# Patient Record
Sex: FEMALE | Race: BLACK OR AFRICAN AMERICAN | NOT HISPANIC OR LATINO
[De-identification: names, ages, dates, MRNs, and addresses within clinical notes are randomized per-mention and may not be internally consistent; named-entity substitution may affect disease eponyms.]

---

## 2016-08-02 RX ORDER — INSULIN LISPRO 100/ML
15 VIAL (ML) SUBCUTANEOUS
Qty: 0 | Refills: 0 | COMMUNITY
Start: 2016-08-02

## 2017-01-11 ENCOUNTER — APPOINTMENT (OUTPATIENT)
Dept: NEUROLOGY | Facility: CLINIC | Age: 57
End: 2017-01-11

## 2017-01-11 VITALS
OXYGEN SATURATION: 98 % | DIASTOLIC BLOOD PRESSURE: 91 MMHG | HEIGHT: 68 IN | HEART RATE: 86 BPM | SYSTOLIC BLOOD PRESSURE: 166 MMHG | RESPIRATION RATE: 16 BRPM

## 2017-01-11 DIAGNOSIS — I63.9 CEREBRAL INFARCTION, UNSPECIFIED: ICD-10-CM

## 2017-04-03 ENCOUNTER — RX RENEWAL (OUTPATIENT)
Age: 57
End: 2017-04-03

## 2017-04-04 ENCOUNTER — RX RENEWAL (OUTPATIENT)
Age: 57
End: 2017-04-04

## 2017-04-05 ENCOUNTER — RX RENEWAL (OUTPATIENT)
Age: 57
End: 2017-04-05

## 2017-04-06 ENCOUNTER — CHART COPY (OUTPATIENT)
Age: 57
End: 2017-04-06

## 2017-06-12 ENCOUNTER — INPATIENT (INPATIENT)
Facility: HOSPITAL | Age: 57
LOS: 2 days | Discharge: HOME | DRG: 74 | End: 2017-06-15
Attending: PSYCHIATRY & NEUROLOGY | Admitting: PSYCHIATRY & NEUROLOGY
Payer: COMMERCIAL

## 2017-06-12 VITALS
DIASTOLIC BLOOD PRESSURE: 86 MMHG | HEART RATE: 88 BPM | SYSTOLIC BLOOD PRESSURE: 144 MMHG | OXYGEN SATURATION: 99 % | TEMPERATURE: 97 F | RESPIRATION RATE: 18 BRPM

## 2017-06-12 DIAGNOSIS — R26.81 UNSTEADINESS ON FEET: ICD-10-CM

## 2017-06-12 DIAGNOSIS — I63.9 CEREBRAL INFARCTION, UNSPECIFIED: ICD-10-CM

## 2017-06-12 DIAGNOSIS — I15.9 SECONDARY HYPERTENSION, UNSPECIFIED: ICD-10-CM

## 2017-06-12 DIAGNOSIS — E11.9 TYPE 2 DIABETES MELLITUS WITHOUT COMPLICATIONS: ICD-10-CM

## 2017-06-12 LAB
ALBUMIN SERPL ELPH-MCNC: 4.7 G/DL — SIGNIFICANT CHANGE UP (ref 3.3–5)
ALP SERPL-CCNC: 73 U/L — SIGNIFICANT CHANGE UP (ref 40–120)
ALT FLD-CCNC: 11 U/L RC — SIGNIFICANT CHANGE UP (ref 10–45)
ANION GAP SERPL CALC-SCNC: 15 MMOL/L — SIGNIFICANT CHANGE UP (ref 5–17)
APPEARANCE UR: CLEAR — SIGNIFICANT CHANGE UP
AST SERPL-CCNC: 13 U/L — SIGNIFICANT CHANGE UP (ref 10–40)
BACTERIA # UR AUTO: ABNORMAL /HPF
BASOPHILS # BLD AUTO: 0 K/UL — SIGNIFICANT CHANGE UP (ref 0–0.2)
BASOPHILS NFR BLD AUTO: 0.3 % — SIGNIFICANT CHANGE UP (ref 0–2)
BILIRUB SERPL-MCNC: 0.4 MG/DL — SIGNIFICANT CHANGE UP (ref 0.2–1.2)
BILIRUB UR-MCNC: NEGATIVE — SIGNIFICANT CHANGE UP
BUN SERPL-MCNC: 23 MG/DL — SIGNIFICANT CHANGE UP (ref 7–23)
CALCIUM SERPL-MCNC: 10.7 MG/DL — HIGH (ref 8.4–10.5)
CHLORIDE SERPL-SCNC: 101 MMOL/L — SIGNIFICANT CHANGE UP (ref 96–108)
CO2 SERPL-SCNC: 26 MMOL/L — SIGNIFICANT CHANGE UP (ref 22–31)
COLOR SPEC: YELLOW — SIGNIFICANT CHANGE UP
COMMENT - URINE: SIGNIFICANT CHANGE UP
CREAT SERPL-MCNC: 1.23 MG/DL — SIGNIFICANT CHANGE UP (ref 0.5–1.3)
DIFF PNL FLD: NEGATIVE — SIGNIFICANT CHANGE UP
EOSINOPHIL # BLD AUTO: 0.3 K/UL — SIGNIFICANT CHANGE UP (ref 0–0.5)
EOSINOPHIL NFR BLD AUTO: 3.2 % — SIGNIFICANT CHANGE UP (ref 0–6)
EPI CELLS # UR: SIGNIFICANT CHANGE UP /HPF
GAS PNL BLDV: SIGNIFICANT CHANGE UP
GLUCOSE SERPL-MCNC: 131 MG/DL — HIGH (ref 70–99)
GLUCOSE UR QL: NEGATIVE — SIGNIFICANT CHANGE UP
HCT VFR BLD CALC: 34.2 % — LOW (ref 34.5–45)
HGB BLD-MCNC: 11.5 G/DL — SIGNIFICANT CHANGE UP (ref 11.5–15.5)
KETONES UR-MCNC: NEGATIVE — SIGNIFICANT CHANGE UP
LEUKOCYTE ESTERASE UR-ACNC: ABNORMAL
LYMPHOCYTES # BLD AUTO: 2.4 K/UL — SIGNIFICANT CHANGE UP (ref 1–3.3)
LYMPHOCYTES # BLD AUTO: 30 % — SIGNIFICANT CHANGE UP (ref 13–44)
MAGNESIUM SERPL-MCNC: 1.9 MG/DL — SIGNIFICANT CHANGE UP (ref 1.6–2.6)
MCHC RBC-ENTMCNC: 29.2 PG — SIGNIFICANT CHANGE UP (ref 27–34)
MCHC RBC-ENTMCNC: 33.6 GM/DL — SIGNIFICANT CHANGE UP (ref 32–36)
MCV RBC AUTO: 86.9 FL — SIGNIFICANT CHANGE UP (ref 80–100)
MONOCYTES # BLD AUTO: 0.5 K/UL — SIGNIFICANT CHANGE UP (ref 0–0.9)
MONOCYTES NFR BLD AUTO: 5.8 % — SIGNIFICANT CHANGE UP (ref 2–14)
NEUTROPHILS # BLD AUTO: 4.8 K/UL — SIGNIFICANT CHANGE UP (ref 1.8–7.4)
NEUTROPHILS NFR BLD AUTO: 60.6 % — SIGNIFICANT CHANGE UP (ref 43–77)
NITRITE UR-MCNC: NEGATIVE — SIGNIFICANT CHANGE UP
PH UR: 5.5 — SIGNIFICANT CHANGE UP (ref 5–8)
PHOSPHATE SERPL-MCNC: 3.5 MG/DL — SIGNIFICANT CHANGE UP (ref 2.5–4.5)
PLATELET # BLD AUTO: 223 K/UL — SIGNIFICANT CHANGE UP (ref 150–400)
POTASSIUM SERPL-MCNC: 4.1 MMOL/L — SIGNIFICANT CHANGE UP (ref 3.5–5.3)
POTASSIUM SERPL-SCNC: 4.1 MMOL/L — SIGNIFICANT CHANGE UP (ref 3.5–5.3)
PROT SERPL-MCNC: 8.5 G/DL — HIGH (ref 6–8.3)
PROT UR-MCNC: SIGNIFICANT CHANGE UP
RBC # BLD: 3.94 M/UL — SIGNIFICANT CHANGE UP (ref 3.8–5.2)
RBC # FLD: 11.5 % — SIGNIFICANT CHANGE UP (ref 10.3–14.5)
RBC CASTS # UR COMP ASSIST: SIGNIFICANT CHANGE UP /HPF (ref 0–2)
SODIUM SERPL-SCNC: 142 MMOL/L — SIGNIFICANT CHANGE UP (ref 135–145)
SP GR SPEC: 1.02 — SIGNIFICANT CHANGE UP (ref 1.01–1.02)
TSH SERPL-MCNC: 0.92 UIU/ML — SIGNIFICANT CHANGE UP (ref 0.27–4.2)
UROBILINOGEN FLD QL: NEGATIVE — SIGNIFICANT CHANGE UP
WBC # BLD: 8 K/UL — SIGNIFICANT CHANGE UP (ref 3.8–10.5)
WBC # FLD AUTO: 8 K/UL — SIGNIFICANT CHANGE UP (ref 3.8–10.5)
WBC UR QL: SIGNIFICANT CHANGE UP /HPF (ref 0–5)

## 2017-06-12 PROCEDURE — 99285 EMERGENCY DEPT VISIT HI MDM: CPT | Mod: 25

## 2017-06-12 PROCEDURE — 71010: CPT | Mod: 26

## 2017-06-12 PROCEDURE — 93010 ELECTROCARDIOGRAM REPORT: CPT

## 2017-06-12 PROCEDURE — 70450 CT HEAD/BRAIN W/O DYE: CPT | Mod: 26

## 2017-06-12 RX ORDER — FLUOXETINE HCL 10 MG
20 CAPSULE ORAL DAILY
Qty: 0 | Refills: 0 | Status: DISCONTINUED | OUTPATIENT
Start: 2017-06-12 | End: 2017-06-15

## 2017-06-12 RX ORDER — DEXTROSE 50 % IN WATER 50 %
25 SYRINGE (ML) INTRAVENOUS ONCE
Qty: 0 | Refills: 0 | Status: DISCONTINUED | OUTPATIENT
Start: 2017-06-12 | End: 2017-06-15

## 2017-06-12 RX ORDER — ENOXAPARIN SODIUM 100 MG/ML
40 INJECTION SUBCUTANEOUS DAILY
Qty: 0 | Refills: 0 | Status: DISCONTINUED | OUTPATIENT
Start: 2017-06-12 | End: 2017-06-15

## 2017-06-12 RX ORDER — ATORVASTATIN CALCIUM 80 MG/1
80 TABLET, FILM COATED ORAL AT BEDTIME
Qty: 0 | Refills: 0 | Status: DISCONTINUED | OUTPATIENT
Start: 2017-06-12 | End: 2017-06-15

## 2017-06-12 RX ORDER — DEXTROSE 50 % IN WATER 50 %
12.5 SYRINGE (ML) INTRAVENOUS ONCE
Qty: 0 | Refills: 0 | Status: DISCONTINUED | OUTPATIENT
Start: 2017-06-12 | End: 2017-06-15

## 2017-06-12 RX ORDER — SODIUM CHLORIDE 9 MG/ML
1000 INJECTION, SOLUTION INTRAVENOUS
Qty: 0 | Refills: 0 | Status: DISCONTINUED | OUTPATIENT
Start: 2017-06-12 | End: 2017-06-15

## 2017-06-12 RX ORDER — SODIUM CHLORIDE 9 MG/ML
1000 INJECTION INTRAMUSCULAR; INTRAVENOUS; SUBCUTANEOUS ONCE
Qty: 0 | Refills: 0 | Status: COMPLETED | OUTPATIENT
Start: 2017-06-12 | End: 2017-06-12

## 2017-06-12 RX ORDER — DEXTROSE 50 % IN WATER 50 %
1 SYRINGE (ML) INTRAVENOUS ONCE
Qty: 0 | Refills: 0 | Status: DISCONTINUED | OUTPATIENT
Start: 2017-06-12 | End: 2017-06-15

## 2017-06-12 RX ORDER — INSULIN LISPRO 100/ML
VIAL (ML) SUBCUTANEOUS
Qty: 0 | Refills: 0 | Status: DISCONTINUED | OUTPATIENT
Start: 2017-06-12 | End: 2017-06-15

## 2017-06-12 RX ORDER — GLUCAGON INJECTION, SOLUTION 0.5 MG/.1ML
1 INJECTION, SOLUTION SUBCUTANEOUS ONCE
Qty: 0 | Refills: 0 | Status: DISCONTINUED | OUTPATIENT
Start: 2017-06-12 | End: 2017-06-15

## 2017-06-12 RX ORDER — AMLODIPINE BESYLATE 2.5 MG/1
5 TABLET ORAL DAILY
Qty: 0 | Refills: 0 | Status: DISCONTINUED | OUTPATIENT
Start: 2017-06-12 | End: 2017-06-15

## 2017-06-12 RX ORDER — CLOPIDOGREL BISULFATE 75 MG/1
75 TABLET, FILM COATED ORAL DAILY
Qty: 0 | Refills: 0 | Status: DISCONTINUED | OUTPATIENT
Start: 2017-06-12 | End: 2017-06-15

## 2017-06-12 RX ADMIN — SODIUM CHLORIDE 1000 MILLILITER(S): 9 INJECTION INTRAMUSCULAR; INTRAVENOUS; SUBCUTANEOUS at 16:11

## 2017-06-12 NOTE — CONSULT NOTE ADULT - SUBJECTIVE AND OBJECTIVE BOX
Neurology Consult    Patient is a 57y old  Female who presents with a chief complaint of unsteady gait    HPI:  57yoF pmh DM2, HTN, CVA/TIA (s/p loop monitor, on plavix, residual R sided weakness) comes to ED noting 2-3 weeks of gen weakness/fatigue and 1-2 day of unsteady walking. As per patient has minor residual R sided weakness, but over past few days feels off balance when walking. Denies any falls, headache, blurred vision, dysarthria, or facial droop. Patient has been following Dr. Echevarria and Dr. Del Angel as o/p for vascular work up and loop monitoring which has been uneventful. Patient endorses also feeling dehydrated, and less ambulatory recently.    NIHSS MRS 1      PAST MEDICAL & SURGICAL HISTORY:  Hypokalemia  Diabetes Mellitus  Personal History of Hypertension  IBS (Irritable Bowel Syndrome)  Generalized Headaches  Diabetes Mellitus Type II  Hypertension  History of total knee replacement, right  No Past Surgical History      Allergies    sulfa drugs (Angioedema; Swelling)  sulfa drugs (Rash)  Sulfac 10% (Unknown)    Intolerances        MEDICATIONS  (STANDING):    MEDICATIONS  (PRN):      Social History: Denies tob, EtOH use     FAMILY HISTORY:  Family history of acute myocardial infarction (Father)      Review of Systems:  CONSTITUTIONAL:  No weight loss, fever, chills, weakness or fatigue.  HEENT:  Eyes:  No visual loss, blurred vision, double vision or yellow sclerae. Ears, Nose, Throat:  No hearing loss, sneezing, congestion, runny nose or sore throat.  SKIN:  No rash or itching.  CARDIOVASCULAR:  No chest pain, chest pressure or chest discomfort. No palpitations or edema.  RESPIRATORY:  No shortness of breath, cough or sputum.  GASTROINTESTINAL:  No anorexia, nausea, vomiting or diarrhea. No abdominal pain or blood.  GENITOURINARY:  Burning on urination. Pregnancy. Last menstrual period, MM/DD/YYYY.  NEUROLOGICAL:  No headache, dizziness, syncope, paralysis, ataxia, numbness or tingling in the extremities. No change in bowel or bladder control.  MUSCULOSKELETAL:  No muscle, back pain, joint pain or stiffness.  HEMATOLOGIC:  No anemia, bleeding or bruising.  LYMPHATICS:  No enlarged nodes. No history of splenectomy.  PSYCHIATRIC:  No history of depression or anxiety.  ENDOCRINOLOGIC:  No reports of sweating, cold or heat intolerance. No polyuria or polydipsia.      Physical Exam:   Vital Signs Last 24 Hrs  T(C): 36.1, Max: 36.1 ( @ 12:46)  T(F): 97, Max: 97 ( @ 12:46)  HR: 88 (88 - 88)  BP: 144/86 (144/86 - 144/86)  BP(mean): --  RR: 18 (18 - 18)  SpO2: 99% (99% - 99%)    General Exam:   General appearance: No acute distress                 Neurological Exam:  Mental Status: AAOx3, fluent speech, follows simple commands, able to name  Cranial Nerves: EOMI, PERRL, VFF, V1-V3 intact, facial symmetric, no dysarthria, tongue midline  Motor: strength 5/5 throughout. No drift x4  Sensation: Intact to LT throughout  Coordination: FTN intact b/l  Reflexes: 1+ bilateral biceps, brachioradialis, patellar and ankle  Gait: normal and stable.      Labs:                           11.5   8.0   )-----------( 223      ( 2017 16:24 )             34.2     -12    142  |  101  |  23  ----------------------------<  131<H>  4.1   |  26  |  1.23    Ca    10.7<H>      2017 16:24  Phos  3.5     -12  Mg     1.9     12    TPro  8.5<H>  /  Alb  4.7  /  TBili  0.4  /  DBili  x   /  AST  13  /  ALT  11  /  AlkPhos  73  06-12    LIVER FUNCTIONS - ( 2017 16:24 )  Alb: 4.7 g/dL / Pro: 8.5 g/dL / ALK PHOS: 73 U/L / ALT: 11 U/L RC / AST: 13 U/L / GGT: x             Urinalysis Basic - ( 2017 18:22 )    Color: Yellow / Appearance: Clear / S.023 / pH: x  Gluc: x / Ketone: Negative  / Bili: Negative / Urobili: Negative   Blood: x / Protein: Trace / Nitrite: Negative   Leuk Esterase: Small / RBC: 0-2 /HPF / WBC 3-5 /HPF   Sq Epi: x / Non Sq Epi: OCC /HPF / Bacteria: Few /HPF

## 2017-06-12 NOTE — ED PROVIDER NOTE - ATTENDING CONTRIBUTION TO CARE
****ATTENDING**** 56yo f hx CVA on plavix, DM, HTN pw weakness. Patient states she had a viral infection 10 days ago and since than has diffuse join pain, muscle cramps and increasing R sided weakness. States she has had residual weakness since last year now worsened over 10 days. No ha, neck pain, visual changes. No change in bladder or bowel. On exam, Patient is awake,alert,oriented x 3. Patient is well appearing and in no acute distress. Patient's chest is clear to ausculation, +s1s2. Abdomen is soft nd/nt +BS. Extremity with no swelling or calf tenderness. Joints no swelling. RUE 5/5, RLE 5/5, Gait with increasing foot dragging on RLE.   Patient well appearing, feels she may be dehydrated causing worsening weakness. Check Labs, CT head, Treat with IV fluids and re eval.

## 2017-06-12 NOTE — ED ADULT NURSE NOTE - PMH
Diabetes Mellitus Type II    Generalized Headaches    Hypertension    Hypokalemia    IBS (Irritable Bowel Syndrome)

## 2017-06-12 NOTE — H&P ADULT - NSHPLABSRESULTS_GEN_ALL_CORE
No CT evidence for acute infarct or acute hemorrhage. A chronic left   pontine/midbrain infarct is noted. If the patient has new and persistent   symptoms, consider short interval follow-up head CT or brain MRI   follow-up.

## 2017-06-12 NOTE — ED ADULT NURSE NOTE - OBJECTIVE STATEMENT
pt presents with unsteady gait, and "not feeling herself" x 2 weeks progressively becoming worse, pt also c/o joint pain, pt A;Ox3, no acute resp distress noted ,no URI symptoms,  v/s stable, no abd pain, reports watery stools x 2, no blood noted, voids freely, no UTI symptoms noted will continue to monitor

## 2017-06-12 NOTE — H&P ADULT - PROBLEM SELECTOR PLAN 1
Ct H negative  MRI Brain w/o contrast  MRA H/N  TTE  Hba1c/Lipid Profile  PT/OT  Neurochecks q4h  If MRI negative, consider EMG and foot brace

## 2017-06-12 NOTE — H&P ADULT - ATTENDING COMMENTS
I have seen and examined this patient with the stroke neurology team.     History was reviewed with the patient and available family members.   ROS: All negative except documented in the HPI.   Neurological exam was performed and agree with exam as documented above.   Laboratory results and imaging studies were reviewed by me.   I agree with the neurology resident note as documented above.    58 Y/O woman with multiple vascular risk factors including HTN, DM II, age and stroke in 2016 with residual R sided weakness is admitted to [facility] for evaluation and management of R foot weakness. She reports to have noticed R foot weakness – mainly described as inability to lift the foot up off the ground while walking since last week. Her R arm and proximal R leg weakness is reported to be the same as before and she also denies any recent change in the bowel or bladder habits. Her neurological exam today shows mild RUE weakness (5-/5), RLE weakness (HF 5/5, KE 5/5, KF 4+/5, FDF 3/5, Foot inv and daniela 4-/5 and FPF 4/5) and decreased PP over the dorsum of the R foot. MRI brain did not show any acute stroke but showed evidence of old midbrain/pontine stroke with wallerian degeneration changes as well as multiple old micro-hemorrhages in the deep  territories. MRA H/N did not show any intracranial or extracranial large vessel severe stenosis or occlusion. Impression: R foot weakness – likely etiology being L4-L5 radiculopathy once the possibility of MICROS (Metabolic insufficiency causing reactivation of old stroke symptoms) is ruled out. Plan: Continue with Plavix and atorvastatin as per home dose for secondary stroke prevention. Continue with aggressive vascular risk factors modifications. PT/OT/Speech and swallow evaluation. MRI L spine to look for evidence of spinal canal stenosis or neural foraminal narrowing.     Above mentioned plan was discussed with patient in detail. All the questions were answered and concerns were addressed.

## 2017-06-12 NOTE — H&P ADULT - HISTORY OF PRESENT ILLNESS
57yoF pmh DM2, HTN, CVA/TIA (s/p loop monitor, on plavix, residual R sided weakness) comes to ED noting 2-3 weeks of gen weakness/fatigue and 1-2 day of unsteady walking. As per patient has minor residual R sided weakness, but over past few days feels off balance when walking. Patient had fall earlier this week 2/2 weakness. Denies any falls, headache, blurred vision, dysarthria, or facial droop.She does endorse joint pains and fatigue over past week.  Patient has been following Dr. Echevarria and Dr. Del Angel as o/p for vascular work up and loop monitoring which has been uneventful. Patient endorses also feeling dehydrated, and less ambulatory recently.    NIHSS MRS 1 57yoF pmh DM2, HTN, CVA/TIA (s/p loop monitor, on plavix, residual R sided weakness) comes to ED noting 2-3 weeks of gen weakness/fatigue and 1-2 day of unsteady walking. As per patient has minor residual R sided weakness, but over past few days feels off balance when walking. Patient had fall earlier this week 2/2 weakness. Denies any falls, headache, blurred vision, dysarthria, or facial droop.She does endorse joint pains and fatigue over past week.  Patient has been following Dr. Echevarria and Dr. Del Angel as o/p for vascular work up and loop monitoring which has been uneventful. Patient endorses also feeling dehydrated, and less ambulatory recently.    NIHSS3  MRS 1 57yoF pmh DM2, HTN, CVA/TIA (s/p loop monitor, on plavix, residual R sided weakness) comes to ED noting 2-3 weeks of gen weakness/fatigue and 1-2 day of unsteady walking. As per patient has minor residual R sided weakness, but over past few days feels off balance when walking. Patient had fall earlier this week 2/2 weakness. Denies,headache, blurred vision, dysarthria, or facial droop. She does endorse joint pains and fatigue over past week.  Patient has been following Dr. Echevarria and Dr. Del Angel as o/p for vascular work up and loop monitoring which has been uneventful. Patient endorses also feeling dehydrated, and less ambulatory recently.    NIHSS3  MRS 1

## 2017-06-12 NOTE — H&P ADULT - NSHPPHYSICALEXAM_GEN_ALL_CORE
Awake and alert  AOX3, follows commands, normal speech, normal cognition, able to name/repeat/comprehend  EOMi, PERRLA, no nystagmus, no APD,  VVF, V1-V3 normal, no facial assymetry, normal shrug, tongue midline  RUE proximal 4/5, distal 4/5, RLE proximal 4+/5, distal 4/5, + drift, LUE 5/5, LLE 5/5, hyperreflexia 2+ on R, trace on Left side.   Sensation intact to light and deep tough throughout, vibration intact   Gait unsteady 2/2 weakness.

## 2017-06-12 NOTE — H&P ADULT - ASSESSMENT
57yoF pmh DM2, HTN, CVA/TIA (s/p loop monitor, on plavix, residual R sided weakness) comes to ED noting 2-3 weeks of gen weakness/fatigue and 1-2 day of unsteady walking. Patient with h/o CVA L pontine s/p loop, with increase RLE weakness and unsteady gait.  R/o CVA vs. peripheral peroneal nerve palsy.

## 2017-06-12 NOTE — CONSULT NOTE ADULT - ASSESSMENT
57yoF pmh DM2, HTN, CVA/TIA (s/p loop monitor, on plavix, residual R sided weakness) comes to ED noting 2-3 weeks of gen weakness/fatigue and 1-2 day of unsteady walking.

## 2017-06-12 NOTE — ED ADULT NURSE REASSESSMENT NOTE - NS ED NURSE REASSESS COMMENT FT1
patient is resting in the room waiting for dispo. neurology just came to exam the patient. patient states the weakness resolved after the IV fluids. patient is a/ox4. states being back at her baseline. able to move all extremities. denies any fevers, chills, chest pain, sob. patient is slightly htn md is aware.

## 2017-06-12 NOTE — ED PROVIDER NOTE - OBJECTIVE STATEMENT
Detail Level: Zone 57yoF pmh DM2, HTN, CVA/TIA (plaviv, with resiula rue/rle weakenss) comes to ED noting 2-3 weeks of gen weakness/fatigue and 1-2 day of unsteady walking. pt reports no h/a, no loc, no trauma, pt reports decreased appeitite and intermittent watry diarrhea over aopt month. In ED pt reports no f/c, no n/v, no abd pain, no cp/sob/palp/cough, no neck pain, n dysuria. Pt does endorse diffuse body/joint aches. pt reports no known insect/tick bite.

## 2017-06-13 LAB
CULTURE RESULTS: SIGNIFICANT CHANGE UP
SPECIMEN SOURCE: SIGNIFICANT CHANGE UP

## 2017-06-13 PROCEDURE — 72148 MRI LUMBAR SPINE W/O DYE: CPT | Mod: 26

## 2017-06-13 PROCEDURE — 70551 MRI BRAIN STEM W/O DYE: CPT | Mod: 26

## 2017-06-13 PROCEDURE — 99223 1ST HOSP IP/OBS HIGH 75: CPT | Mod: GC

## 2017-06-13 PROCEDURE — 70549 MR ANGIOGRAPH NECK W/O&W/DYE: CPT | Mod: 26

## 2017-06-13 RX ORDER — LISINOPRIL 2.5 MG/1
40 TABLET ORAL DAILY
Qty: 0 | Refills: 0 | Status: DISCONTINUED | OUTPATIENT
Start: 2017-06-13 | End: 2017-06-15

## 2017-06-13 RX ORDER — ASPIRIN/CALCIUM CARB/MAGNESIUM 324 MG
81 TABLET ORAL DAILY
Qty: 0 | Refills: 0 | Status: DISCONTINUED | OUTPATIENT
Start: 2017-06-13 | End: 2017-06-15

## 2017-06-13 RX ADMIN — Medication 81 MILLIGRAM(S): at 14:02

## 2017-06-13 RX ADMIN — Medication 20 MILLIGRAM(S): at 12:23

## 2017-06-13 RX ADMIN — LISINOPRIL 40 MILLIGRAM(S): 2.5 TABLET ORAL at 14:05

## 2017-06-13 RX ADMIN — AMLODIPINE BESYLATE 5 MILLIGRAM(S): 2.5 TABLET ORAL at 06:02

## 2017-06-13 RX ADMIN — ENOXAPARIN SODIUM 40 MILLIGRAM(S): 100 INJECTION SUBCUTANEOUS at 12:23

## 2017-06-13 RX ADMIN — ATORVASTATIN CALCIUM 80 MILLIGRAM(S): 80 TABLET, FILM COATED ORAL at 21:53

## 2017-06-13 RX ADMIN — Medication 1: at 14:02

## 2017-06-13 RX ADMIN — CLOPIDOGREL BISULFATE 75 MILLIGRAM(S): 75 TABLET, FILM COATED ORAL at 12:23

## 2017-06-13 RX ADMIN — Medication 1: at 18:14

## 2017-06-13 NOTE — DISCHARGE NOTE ADULT - CARE PROVIDER_API CALL
Jonah Del Angel (DO), Neurology; Vascular Neurology  [address] [facility] [address] [facility] [address]  [place] [address]  Phone: [phone]  Fax: [phone]

## 2017-06-13 NOTE — DISCHARGE NOTE ADULT - MEDICATION SUMMARY - MEDICATIONS TO TAKE
I will START or STAY ON the medications listed below when I get home from the hospital:    lisinopril 40 mg oral tablet  -- 1 tab(s) by mouth once a day  -- Indication: For Hypertension    FLUoxetine 20 mg oral capsule  -- 1 cap(s) by mouth once a day  -- Indication: For Anxiety    Lantus 100 units/mL subcutaneous solution  -- 15 unit(s) subcutaneous once a day (at bedtime)  -- Indication: For Diabetes    metFORMIN 500 mg oral tablet  -- 1 tab(s) by mouth 2 times a day  -- Indication: For Diabetes    atorvastatin 80 mg oral tablet  -- 1 tab(s) by mouth once a day  -- Indication: For Stroke    clopidogrel 75 mg oral tablet  -- 1 tab(s) by mouth once a day  -- Indication: For Stroke    labetalol 200 mg oral tablet  -- 2 tab(s) by mouth 3 times a day  -- Indication: For Hypertension    amLODIPine 5 mg oral tablet  -- 1 tab(s) by mouth once a day  -- Indication: For Hypertension    baclofen 10 mg oral tablet  -- 1 tab(s) by mouth 3 times a day  -- Indication: For Pain/Spasm    hydrALAZINE 50 mg oral tablet  -- 1 tab(s) by mouth 3 times a day  -- Indication: For Hypertension I will START or STAY ON the medications listed below when I get home from the hospital:    1  -- Physical Therapy  2-3X/week  Dx. Peripheral neuropathy  -- Indication: For neuroapthy    lisinopril 40 mg oral tablet  -- 1 tab(s) by mouth once a day  -- Indication: For Hypertension    FLUoxetine 20 mg oral capsule  -- 1 cap(s) by mouth once a day  -- Indication: For Anxiety    Lantus 100 units/mL subcutaneous solution  -- 15 unit(s) subcutaneous once a day (at bedtime)  -- Indication: For Diabetes    metFORMIN 500 mg oral tablet  -- 1 tab(s) by mouth 2 times a day  -- Indication: For Diabetes    atorvastatin 80 mg oral tablet  -- 1 tab(s) by mouth once a day  -- Indication: For Stroke    clopidogrel 75 mg oral tablet  -- 1 tab(s) by mouth once a day  -- Indication: For Stroke    labetalol 200 mg oral tablet  -- 2 tab(s) by mouth 3 times a day  -- Indication: For Hypertension    amLODIPine 5 mg oral tablet  -- 1 tab(s) by mouth once a day  -- Indication: For Hypertension    baclofen 10 mg oral tablet  -- 1 tab(s) by mouth 3 times a day  -- Indication: For Pain/Spasm    hydrALAZINE 50 mg oral tablet  -- 1 tab(s) by mouth 3 times a day  -- Indication: For Hypertension

## 2017-06-13 NOTE — OCCUPATIONAL THERAPY INITIAL EVALUATION ADULT - LIVES WITH, PROFILE
children/Lives in house with  and 5 children 3 steps to enter with no railing, bed/bath 13 steps/spouse

## 2017-06-13 NOTE — DISCHARGE NOTE ADULT - HOSPITAL COURSE
57yoF pmh DM2, HTN, CVA/TIA (s/p loop monitor, on plavix, residual R sided weakness) comes to ED noting 2-3 weeks of gen weakness/fatigue and 1-2 day of unsteady walking. As per patient has minor residual R sided weakness, but over past few days feels off balance when walking. Patient had fall earlier this week 2/2 weakness. Denies,headache, blurred vision, dysarthria, or facial droop.  Patient was admitted to the neurology unit for observation and work up. Work up included MRI Brain demonstrating no acute stroke, vessel imaging via MRA head/neck demonstrating mild to moderate stenosis.. Patient underwent further evaluation via MRI L/S spine , showing ________. Etiology of patient's symptoms of RLE weakness and unsteady gait were secondary to likely to peripheral neuropathy. Patient underwent blood pressure management, preventative care, and physical therapy recommeding _____________during course of stay. Patient is now stable for follow up as outpatient in 2-4 weeks with Neurology. Patient is to continue medications as directed for stroke prevention. 57yoF pmh DM2, HTN, CVA/TIA (s/p loop monitor, on plavix, residual R sided weakness) comes to ED noting 2-3 weeks of gen weakness/fatigue and 1-2 day of unsteady walking. As per patient has minor residual R sided weakness, but over past few days feels off balance when walking. Patient had fall earlier this week 2/2 weakness. Denies,headache, blurred vision, dysarthria, or facial droop.  Patient was admitted to the neurology unit for observation and work up. Work up included MRI Brain demonstrating no new acute stroke, vessel imaging via MRA head/neck demonstrating mild to moderate stenosis.. Patient underwent further evaluation via MRI L/S spine , showing ________, no acute surgical lesion. Etiology of patient's symptoms of RLE weakness and unsteady gait were secondary to likely to peripheral neuropathy related to DMII, superimposed on baseline right paresis due to stroke.  Pt with diffuse right sided weakness, though now with c/o ankle DF weakness, numbness on dorsum of foot.  Some weakness of eversion, and possibly even glut, raising possibility of common peroneal vs L5 radiculopathy.   Patient underwent blood pressure management, preventative care, and physical therapy recommending _____________during course of stay. Patient is now stable for follow up as outpatient in 2-4 weeks with Neurology. Patient is to continue medications as directed for stroke prevention.  Rec EMG as outpt for clarification of new sensorimotor peripheral findings vs chronic stroke related findings. 57yoF pmh DM2, HTN, CVA/TIA (s/p loop monitor, on plavix, residual R sided weakness) comes to ED noting 2-3 weeks of gen weakness/fatigue and 1-2 day of unsteady walking. As per patient has minor residual R sided weakness, but over past few days feels off balance when walking. Patient had fall earlier this week 2/2 weakness. Denies,headache, blurred vision, dysarthria, or facial droop.  Patient was admitted to the neurology unit for observation and work up. Work up included MRI Brain demonstrating no new acute stroke, vessel imaging via MRA head/neck demonstrating mild to moderate stenosis.. Patient underwent further evaluation via MRI L/S spine , showing Mild degenerative changes without high-grade central canal and neural foraminal comment [place].  Possible T12/L1 right parasagittal disc herniation, no acute surgical lesion. Etiology of patient's symptoms of RLE weakness and unsteady gait were secondary to likely to peripheral neuropathy related to DMII, superimposed on baseline right paresis due to stroke.  Pt with diffuse right sided weakness, though now with c/o ankle DF weakness, numbness on dorsum of foot.  Some weakness of eversion, and possibly even glut, raising possibility of common peroneal vs L5 radiculopathy.   Patient underwent blood pressure management, preventative care, and physical therapy recommending Home with o/p PT and cane for stairs.Patient is now stable for follow up as outpatient in 2-4 weeks with Neurology. Patient is to continue medications as directed for stroke prevention. EMG as outpt for clarification of new sensorimotor peripheral findings vs chronic stroke related findings. 57yoF pmh DM2, HTN, CVA/TIA (s/p loop monitor, on plavix, residual R sided weakness) comes to ED noting 2-3 weeks of gen weakness/fatigue and 1-2 day of unsteady walking. As per patient has minor residual R sided weakness, but over past few days feels off balance when walking. Patient had fall earlier this week 2/2 weakness. Denies,headache, blurred vision, dysarthria, or facial droop.  Patient was admitted to the neurology unit for observation and work up. Work up included MRI Brain demonstrating no new acute stroke, vessel imaging via MRA head/neck demonstrating mild to moderate stenosis.. Patient underwent further evaluation via MRI L/S spine , showing Mild degenerative changes without high-grade central canal and neural foraminal comment [place]. Patient fitted for brace to follow up as o/p for AFO brace.   Possible T12/L1 right parasagittal disc herniation, no acute surgical lesion. Etiology of patient's symptoms of RLE weakness and unsteady gait were secondary to likely to peripheral neuropathy related to DMII, superimposed on baseline right paresis due to stroke.  Pt with diffuse right sided weakness, though now with c/o ankle DF weakness, numbness on dorsum of foot.  Some weakness of eversion, and possibly even glut, raising possibility of common peroneal vs L5 radiculopathy.   Patient underwent blood pressure management, preventative care, and physical therapy recommending Home with o/p PT and cane for stairs.Patient is now stable for follow up as outpatient in 2-4 weeks with Neurology. Patient is to continue medications as directed for stroke prevention. EMG as outpt for clarification of new sensorimotor peripheral findings vs chronic stroke related findings.

## 2017-06-13 NOTE — OCCUPATIONAL THERAPY INITIAL EVALUATION ADULT - ADDITIONAL COMMENTS
MRI Head Redemonstration of  foci of hyperintense T2 foci signal throughout the white matter and lacunar infarcts with multiple abnormal foci of susceptibility on SWI, likely sequela of microvascular disease with   possible prior microhemorrhage or calcification. The size and number has increased slightly compared to the most recent study and with more profound increased when compared to more remote examinations, 8/6/2012. There is no  new restricted diffusion, new extra-axial hemorrhage or midline shift.

## 2017-06-13 NOTE — OCCUPATIONAL THERAPY INITIAL EVALUATION ADULT - ANTICIPATED DISCHARGE DISPOSITION, OT EVAL
Home with assistance/supervision as needed from family, no skilled OT needs, functioning at baseline

## 2017-06-13 NOTE — DISCHARGE NOTE ADULT - PLAN OF CARE
prevention C/w PT/OT as tolerated  F/u o/p Neurology C/w Plavix/Lipitor  F/u o/p Vascular neurology Hba1c <7 C/w home medications  Frequent FS

## 2017-06-13 NOTE — DISCHARGE NOTE ADULT - NS AS DC STROKE ED MATERIALS
Call 911 for Stroke/Prescribed Medications/Need for Followup After Discharge/Stroke Education Booklet/Stroke Warning Signs and Symptoms/Risk Factors for Stroke

## 2017-06-13 NOTE — DISCHARGE NOTE ADULT - PATIENT PORTAL LINK FT
“You can access the [facility] Patient Portal, offered by [facility], by registering with the following website: [email or web address]”

## 2017-06-13 NOTE — DISCHARGE NOTE ADULT - SECONDARY DIAGNOSIS.
Cerebrovascular accident (CVA), unspecified mechanism Controlled type 2 diabetes mellitus without complication, with long-term current use of insulin

## 2017-06-13 NOTE — PROGRESS NOTE ADULT - SUBJECTIVE AND OBJECTIVE BOX
LOOP recorder interrogation   Prior to MRI  NSR in 80's  no new episodes since last remote down load on June 8,2017  one saved episode prior to that noted  may 6 2017 high rate to 162  normal loop recorder function noted

## 2017-06-13 NOTE — DISCHARGE NOTE ADULT - CARE PLAN
Principal Discharge DX:	Unsteady gait  Goal:	prevention  Instructions for follow-up, activity and diet:	C/w PT/OT as tolerated  F/u o/p Neurology  Secondary Diagnosis:	Cerebrovascular accident (CVA), unspecified mechanism  Goal:	prevention  Instructions for follow-up, activity and diet:	C/w Plavix/Lipitor  F/u o/p Vascular neurology  Secondary Diagnosis:	Controlled type 2 diabetes mellitus without complication, with long-term current use of insulin  Goal:	Hba1c <7  Instructions for follow-up, activity and diet:	C/w home medications  Frequent FS

## 2017-06-13 NOTE — DISCHARGE NOTE ADULT - OTHER SIGNIFICANT FINDINGS
Redemonstration of  foci of hyperintense T2 foci signal throughout the   white matter and lacunar infarcts with multiple abnormal foci of   susceptibility on SWI, likely sequela of microvascular disease with   possible prior microhemorrhage or calcification. The size and number has   increased slightly compared to the most recent study and with more   profound increased when compared to more remote examinations, 8/6/2012.   There is no  new restricted diffusion, new extra-axial hemorrhage or   midline shift.    Tortuous extracranial circulation, likely from hypertension without    hemodynamically significant stenosis at the ICA origins. There are   multifocal stenoses of the  anterior, middle, and posterior cerebral   artery branches Redemonstration of  foci of hyperintense T2 foci signal throughout the   white matter and lacunar infarcts with multiple abnormal foci of   susceptibility on SWI, likely sequela of microvascular disease with   possible prior microhemorrhage or calcification. The size and number has   increased slightly compared to the most recent study and with more   profound increased when compared to more remote examinations, 8/6/2012.   There is no  new restricted diffusion, new extra-axial hemorrhage or   midline shift.    Tortuous extracranial circulation, likely from hypertension without      MRI L spine: Mild degenerative changes without high-grade central canal and neural   foraminal comment [place].  Possible T12/L1 right parasagittal disc herniation  hemodynamically significant stenosis at the ICA origins. There are   multifocal stenoses of the  anterior, middle, and posterior cerebral   artery branches

## 2017-06-13 NOTE — CHART NOTE - NSCHARTNOTEFT_GEN_A_CORE
7yoF pmh DM2, HTN, CVA/TIA (s/p loop monitor, on plavix, residual R sided weakness) comes to ED noting 2-3 weeks of gen weakness/fatigue and 1-2 day of unsteady walking. As per patient has minor residual R sided weakness, but over past few days feels off balance when walking. Patient had fall earlier this week 2/2 weakness. Denies,headache, blurred vision, dysarthria, or facial droop. She does endorse joint pains and fatigue over past week.  Patient has been following Dr. Echevarria and Dr. Del Angel as o/p for vascular work up and loop monitoring which has been uneventful. Patient endorses also feeling dehydrated, and less ambulatory recently.    NIHSS3  MRS 1    MRI head negative for evidence of stroke. Pt transferred to the general neurology service.    Plan : MRI lumbar spine for evaluation of foot drop. OT consult for splint of Right foot.

## 2017-06-13 NOTE — OCCUPATIONAL THERAPY INITIAL EVALUATION ADULT - PERTINENT HX OF CURRENT PROBLEM, REHAB EVAL
57yoF comes to ED noting 2-3 weeks of gen weakness/fatigue and 1-2 day of unsteady walking. As per patient has minor residual R sided weakness, but over past few days feels off balance when walking. Patient had fall earlier this week 2/2 weakness.  Patient endorses also feeling dehydrated, and less ambulatory recently.  NIHSS3  MRS 1

## 2017-06-14 DIAGNOSIS — E11.9 TYPE 2 DIABETES MELLITUS WITHOUT COMPLICATIONS: ICD-10-CM

## 2017-06-14 LAB
CHOLEST SERPL-MCNC: 220 MG/DL — HIGH (ref 10–199)
HBA1C BLD-MCNC: 6.9 % — HIGH (ref 4–5.6)
HDLC SERPL-MCNC: 49 MG/DL — SIGNIFICANT CHANGE UP (ref 40–125)
LIPID PNL WITH DIRECT LDL SERPL: 154 MG/DL — HIGH
TOTAL CHOLESTEROL/HDL RATIO MEASUREMENT: 4.5 RATIO — SIGNIFICANT CHANGE UP (ref 3.3–7.1)
TRIGL SERPL-MCNC: 87 MG/DL — SIGNIFICANT CHANGE UP (ref 10–149)

## 2017-06-14 PROCEDURE — 99231 SBSQ HOSP IP/OBS SF/LOW 25: CPT

## 2017-06-14 RX ORDER — BACLOFEN 100 %
10 POWDER (GRAM) MISCELLANEOUS THREE TIMES A DAY
Qty: 0 | Refills: 0 | Status: DISCONTINUED | OUTPATIENT
Start: 2017-06-14 | End: 2017-06-15

## 2017-06-14 RX ORDER — INSULIN GLARGINE 100 [IU]/ML
10 INJECTION, SOLUTION SUBCUTANEOUS AT BEDTIME
Qty: 0 | Refills: 0 | Status: DISCONTINUED | OUTPATIENT
Start: 2017-06-14 | End: 2017-06-15

## 2017-06-14 RX ADMIN — INSULIN GLARGINE 10 UNIT(S): 100 INJECTION, SOLUTION SUBCUTANEOUS at 22:37

## 2017-06-14 RX ADMIN — Medication 10 MILLIGRAM(S): at 22:37

## 2017-06-14 RX ADMIN — LISINOPRIL 40 MILLIGRAM(S): 2.5 TABLET ORAL at 05:20

## 2017-06-14 RX ADMIN — CLOPIDOGREL BISULFATE 75 MILLIGRAM(S): 75 TABLET, FILM COATED ORAL at 11:53

## 2017-06-14 RX ADMIN — Medication: at 09:48

## 2017-06-14 RX ADMIN — Medication 20 MILLIGRAM(S): at 11:53

## 2017-06-14 RX ADMIN — AMLODIPINE BESYLATE 5 MILLIGRAM(S): 2.5 TABLET ORAL at 05:20

## 2017-06-14 RX ADMIN — Medication 10 MILLIGRAM(S): at 15:07

## 2017-06-14 RX ADMIN — Medication 81 MILLIGRAM(S): at 11:51

## 2017-06-14 RX ADMIN — ATORVASTATIN CALCIUM 80 MILLIGRAM(S): 80 TABLET, FILM COATED ORAL at 22:37

## 2017-06-14 RX ADMIN — ENOXAPARIN SODIUM 40 MILLIGRAM(S): 100 INJECTION SUBCUTANEOUS at 22:37

## 2017-06-14 NOTE — PHYSICAL THERAPY INITIAL EVALUATION ADULT - LIVES WITH, PROFILE
children/lives in private house with family, 3 steps to enter without rail, 1 flight inside with 1 rail/spouse

## 2017-06-14 NOTE — PROGRESS NOTE ADULT - PROBLEM SELECTOR PLAN 2
C/w Plavix, lipitor   O/p vascular neurology follow up C/w Plavix, lipitor   O/p vascular neurology follow up  outpt EMG recommended

## 2017-06-14 NOTE — PROGRESS NOTE ADULT - ASSESSMENT
57yoF pmh DM2, HTN, CVA/TIA (s/p loop monitor, on plavix, residual R sided weakness) comes to ED noting 2-3 weeks of gen weakness/fatigue and 1-2 day of unsteady walking. RLE weakness appears peripheral in etiology. Would rule out a root neuropathy vs. peripheral. 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 Etiology includes 2/2 DM vs. vitamin deficiency vs traumatic vs idiopathic. 57yoF pmh DM2, HTN, CVA/TIA (s/p loop monitor, on plavix, residual R sided weakness) comes to ED noting 2-3 weeks of gen weakness/fatigue and 1-2 day of unsteady walking. RLE weakness appears peripheral in etiology. Would rule out a root compression vs. peripheral neuropathy. Distribution pattern of weakness and sensory most consistent with R common peroneal neuropathy vs L5 root involvement. Etiology includes 2/2 DMII, disc disease.  Less likely vitamin deficiency vs traumatic vs idiopathic.

## 2017-06-14 NOTE — PROGRESS NOTE ADULT - PROBLEM SELECTOR PLAN 1
MRI L spine w/o contrast  Consider Foot Brace  PT/OT  Lovenox DVT PPX  b12, folate, tsh, Hba1c MRI L spine w/o contrast negative  AFO device fitted and patient to purchase to outpatient   PT/OT for outpatient PT with cane and walker  Lovenox DVT PPX  b12, folate, tsh, Hba1c ordered  Patient stable for d/c today

## 2017-06-14 NOTE — PHYSICAL THERAPY INITIAL EVALUATION ADULT - PRECAUTIONS/LIMITATIONS, REHAB EVAL
fall precautions fall precautions/cont...  Would rule out a root neuropathy vs. peripheral. 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

## 2017-06-14 NOTE — PHYSICAL THERAPY INITIAL EVALUATION ADULT - GAIT DEVIATIONS NOTED, PT EVAL
RLE crossing midline when using straight cane, increased R hip flex due to decreased R ankle dorsiflexion/decreased ramona/decreased step length

## 2017-06-14 NOTE — PHYSICAL THERAPY INITIAL EVALUATION ADULT - PERTINENT HX OF CURRENT PROBLEM, REHAB EVAL
57yoF pmh DM2, HTN, CVA/TIA (s/p loop monitor, on plavix, residual R sided weakness) comes to ED noting 2-3wks of gen weakness/fatigue & 1-2 day of unsteady walking.  s/p fall earlier this week due to weakness. c/o joint pains & fatigue x1 wk. Patient endorses also feeling dehydrated.  NIHSS3  MRS 1. CTH:  no acute infarct or acute hemorrhage, chronic L pontine/midbrain infarct is noted 57yoF pmh DM2, HTN, CVA/TIA (s/p loop monitor, on plavix, residual R sided weakness) comes to ED noting 2-3wks of gen weakness/fatigue & 1-2 day of unsteady walking.  s/p fall earlier this week due to weakness. c/o joint pains & fatigue x1 wk. Patient endorses also feeling dehydrated.  NIHSS3  MRS 1. CTH:  no acute infarct or acute hemorrhage, chronic L pontine/midbrain infarct is noted. cont below...

## 2017-06-14 NOTE — PHYSICAL THERAPY INITIAL EVALUATION ADULT - GAIT DISTANCE, PT EVAL
45ftx2, pt ambulated 40ftx2 with straight cane and contact guard with increased veer to L & RLE crossing of midline multiple times

## 2017-06-14 NOTE — PROGRESS NOTE ADULT - SUBJECTIVE AND OBJECTIVE BOX
Neurology Progress Note    Patient is a 57y old  Female who presents with a chief complaint of unsteady gait    Interval History  Patient MRI negative for acute stroke, transferred to general neurology for further management. Patient continues to endorse R LE weakness distal >proximal with no significant change overnight. MRI Lumbsacral spine complete overnight pending read        Vital Signs Last 24 Hrs  T(C): 36.7, Max: 37.2 (06-13 @ 16:00)  T(F): 98, Max: 98.9 (06-13 @ 16:00)  HR: 86 (73 - 92)  BP: 149/94 (126/93 - 185/104)  BP(mean): --  RR: 18 (18 - 19)  SpO2: 97% (97% - 100%)  General Exam:   General appearance: No acute distress                 Neurological Exam:  Mental Status: AAOx3, fluent speech, follows simple commands, able to name  Cranial Nerves: EOMI, PERRL, VFF, V1-V3 intact, facial symmetric, no dysarthria, tongue midline  Motor: RUE 4/5, RLE proximal 4+/5, distal dorsi/plantar flexion 3/5, eversion/inversion 3/5, Left side 5/5, reflexes on right 3+ throughout absent archilles, left side trace reflexes throughout.   Sensation: decrease sensation stocking distribution, vibration decrease distal b/l legs.   Coordination: FTN intact b/l  Gait: no ataxia, mild high steppage gait     Labs:                           11.5   8.0   )-----------( 223      ( 12 Jun 2017 16:24 )             34.2     06-12    142  |  101  |  23  ----------------------------<  131<H>  4.1   |  26  |  1.23    Ca    10.7<H>      12 Jun 2017 16:24  Phos  3.5     06-12  Mg     1.9     06-12    TPro  8.5<H>  /  Alb  4.7  /  TBili  0.4  /  DBili  x   /  AST  13  /  ALT  11  /  AlkPhos  73  06-12          MRI negative acute stroke Neurology Progress Note    Patient is a 57y old  Female who presents with a chief complaint of unsteady gait    Interval History  Patient MRI negative for acute stroke, transferred to general neurology for further management. Patient continues to endorse R LE weakness distal >proximal with no significant change overnight. Patient stable for d/c today. Fit for AFO brace to follow as i/p.         Vital Signs Last 24 Hrs  Vital Signs Last 24 Hrs  T(C): 36.9, Max: 36.9 (06-15 @ 09:05)  T(F): 98.5, Max: 98.5 (06-15 @ 09:05)  HR: 94 (18 - 94)  BP: 145/100 (137/79 - 169/112)  BP(mean): --  RR: 18 (17 - 19)  SpO2: 100% (95% - 100%)  General Exam:   General appearance: No acute distress                 Neurological Exam:  Mental Status: AAOx3, fluent speech, follows simple commands, able to name  Cranial Nerves: EOMI, PERRL, VFF, V1-V3 intact, facial symmetric, no dysarthria, tongue midline  Motor: RUE 4/5, RLE proximal 4+/5, distal dorsi/plantar flexion 4/5, eversion 3/5 inversion 4/5, Left side 5/5, reflexes on right 3+ throughout absent archilles, left side trace reflexes throughout.   Sensation: decrease sensation stocking distribution, vibration decrease distal b/l legs.   Coordination: FTN intact b/l  Gait: no ataxia, mild high steppage gait     Labs:                           11.5   8.0   )-----------( 223      ( 12 Jun 2017 16:24 )             34.2     06-12    142  |  101  |  23  ----------------------------<  131<H>  4.1   |  26  |  1.23    Ca    10.7<H>      12 Jun 2017 16:24  Phos  3.5     06-12  Mg     1.9     06-12    TPro  8.5<H>  /  Alb  4.7  /  TBili  0.4  /  DBili  x   /  AST  13  /  ALT  11  /  AlkPhos  73  06-12          MRI negative acute stroke

## 2017-06-15 VITALS
TEMPERATURE: 99 F | OXYGEN SATURATION: 94 % | RESPIRATION RATE: 20 BRPM | SYSTOLIC BLOOD PRESSURE: 173 MMHG | HEART RATE: 105 BPM | DIASTOLIC BLOOD PRESSURE: 96 MMHG

## 2017-06-15 DIAGNOSIS — G62.9 POLYNEUROPATHY, UNSPECIFIED: ICD-10-CM

## 2017-06-15 PROCEDURE — 82330 ASSAY OF CALCIUM: CPT

## 2017-06-15 PROCEDURE — 87086 URINE CULTURE/COLONY COUNT: CPT

## 2017-06-15 PROCEDURE — 84132 ASSAY OF SERUM POTASSIUM: CPT

## 2017-06-15 PROCEDURE — 83036 HEMOGLOBIN GLYCOSYLATED A1C: CPT

## 2017-06-15 PROCEDURE — 97165 OT EVAL LOW COMPLEX 30 MIN: CPT

## 2017-06-15 PROCEDURE — 82947 ASSAY GLUCOSE BLOOD QUANT: CPT

## 2017-06-15 PROCEDURE — 85014 HEMATOCRIT: CPT

## 2017-06-15 PROCEDURE — 99285 EMERGENCY DEPT VISIT HI MDM: CPT | Mod: 25

## 2017-06-15 PROCEDURE — 99238 HOSP IP/OBS DSCHRG MGMT 30/<: CPT

## 2017-06-15 PROCEDURE — 83735 ASSAY OF MAGNESIUM: CPT

## 2017-06-15 PROCEDURE — 82435 ASSAY OF BLOOD CHLORIDE: CPT

## 2017-06-15 PROCEDURE — 80053 COMPREHEN METABOLIC PANEL: CPT

## 2017-06-15 PROCEDURE — 70549 MR ANGIOGRAPH NECK W/O&W/DYE: CPT

## 2017-06-15 PROCEDURE — 84100 ASSAY OF PHOSPHORUS: CPT

## 2017-06-15 PROCEDURE — 84295 ASSAY OF SERUM SODIUM: CPT

## 2017-06-15 PROCEDURE — 72148 MRI LUMBAR SPINE W/O DYE: CPT

## 2017-06-15 PROCEDURE — 71045 X-RAY EXAM CHEST 1 VIEW: CPT

## 2017-06-15 PROCEDURE — 70544 MR ANGIOGRAPHY HEAD W/O DYE: CPT

## 2017-06-15 PROCEDURE — 93005 ELECTROCARDIOGRAM TRACING: CPT

## 2017-06-15 PROCEDURE — 70551 MRI BRAIN STEM W/O DYE: CPT

## 2017-06-15 PROCEDURE — 81001 URINALYSIS AUTO W/SCOPE: CPT

## 2017-06-15 PROCEDURE — 84443 ASSAY THYROID STIM HORMONE: CPT

## 2017-06-15 PROCEDURE — 83605 ASSAY OF LACTIC ACID: CPT

## 2017-06-15 PROCEDURE — 85027 COMPLETE CBC AUTOMATED: CPT

## 2017-06-15 PROCEDURE — 80061 LIPID PANEL: CPT

## 2017-06-15 PROCEDURE — 70450 CT HEAD/BRAIN W/O DYE: CPT

## 2017-06-15 PROCEDURE — 82803 BLOOD GASES ANY COMBINATION: CPT

## 2017-06-15 PROCEDURE — 97161 PT EVAL LOW COMPLEX 20 MIN: CPT

## 2017-06-15 PROCEDURE — 82962 GLUCOSE BLOOD TEST: CPT

## 2017-06-15 PROCEDURE — A9585: CPT

## 2017-06-15 RX ORDER — LISINOPRIL 2.5 MG/1
1 TABLET ORAL
Qty: 0 | Refills: 0 | DISCHARGE
Start: 2017-06-15

## 2017-06-15 RX ORDER — AMLODIPINE BESYLATE 2.5 MG/1
1 TABLET ORAL
Qty: 0 | Refills: 0 | DISCHARGE
Start: 2017-06-15

## 2017-06-15 RX ORDER — BACLOFEN 100 %
1 POWDER (GRAM) MISCELLANEOUS
Qty: 0 | Refills: 0 | COMMUNITY
Start: 2017-06-15

## 2017-06-15 RX ORDER — CLOPIDOGREL BISULFATE 75 MG/1
1 TABLET, FILM COATED ORAL
Qty: 30 | Refills: 2
Start: 2017-06-15 | End: 2017-09-12

## 2017-06-15 RX ORDER — AMLODIPINE BESYLATE 2.5 MG/1
1 TABLET ORAL
Qty: 0 | Refills: 0 | COMMUNITY

## 2017-06-15 RX ORDER — ATORVASTATIN CALCIUM 80 MG/1
1 TABLET, FILM COATED ORAL
Qty: 0 | Refills: 0 | COMMUNITY

## 2017-06-15 RX ORDER — ATORVASTATIN CALCIUM 80 MG/1
1 TABLET, FILM COATED ORAL
Qty: 30 | Refills: 2
Start: 2017-06-15 | End: 2017-09-12

## 2017-06-15 RX ORDER — BACLOFEN 100 %
1 POWDER (GRAM) MISCELLANEOUS
Qty: 21 | Refills: 0 | OUTPATIENT
Start: 2017-06-15 | End: 2017-06-22

## 2017-06-15 RX ADMIN — Medication 20 MILLIGRAM(S): at 11:21

## 2017-06-15 RX ADMIN — CLOPIDOGREL BISULFATE 75 MILLIGRAM(S): 75 TABLET, FILM COATED ORAL at 11:21

## 2017-06-15 RX ADMIN — Medication 81 MILLIGRAM(S): at 11:20

## 2017-06-15 RX ADMIN — LISINOPRIL 40 MILLIGRAM(S): 2.5 TABLET ORAL at 05:15

## 2017-06-15 RX ADMIN — AMLODIPINE BESYLATE 5 MILLIGRAM(S): 2.5 TABLET ORAL at 05:16

## 2017-06-15 RX ADMIN — Medication 10 MILLIGRAM(S): at 05:15

## 2017-06-15 NOTE — PROGRESS NOTE ADULT - PROBLEM SELECTOR PLAN 1
Pt assessed by PT and OT. Will need AFO. Pt seen by orthopedist for orthotics.   D/C with home physical therapy  DVT ppx

## 2017-06-15 NOTE — PROGRESS NOTE ADULT - SUBJECTIVE AND OBJECTIVE BOX
Neurology Follow up note    Patient is a 57y old  Female who presents with a chief complaint of Right sided weakness, mostly of leg, worse vs baseline (13 Jun 2017 17:13)      Subjective: Interval History - No events overnight. Slept well. Feels better than when she first came in. Still having some spasms but the baclofen is helping to decreased the amount. Pain is controlled.     Objective:   Vital Signs Last 24 Hrs  T(C): 36.4, Max: 36.8 (06-14 @ 17:07)  T(F): 97.6, Max: 98.3 (06-14 @ 17:07)  HR: 84 (18 - 89)  BP: 137/79 (137/79 - 169/112)  BP(mean): --  RR: 19 (17 - 19)  SpO2: 97% (95% - 100%)    General Exam:   General appearance: No acute distress                 Cardiovascular: Pedal dorsalis pulses intact bilaterally    Neurological Exam:  Mental Status: Orientated to self, date and place.  Attention intact.  No dysarthria, aphasia or neglect.  Knowledge intact.  Registration intact.  Short and long term memory grossly intact.      Cranial Nerves:  PERRL, EOMI, VFF, no nystagmus or diplopia.  No APD.    CN V1-3 intact to light touch and pinprick.  No facial asymmetry.  Hearing intact to finger rub bilaterally.  Tongue, uvula and palate midline.  Sternocleidomastoid and Trapezius intact bilaterally.    Motor:   Tone: normal.                  Strength: RUE 4/5, RLE proximal 4+/5, distal dorsi/plantar flexion 3/5, eversion/inversion 3/5, Left side 5/5,   Pronator drift: none                 Dysmetria: None to finger-nose-finger or heel-shin-heel  No truncal ataxia.    Tremor: No resting, postural or action tremor.  No myoclonus.  Sensation: decrease sensation stocking distribution, vibration decrease distal b/l legs.  Deep Tendon Reflexes: right 3+ throughout, absent achilles bilat, left side trace reflexes throughout. Toes flexor bilaterally    Gait: High steppage gait        MEDICATIONS  (STANDING):  FLUoxetine 20milliGRAM(s) Oral daily  atorvastatin 80milliGRAM(s) Oral at bedtime  clopidogrel Tablet 75milliGRAM(s) Oral daily  amLODIPine   Tablet 5milliGRAM(s) Oral daily  enoxaparin Injectable 40milliGRAM(s) SubCutaneous daily  insulin lispro (HumaLOG) corrective regimen sliding scale  SubCutaneous three times a day before meals  dextrose 5%. 1000milliLiter(s) IV Continuous <Continuous>  dextrose 50% Injectable 12.5Gram(s) IV Push once  dextrose 50% Injectable 25Gram(s) IV Push once  dextrose 50% Injectable 25Gram(s) IV Push once  aspirin enteric coated 81milliGRAM(s) Oral daily  lisinopril 40milliGRAM(s) Oral daily  insulin glargine Injectable (LANTUS) 10Unit(s) SubCutaneous at bedtime  baclofen 10milliGRAM(s) Oral three times a day    MEDICATIONS  (PRN):  dextrose Gel 1Dose(s) Oral once PRN Blood Glucose LESS THAN 70 milliGRAM(s)/deciliter  glucagon  Injectable 1milliGRAM(s) IntraMuscular once PRN Glucose LESS THAN 70 milligrams/deciliter

## 2017-06-15 NOTE — PROGRESS NOTE ADULT - ASSESSMENT
57yoF pmh DM2, HTN, CVA/TIA (s/p loop monitor, on plavix, residual R sided weakness) comes to ED noting 2-3 weeks of gen weakness/fatigue and 1-2 day of unsteady walking. RLE weakness appears peripheral in etiology. Would rule out a root neuropathy vs. peripheral. 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 Etiology includes 2/2 DM vs. vitamin deficiency vs traumatic vs idiopathic.

## 2017-06-15 NOTE — PROGRESS NOTE ADULT - PROBLEM SELECTOR PLAN 3
Fs before meals and at night  c/w insulin regimen  consistent carb diet.
Frequent FS  C/w home regimen as tolerated  Hba1c 7

## 2017-06-23 ENCOUNTER — EMERGENCY (EMERGENCY)
Facility: HOSPITAL | Age: 57
LOS: 1 days | End: 2017-06-23
Attending: EMERGENCY MEDICINE | Admitting: EMERGENCY MEDICINE
Payer: COMMERCIAL

## 2017-06-23 VITALS
SYSTOLIC BLOOD PRESSURE: 170 MMHG | OXYGEN SATURATION: 100 % | HEART RATE: 78 BPM | DIASTOLIC BLOOD PRESSURE: 97 MMHG | RESPIRATION RATE: 18 BRPM | TEMPERATURE: 98 F

## 2017-06-23 VITALS
HEART RATE: 76 BPM | RESPIRATION RATE: 20 BRPM | TEMPERATURE: 98 F | OXYGEN SATURATION: 99 % | SYSTOLIC BLOOD PRESSURE: 151 MMHG | DIASTOLIC BLOOD PRESSURE: 101 MMHG

## 2017-06-23 LAB
ALBUMIN SERPL ELPH-MCNC: 4.5 G/DL — SIGNIFICANT CHANGE UP (ref 3.3–5)
ALP SERPL-CCNC: 74 U/L — SIGNIFICANT CHANGE UP (ref 40–120)
ALT FLD-CCNC: 12 U/L RC — SIGNIFICANT CHANGE UP (ref 10–45)
ANION GAP SERPL CALC-SCNC: 14 MMOL/L — SIGNIFICANT CHANGE UP (ref 5–17)
AST SERPL-CCNC: 15 U/L — SIGNIFICANT CHANGE UP (ref 10–40)
BASOPHILS # BLD AUTO: 0 K/UL — SIGNIFICANT CHANGE UP (ref 0–0.2)
BASOPHILS NFR BLD AUTO: 0 % — SIGNIFICANT CHANGE UP (ref 0–2)
BILIRUB SERPL-MCNC: 0.3 MG/DL — SIGNIFICANT CHANGE UP (ref 0.2–1.2)
BUN SERPL-MCNC: 24 MG/DL — HIGH (ref 7–23)
CALCIUM SERPL-MCNC: 10.5 MG/DL — SIGNIFICANT CHANGE UP (ref 8.4–10.5)
CHLORIDE SERPL-SCNC: 101 MMOL/L — SIGNIFICANT CHANGE UP (ref 96–108)
CO2 SERPL-SCNC: 28 MMOL/L — SIGNIFICANT CHANGE UP (ref 22–31)
CREAT SERPL-MCNC: 1.28 MG/DL — SIGNIFICANT CHANGE UP (ref 0.5–1.3)
EOSINOPHIL # BLD AUTO: 0.2 K/UL — SIGNIFICANT CHANGE UP (ref 0–0.5)
EOSINOPHIL NFR BLD AUTO: 2.8 % — SIGNIFICANT CHANGE UP (ref 0–6)
GLUCOSE SERPL-MCNC: 128 MG/DL — HIGH (ref 70–99)
HCT VFR BLD CALC: 34.8 % — SIGNIFICANT CHANGE UP (ref 34.5–45)
HGB BLD-MCNC: 11.1 G/DL — LOW (ref 11.5–15.5)
LYMPHOCYTES # BLD AUTO: 1.6 K/UL — SIGNIFICANT CHANGE UP (ref 1–3.3)
LYMPHOCYTES # BLD AUTO: 23.1 % — SIGNIFICANT CHANGE UP (ref 13–44)
MAGNESIUM SERPL-MCNC: 2 MG/DL — SIGNIFICANT CHANGE UP (ref 1.6–2.6)
MCHC RBC-ENTMCNC: 27.9 PG — SIGNIFICANT CHANGE UP (ref 27–34)
MCHC RBC-ENTMCNC: 31.8 GM/DL — LOW (ref 32–36)
MCV RBC AUTO: 87.6 FL — SIGNIFICANT CHANGE UP (ref 80–100)
MONOCYTES # BLD AUTO: 0.4 K/UL — SIGNIFICANT CHANGE UP (ref 0–0.9)
MONOCYTES NFR BLD AUTO: 6.2 % — SIGNIFICANT CHANGE UP (ref 2–14)
NEUTROPHILS # BLD AUTO: 4.7 K/UL — SIGNIFICANT CHANGE UP (ref 1.8–7.4)
NEUTROPHILS NFR BLD AUTO: 67.9 % — SIGNIFICANT CHANGE UP (ref 43–77)
PHOSPHATE SERPL-MCNC: 3.5 MG/DL — SIGNIFICANT CHANGE UP (ref 2.5–4.5)
PLATELET # BLD AUTO: 205 K/UL — SIGNIFICANT CHANGE UP (ref 150–400)
POTASSIUM SERPL-MCNC: 4.3 MMOL/L — SIGNIFICANT CHANGE UP (ref 3.5–5.3)
POTASSIUM SERPL-SCNC: 4.3 MMOL/L — SIGNIFICANT CHANGE UP (ref 3.5–5.3)
PROT SERPL-MCNC: 8.3 G/DL — SIGNIFICANT CHANGE UP (ref 6–8.3)
RBC # BLD: 3.98 M/UL — SIGNIFICANT CHANGE UP (ref 3.8–5.2)
RBC # FLD: 11.5 % — SIGNIFICANT CHANGE UP (ref 10.3–14.5)
SODIUM SERPL-SCNC: 143 MMOL/L — SIGNIFICANT CHANGE UP (ref 135–145)
WBC # BLD: 7 K/UL — SIGNIFICANT CHANGE UP (ref 3.8–10.5)
WBC # FLD AUTO: 7 K/UL — SIGNIFICANT CHANGE UP (ref 3.8–10.5)

## 2017-06-23 PROCEDURE — 93010 ELECTROCARDIOGRAM REPORT: CPT

## 2017-06-23 PROCEDURE — 84100 ASSAY OF PHOSPHORUS: CPT

## 2017-06-23 PROCEDURE — 80053 COMPREHEN METABOLIC PANEL: CPT

## 2017-06-23 PROCEDURE — 83735 ASSAY OF MAGNESIUM: CPT

## 2017-06-23 PROCEDURE — 99283 EMERGENCY DEPT VISIT LOW MDM: CPT | Mod: 25

## 2017-06-23 PROCEDURE — 93005 ELECTROCARDIOGRAM TRACING: CPT

## 2017-06-23 PROCEDURE — 85027 COMPLETE CBC AUTOMATED: CPT

## 2017-06-23 PROCEDURE — 99284 EMERGENCY DEPT VISIT MOD MDM: CPT | Mod: 25

## 2017-06-23 NOTE — ED PROVIDER NOTE - CARE PLAN
Principal Discharge DX:	Syncope Principal Discharge DX:	Syncope  Instructions for follow-up, activity and diet:	Drink plenty of fluids and get plenty of rest. Follow up with your primary doctor tomorrow. Return to the Emergency Dept if you develop any new or worsening symptoms

## 2017-06-23 NOTE — ED PROVIDER NOTE - PHYSICAL EXAMINATION
Attending MD Hoffmann: A & O x 3, NAD, HEENT WNL and no facial asymmetry; lungs CTAB, heart with reg rhythm without murmur; abdomen soft NTND; extremities with no edema; neuro exam non focal with no motor or sensory deficits. peripheral pulses full and equal in bilateral upper and lower extremities

## 2017-06-23 NOTE — ED PROVIDER NOTE - MEDICAL DECISION MAKING DETAILS
Attending MD Hoffmann: 57F presenting with syncopal episode, was out in heat and had a prodrome of dizziness, suspicious for likely vagal event. Patient does have a loop recorder so will consult EP to see if they can download info to r/o arrhythmia during event. Plan for basic labs, EKG, telemetry monitoring

## 2017-06-23 NOTE — ED ADULT NURSE NOTE - OBJECTIVE STATEMENT
56 y/o female brought in by EMS complaining of passing out while at school.  Pt states she was here x 1 week ago for "stroke symptoms". Pt states she was standing in the school yard when she suddenly felt faint and slid against a wall.  Past medical history of  HTN, diabetes, HLD, and a loop placed last year(was not interegated last visit).  Pt's daughter in law states she lost consciousness for about 15 seconds, and did not hit her head. Small abrasion to pt's left knee.  A&O x 4 gross neuro intact.  Able to speak in complete sentences without difficulty.  Lung sounds clear and equal bilateral. S1 S2 heart sounds heard, no edema, pulse motor sensory present x 4 with weakness present on the right side from a previous stroke.  Abdomen soft, nontender, nondistended.  Skin is intact.  Neuro assessment and dysphagia screen complete, placed in chart. Safety and comfort measures maintained. family at the bedside

## 2017-06-23 NOTE — ED PROVIDER NOTE - ATTENDING CONTRIBUTION TO CARE
Attending MD Hoffmann:  I personally have seen and examined this patient.  Resident note reviewed and agree on plan of care and except where noted.  See HPI, PE, and MDM for details.

## 2017-06-23 NOTE — PROGRESS NOTE ADULT - SUBJECTIVE AND OBJECTIVE BOX
EPS: Loop Recorder Interrogation    Loop recorder interrogation revealed no tachycardia, bradycardia, or pauses noted to correlate with her syncopal episode from today. Battery status is good. Two tachycardia events stored on 6/13/17 for 15-28 seconds of Atach. No changes at this time and follow up with EP Clinic in 3 months (Dr Quintana).

## 2017-06-23 NOTE — ED PROVIDER NOTE - OBJECTIVE STATEMENT
57 year old female with PMHx of DM, HTN, CVA in past with mild residual right sided weakness, very recent negative neurologic work up presenting with syncopal episode while outside in the heat and had a prodrome of dizziness. Has had a loop recorder in for the past few months but does not remember the name of her cardiologist. Currently denies any symptoms, no chest pain, no dyspnea, just feels "a little tired." States that she does not want to stay in the hospital.

## 2017-07-05 ENCOUNTER — APPOINTMENT (OUTPATIENT)
Dept: NEUROLOGY | Facility: CLINIC | Age: 57
End: 2017-07-05

## 2017-07-25 ENCOUNTER — APPOINTMENT (OUTPATIENT)
Dept: NEUROLOGY | Facility: CLINIC | Age: 57
End: 2017-07-25

## 2017-07-25 VITALS
HEART RATE: 90 BPM | SYSTOLIC BLOOD PRESSURE: 137 MMHG | BODY MASS INDEX: 32.89 KG/M2 | DIASTOLIC BLOOD PRESSURE: 80 MMHG | TEMPERATURE: 97.8 F | WEIGHT: 217 LBS | HEIGHT: 68 IN | OXYGEN SATURATION: 97 %

## 2017-08-31 ENCOUNTER — APPOINTMENT (OUTPATIENT)
Dept: ELECTROPHYSIOLOGY | Facility: CLINIC | Age: 57
End: 2017-08-31

## 2017-10-16 ENCOUNTER — RX RENEWAL (OUTPATIENT)
Age: 57
End: 2017-10-16

## 2017-10-31 ENCOUNTER — OUTPATIENT (OUTPATIENT)
Dept: OUTPATIENT SERVICES | Facility: HOSPITAL | Age: 57
LOS: 1 days | End: 2017-10-31
Payer: COMMERCIAL

## 2017-10-31 VITALS
HEIGHT: 68 IN | HEART RATE: 86 BPM | DIASTOLIC BLOOD PRESSURE: 91 MMHG | SYSTOLIC BLOOD PRESSURE: 200 MMHG | WEIGHT: 214.95 LBS | RESPIRATION RATE: 18 BRPM | OXYGEN SATURATION: 98 % | TEMPERATURE: 98 F

## 2017-10-31 DIAGNOSIS — I66.9 OCCLUSION AND STENOSIS OF UNSPECIFIED CEREBRAL ARTERY: ICD-10-CM

## 2017-10-31 LAB — GLUCOSE BLDC GLUCOMTR-MCNC: 109 MG/DL — HIGH (ref 70–99)

## 2017-10-31 PROCEDURE — 93010 ELECTROCARDIOGRAM REPORT: CPT

## 2017-10-31 PROCEDURE — 82962 GLUCOSE BLOOD TEST: CPT

## 2017-10-31 PROCEDURE — 93005 ELECTROCARDIOGRAM TRACING: CPT

## 2017-10-31 RX ORDER — SODIUM CHLORIDE 9 MG/ML
3 INJECTION INTRAMUSCULAR; INTRAVENOUS; SUBCUTANEOUS EVERY 8 HOURS
Qty: 0 | Refills: 0 | Status: DISCONTINUED | OUTPATIENT
Start: 2017-10-31 | End: 2017-11-15

## 2017-10-31 RX ORDER — INSULIN GLARGINE 100 [IU]/ML
15 INJECTION, SOLUTION SUBCUTANEOUS
Qty: 0 | Refills: 0 | COMMUNITY

## 2017-10-31 NOTE — H&P CARDIOLOGY - FAMILY HISTORY
Father  Still living? No  Family history of acute myocardial infarction, Age at diagnosis: Age Unknown  Family history of prostate cancer, Age at diagnosis: Age Unknown

## 2017-10-31 NOTE — H&P CARDIOLOGY - PMH
CVA (cerebral vascular accident)    Diabetes Mellitus Type II    Generalized Headaches    Hypertension    Hypokalemia    IBS (Irritable Bowel Syndrome)    TIA (transient ischemic attack)

## 2017-10-31 NOTE — H&P CARDIOLOGY - HISTORY OF PRESENT ILLNESS
58yo female pt with PMHx of DM2, HTN, CVA/TIA, syncopal episodes this past summer, (s/p Loop monitor on 7/2016, on Plavix, residual R sided weakness) presents for Loop recorder extraction. Pt denies SOB, dizziness or light headedness.

## 2017-11-15 ENCOUNTER — APPOINTMENT (OUTPATIENT)
Dept: ELECTROPHYSIOLOGY | Facility: CLINIC | Age: 57
End: 2017-11-15

## 2017-12-21 ENCOUNTER — APPOINTMENT (OUTPATIENT)
Dept: ELECTROPHYSIOLOGY | Facility: CLINIC | Age: 57
End: 2017-12-21

## 2018-02-02 ENCOUNTER — RX RENEWAL (OUTPATIENT)
Age: 58
End: 2018-02-02

## 2018-03-05 ENCOUNTER — RX RENEWAL (OUTPATIENT)
Age: 58
End: 2018-03-05

## 2018-03-05 RX ORDER — METFORMIN ER 500 MG 500 MG/1
500 TABLET ORAL
Qty: 60 | Refills: 1 | Status: ACTIVE | COMMUNITY
Start: 2017-04-04 | End: 1900-01-01

## 2018-03-12 ENCOUNTER — RX RENEWAL (OUTPATIENT)
Age: 58
End: 2018-03-12

## 2018-11-09 NOTE — ED ADULT TRIAGE NOTE - CHIEF COMPLAINT QUOTE
feels weakness and  tired been dehydrated in past had low potassium Skin normal color for race, warm, dry and intact. No evidence of rash.

## 2019-02-07 PROBLEM — G45.9 TRANSIENT CEREBRAL ISCHEMIC ATTACK, UNSPECIFIED: Chronic | Status: ACTIVE | Noted: 2017-10-31

## 2019-02-07 PROBLEM — I63.9 CEREBRAL INFARCTION, UNSPECIFIED: Chronic | Status: ACTIVE | Noted: 2017-10-31

## 2019-03-26 ENCOUNTER — APPOINTMENT (OUTPATIENT)
Dept: NEUROLOGY | Facility: CLINIC | Age: 59
End: 2019-03-26

## 2020-01-16 ENCOUNTER — EMERGENCY (EMERGENCY)
Facility: HOSPITAL | Age: 60
LOS: 1 days | Discharge: HOME | End: 2020-01-16
Attending: EMERGENCY MEDICINE
Payer: COMMERCIAL

## 2020-01-16 VITALS
TEMPERATURE: 97 F | RESPIRATION RATE: 18 BRPM | HEIGHT: 68 IN | HEART RATE: 80 BPM | SYSTOLIC BLOOD PRESSURE: 137 MMHG | DIASTOLIC BLOOD PRESSURE: 87 MMHG | WEIGHT: 216.93 LBS | OXYGEN SATURATION: 99 %

## 2020-01-16 PROCEDURE — 99284 EMERGENCY DEPT VISIT MOD MDM: CPT

## 2020-01-16 RX ORDER — ACETAMINOPHEN 500 MG
975 TABLET ORAL ONCE
Refills: 0 | Status: COMPLETED | OUTPATIENT
Start: 2020-01-16 | End: 2020-01-16

## 2020-01-16 RX ORDER — SODIUM CHLORIDE 9 MG/ML
1000 INJECTION INTRAMUSCULAR; INTRAVENOUS; SUBCUTANEOUS ONCE
Refills: 0 | Status: COMPLETED | OUTPATIENT
Start: 2020-01-16 | End: 2020-01-16

## 2020-01-16 RX ORDER — ASPIRIN/CALCIUM CARB/MAGNESIUM 324 MG
162 TABLET ORAL ONCE
Refills: 0 | Status: COMPLETED | OUTPATIENT
Start: 2020-01-16 | End: 2020-01-16

## 2020-01-16 NOTE — ED ADULT NURSE NOTE - OBJECTIVE STATEMENT
59YOF pmh stroke 2016, HTN, DM2 presents to ED c/o left shoulder and arm pain, palpitation. pt also states she "slept wrong and feels its muscular." Denies fever, chills, HA, blurry vision, abd pain, N/V/D, burning upon urination. Pt placed on portable monitor. Safety and comfort measures provided. Will continue to monitor.  at bedside.

## 2020-01-17 VITALS
HEART RATE: 76 BPM | DIASTOLIC BLOOD PRESSURE: 99 MMHG | SYSTOLIC BLOOD PRESSURE: 141 MMHG | OXYGEN SATURATION: 99 % | TEMPERATURE: 98 F | RESPIRATION RATE: 18 BRPM

## 2020-01-17 LAB
ALBUMIN SERPL ELPH-MCNC: 3.6 G/DL — SIGNIFICANT CHANGE UP (ref 3.3–5)
ALP SERPL-CCNC: 82 U/L — SIGNIFICANT CHANGE UP (ref 40–120)
ALT FLD-CCNC: 10 U/L — SIGNIFICANT CHANGE UP (ref 10–45)
ANION GAP SERPL CALC-SCNC: 14 MMOL/L — SIGNIFICANT CHANGE UP (ref 5–17)
ANION GAP SERPL CALC-SCNC: 18 MMOL/L — HIGH (ref 5–17)
APPEARANCE UR: CLEAR — SIGNIFICANT CHANGE UP
AST SERPL-CCNC: 17 U/L — SIGNIFICANT CHANGE UP (ref 10–40)
BASOPHILS # BLD AUTO: 0.02 K/UL — SIGNIFICANT CHANGE UP (ref 0–0.2)
BASOPHILS NFR BLD AUTO: 0.3 % — SIGNIFICANT CHANGE UP (ref 0–2)
BILIRUB SERPL-MCNC: 0.3 MG/DL — SIGNIFICANT CHANGE UP (ref 0.2–1.2)
BILIRUB UR-MCNC: NEGATIVE — SIGNIFICANT CHANGE UP
BUN SERPL-MCNC: 15 MG/DL — SIGNIFICANT CHANGE UP (ref 7–23)
BUN SERPL-MCNC: 16 MG/DL — SIGNIFICANT CHANGE UP (ref 7–23)
CALCIUM SERPL-MCNC: 9.1 MG/DL — SIGNIFICANT CHANGE UP (ref 8.4–10.5)
CALCIUM SERPL-MCNC: 9.2 MG/DL — SIGNIFICANT CHANGE UP (ref 8.4–10.5)
CHLORIDE SERPL-SCNC: 97 MMOL/L — SIGNIFICANT CHANGE UP (ref 96–108)
CHLORIDE SERPL-SCNC: 98 MMOL/L — SIGNIFICANT CHANGE UP (ref 96–108)
CO2 SERPL-SCNC: 21 MMOL/L — LOW (ref 22–31)
CO2 SERPL-SCNC: 26 MMOL/L — SIGNIFICANT CHANGE UP (ref 22–31)
COLOR SPEC: YELLOW — SIGNIFICANT CHANGE UP
CREAT SERPL-MCNC: 0.9 MG/DL — SIGNIFICANT CHANGE UP (ref 0.5–1.3)
CREAT SERPL-MCNC: 0.97 MG/DL — SIGNIFICANT CHANGE UP (ref 0.5–1.3)
DIFF PNL FLD: NEGATIVE — SIGNIFICANT CHANGE UP
EOSINOPHIL # BLD AUTO: 0.13 K/UL — SIGNIFICANT CHANGE UP (ref 0–0.5)
EOSINOPHIL NFR BLD AUTO: 1.8 % — SIGNIFICANT CHANGE UP (ref 0–6)
GAS PNL BLDV: SIGNIFICANT CHANGE UP
GLUCOSE SERPL-MCNC: 408 MG/DL — HIGH (ref 70–99)
GLUCOSE SERPL-MCNC: 437 MG/DL — HIGH (ref 70–99)
GLUCOSE UR QL: ABNORMAL
HCT VFR BLD CALC: 34.9 % — SIGNIFICANT CHANGE UP (ref 34.5–45)
HGB BLD-MCNC: 11.5 G/DL — SIGNIFICANT CHANGE UP (ref 11.5–15.5)
IMM GRANULOCYTES NFR BLD AUTO: 0.6 % — SIGNIFICANT CHANGE UP (ref 0–1.5)
KETONES UR-MCNC: NEGATIVE — SIGNIFICANT CHANGE UP
LEUKOCYTE ESTERASE UR-ACNC: NEGATIVE — SIGNIFICANT CHANGE UP
LYMPHOCYTES # BLD AUTO: 2.02 K/UL — SIGNIFICANT CHANGE UP (ref 1–3.3)
LYMPHOCYTES # BLD AUTO: 28.3 % — SIGNIFICANT CHANGE UP (ref 13–44)
MAGNESIUM SERPL-MCNC: 1.3 MG/DL — LOW (ref 1.6–2.6)
MCHC RBC-ENTMCNC: 26.6 PG — LOW (ref 27–34)
MCHC RBC-ENTMCNC: 33 GM/DL — SIGNIFICANT CHANGE UP (ref 32–36)
MCV RBC AUTO: 80.6 FL — SIGNIFICANT CHANGE UP (ref 80–100)
MONOCYTES # BLD AUTO: 0.48 K/UL — SIGNIFICANT CHANGE UP (ref 0–0.9)
MONOCYTES NFR BLD AUTO: 6.7 % — SIGNIFICANT CHANGE UP (ref 2–14)
NEUTROPHILS # BLD AUTO: 4.46 K/UL — SIGNIFICANT CHANGE UP (ref 1.8–7.4)
NEUTROPHILS NFR BLD AUTO: 62.3 % — SIGNIFICANT CHANGE UP (ref 43–77)
NITRITE UR-MCNC: NEGATIVE — SIGNIFICANT CHANGE UP
NRBC # BLD: 0 /100 WBCS — SIGNIFICANT CHANGE UP (ref 0–0)
PH UR: 6 — SIGNIFICANT CHANGE UP (ref 5–8)
PHOSPHATE SERPL-MCNC: 3.5 MG/DL — SIGNIFICANT CHANGE UP (ref 2.5–4.5)
PLATELET # BLD AUTO: 220 K/UL — SIGNIFICANT CHANGE UP (ref 150–400)
POTASSIUM SERPL-MCNC: 3.1 MMOL/L — LOW (ref 3.5–5.3)
POTASSIUM SERPL-MCNC: 3.9 MMOL/L — SIGNIFICANT CHANGE UP (ref 3.5–5.3)
POTASSIUM SERPL-SCNC: 3.1 MMOL/L — LOW (ref 3.5–5.3)
POTASSIUM SERPL-SCNC: 3.9 MMOL/L — SIGNIFICANT CHANGE UP (ref 3.5–5.3)
PROT SERPL-MCNC: 7.3 G/DL — SIGNIFICANT CHANGE UP (ref 6–8.3)
PROT UR-MCNC: ABNORMAL
RBC # BLD: 4.33 M/UL — SIGNIFICANT CHANGE UP (ref 3.8–5.2)
RBC # FLD: 13.3 % — SIGNIFICANT CHANGE UP (ref 10.3–14.5)
SODIUM SERPL-SCNC: 136 MMOL/L — SIGNIFICANT CHANGE UP (ref 135–145)
SODIUM SERPL-SCNC: 138 MMOL/L — SIGNIFICANT CHANGE UP (ref 135–145)
SP GR SPEC: 1.03 — HIGH (ref 1.01–1.02)
TROPONIN T, HIGH SENSITIVITY RESULT: 17 NG/L — SIGNIFICANT CHANGE UP (ref 0–51)
TROPONIN T, HIGH SENSITIVITY RESULT: 18 NG/L — SIGNIFICANT CHANGE UP (ref 0–51)
UROBILINOGEN FLD QL: NEGATIVE — SIGNIFICANT CHANGE UP
WBC # BLD: 7.15 K/UL — SIGNIFICANT CHANGE UP (ref 3.8–10.5)
WBC # FLD AUTO: 7.15 K/UL — SIGNIFICANT CHANGE UP (ref 3.8–10.5)

## 2020-01-17 PROCEDURE — 71046 X-RAY EXAM CHEST 2 VIEWS: CPT

## 2020-01-17 PROCEDURE — 85014 HEMATOCRIT: CPT

## 2020-01-17 PROCEDURE — 85027 COMPLETE CBC AUTOMATED: CPT

## 2020-01-17 PROCEDURE — 99285 EMERGENCY DEPT VISIT HI MDM: CPT | Mod: 25

## 2020-01-17 PROCEDURE — 80048 BASIC METABOLIC PNL TOTAL CA: CPT

## 2020-01-17 PROCEDURE — 82947 ASSAY GLUCOSE BLOOD QUANT: CPT

## 2020-01-17 PROCEDURE — 80053 COMPREHEN METABOLIC PANEL: CPT

## 2020-01-17 PROCEDURE — 83605 ASSAY OF LACTIC ACID: CPT

## 2020-01-17 PROCEDURE — 82330 ASSAY OF CALCIUM: CPT

## 2020-01-17 PROCEDURE — 84100 ASSAY OF PHOSPHORUS: CPT

## 2020-01-17 PROCEDURE — 84484 ASSAY OF TROPONIN QUANT: CPT

## 2020-01-17 PROCEDURE — 82962 GLUCOSE BLOOD TEST: CPT

## 2020-01-17 PROCEDURE — 82803 BLOOD GASES ANY COMBINATION: CPT

## 2020-01-17 PROCEDURE — 82435 ASSAY OF BLOOD CHLORIDE: CPT

## 2020-01-17 PROCEDURE — 83735 ASSAY OF MAGNESIUM: CPT

## 2020-01-17 PROCEDURE — 96374 THER/PROPH/DIAG INJ IV PUSH: CPT

## 2020-01-17 PROCEDURE — 71046 X-RAY EXAM CHEST 2 VIEWS: CPT | Mod: 26

## 2020-01-17 PROCEDURE — 84132 ASSAY OF SERUM POTASSIUM: CPT

## 2020-01-17 PROCEDURE — 81001 URINALYSIS AUTO W/SCOPE: CPT

## 2020-01-17 PROCEDURE — 93005 ELECTROCARDIOGRAM TRACING: CPT

## 2020-01-17 PROCEDURE — 84295 ASSAY OF SERUM SODIUM: CPT

## 2020-01-17 RX ORDER — SODIUM CHLORIDE 9 MG/ML
1000 INJECTION INTRAMUSCULAR; INTRAVENOUS; SUBCUTANEOUS ONCE
Refills: 0 | Status: COMPLETED | OUTPATIENT
Start: 2020-01-17 | End: 2020-01-17

## 2020-01-17 RX ORDER — POTASSIUM CITRATE MONOHYDRATE 100 %
1 POWDER (GRAM) MISCELLANEOUS
Qty: 14 | Refills: 0
Start: 2020-01-17 | End: 2020-01-23

## 2020-01-17 RX ORDER — MAGNESIUM SULFATE 500 MG/ML
2 VIAL (ML) INJECTION ONCE
Refills: 0 | Status: COMPLETED | OUTPATIENT
Start: 2020-01-17 | End: 2020-01-17

## 2020-01-17 RX ORDER — POTASSIUM CHLORIDE 20 MEQ
40 PACKET (EA) ORAL ONCE
Refills: 0 | Status: COMPLETED | OUTPATIENT
Start: 2020-01-17 | End: 2020-01-17

## 2020-01-17 RX ORDER — POTASSIUM CHLORIDE 20 MEQ
20 PACKET (EA) ORAL ONCE
Refills: 0 | Status: COMPLETED | OUTPATIENT
Start: 2020-01-17 | End: 2020-01-17

## 2020-01-17 RX ORDER — INSULIN DETEMIR 100/ML (3)
15 INSULIN PEN (ML) SUBCUTANEOUS
Qty: 1 | Refills: 0
Start: 2020-01-17

## 2020-01-17 RX ADMIN — Medication 40 MILLIEQUIVALENT(S): at 00:46

## 2020-01-17 RX ADMIN — Medication 975 MILLIGRAM(S): at 00:03

## 2020-01-17 RX ADMIN — Medication 162 MILLIGRAM(S): at 00:03

## 2020-01-17 RX ADMIN — SODIUM CHLORIDE 2000 MILLILITER(S): 9 INJECTION INTRAMUSCULAR; INTRAVENOUS; SUBCUTANEOUS at 00:03

## 2020-01-17 RX ADMIN — Medication 20 MILLIEQUIVALENT(S): at 04:15

## 2020-01-17 RX ADMIN — Medication 50 GRAM(S): at 00:45

## 2020-01-17 RX ADMIN — SODIUM CHLORIDE 1000 MILLILITER(S): 9 INJECTION INTRAMUSCULAR; INTRAVENOUS; SUBCUTANEOUS at 00:46

## 2020-01-17 NOTE — ED PROVIDER NOTE - NSFOLLOWUPINSTRUCTIONS_ED_ALL_ED_FT
(1) Follow up with your primary care physician as discussed. In addition, we did not find evidence of a life threatening illness on your testing here today, but listed below are the specialists that will be necessary to see as an outpatient to continue the workup.  Please call the numbers listed below or [phone] to set up the necessary appointments  or call [phone] to make an appointment with the clinic.  (2) You were seen for hypokalemia. A copy of your resulted labs, imaging, and findings have been provided to you.  (3) Take potassium as prescribed and follow up with the potassium level with your Primary Care Doctor.  (4) Return immediately to the emergency department for new, persistent, or worsening symptoms or signs. Return immediately to the emergency department if you have chest pain, shortness of breath, loss of consciousness, or any other new concerns.

## 2020-01-17 NOTE — ED PROVIDER NOTE - GASTROINTESTINAL, MLM
Abdomen soft, non-tender, no guarding. 1.  Name of PCP:  2.  PCP Contacted on Admission: [ ] Y    [X] N    3.  PCP contacted at Discharge: [ ] Y    [ ] N    [ ] N/A  4.  Post-Discharge Appointment Date and Location:  5.  Summary of Handoff given to PCP:

## 2020-01-17 NOTE — ED PROVIDER NOTE - PROGRESS NOTE DETAILS
PGY2/MD Jason. Pt feels much better, fluid was given, anion gap now improved t o14. Hypo potassium, 3.1, PO potassium was given. meds are in her pharmacy. Negative delta trop, BS trending down to 300. pt is now safely going home. I have given the pt strict return and follow up precautions. The patient has been provided with a copy of all pertinent results. The patient has been informed of all concerning signs and symptoms to return to Emergency Department, the necessity to follow up with PMD/Clinic/follow up provided within 2-3 days was explained, and the patient reports understanding of above with capacity and insight.

## 2020-01-17 NOTE — ED PROVIDER NOTE - CLINICAL SUMMARY MEDICAL DECISION MAKING FREE TEXT BOX
Chest pressure and intermittent dizziness.  Does not sound like cardiac chest pain, but given patient's history, will get EKG and troponin to screen for ACS.  electrolyte abnormality is more likely given her past experience with these symptoms.  will check glucose, VBG, UA and electrolytes to screen for DKA and other electrolyte disorders.  will treat symptoms and reassess.

## 2020-01-17 NOTE — ED PROVIDER NOTE - PATIENT PORTAL LINK FT
You can access the FollowMyHealth Patient Portal offered by [facility] by registering at the following website: [email or web address]. By joining [email or web address]’s FollowMyHealth portal, you will also be able to view your health information using other applications (apps) compatible with our system.

## 2020-01-17 NOTE — ED PROVIDER NOTE - OBJECTIVE STATEMENT
Attendinyo female presents with feeling dizzy and having a heavy sensation in the chest.  symptoms are not exertional.  feels lightheaded when she stands.  pt feels dehydrated.  glucose has been high.  no diaphoresis.  no vomiting.  no nausea.  states she has felt like this before when her potassium is low.  had some left shoulder discomfort but this is only when she ranges the left arm.  no exertional symptoms.

## 2020-10-02 ENCOUNTER — INPATIENT (INPATIENT)
Facility: HOSPITAL | Age: 60
LOS: 3 days | Discharge: NURSING, REHAB OR LONG-TERM CARE | DRG: 64 | End: 2020-10-06
Attending: PSYCHIATRY & NEUROLOGY | Admitting: PSYCHIATRY & NEUROLOGY
Payer: COMMERCIAL

## 2020-10-02 VITALS
HEIGHT: 68 IN | HEART RATE: 90 BPM | DIASTOLIC BLOOD PRESSURE: 111 MMHG | WEIGHT: 216.93 LBS | OXYGEN SATURATION: 98 % | TEMPERATURE: 99 F | RESPIRATION RATE: 20 BRPM | SYSTOLIC BLOOD PRESSURE: 175 MMHG

## 2020-10-02 LAB
ALBUMIN SERPL ELPH-MCNC: 4.2 G/DL — SIGNIFICANT CHANGE UP (ref 3.3–5)
ALP SERPL-CCNC: 89 U/L — SIGNIFICANT CHANGE UP (ref 40–120)
ALT FLD-CCNC: 13 U/L — SIGNIFICANT CHANGE UP (ref 10–45)
ANION GAP SERPL CALC-SCNC: 13 MMOL/L — SIGNIFICANT CHANGE UP (ref 5–17)
APTT BLD: 28.7 SEC — SIGNIFICANT CHANGE UP (ref 27.5–35.5)
AST SERPL-CCNC: 16 U/L — SIGNIFICANT CHANGE UP (ref 10–40)
BASOPHILS # BLD AUTO: 0.02 K/UL — SIGNIFICANT CHANGE UP (ref 0–0.2)
BASOPHILS NFR BLD AUTO: 0.2 % — SIGNIFICANT CHANGE UP (ref 0–2)
BILIRUB SERPL-MCNC: 0.3 MG/DL — SIGNIFICANT CHANGE UP (ref 0.2–1.2)
BUN SERPL-MCNC: 21 MG/DL — SIGNIFICANT CHANGE UP (ref 7–23)
CALCIUM SERPL-MCNC: 10.1 MG/DL — SIGNIFICANT CHANGE UP (ref 8.4–10.5)
CHLORIDE SERPL-SCNC: 98 MMOL/L — SIGNIFICANT CHANGE UP (ref 96–108)
CO2 SERPL-SCNC: 26 MMOL/L — SIGNIFICANT CHANGE UP (ref 22–31)
CREAT SERPL-MCNC: 0.99 MG/DL — SIGNIFICANT CHANGE UP (ref 0.5–1.3)
EOSINOPHIL # BLD AUTO: 0.22 K/UL — SIGNIFICANT CHANGE UP (ref 0–0.5)
EOSINOPHIL NFR BLD AUTO: 2.5 % — SIGNIFICANT CHANGE UP (ref 0–6)
GLUCOSE SERPL-MCNC: 164 MG/DL — HIGH (ref 70–99)
HCT VFR BLD CALC: 37.2 % — SIGNIFICANT CHANGE UP (ref 34.5–45)
HGB BLD-MCNC: 12.3 G/DL — SIGNIFICANT CHANGE UP (ref 11.5–15.5)
IMM GRANULOCYTES NFR BLD AUTO: 0.3 % — SIGNIFICANT CHANGE UP (ref 0–1.5)
INR BLD: 0.9 RATIO — SIGNIFICANT CHANGE UP (ref 0.88–1.16)
LYMPHOCYTES # BLD AUTO: 3.05 K/UL — SIGNIFICANT CHANGE UP (ref 1–3.3)
LYMPHOCYTES # BLD AUTO: 34.5 % — SIGNIFICANT CHANGE UP (ref 13–44)
MCHC RBC-ENTMCNC: 26.7 PG — LOW (ref 27–34)
MCHC RBC-ENTMCNC: 33.1 GM/DL — SIGNIFICANT CHANGE UP (ref 32–36)
MCV RBC AUTO: 80.9 FL — SIGNIFICANT CHANGE UP (ref 80–100)
MONOCYTES # BLD AUTO: 0.53 K/UL — SIGNIFICANT CHANGE UP (ref 0–0.9)
MONOCYTES NFR BLD AUTO: 6 % — SIGNIFICANT CHANGE UP (ref 2–14)
NEUTROPHILS # BLD AUTO: 4.98 K/UL — SIGNIFICANT CHANGE UP (ref 1.8–7.4)
NEUTROPHILS NFR BLD AUTO: 56.5 % — SIGNIFICANT CHANGE UP (ref 43–77)
NRBC # BLD: 0 /100 WBCS — SIGNIFICANT CHANGE UP (ref 0–0)
PLATELET # BLD AUTO: 264 K/UL — SIGNIFICANT CHANGE UP (ref 150–400)
POTASSIUM SERPL-MCNC: 3.3 MMOL/L — LOW (ref 3.5–5.3)
POTASSIUM SERPL-SCNC: 3.3 MMOL/L — LOW (ref 3.5–5.3)
PROT SERPL-MCNC: 7.6 G/DL — SIGNIFICANT CHANGE UP (ref 6–8.3)
PROTHROM AB SERPL-ACNC: 10.8 SEC — SIGNIFICANT CHANGE UP (ref 10.6–13.6)
RBC # BLD: 4.6 M/UL — SIGNIFICANT CHANGE UP (ref 3.8–5.2)
RBC # FLD: 13.5 % — SIGNIFICANT CHANGE UP (ref 10.3–14.5)
SODIUM SERPL-SCNC: 137 MMOL/L — SIGNIFICANT CHANGE UP (ref 135–145)
TROPONIN T, HIGH SENSITIVITY RESULT: 18 NG/L — SIGNIFICANT CHANGE UP (ref 0–51)
WBC # BLD: 8.83 K/UL — SIGNIFICANT CHANGE UP (ref 3.8–10.5)
WBC # FLD AUTO: 8.83 K/UL — SIGNIFICANT CHANGE UP (ref 3.8–10.5)

## 2020-10-02 PROCEDURE — 99285 EMERGENCY DEPT VISIT HI MDM: CPT

## 2020-10-02 PROCEDURE — 70498 CT ANGIOGRAPHY NECK: CPT | Mod: 26

## 2020-10-02 PROCEDURE — 70496 CT ANGIOGRAPHY HEAD: CPT | Mod: 26

## 2020-10-02 PROCEDURE — 70450 CT HEAD/BRAIN W/O DYE: CPT | Mod: 26,59

## 2020-10-02 PROCEDURE — 93010 ELECTROCARDIOGRAM REPORT: CPT

## 2020-10-02 NOTE — ED ADULT NURSE NOTE - OBJECTIVE STATEMENT
60y female from triage complaining of left sided numbness descirbed by patient as "pins and needles" since 0800, headache since night before gradually worsening, gross neuro intact, a/o x3,  PMH stroke 2016 w/ some right sided weakness, 60y female from triage complaining of left sided numbness described by patient as "pins and needles" since 0800, headache since night before gradually worsening, gross neuro intact, a/o x3,  PMH stroke 2016 w/ some right sided weakness, HTN, no on any blood thinners, allergy to sulfa drugs, denies chest pain, shortness of breath, moves all extremities, abd soft and non tender, denies n/v/d, urinary symptoms, IV lock placed, call bell in reach, bed in lowest position, comfort and safety provided.

## 2020-10-02 NOTE — ED PROVIDER NOTE - ATTENDING CONTRIBUTION TO CARE
61 yo female p/w HA and L sided paresthesias.  ambulating without assistance, no vertigo, no focal weakness, no dysmetria.  stroke alert criteria not met.  onset unclear; some time between 12-24h ago.  otherwise nontoxic appearing, afebrile.  will check labs, CT head, neuro consult and reassess.

## 2020-10-02 NOTE — ED PROVIDER NOTE - NS ED ROS FT
ROS:  GENERAL: No fever, no chills  EYES: no change in vision  HEENT: no trouble swallowing, no trouble speaking  CARDIAC: no chest pain  PULMONARY: no cough, no shortness of breath  GI: no abdominal pain, no nausea, no vomiting, no diarrhea, no constipation  : No dysuria, no frequency, no change in appearance, or odor of urine  SKIN: no rashes  NEURO: +HA, L-sided numbness, unsteadiness.  no new weakness (residual R-sided weakness)   MSK: No joint pain    Riccardo Moreno PGY3 ROS:  GENERAL: No fever, no chills  EYES: no change in vision  HEENT: no trouble swallowing, no trouble speaking  CARDIAC: no chest pain  PULMONARY: no cough, no shortness of breath  GI: no abdominal pain, no nausea, no vomiting, no diarrhea, no constipation  : No dysuria, no frequency, no change in appearance, or odor of urine  SKIN: no rashes  NEURO: +HA, L-sided numbness  no new weakness (residual R-sided weakness)   MSK: No joint pain    Riccardo Mroeno PGY3

## 2020-10-02 NOTE — ED PROVIDER NOTE - CLINICAL SUMMARY MEDICAL DECISION MAKING FREE TEXT BOX
HA and L-sided numbness in pt with hx of CVA with residual R-sided weakness. On exam, decreased L-sided sensation but no other deficits noted. Will assess for ICH & CVA with CT/CTA then likely consult neurology. No code stroke called given lack of focal deficits and length of time from uncertain onset.

## 2020-10-02 NOTE — CONSULT NOTE ADULT - ASSESSMENT
Patient is a 57yoF pmhx DM2, HTN, CVA/TIA (s/p loop monitor, on plavix, residual R sided weakness) who presented to the ED and neurology was consulted for concern of L sided numbness. Onset when she woke this morning at 6am. Associated with headache, phonophobia and unsteady gait. Neuro exam is significant for decreased sensation to LT of L face sparing forehead, LUE and LLE. No motor deficits noted. Sensation intact to pinprick throughout. Patient lightheaded and nauseous on standing. Unsteady gait. NIHSS 1. L sided numbness and tingling 2/2 poss R brain dysfunction vs vitamin deficiency vs DM sequale in the setting of hyperglycemia vs cardiac etiology, orthostatic hypotension.  CT finding as below    CTH : No acute intracranial hemorrhage, mass effect, or CT evidence of an acute transcortical infarct.  Mild to moderate chronic ischemic changes in the frontoparietal white matter and chronic bilateral basal ganglia, bilateral thalamic, and left pontine lacunar infarcts.  CTA NECK:  No significant stenosis of the cervical carotid arteries based on NASCET criteria. Patent cervical vertebral arteries. No evidence of cervical carotid or vertebral artery dissection..  CTA HEAD: No high-grade stenosis or occlusion of the major proximal arterial branches. Short segment mild stenosis at the right P1-P2 junction of the posterior cerebral artery.    Recommendations  - MRI brain without contrast  - Continue home ASA 81mg  - Orthostatics     Case to be discussed with neurology attending Dr. Del Angel Patient is a 57yoF pmhx DM2, HTN, CVA/TIA (s/p loop monitor, on plavix, residual R sided weakness) who presented to the ED and neurology was consulted for concern of L sided numbness. Onset when she woke this morning at 6am. Associated with headache, phonophobia and unsteady gait. Neuro exam is significant for decreased sensation to LT of L face sparing forehead, LUE and LLE. No motor deficits noted. Sensation intact to pinprick throughout. Patient lightheaded and nauseous on standing. Unsteady gait. NIHSS 1. L sided numbness and tingling 2/2 poss R brain dysfunction vs vitamin deficiency vs DM sequale in the setting of hyperglycemia vs cardiac etiology, orthostatic hypotension.  CT finding as below    CTH : No acute intracranial hemorrhage, mass effect, or CT evidence of an acute transcortical infarct.  Mild to moderate chronic ischemic changes in the frontoparietal white matter and chronic bilateral basal ganglia, bilateral thalamic, and left pontine lacunar infarcts.  CTA NECK:  No significant stenosis of the cervical carotid arteries based on NASCET criteria. Patent cervical vertebral arteries. No evidence of cervical carotid or vertebral artery dissection..  CTA HEAD: No high-grade stenosis or occlusion of the major proximal arterial branches. Short segment mild stenosis at the right P1-P2 junction of the posterior cerebral artery.    Recommendations  - MRI brain without contrast  - Continue home ASA 81mg  - Orthostatics  - Permissive htn up to 220/110 for now     Case to be discussed with neurology attending Dr. Del Angel Patient is a 57yoF pmhx DM2, HTN, CVA/TIA (s/p loop monitor, on plavix, residual R sided weakness) who presented to the ED and neurology was consulted for concern of L sided numbness. Onset when she woke this morning at 6am. Associated with headache, phonophobia and unsteady gait. Neuro exam is significant for decreased sensation to LT of L face sparing forehead, LUE and LLE. No motor deficits noted. Sensation intact to pinprick throughout. Comparative hyperreflexia on R, likely due to prior stroke. Patient lightheaded and nauseous on standing. Unsteady gait. NIHSS 1. L sided numbness and tingling 2/2 poss R brain dysfunction vs vitamin deficiency vs DM sequale in the setting of hyperglycemia vs cardiac etiology, orthostatic hypotension.  CT finding as below    CTH : No acute intracranial hemorrhage, mass effect, or CT evidence of an acute transcortical infarct.  Mild to moderate chronic ischemic changes in the frontoparietal white matter and chronic bilateral basal ganglia, bilateral thalamic, and left pontine lacunar infarcts.  CTA NECK:  No significant stenosis of the cervical carotid arteries based on NASCET criteria. Patent cervical vertebral arteries. No evidence of cervical carotid or vertebral artery dissection..  CTA HEAD: No high-grade stenosis or occlusion of the major proximal arterial branches. Short segment mild stenosis at the right P1-P2 junction of the posterior cerebral artery.    Recommendations  - MRI brain without contrast  - Continue home ASA 81mg  - Orthostatics  - Permissive htn up to 220/110 for now     Case to be discussed with neurology attending Dr. Del Angel Patient is a 57yoF pmhx DM2, HTN, CVA/TIA (s/p loop monitor, on plavix, residual R sided weakness) who presented to the ED and neurology was consulted for concern of L sided numbness. Onset when she woke this morning at 6am. Associated with headache, phonophobia and unsteady gait. Neuro exam is significant for decreased sensation to LT of L face sparing forehead, LUE and LLE. No motor deficits noted. Sensation intact to pinprick throughout. Comparative hyperreflexia on R, likely due to prior stroke. Patient lightheaded and nauseous on standing. Unsteady gait. NIHSS 1. L sided numbness and tingling 2/2 poss R brain dysfunction vs vitamin deficiency vs DM sequale in the setting of hyperglycemia vs cardiac etiology, orthostatic hypotension.  CT finding as below    CTH : No acute intracranial hemorrhage, mass effect, or CT evidence of an acute transcortical infarct.  Mild to moderate chronic ischemic changes in the frontoparietal white matter and chronic bilateral basal ganglia, bilateral thalamic, and left pontine lacunar infarcts.  CTA NECK:  No significant stenosis of the cervical carotid arteries based on NASCET criteria. Patent cervical vertebral arteries. No evidence of cervical carotid or vertebral artery dissection..  CTA HEAD: No high-grade stenosis or occlusion of the major proximal arterial branches. Short segment mild stenosis at the right P1-P2 junction of the posterior cerebral artery.    Recommendations  - MRI brain without contrast  - Continue home ASA 81mg  - Orthostatics  - Labs B12, Folate, Thiamine  - Permissive htn up to 220/110 for now     Case to be discussed with neurology attending Dr. Del Angel Patient is a 57yoF pmhx DM2, HTN, CVA/TIA (s/p loop monitor, on plavix, residual R sided weakness) who presented to the ED and neurology was consulted for concern of L sided numbness. Onset when she woke this morning at 6am. Associated with headache, phonophobia and unsteady gait. Neuro exam is significant for decreased sensation to LT of L face sparing forehead, LUE and LLE. No motor deficits noted. Sensation intact to pinprick throughout. Comparative hyperreflexia on R, likely due to prior stroke. Patient lightheaded and nauseous on standing. Unsteady gait. NIHSS 1. L sided numbness and tingling 2/2 poss R brain dysfunction, possible recrudescence given chronic b/l thalamic infarcts on CTH vs vitamin deficiency vs DM sequale in the setting of hyperglycemia vs cardiac etiology, orthostatic hypotension.  CT finding as below    CTH : No acute intracranial hemorrhage, mass effect, or CT evidence of an acute transcortical infarct.  Mild to moderate chronic ischemic changes in the frontoparietal white matter and chronic bilateral basal ganglia, bilateral thalamic, and left pontine lacunar infarcts.  CTA NECK:  No significant stenosis of the cervical carotid arteries based on NASCET criteria. Patent cervical vertebral arteries. No evidence of cervical carotid or vertebral artery dissection..  CTA HEAD: No high-grade stenosis or occlusion of the major proximal arterial branches. Short segment mild stenosis at the right P1-P2 junction of the posterior cerebral artery.    Recommendations  - MRI brain without contrast  - Continue home ASA 81mg  - Orthostatics  - Labs B12, Folate, Thiamine  - Permissive htn up to 220/110 for now     Case to be discussed with neurology attending Dr. Del Angel

## 2020-10-02 NOTE — CONSULT NOTE ADULT - SUBJECTIVE AND OBJECTIVE BOX
HPI:  Patient is a 57yoF pmhx DM2, HTN, CVA/TIA (s/p loop monitor, on plavix, residual R sided weakness) who presented to the ED and neurology was consulted for concern of L sided numbness. She reports sudden onset L sided numbness when she woke up this morning around 6am. She states it involves her  L face, arm, and leg. She reports hx of stroke with residual R sided weakness but states that this feels different. She denies associated weakness but reports associated phonophobia on the L and being able to hear the heartbeat in her L ear. She reports associated nausea and lightheadedness as well. She states that she took her BG at home yesterday and it was 372. Her BG readings at home today were 200-260s. She reports associated bad balance. She reports a b/l occipital headache radiating to the from. Denies blurry/double vision, cp, sob, weakness.     ROS: A 10-system ROS was performed and is negative except for those items noted above and/or in the HPI.    PAST MEDICAL & SURGICAL HISTORY:  TIA (transient ischemic attack)    CVA (cerebral vascular accident)    Hypokalemia    IBS (Irritable Bowel Syndrome)    Generalized Headaches    Diabetes Mellitus Type II    Hypertension    No Past Surgical History      FAMILY HISTORY:  Family history of prostate cancer (Father)    Family history of acute myocardial infarction (Father)        SOCIAL HISTORY: SOCIAL HISTORY:     Marital Status: (  )   (  ) Single  (  )   (  )      Occupation:      Lives: (  ) alone  (  ) with children   (  ) with spouse  (  ) with parents  (  ) other     Illicit Drug Use: (  ) never used  (  ) other _____     Tobacco Use:  (  ) never smoked  (  ) former smoker  (  ) current smoker  (  ) pack year  (  ) last cigarette date     Alcohol Use:      Sexual History:        MEDICATIONS  Home Medications:  amLODIPine 5 mg oral tablet: 1 tab(s) orally once a day (31 Oct 2017 13:27)  Aspirin Enteric Coated 81 mg oral delayed release tablet: 1 tab(s) orally once a day (31 Oct 2017 13:27)  hydrALAZINE 50 mg oral tablet: 1 tab(s) orally 3 times a day (31 Oct 2017 13:27)  labetalol 200 mg oral tablet: 2 tab(s) orally 3 times a day (31 Oct 2017 13:27)  Levemir FlexPen 100 units/mL subcutaneous solution: 15 unit(s) subcutaneous once a day (at bedtime) (31 Oct 2017 13:27)  lisinopril 40 mg oral tablet: 1 tab(s) orally once a day (31 Oct 2017 13:27)  metFORMIN 500 mg oral tablet: 1 tab(s) orally 2 times a day (31 Oct 2017 13:27)      MEDICATIONS  (STANDING):    MEDICATIONS  (PRN):      ALLERGIES/INTOLERANCES:  Allergies  sulfa drugs (Angioedema; Swelling)  sulfa drugs (Rash)  Sulfac 10% (Unknown)    Intolerances      OBJECTIVE:  VITALS   Vital Signs Last 24 Hrs  T(C): 36.6 (02 Oct 2020 21:20), Max: 37.1 (02 Oct 2020 19:27)  T(F): 97.9 (02 Oct 2020 21:20), Max: 98.8 (02 Oct 2020 19:27)  HR: 85 (02 Oct 2020 22:26) (84 - 99)  BP: 151/90 (02 Oct 2020 22:26) (147/101 - 185/119)  BP(mean): --  RR: 16 (02 Oct 2020 22:26) (16 - 20)  SpO2: 100% (02 Oct 2020 22:26) (98% - 100%)    PHYSICAL EXAM:    General:  Constitutional: Obese Female, appears stated age, in no apparent distress including pain  Head: Normocephalic & atraumatic.    Neurological (>12):  MS: Awake, alert, oriented to person, place, situation, time. Normal affect. Follows all commands.    Language: Speech is clear, fluent with good repetition & comprehension (able to name objects: thumb, watch, glasses)    CNs: PERRLA (R = 3mm, L = 3mm). VFF. EOMI no nystagmus, no diplopia. Decreased sensation to LT V1-3 on L. Intact sensation to pinprick. well developed masseter muscles b/l. No facial asymmetry b/l, full eye closure strength b/l. Hearing grossly normal (rubbing fingers) b/l. Symmetric palate elevation in midline. Gag reflex deferred. Head turning & shoulder shrug intact b/l. Tongue midline, normal movements, no atrophy.    Motor: Normal muscle bulk & tone. No noticeable tremor. No pronator drift.              Deltoid	Biceps	Triceps	   R	5	5	5	5	 	  L	5	5	5	5	    	H-Flex	K-Flex	K-Ext	D-Flex	P-Flex  R	5	5	5	5	5		   L	5	5	5	5	5		     Sensation: Decreased sensation to LT of LUE and LLE. Intact sensation to pinprick throughout b/l     Cortical: Extinction on DSS (neglect): none    Reflexes:              Biceps(C5)       BR(C6)     Triceps(C7)               Patellar(L4)    Achilles(S1)    Plantar Resp  R	2	          2	             2		        2		    2		Up  L	1	          1	             1		        1		    1		Down     Coordination:  No dysmetria to FTN/HTS    Gait: Lightheadedness/nausea on standing. Unsteady gait.     LABORATORY:  CBC                       12.3   8.83  )-----------( 264      ( 02 Oct 2020 20:21 )             37.2     Chem 10-02    137  |  98  |  21  ----------------------------<  164<H>  3.3<L>   |  26  |  0.99    Ca    10.1      02 Oct 2020 20:21    TPro  7.6  /  Alb  4.2  /  TBili  0.3  /  DBili  x   /  AST  16  /  ALT  13  /  AlkPhos  89  10-02    LFTs LIVER FUNCTIONS - ( 02 Oct 2020 20:21 )  Alb: 4.2 g/dL / Pro: 7.6 g/dL / ALK PHOS: 89 U/L / ALT: 13 U/L / AST: 16 U/L / GGT: x           Coagulopathy PT/INR - ( 02 Oct 2020 20:21 )   PT: 10.8 sec;   INR: 0.90 ratio         PTT - ( 02 Oct 2020 20:21 )  PTT:28.7 sec

## 2020-10-02 NOTE — ED ADULT NURSE NOTE - NSIMPLEMENTINTERV_GEN_ALL_ED
Implemented All Fall Risk Interventions:  [facility] to call system. Call bell, personal items and telephone within reach. Instruct patient to call for assistance. Room bathroom lighting operational. Non-slip footwear when patient is off stretcher. Physically safe environment: no spills, clutter or unnecessary equipment. Stretcher in lowest position, wheels locked, appropriate side rails in place. Provide visual cue, wrist band, yellow gown, etc. Monitor gait and stability. Monitor for mental status changes and reorient to person, place, and time. Review medications for side effects contributing to fall risk. Reinforce activity limits and safety measures with patient and family.

## 2020-10-02 NOTE — ED PROVIDER NOTE - PHYSICAL EXAMINATION
Gen: AAOx3, non-toxic  Head: NCAT  HEENT: RAYMOND 2MM B/L REACTIVE, EOMI, oral mucosa moist, normal conjunctiva  Lung: CTAB, no respiratory distress, no wheezes/rhonchi/rales B/L, speaking in full sentences  CV: RRR, no murmurs, rubs or gallops  Abd: soft, NTND, no guarding, no CVA tenderness  MSK: no visible deformities  Neuro: +decreased sensation over L face, LUE, and LLE. No focal sensory or motor deficits, normal CN exam, no dysmetria on finger to nose testing    Skin: Warm, well perfused, no rash  Psych: normal affect.     Riccardo Moreno PGY3

## 2020-10-02 NOTE — ED PROVIDER NOTE - OBJECTIVE STATEMENT
60F with PMH of HTN, DM, CVA/TIA with residual right-sided weakness presents with headache and left-sided paresthesias that began some time in the middle of last night (pt unsure). HA described as mild, posterior, gradual onset. Parasthenia affect L face, LUE, & LLE. Also endorses feeling slightly unsteady on her feet today. Denies any other symptoms including weakness, visual changes, trouble speaking/swallowing, chest pain, SOB, abd pain, N/V/D. Not on AC. 60F with PMH of HTN, DM, CVA/TIA with residual right-sided weakness presents with headache and left-sided paresthesias that began some time in the middle of last night (pt unsure). HA described as mild, posterior, gradual onset. Parasthenia affect L face, LUE, & LLE. Denies any other symptoms including weakness, visual changes, trouble speaking/swallowing, chest pain, SOB, abd pain, N/V/D. Not on AC.

## 2020-10-02 NOTE — ED ADULT NURSE REASSESSMENT NOTE - NS ED NURSE REASSESS COMMENT FT1
Report received from Carmelo KRAMER in [facility]. Patient seen resting comfortably in bed, socks provided--comfort provided. Awaiting dispo and neuro recs. Safety and comfort provided at this time.

## 2020-10-03 DIAGNOSIS — I63.9 CEREBRAL INFARCTION, UNSPECIFIED: ICD-10-CM

## 2020-10-03 LAB
A1C WITH ESTIMATED AVERAGE GLUCOSE RESULT: 11.9 % — HIGH (ref 4–5.6)
ALBUMIN SERPL ELPH-MCNC: 3.9 G/DL — SIGNIFICANT CHANGE UP (ref 3.3–5)
ALP SERPL-CCNC: 85 U/L — SIGNIFICANT CHANGE UP (ref 40–120)
ALT FLD-CCNC: 12 U/L — SIGNIFICANT CHANGE UP (ref 10–45)
ANION GAP SERPL CALC-SCNC: 13 MMOL/L — SIGNIFICANT CHANGE UP (ref 5–17)
APTT BLD: 28 SEC — SIGNIFICANT CHANGE UP (ref 27.5–35.5)
AST SERPL-CCNC: 16 U/L — SIGNIFICANT CHANGE UP (ref 10–40)
BILIRUB SERPL-MCNC: 0.4 MG/DL — SIGNIFICANT CHANGE UP (ref 0.2–1.2)
BUN SERPL-MCNC: 20 MG/DL — SIGNIFICANT CHANGE UP (ref 7–23)
CALCIUM SERPL-MCNC: 9.7 MG/DL — SIGNIFICANT CHANGE UP (ref 8.4–10.5)
CHLORIDE SERPL-SCNC: 98 MMOL/L — SIGNIFICANT CHANGE UP (ref 96–108)
CHOLEST SERPL-MCNC: 241 MG/DL — HIGH (ref 10–199)
CO2 SERPL-SCNC: 24 MMOL/L — SIGNIFICANT CHANGE UP (ref 22–31)
CREAT SERPL-MCNC: 0.98 MG/DL — SIGNIFICANT CHANGE UP (ref 0.5–1.3)
CRP SERPL-MCNC: 1.22 MG/DL — HIGH (ref 0–0.4)
ERYTHROCYTE [SEDIMENTATION RATE] IN BLOOD: 82 MM/HR — HIGH (ref 0–20)
ESTIMATED AVERAGE GLUCOSE: 295 MG/DL — HIGH (ref 68–114)
FOLATE SERPL-MCNC: 11.6 NG/ML — SIGNIFICANT CHANGE UP
GLUCOSE BLDC GLUCOMTR-MCNC: 309 MG/DL — HIGH (ref 70–99)
GLUCOSE BLDC GLUCOMTR-MCNC: 324 MG/DL — HIGH (ref 70–99)
GLUCOSE SERPL-MCNC: 240 MG/DL — HIGH (ref 70–99)
HCT VFR BLD CALC: 37.8 % — SIGNIFICANT CHANGE UP (ref 34.5–45)
HCV AB S/CO SERPL IA: 0.08 S/CO — SIGNIFICANT CHANGE UP (ref 0–0.99)
HCV AB SERPL-IMP: SIGNIFICANT CHANGE UP
HDLC SERPL-MCNC: 52 MG/DL — SIGNIFICANT CHANGE UP
HGB BLD-MCNC: 12.1 G/DL — SIGNIFICANT CHANGE UP (ref 11.5–15.5)
LIPID PNL WITH DIRECT LDL SERPL: 168 MG/DL — HIGH
MCHC RBC-ENTMCNC: 26.5 PG — LOW (ref 27–34)
MCHC RBC-ENTMCNC: 32 GM/DL — SIGNIFICANT CHANGE UP (ref 32–36)
MCV RBC AUTO: 82.7 FL — SIGNIFICANT CHANGE UP (ref 80–100)
NRBC # BLD: 0 /100 WBCS — SIGNIFICANT CHANGE UP (ref 0–0)
PLATELET # BLD AUTO: 232 K/UL — SIGNIFICANT CHANGE UP (ref 150–400)
POTASSIUM SERPL-MCNC: 3.4 MMOL/L — LOW (ref 3.5–5.3)
POTASSIUM SERPL-SCNC: 3.4 MMOL/L — LOW (ref 3.5–5.3)
PROT SERPL-MCNC: 7.2 G/DL — SIGNIFICANT CHANGE UP (ref 6–8.3)
RBC # BLD: 4.57 M/UL — SIGNIFICANT CHANGE UP (ref 3.8–5.2)
RBC # FLD: 13.6 % — SIGNIFICANT CHANGE UP (ref 10.3–14.5)
SARS-COV-2 RNA SPEC QL NAA+PROBE: SIGNIFICANT CHANGE UP
SODIUM SERPL-SCNC: 135 MMOL/L — SIGNIFICANT CHANGE UP (ref 135–145)
TOTAL CHOLESTEROL/HDL RATIO MEASUREMENT: 4.6 RATIO — SIGNIFICANT CHANGE UP (ref 3.3–7.1)
TRIGL SERPL-MCNC: 104 MG/DL — SIGNIFICANT CHANGE UP (ref 10–149)
TSH SERPL-MCNC: 0.46 UIU/ML — SIGNIFICANT CHANGE UP (ref 0.27–4.2)
VIT B12 SERPL-MCNC: 686 PG/ML — SIGNIFICANT CHANGE UP (ref 232–1245)
WBC # BLD: 6.5 K/UL — SIGNIFICANT CHANGE UP (ref 3.8–10.5)
WBC # FLD AUTO: 6.5 K/UL — SIGNIFICANT CHANGE UP (ref 3.8–10.5)

## 2020-10-03 PROCEDURE — 70551 MRI BRAIN STEM W/O DYE: CPT | Mod: 26

## 2020-10-03 PROCEDURE — 99233 SBSQ HOSP IP/OBS HIGH 50: CPT

## 2020-10-03 RX ORDER — ENOXAPARIN SODIUM 100 MG/ML
40 INJECTION SUBCUTANEOUS DAILY
Refills: 0 | Status: DISCONTINUED | OUTPATIENT
Start: 2020-10-03 | End: 2020-10-06

## 2020-10-03 RX ORDER — CLOPIDOGREL BISULFATE 75 MG/1
TABLET, FILM COATED ORAL
Refills: 0 | Status: DISCONTINUED | OUTPATIENT
Start: 2020-10-03 | End: 2020-10-03

## 2020-10-03 RX ORDER — POTASSIUM CHLORIDE 20 MEQ
20 PACKET (EA) ORAL ONCE
Refills: 0 | Status: COMPLETED | OUTPATIENT
Start: 2020-10-03 | End: 2020-10-03

## 2020-10-03 RX ORDER — DEXTROSE 50 % IN WATER 50 %
25 SYRINGE (ML) INTRAVENOUS ONCE
Refills: 0 | Status: DISCONTINUED | OUTPATIENT
Start: 2020-10-03 | End: 2020-10-06

## 2020-10-03 RX ORDER — CLOPIDOGREL BISULFATE 75 MG/1
300 TABLET, FILM COATED ORAL ONCE
Refills: 0 | Status: COMPLETED | OUTPATIENT
Start: 2020-10-03 | End: 2020-10-03

## 2020-10-03 RX ORDER — ATORVASTATIN CALCIUM 80 MG/1
80 TABLET, FILM COATED ORAL AT BEDTIME
Refills: 0 | Status: DISCONTINUED | OUTPATIENT
Start: 2020-10-03 | End: 2020-10-06

## 2020-10-03 RX ORDER — SODIUM CHLORIDE 9 MG/ML
1000 INJECTION, SOLUTION INTRAVENOUS
Refills: 0 | Status: DISCONTINUED | OUTPATIENT
Start: 2020-10-03 | End: 2020-10-06

## 2020-10-03 RX ORDER — DEXTROSE 50 % IN WATER 50 %
15 SYRINGE (ML) INTRAVENOUS ONCE
Refills: 0 | Status: DISCONTINUED | OUTPATIENT
Start: 2020-10-03 | End: 2020-10-06

## 2020-10-03 RX ORDER — CLOPIDOGREL BISULFATE 75 MG/1
75 TABLET, FILM COATED ORAL DAILY
Refills: 0 | Status: DISCONTINUED | OUTPATIENT
Start: 2020-10-04 | End: 2020-10-05

## 2020-10-03 RX ORDER — INSULIN GLARGINE 100 [IU]/ML
15 INJECTION, SOLUTION SUBCUTANEOUS AT BEDTIME
Refills: 0 | Status: DISCONTINUED | OUTPATIENT
Start: 2020-10-03 | End: 2020-10-04

## 2020-10-03 RX ORDER — INSULIN LISPRO 100/ML
5 VIAL (ML) SUBCUTANEOUS
Refills: 0 | Status: DISCONTINUED | OUTPATIENT
Start: 2020-10-03 | End: 2020-10-04

## 2020-10-03 RX ORDER — INSULIN LISPRO 100/ML
VIAL (ML) SUBCUTANEOUS
Refills: 0 | Status: DISCONTINUED | OUTPATIENT
Start: 2020-10-03 | End: 2020-10-06

## 2020-10-03 RX ORDER — CLOPIDOGREL BISULFATE 75 MG/1
600 TABLET, FILM COATED ORAL ONCE
Refills: 0 | Status: DISCONTINUED | OUTPATIENT
Start: 2020-10-03 | End: 2020-10-03

## 2020-10-03 RX ORDER — DEXTROSE 50 % IN WATER 50 %
12.5 SYRINGE (ML) INTRAVENOUS ONCE
Refills: 0 | Status: DISCONTINUED | OUTPATIENT
Start: 2020-10-03 | End: 2020-10-06

## 2020-10-03 RX ORDER — INFLUENZA VIRUS VACCINE 15; 15; 15; 15 UG/.5ML; UG/.5ML; UG/.5ML; UG/.5ML
0.5 SUSPENSION INTRAMUSCULAR ONCE
Refills: 0 | Status: COMPLETED | OUTPATIENT
Start: 2020-10-03 | End: 2020-10-06

## 2020-10-03 RX ORDER — SODIUM CHLORIDE 9 MG/ML
1000 INJECTION INTRAMUSCULAR; INTRAVENOUS; SUBCUTANEOUS
Refills: 0 | Status: DISCONTINUED | OUTPATIENT
Start: 2020-10-03 | End: 2020-10-05

## 2020-10-03 RX ORDER — ASPIRIN/CALCIUM CARB/MAGNESIUM 324 MG
81 TABLET ORAL DAILY
Refills: 0 | Status: DISCONTINUED | OUTPATIENT
Start: 2020-10-03 | End: 2020-10-06

## 2020-10-03 RX ORDER — GLUCAGON INJECTION, SOLUTION 0.5 MG/.1ML
1 INJECTION, SOLUTION SUBCUTANEOUS ONCE
Refills: 0 | Status: DISCONTINUED | OUTPATIENT
Start: 2020-10-03 | End: 2020-10-06

## 2020-10-03 RX ORDER — INSULIN LISPRO 100/ML
VIAL (ML) SUBCUTANEOUS AT BEDTIME
Refills: 0 | Status: DISCONTINUED | OUTPATIENT
Start: 2020-10-03 | End: 2020-10-06

## 2020-10-03 RX ADMIN — Medication 4: at 17:52

## 2020-10-03 RX ADMIN — ENOXAPARIN SODIUM 40 MILLIGRAM(S): 100 INJECTION SUBCUTANEOUS at 13:34

## 2020-10-03 RX ADMIN — Medication 81 MILLIGRAM(S): at 13:59

## 2020-10-03 RX ADMIN — Medication 3: at 13:32

## 2020-10-03 RX ADMIN — Medication 20 MILLIEQUIVALENT(S): at 13:00

## 2020-10-03 RX ADMIN — ATORVASTATIN CALCIUM 80 MILLIGRAM(S): 80 TABLET, FILM COATED ORAL at 21:46

## 2020-10-03 RX ADMIN — CLOPIDOGREL BISULFATE 300 MILLIGRAM(S): 75 TABLET, FILM COATED ORAL at 18:46

## 2020-10-03 RX ADMIN — SODIUM CHLORIDE 65 MILLILITER(S): 9 INJECTION INTRAMUSCULAR; INTRAVENOUS; SUBCUTANEOUS at 06:31

## 2020-10-03 RX ADMIN — Medication 2: at 21:46

## 2020-10-03 RX ADMIN — INSULIN GLARGINE 15 UNIT(S): 100 INJECTION, SOLUTION SUBCUTANEOUS at 21:46

## 2020-10-03 NOTE — H&P ADULT - NSHPLABSRESULTS_GEN_ALL_CORE
12.3   8.83  )-----------( 264      ( 02 Oct 2020 20:21 )             37.2     10-02    137  |  98  |  21  ----------------------------<  164<H>  3.3<L>   |  26  |  0.99    Ca    10.1      02 Oct 2020 20:21    TPro  7.6  /  Alb  4.2  /  TBili  0.3  /  DBili  x   /  AST  16  /  ALT  13  /  AlkPhos  89  10-02    CAPILLARY BLOOD GLUCOSE      POCT Blood Glucose.: 150 mg/dL (02 Oct 2020 19:52)  POCT Blood Glucose.: 155 mg/dL (02 Oct 2020 19:33)    PT/INR - ( 02 Oct 2020 20:21 )   PT: 10.8 sec;   INR: 0.90 ratio         PTT - ( 02 Oct 2020 20:21 )  PTT:28.7 sec    I&O's Summary    Vital Signs Last 24 Hrs  T(C): 36.3 (03 Oct 2020 01:49), Max: 37.1 (02 Oct 2020 19:27)  T(F): 97.4 (03 Oct 2020 01:49), Max: 98.8 (02 Oct 2020 19:27)  HR: 76 (03 Oct 2020 01:49) (76 - 99)  BP: 164/85 (03 Oct 2020 01:49) (147/101 - 185/119)  BP(mean): --  RR: 18 (03 Oct 2020 01:49) (16 - 20)  SpO2: 100% (03 Oct 2020 01:49) (98% - 100%)

## 2020-10-03 NOTE — PHYSICAL THERAPY INITIAL EVALUATION ADULT - LEVEL OF INDEPENDENCE: GAIT, REHAB EVAL
moderate assist (50% patients effort)/contact guard minimum assist (75% patients effort)/CGA with RW, Min Ax1 without AD/contact guard

## 2020-10-03 NOTE — PHYSICAL THERAPY INITIAL EVALUATION ADULT - ORIENTATION, REHAB EVAL
Celine Licona needs to be seen for an appointment before further refills are given.
oriented to person, place, time and situation/person/place/time/situation/A&Ox4

## 2020-10-03 NOTE — PHYSICAL THERAPY INITIAL EVALUATION ADULT - GAIT DISTANCE, PT EVAL
10 feet/50 feet/10ft with no AD, 50ft x2 trials with RW 10 feet/10ft with no AD, 40ft x2 trials with RW

## 2020-10-03 NOTE — PHYSICAL THERAPY INITIAL EVALUATION ADULT - PERTINENT HX OF CURRENT PROBLEM, REHAB EVAL
Pt is a 56 y/o F pmhx DM2, HTN, CVA/TIA (s/p loop monitor, on plavix, residual R sided weakness) who presented to the ED and neurology was consulted for concern of L sided numbness. She reports sudden onset L sided numbness when she woke up this morning (10/2) around 6am. She states it involves her  L face, arm, and leg. She reports hx of stroke with residual R sided weakness but states that this feels different.
36.9

## 2020-10-03 NOTE — PHYSICAL THERAPY INITIAL EVALUATION ADULT - PRECAUTIONS/LIMITATIONS, REHAB EVAL
fall precautions/CONTINUED: She denies associated weakness but reports associated phonophobia on the L and being able to hear the heartbeat in her L ear. She reports associated nausea and lightheadedness as well. She states that she took her BG at home yesterday and it was 372. Her BG readings at home today were 200-260s. She reports associated bad balance. She reports a b/l occipital headache radiating to the from. Denies blurry/double vision, cp, sob, weakness.

## 2020-10-03 NOTE — PHYSICAL THERAPY INITIAL EVALUATION ADULT - LIVES WITH, PROFILE
spouse/As per pt, pt lives in a private home with her  and 4 of her children, 3 steps to enter with no HRs, and 14 steps inside up to bedroom/bathroom with BHRs. Pt was indep prior with ADLs, functional mobility and ambulation without AD. (-) drive/children

## 2020-10-03 NOTE — PHYSICAL THERAPY INITIAL EVALUATION ADULT - TRANSFER TRAINING, PT EVAL
Goal: Pt will be able to perform sit<>stand transfers independently with or without least restrictive AD within 3-4 weeks to improve functional mobility.

## 2020-10-03 NOTE — PHYSICAL THERAPY INITIAL EVALUATION ADULT - DIAGNOSIS, PT EVAL
Pt presents with decreased balance, decreased strength, decreased light touch sensation all impacting ability to perform ADLs and functional mobility Pt presents with decreased balance, strength, motor control, decreased light touch sensation all impacting ability to perform ADLs and functional mobility

## 2020-10-03 NOTE — H&P ADULT - HISTORY OF PRESENT ILLNESS
Patient is a 57yoF pmhx DM2, HTN, CVA/TIA (s/p loop monitor, on plavix, residual R sided weakness) who presented to the ED and neurology was consulted for concern of L sided numbness. She reports sudden onset L sided numbness when she woke up this morning around 6am. She states it involves her  L face, arm, and leg. She reports hx of stroke with residual R sided weakness but states that this feels different. She denies associated weakness but reports associated phonophobia on the L and being able to hear the heartbeat in her L ear. She reports associated nausea and lightheadedness as well. She states that she took her BG at home yesterday and it was 372. Her BG readings at home today were 200-260s. She reports associated bad balance. She reports a b/l occipital headache radiating to the from. Denies blurry/double vision, cp, sob, weakness.

## 2020-10-03 NOTE — PHYSICAL THERAPY INITIAL EVALUATION ADULT - BED MOBILITY TRAINING, PT EVAL
Goal: Pt will be able to perform all bed mobility independently without bed rail assist within 3-4 weeks to improve functional mobility.

## 2020-10-03 NOTE — PHYSICAL THERAPY INITIAL EVALUATION ADULT - BALANCE TRAINING, PT EVAL
Goal: Pt will be improve sitting/standing static and dynamic balance by at least half a grade within 3-4 weeks to decrease fall risk.

## 2020-10-03 NOTE — PHYSICAL THERAPY INITIAL EVALUATION ADULT - ACTIVE RANGE OF MOTION EXAMINATION, REHAB EVAL
bilateral upper extremity Active ROM was WFL (within functional limits)/R Ankle df AAROM WFL/Left LE Active ROM was WFL (within functional limits) Left LE Active ROM was WFL (within functional limits)/Except: R Ankle df AAROM WFL/bilateral upper extremity Active ROM was WFL (within functional limits)/Right LE Active ROM was WFL (within functional limits)

## 2020-10-03 NOTE — PHYSICAL THERAPY INITIAL EVALUATION ADULT - PLANNED THERAPY INTERVENTIONS, PT EVAL
balance training/bed mobility training/Stair Goal: Pt will be able to negotiate at least 2 flights of stairs independently with HR assist within 3-4 weeks to improve functional mobility./gait training/transfer training

## 2020-10-03 NOTE — H&P ADULT - NSHPPHYSICALEXAM_GEN_ALL_CORE
PHYSICAL EXAM:    General:  Constitutional: Obese Female, appears stated age, in no apparent distress including pain  Head: Normocephalic & atraumatic.    Neurological (>12):  MS: Awake, alert, oriented to person, place, situation, time. Normal affect. Follows all commands.    Language: Speech is clear, fluent with good repetition & comprehension (able to name objects: thumb, watch, glasses)    CNs: PERRLA (R = 3mm, L = 3mm). VFF. EOMI no nystagmus, no diplopia. Decreased sensation to LT V1-3 on L. Intact sensation to pinprick. well developed masseter muscles b/l. No facial asymmetry b/l, full eye closure strength b/l. Hearing grossly normal (rubbing fingers) b/l. Symmetric palate elevation in midline. Gag reflex deferred. Head turning & shoulder shrug intact b/l. Tongue midline, normal movements, no atrophy.    Motor: Normal muscle bulk & tone. No noticeable tremor. No pronator drift.              Deltoid	Biceps	Triceps	   R	5	5	5	5	 	  L	5	5	5	5	    	H-Flex	K-Flex	K-Ext	D-Flex	P-Flex  R	5	5	5	5	5		   L	5	5	5	5	5		     Sensation: Decreased sensation to LT of LUE and LLE. Intact sensation to pinprick throughout b/l     Cortical: Extinction on DSS (neglect): none    Reflexes:              Biceps(C5)       BR(C6)     Triceps(C7)               Patellar(L4)    Achilles(S1)    Plantar Resp  R	2	          2	             2		        2		    2		Up  L	1	          1	             1		        1		    1		Down     Coordination:  No dysmetria to FTN/HTS    Gait: Lightheadedness/nausea on standing. Unsteady gait.

## 2020-10-03 NOTE — PHYSICAL THERAPY INITIAL EVALUATION ADULT - IMPAIRMENTS CONTRIBUTING TO GAIT DEVIATIONS, PT EVAL
decreased strength/impaired balance/decreased ROM/decreased sensation decreased sensation/decreased strength/impaired balance/impaired motor control/decreased ROM

## 2020-10-03 NOTE — CHART NOTE - NSCHARTNOTEFT_GEN_A_CORE
obtain screening lower extremity venous ultrasound in patients who meet 1 or more of the following criteria as patient is high risk for DVT/PE on admission:   [] History of DVT/PE  []Hypercoagulable states (Factor V Leiden, Cancer, OCP, etc. )  []Prolonged immobility (hemiplegia/hemiparesis/post operative or any other extended immobilization)  [] Transferred from outside facility (Rehab or Long term care)  [] Age </= to 50.

## 2020-10-03 NOTE — PHYSICAL THERAPY INITIAL EVALUATION ADULT - BALANCE DISTURBANCE, IDENTIFIED IMPAIRMENT CONTRIBUTE, REHAB EVAL
decreased ROM/decreased sensation/decreased strength decreased strength/impaired motor control/decreased sensation/decreased ROM

## 2020-10-03 NOTE — PHYSICAL THERAPY INITIAL EVALUATION ADULT - ADDITIONAL COMMENTS
IMAGING: CT BRAIN (10/2/20): No acute intracranial hemorrhage, mass effect, or CT evidence of an acute transcortical infarct. Mild to moderate chronic ischemic changes in the frontoparietal white matter and chronic bilateral basal ganglia, bilateral thalamic, and left pontine lacunar infarcts.  CTA NECK (10/2/20):  No significant stenosis of the cervical carotid arteries based on NASCET criteria. Patent cervical vertebral arteries. No evidence of cervical carotid or vertebral artery dissection.  CTA HEAD (10/2/20): No high-grade stenosis or occlusion of the major proximal arterial branches. Short segment mild stenosis at the right P1-P2 junction of the posterior cerebral artery.

## 2020-10-03 NOTE — PHYSICAL THERAPY INITIAL EVALUATION ADULT - GAIT TRAINING, PT EVAL
Goal: Pt will be able to ambulate at least 250ft independently with or without least restrictive AD to increase endurance.

## 2020-10-03 NOTE — PHYSICAL THERAPY INITIAL EVALUATION ADULT - GAIT DEVIATIONS NOTED, PT EVAL
decreased ramona/footdrop decreased weight-shifting ability/decreased ramona/footdrop/decreased step length/mild foot drop R ankle 2* DF: 2+/5 from prior CVA

## 2020-10-03 NOTE — OCCUPATIONAL THERAPY INITIAL EVALUATION ADULT - PERTINENT HX OF CURRENT PROBLEM, REHAB EVAL
Patient is a 57yoF pmhx DM2, HTN, CVA/TIA (s/p loop monitor, on plavix, residual R sided weakness) who presented to the ED. She reports sudden onset L sided numbness when she woke up this morning around 6am. She states it involves her  L face, arm, and leg. She reports hx of stroke with residual R sided weakness but states that this feels different.

## 2020-10-03 NOTE — OCCUPATIONAL THERAPY INITIAL EVALUATION ADULT - LIVES WITH, PROFILE
spouse/children/Pt lives with spouse and 4 children in private home with 3 [address] and 14 steps to 2nd floor. Pt reports being Ind with all ADLs and functional ambulation PTA. Pt owns a rolling walker however was not using it. Pt also has shower chair which she uses.

## 2020-10-03 NOTE — PHYSICAL THERAPY INITIAL EVALUATION ADULT - GENERAL OBSERVATIONS, REHAB EVAL
Pt received supine in bed in NAD, VSS, +IVL, +cont pulse ox, +BP cuff, +tele and agreeable to tx at this time.

## 2020-10-03 NOTE — CHART NOTE - NSCHARTNOTEFT_GEN_A_CORE
Spoke with Endocrinology over the phone regarding patients elevated blood sugar. Endo recommended to restart home insulin regimen. Patient to remain on low corrective scale and start Humalog 5 mg before meals and Lantus 15 mg at bedtime. Will continue to monitor blood sugar levels. Endocrine at standby if further recommendations required.

## 2020-10-03 NOTE — H&P ADULT - ATTENDING COMMENTS
60-year-old right-handed lady first evaluated at [facility] on 10/3/2020 with left-sided numbness and imbalance.  History and exam as above, with minor changes.  Home medications included aspirin.  ROS otherwise negative.  Exam.  Alert and attentive; mild dysarthria; right anterior tibialis 4/5 (chronic); mild or mild-moderately decreased light touch on left; gait not tested; remainder of neurologic exam was nonfocal.  CT head (10/2/2020) to my eye showed chronic lacunar infarcts: Left superior basis pontis; possibly in the basal ganglia bilaterally (not well seen); and reportedly in the thalamus bilaterally, not obvious to my eye.  CTA neck and head (10/2/2020) to my eye showed mild right PCA stenosis at the P1-P2 junction, and was otherwise unremarkable.  Impression.  In 2017 she had a stroke with residual, mild right hemiparesis, including a mild foot drop.  She has been evaluated by Dr. Heri Vences (vascular neurology).  At that time, stroke work-up was unremarkable including an ILR, and the diagnosis was likely small vessel disease.  On 10/2/2020, she awoke and noted left-sided numbness involving face, arm and leg.  There was associated mild imbalance and occipital headache.  Her fingerstick glucose was 372.  Her presentation is consistent with right brain dysfunction, perhaps in the thalamic region.  Etiology is probably recurrent, minor ischemic stroke of uncertain mechanism, perhaps small vessel disease, or perhaps even branch atheromatous disease given her mild right PCA stenosis.  Her diabetes is apparently under poor control as well.  Plan.  MRI brain; TTE; further management will be guided by results of her MRI; Plavix 300 mg loading dose, then 75 mg daily for 3 weeks; continue aspirin 81 mg daily, indefinitely; an alternative treatment plan would be to change aspirin/Plavix after 3 weeks to Aggrenox; atorvastatin 80 mg daily, target LDL less than 70; endocrinology consultation; PT/OT/speech therapy.

## 2020-10-03 NOTE — PHYSICAL THERAPY INITIAL EVALUATION ADULT - LEVEL OF INDEPENDENCE: SIT/STAND, REHAB EVAL
"Patient identifiers verified and correct for Amanda James.    LOC: The patient is awake, alert and aware of environment with an appropriate affect, the patient is oriented x 3 and speaking appropriately.    APPEARANCE: Patient resting comfortably and in no acute distress, patient is clean and well groomed, patient's clothing is properly fastened.    SKIN: The skin is warm and dry, color consistent with ethnicity, patient has normal skin turgor and moist mucus membranes, skin intact, no breakdown or bruising noted.    MUSCULOSKELETAL: Patient moving all extremities spontaneously, no obvious swelling or deformities noted.  Right hip pain( Pain around the Right SI joint and Right Trochanteric bursa"     RESPIRATORY: Airway is open and patent, respirations are spontaneous, patient has a normal effort and rate, no accessory muscle use noted, bilateral breath sounds clear     CARDIAC: Patient has a normal rate and regular rhythm, no periphreal edema noted, capillary refill < 3 seconds.    ABDOMEN: Soft and non tender to palpation, no distention noted, normoactive bowel sounds present in all four quadrants.    NEUROLOGIC: PERRLA, 3 mm bilaterally, eyes open spontaneously, behavior appropriate to situation, follows commands, facial expression symmetrical, bilateral hand grasp equal and even, purposeful motor response noted  "
contact guard

## 2020-10-03 NOTE — PHYSICAL THERAPY INITIAL EVALUATION ADULT - MANUAL MUSCLE TESTING RESULTS, REHAB EVAL
Pt MMT scores assessed, RUE, R hip flex, R knee ext at least 3+/5, R ankle df 2+/5, L UE and LE at least 4/5 throughout./grossly assessed due to

## 2020-10-03 NOTE — OCCUPATIONAL THERAPY INITIAL EVALUATION ADULT - PRECAUTIONS/LIMITATIONS, REHAB EVAL
she reports associated phonophobia on the L and being able to hear the heartbeat in her L ear, nausea and lightheadedness. She states that she took her BG at home yesterday (10/20 and it was 372. Her BG readings at home today (10/3) were 200-260s. She reports associated bad balance. She reports a b/l occipital headache radiating to the from. fall precautions/she reports associated phonophobia on the L and being able to hear the heartbeat in her L ear, nausea and lightheadedness. She states that she took her BG at home yesterday (10/20 and it was 372. Her BG readings at home today (10/3) were 200-260s. She reports associated bad balance. She reports a b/l occipital headache radiating to the from.

## 2020-10-03 NOTE — OCCUPATIONAL THERAPY INITIAL EVALUATION ADULT - REFERRAL TO ANOTHER SERVICE NEEDED, OT EVAL
CTA NECK: (-) CTA HEAD: No high-grade stenosis or occlusion of the major proximal arterial branches. Short segment mild stenosis at the right P1-P2 junction of the posterior cerebral artery.

## 2020-10-03 NOTE — H&P ADULT - ASSESSMENT
Patient is a 57yoF pmhx DM2, HTN, CVA/TIA (s/p loop monitor, on plavix, residual R sided weakness) who presented to the ED and neurology was consulted for concern of L sided numbness. Onset when she woke this morning at 6am. Associated with headache, phonophobia and unsteady gait. Neuro exam is significant for decreased sensation to LT of L face sparing forehead, LUE and LLE. No motor deficits noted. Sensation intact to pinprick throughout. Comparative hyperreflexia on R, likely due to prior stroke. Patient lightheaded and nauseous on standing. Unsteady gait. NIHSS 1. L sided numbness and tingling 2/2 poss R brain dysfunction, possible recrudescence given chronic b/l thalamic infarcts on CTH vs vitamin deficiency vs DM sequale in the setting of hyperglycemia vs cardiac etiology, orthostatic hypotension.  CT finding as below.     CTH : No acute intracranial hemorrhage, mass effect, or CT evidence of an acute transcortical infarct.  Mild to moderate chronic ischemic changes in the frontoparietal white matter and chronic bilateral basal ganglia, bilateral thalamic, and left pontine lacunar infarcts.  CTA NECK:  No significant stenosis of the cervical carotid arteries based on NASCET criteria. Patent cervical vertebral arteries. No evidence of cervical carotid or vertebral artery dissection..  CTA HEAD: No high-grade stenosis or occlusion of the major proximal arterial branches. Short segment mild stenosis at the right P1-P2 junction of the posterior cerebral artery.    Recommendations  - MRI brain without contrast  - Continue home ASA 81mg  - Orthostatics  - Labs B12, Folate, Thiamine, A1C, Lipid Panel  - Permissive htn up to 220/110 for now  - DVT ppx  - Atorvastatin 80mg, Titrate LDL < 70  - TTE with bubble study and telemetry to look for a cardiac source of embolism.   -  PT/OT evaluation  -  NPO until clears Dysphagia screen, otherwise Swallow evaluation       Case to be discussed with neurology attending Dr. Libman

## 2020-10-04 DIAGNOSIS — E11.65 TYPE 2 DIABETES MELLITUS WITH HYPERGLYCEMIA: ICD-10-CM

## 2020-10-04 DIAGNOSIS — I10 ESSENTIAL (PRIMARY) HYPERTENSION: ICD-10-CM

## 2020-10-04 DIAGNOSIS — E78.5 HYPERLIPIDEMIA, UNSPECIFIED: ICD-10-CM

## 2020-10-04 DIAGNOSIS — I63.9 CEREBRAL INFARCTION, UNSPECIFIED: ICD-10-CM

## 2020-10-04 LAB
APPEARANCE UR: ABNORMAL
BILIRUB UR-MCNC: NEGATIVE — SIGNIFICANT CHANGE UP
COLOR SPEC: YELLOW — SIGNIFICANT CHANGE UP
DIFF PNL FLD: ABNORMAL
GLUCOSE BLDC GLUCOMTR-MCNC: 152 MG/DL — HIGH (ref 70–99)
GLUCOSE BLDC GLUCOMTR-MCNC: 191 MG/DL — HIGH (ref 70–99)
GLUCOSE BLDC GLUCOMTR-MCNC: 195 MG/DL — HIGH (ref 70–99)
GLUCOSE BLDC GLUCOMTR-MCNC: 258 MG/DL — HIGH (ref 70–99)
GLUCOSE UR QL: ABNORMAL
KETONES UR-MCNC: NEGATIVE — SIGNIFICANT CHANGE UP
LEUKOCYTE ESTERASE UR-ACNC: ABNORMAL
NITRITE UR-MCNC: NEGATIVE — SIGNIFICANT CHANGE UP
PH UR: 6 — SIGNIFICANT CHANGE UP (ref 5–8)
PROT UR-MCNC: >600
SARS-COV-2 IGG SERPL QL IA: NEGATIVE — SIGNIFICANT CHANGE UP
SARS-COV-2 IGM SERPL IA-ACNC: <0.1 INDEX — SIGNIFICANT CHANGE UP
SP GR SPEC: 1.03 — HIGH (ref 1.01–1.02)
UROBILINOGEN FLD QL: NEGATIVE — SIGNIFICANT CHANGE UP

## 2020-10-04 PROCEDURE — 93010 ELECTROCARDIOGRAM REPORT: CPT

## 2020-10-04 PROCEDURE — 99233 SBSQ HOSP IP/OBS HIGH 50: CPT

## 2020-10-04 PROCEDURE — 71045 X-RAY EXAM CHEST 1 VIEW: CPT | Mod: 26

## 2020-10-04 PROCEDURE — 99222 1ST HOSP IP/OBS MODERATE 55: CPT

## 2020-10-04 RX ORDER — INSULIN GLARGINE 100 [IU]/ML
18 INJECTION, SOLUTION SUBCUTANEOUS AT BEDTIME
Refills: 0 | Status: DISCONTINUED | OUTPATIENT
Start: 2020-10-04 | End: 2020-10-06

## 2020-10-04 RX ORDER — INSULIN LISPRO 100/ML
6 VIAL (ML) SUBCUTANEOUS
Refills: 0 | Status: DISCONTINUED | OUTPATIENT
Start: 2020-10-04 | End: 2020-10-06

## 2020-10-04 RX ORDER — ACETAMINOPHEN 500 MG
650 TABLET ORAL EVERY 6 HOURS
Refills: 0 | Status: DISCONTINUED | OUTPATIENT
Start: 2020-10-04 | End: 2020-10-06

## 2020-10-04 RX ORDER — LISINOPRIL 2.5 MG/1
40 TABLET ORAL DAILY
Refills: 0 | Status: DISCONTINUED | OUTPATIENT
Start: 2020-10-04 | End: 2020-10-06

## 2020-10-04 RX ORDER — LABETALOL HCL 100 MG
100 TABLET ORAL
Refills: 0 | Status: DISCONTINUED | OUTPATIENT
Start: 2020-10-04 | End: 2020-10-05

## 2020-10-04 RX ORDER — AMLODIPINE BESYLATE 2.5 MG/1
5 TABLET ORAL DAILY
Refills: 0 | Status: DISCONTINUED | OUTPATIENT
Start: 2020-10-04 | End: 2020-10-06

## 2020-10-04 RX ADMIN — ENOXAPARIN SODIUM 40 MILLIGRAM(S): 100 INJECTION SUBCUTANEOUS at 11:59

## 2020-10-04 RX ADMIN — Medication 100 MILLIGRAM(S): at 21:26

## 2020-10-04 RX ADMIN — Medication 81 MILLIGRAM(S): at 11:59

## 2020-10-04 RX ADMIN — CLOPIDOGREL BISULFATE 75 MILLIGRAM(S): 75 TABLET, FILM COATED ORAL at 11:59

## 2020-10-04 RX ADMIN — Medication 6 UNIT(S): at 17:33

## 2020-10-04 RX ADMIN — Medication 5 UNIT(S): at 12:01

## 2020-10-04 RX ADMIN — Medication 5 UNIT(S): at 09:04

## 2020-10-04 RX ADMIN — Medication 3: at 09:03

## 2020-10-04 RX ADMIN — LISINOPRIL 40 MILLIGRAM(S): 2.5 TABLET ORAL at 06:04

## 2020-10-04 RX ADMIN — AMLODIPINE BESYLATE 5 MILLIGRAM(S): 2.5 TABLET ORAL at 06:04

## 2020-10-04 RX ADMIN — INSULIN GLARGINE 18 UNIT(S): 100 INJECTION, SOLUTION SUBCUTANEOUS at 21:26

## 2020-10-04 RX ADMIN — Medication 1: at 12:00

## 2020-10-04 RX ADMIN — Medication 1: at 17:32

## 2020-10-04 RX ADMIN — ATORVASTATIN CALCIUM 80 MILLIGRAM(S): 80 TABLET, FILM COATED ORAL at 21:26

## 2020-10-04 NOTE — PROGRESS NOTE ADULT - SUBJECTIVE AND OBJECTIVE BOX
THE PATIENT WAS SEEN AND EXAMINED BY ME WITH THE HOUSESTAFF AND STROKE TEAM DURING MORNING ROUNDS.   HPI:  Patient is a 57yoF pmhx DM2, HTN, CVA/TIA (s/p loop monitor, on plavix, residual R sided weakness) who presented to the ED and neurology was consulted for concern of L sided numbness. She reported sudden onset L sided numbness when she woke up the morning of admission around 6am. She stateed it involves her  L face, arm, and leg. She reports hx of stroke with residual R sided weakness but states that this feels different. She denied associated weakness but reports associated phonophobia on the L and being able to hear the heartbeat in her L ear. She reported associated nausea and lightheadedness as well. She states that she took her BG at home the day prior to admission and it was 372. Her BG readings at home the day of admission were 200-260s. She reported associated bad balance. She reported a b/l occipital headache radiating to the from. Denied blurry/double vision, cp, sob, weakness. NIHSS 1 on admission.    SUBJECTIVE: No events overnight.  No new neurologic complaints.      amLODIPine   Tablet 5 milliGRAM(s) Oral daily  aspirin  chewable 81 milliGRAM(s) Oral daily  atorvastatin 80 milliGRAM(s) Oral at bedtime  clopidogrel Tablet 75 milliGRAM(s) Oral daily  dextrose 40% Gel 15 Gram(s) Oral once PRN  dextrose 5%. 1000 milliLiter(s) IV Continuous <Continuous>  dextrose 50% Injectable 12.5 Gram(s) IV Push once  dextrose 50% Injectable 25 Gram(s) IV Push once  dextrose 50% Injectable 25 Gram(s) IV Push once  enoxaparin Injectable 40 milliGRAM(s) SubCutaneous daily  glucagon  Injectable 1 milliGRAM(s) IntraMuscular once PRN  influenza   Vaccine 0.5 milliLiter(s) IntraMuscular once  insulin glargine Injectable (LANTUS) 15 Unit(s) SubCutaneous at bedtime  insulin lispro (HumaLOG) corrective regimen sliding scale   SubCutaneous three times a day before meals  insulin lispro (HumaLOG) corrective regimen sliding scale   SubCutaneous at bedtime  insulin lispro Injectable (HumaLOG) 5 Unit(s) SubCutaneous three times a day before meals  lisinopril 40 milliGRAM(s) Oral daily  sodium chloride 0.9%. 1000 milliLiter(s) IV Continuous <Continuous>    PHYSICAL EXAM:   Vital Signs Last 24 Hrs  T(C): 37.2 (03 Oct 2020 20:35), Max: 37.2 (03 Oct 2020 20:35)  T(F): 98.9 (03 Oct 2020 20:35), Max: 98.9 (03 Oct 2020 20:35)  HR: 82 (04 Oct 2020 06:29) (74 - 107)  BP: 209/118 (04 Oct 2020 06:29) (172/107 - 209/118)  BP(mean): 146 (04 Oct 2020 06:29) (122 - 146)  RR: 12 (04 Oct 2020 06:29) (10 - 19)  SpO2: 98% (04 Oct 2020 06:29) (97% - 100%)    General: No acute distress  HEENT: EOM intact, visual fields full  Abdomen: Soft, nontender, nondistended   Extremities: No edema    NEUROLOGICAL EXAM:  Mental status: Awake, alert, oriented x3, no aphasia, no neglect, normal memory   Cranial Nerves: No facial asymmetry, no nystagmus, no dysarthria,  tongue midline  Motor exam: Normal tone, no drift, 5/5 RUE, 5/5 RLE, 5/5 LUE, 5/5 LLE, normal fine finger movements.  Sensation: Intact to light touch   Coordination/ Gait: No dysmetria, OLINDA intact and symmetric bilaterally    LABS:                        12.1   6.50  )-----------( 232      ( 03 Oct 2020 06:47 )             37.8    10-03    135  |  98  |  20  ----------------------------<  240<H>  3.4<L>   |  24  |  0.98    Ca    9.7      03 Oct 2020 06:47    TPro  7.2  /  Alb  3.9  /  TBili  0.4  /  DBili  x   /  AST  16  /  ALT  12  /  AlkPhos  85  10-03  PT/INR - ( 02 Oct 2020 20:21 )   PT: 10.8 sec;   INR: 0.90 ratio         PTT - ( 03 Oct 2020 10:47 )  PTT:28.0 sec      IMAGING: Reviewed by me.      THE PATIENT WAS SEEN AND EXAMINED BY ME WITH THE HOUSESTAFF AND STROKE TEAM DURING MORNING ROUNDS.   HPI:  Patient is a 57yoF pmhx DM2, HTN, CVA/TIA (s/p loop monitor, on plavix, residual R sided weakness) who presented to the ED and neurology was consulted for concern of L sided numbness. She reported sudden onset L sided numbness when she woke up the morning of admission around 6am. She stateed it involves her  L face, arm, and leg. She reports hx of stroke with residual R sided weakness but states that this feels different. She denied associated weakness but reports associated phonophobia on the L and being able to hear the heartbeat in her L ear. She reported associated nausea and lightheadedness as well. She states that she took her BG at home the day prior to admission and it was 372. Her BG readings at home the day of admission were 200-260s. She reported associated bad balance. She reported a b/l occipital headache radiating to the from. Denied blurry/double vision, cp, sob, weakness. NIHSS 1 on admission.    SUBJECTIVE: No events overnight.  No new neurologic complaints.      amLODIPine   Tablet 5 milliGRAM(s) Oral daily  aspirin  chewable 81 milliGRAM(s) Oral daily  atorvastatin 80 milliGRAM(s) Oral at bedtime  clopidogrel Tablet 75 milliGRAM(s) Oral daily  dextrose 40% Gel 15 Gram(s) Oral once PRN  dextrose 5%. 1000 milliLiter(s) IV Continuous <Continuous>  dextrose 50% Injectable 12.5 Gram(s) IV Push once  dextrose 50% Injectable 25 Gram(s) IV Push once  dextrose 50% Injectable 25 Gram(s) IV Push once  enoxaparin Injectable 40 milliGRAM(s) SubCutaneous daily  glucagon  Injectable 1 milliGRAM(s) IntraMuscular once PRN  influenza   Vaccine 0.5 milliLiter(s) IntraMuscular once  insulin glargine Injectable (LANTUS) 15 Unit(s) SubCutaneous at bedtime  insulin lispro (HumaLOG) corrective regimen sliding scale   SubCutaneous three times a day before meals  insulin lispro (HumaLOG) corrective regimen sliding scale   SubCutaneous at bedtime  insulin lispro Injectable (HumaLOG) 5 Unit(s) SubCutaneous three times a day before meals  lisinopril 40 milliGRAM(s) Oral daily  sodium chloride 0.9%. 1000 milliLiter(s) IV Continuous <Continuous>    PHYSICAL EXAM:   Vital Signs Last 24 Hrs  T(C): 37.2 (03 Oct 2020 20:35), Max: 37.2 (03 Oct 2020 20:35)  T(F): 98.9 (03 Oct 2020 20:35), Max: 98.9 (03 Oct 2020 20:35)  HR: 82 (04 Oct 2020 06:29) (74 - 107)  BP: 209/118 (04 Oct 2020 06:29) (172/107 - 209/118)  BP(mean): 146 (04 Oct 2020 06:29) (122 - 146)  RR: 12 (04 Oct 2020 06:29) (10 - 19)  SpO2: 98% (04 Oct 2020 06:29) (97% - 100%)    General: No acute distress  HEENT: EOM intact, visual fields full  Abdomen: Soft, nontender, nondistended   Extremities: No edema    NEUROLOGICAL EXAM:  Mental status: Awake, alert, oriented x3, speech fluent, no neglect, follows commands  Cranial Nerves: Mild R NLF flattening, no nystagmus, no dysarthria,  tongue midline  Motor exam: Normal tone, subtle LLE drift, Right anterior tibialis (4/5) - chronic, rest 5/5  Sensation: Mild decrease in sensation on the L side  Coordination/ Gait: No dysmetria, gait with r foot being elevated to overcome foot drop     LABS:                        12.1   6.50  )-----------( 232      ( 03 Oct 2020 06:47 )             37.8    10-03    135  |  98  |  20  ----------------------------<  240<H>  3.4<L>   |  24  |  0.98    Ca    9.7      03 Oct 2020 06:47    TPro  7.2  /  Alb  3.9  /  TBili  0.4  /  DBili  x   /  AST  16  /  ALT  12  /  AlkPhos  85  10-03  PT/INR - ( 02 Oct 2020 20:21 )   PT: 10.8 sec;   INR: 0.90 ratio         PTT - ( 03 Oct 2020 10:47 )  PTT:28.0 sec      IMAGING: Reviewed by me.     MR Head No Cont (10/3/20):  Small patchy acute/subacute infarcts within the right side of the niko with associated cytotoxic edema and without hemorrhagic transformation. Multiple additional chronic infarcts and chronic white matter microvascular type changes, as discussed. Multiple foci of susceptibility artifact within the thalami, supratentorial brain, and posterior fossa which may reflect areas of chronic microhemorrhage. Chronic hypertensive encephalopathy and amyloid angiopathy can also have a similar appearance.    CT Head (10/2/20):  No acute intracranial hemorrhage, mass effect, or CT evidence of an acute transcortical infarct. Mild to moderate chronic ischemic changes in the frontoparietal white matter and chronic bilateral basal ganglia, bilateral thalamic, and left pontine lacunar infarcts.    CTA NECK (10/2/20):    No significant stenosis of the cervical carotid arteries based on NASCET criteria. Patent cervical vertebral arteries. No evidence of cervical carotid or vertebral artery dissection..    CTA HEAD (10/2/20):   No high-grade stenosis or occlusion of the major proximal arterial branches. Short segment mild stenosis at the right P1-P2 junction of the posterior cerebral artery. THE PATIENT WAS SEEN AND EXAMINED BY ME WITH THE HOUSESTAFF AND STROKE TEAM DURING MORNING ROUNDS.   HPI:  Patient is a 57yoF pmhx DM2, HTN, CVA/TIA (s/p loop monitor, on plavix, residual R sided weakness) who presented to the ED and neurology was consulted for concern of L sided numbness. She reported sudden onset L sided numbness when she woke up the morning of admission around 6am. She stateed it involves her  L face, arm, and leg. She reports hx of stroke with residual R sided weakness but states that this feels different. She denied associated weakness but reports associated phonophobia on the L and being able to hear the heartbeat in her L ear. She reported associated nausea and lightheadedness as well. She states that she took her BG at home the day prior to admission and it was 372. Her BG readings at home the day of admission were 200-260s. She reported associated bad balance. She reported a b/l occipital headache radiating to the from. Denied blurry/double vision, cp, sob, weakness. NIHSS 1 on admission.    SUBJECTIVE: No events overnight.  No new neurologic complaints.      amLODIPine   Tablet 5 milliGRAM(s) Oral daily  aspirin  chewable 81 milliGRAM(s) Oral daily  atorvastatin 80 milliGRAM(s) Oral at bedtime  clopidogrel Tablet 75 milliGRAM(s) Oral daily  dextrose 40% Gel 15 Gram(s) Oral once PRN  dextrose 5%. 1000 milliLiter(s) IV Continuous <Continuous>  dextrose 50% Injectable 12.5 Gram(s) IV Push once  dextrose 50% Injectable 25 Gram(s) IV Push once  dextrose 50% Injectable 25 Gram(s) IV Push once  enoxaparin Injectable 40 milliGRAM(s) SubCutaneous daily  glucagon  Injectable 1 milliGRAM(s) IntraMuscular once PRN  influenza   Vaccine 0.5 milliLiter(s) IntraMuscular once  insulin glargine Injectable (LANTUS) 15 Unit(s) SubCutaneous at bedtime  insulin lispro (HumaLOG) corrective regimen sliding scale   SubCutaneous three times a day before meals  insulin lispro (HumaLOG) corrective regimen sliding scale   SubCutaneous at bedtime  insulin lispro Injectable (HumaLOG) 5 Unit(s) SubCutaneous three times a day before meals  lisinopril 40 milliGRAM(s) Oral daily  sodium chloride 0.9%. 1000 milliLiter(s) IV Continuous <Continuous>    PHYSICAL EXAM:   Vital Signs Last 24 Hrs  T(C): 37.2 (03 Oct 2020 20:35), Max: 37.2 (03 Oct 2020 20:35)  T(F): 98.9 (03 Oct 2020 20:35), Max: 98.9 (03 Oct 2020 20:35)  HR: 82 (04 Oct 2020 06:29) (74 - 107)  BP: 209/118 (04 Oct 2020 06:29) (172/107 - 209/118)  BP(mean): 146 (04 Oct 2020 06:29) (122 - 146)  RR: 12 (04 Oct 2020 06:29) (10 - 19)  SpO2: 98% (04 Oct 2020 06:29) (97% - 100%)    General: No acute distress  HEENT: EOM intact, visual fields full  Abdomen: Soft, nontender, nondistended   Extremities: No edema    NEUROLOGICAL EXAM:  Mental status: Awake, alert, oriented x3, speech fluent, no neglect, follows commands  Cranial Nerves: Mild R NLF flattening, no nystagmus, no dysarthria,  tongue midline  Motor exam: Normal tone, subtle LLE drift, Right anterior tibialis (4/5) - chronic, rest 5/5  Sensation: Mild decrease in temp sensation on the L side  Coordination/ Gait: No dysmetria, mild steppage gait due to right foot drop     LABS:                        12.1   6.50  )-----------( 232      ( 03 Oct 2020 06:47 )             37.8    10-03    135  |  98  |  20  ----------------------------<  240<H>  3.4<L>   |  24  |  0.98    Ca    9.7      03 Oct 2020 06:47    TPro  7.2  /  Alb  3.9  /  TBili  0.4  /  DBili  x   /  AST  16  /  ALT  12  /  AlkPhos  85  10-03  PT/INR - ( 02 Oct 2020 20:21 )   PT: 10.8 sec;   INR: 0.90 ratio         PTT - ( 03 Oct 2020 10:47 )  PTT:28.0 sec      IMAGING: Reviewed by me.     MR Head No Cont (10/3/20):  Small patchy acute/subacute infarcts within the right side of the niko with associated cytotoxic edema and without hemorrhagic transformation. Multiple additional chronic infarcts and chronic white matter microvascular type changes, as discussed. Multiple foci of susceptibility artifact within the thalami, supratentorial brain, and posterior fossa which may reflect areas of chronic microhemorrhage. Chronic hypertensive encephalopathy and amyloid angiopathy can also have a similar appearance.    CT Head (10/2/20):  No acute intracranial hemorrhage, mass effect, or CT evidence of an acute transcortical infarct. Mild to moderate chronic ischemic changes in the frontoparietal white matter and chronic bilateral basal ganglia, bilateral thalamic, and left pontine lacunar infarcts.    CTA NECK (10/2/20):    No significant stenosis of the cervical carotid arteries based on NASCET criteria. Patent cervical vertebral arteries. No evidence of cervical carotid or vertebral artery dissection..    CTA HEAD (10/2/20):   No high-grade stenosis or occlusion of the major proximal arterial branches. Short segment mild stenosis at the right P1-P2 junction of the posterior cerebral artery.

## 2020-10-04 NOTE — PROGRESS NOTE ADULT - ASSESSMENT
Patient is a 57yoF pmhx DM2, HTN, CVA/TIA (s/p loop monitor, on plavix, residual R sided weakness) who presented to the ED and neurology was consulted for concern of L sided numbness. She reported sudden onset L sided numbness when she woke up the morning of admission around 6am. She stateed it involves her  L face, arm, and leg. She reports hx of stroke with residual R sided weakness but states that this feels different. She denied associated weakness but reports associated phonophobia on the L and being able to hear the heartbeat in her L ear. She reported associated nausea and lightheadedness as well. She states that she took her BG at home the day prior to admission and it was 372. Her BG readings at home the day of admission were 200-260s. She reported associated bad balance. She reported a b/l occipital headache radiating to the from. Denied blurry/double vision, cp, sob, weakness. NIHSS 1 on admission.    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     ASSESSMENT:     NEURO: Neurologically stable since admission. Continue close monitoring for neurologic deterioration, permissive HTN to gradual normotension, titrate statin to LDL goal less than 70, MRI Brain w/o results as noted above. Physical therapy/OT recommended AR.    ANTITHROMBOTIC THERAPY: Asprin and Plavix for 3 weeks     PULMONARY: Protecting airway, saturating well     CARDIOVASCULAR: check TTE, cardiac monitoring without any recorded events.                              SBP goal: Permissive HTN to gradual normotension.      GASTROINTESTINAL:  dysphagia screen passed, tolerating diet.       Diet: Regular CC     RENAL: BUN/Cr without acute change, good urine output      Na Goal: Greater than 135     Macario: No    HEMATOLOGY: H/H without acute change, Platelets 232, no signs or symptoms of bleeding.      DVT ppx: Heparin s.c [] LMWH [x]     ID: afebrile, no leukocytosis, no signs or symptoms of infection.     OTHER: All questions and concerns addressed with patient at bedside.     DISPOSITION: Acute rehab once stable and workup is complete.    CORE MEASURES:        Admission NIHSS: 1     TPA: [] YES [x] NO      LDL/HDL: 168/52     Depression Screen: p     Statin Therapy: y     Dysphagia Screen: [x] PASS [] FAIL     Smoking [] YES [x] NO      Afib [] YES [x] NO     Stroke Education [x] YES [] NO    obtain screening lower extremity venous ultrasound in patients who meet 1 or more of the following criteria as patient is high risk for DVT/PE on admission:   [] History of DVT/PE  []Hypercoagulable states (Factor V Leiden, Cancer, OCP, etc. )  []Prolonged immobility (hemiplegia/hemiparesis/post operative or any other extended immobilization)  [] Transferred from outside facility (Rehab or Long term care)  [] Age </= to 50. Patient is a 57yoF pmhx DM2, HTN, CVA/TIA (s/p loop monitor, on plavix, residual R sided weakness) who presented to the ED and neurology was consulted for concern of L sided numbness. She reported sudden onset L sided numbness when she woke up the morning of admission around 6am. She stated it involves her  L face, arm, and leg. She reports hx of stroke with residual R sided weakness but states that this feels different. She denied associated weakness but reports associated phonophobia on the L and being able to hear the heartbeat in her L ear. She reported associated nausea and lightheadedness as well. She states that she took her BG at home the day prior to admission and it was 372. Her BG readings at home the day of admission were 200-260s. She reported associated poor balance. She reported a b/l occipital headache radiating to the front. Denied blurry/double vision, cp, sob, weakness. NIHSS 1 on admission.    Impression: L sensory loss with imbalance due to right pontine infarct, due to small vessel disease.     ASSESSMENT:     NEURO: Neurologically stable since admission. Continue close monitoring for neurologic deterioration, permissive HTN to gradual normotension, titrate statin to LDL goal less than 70, MRI Brain w/o results as noted above. Physical therapy/OT recommended AR.    ANTITHROMBOTIC THERAPY: Asprin and Plavix for 3 weeks, then stop Plavix and continue ASA    PULMONARY: Protecting airway, saturating well     CARDIOVASCULAR: check TTE, cardiac monitoring without any recorded events.                              SBP goal: Permissive HTN to gradual normotension.      GASTROINTESTINAL:  dysphagia screen passed, tolerating diet.       Diet: Regular CC     RENAL: BUN/Cr without acute change, good urine output      Na Goal: Greater than 135     Macario: No    HEMATOLOGY: H/H without acute change, Platelets 232, no signs or symptoms of bleeding.      DVT ppx: Heparin s.c [] LMWH [x]     ID: afebrile, no leukocytosis, no signs or symptoms of infection.     OTHER: All questions and concerns addressed with patient at bedside.     DISPOSITION: Acute rehab once stable and workup is complete.    CORE MEASURES:        Admission NIHSS: 1     TPA: [] YES [x] NO      LDL/HDL: 168/52     Depression Screen: p     Statin Therapy: y     Dysphagia Screen: [x] PASS [] FAIL     Smoking [] YES [x] NO      Afib [] YES [x] NO     Stroke Education [x] YES [] NO    obtain screening lower extremity venous ultrasound in patients who meet 1 or more of the following criteria as patient is high risk for DVT/PE on admission:   [] History of DVT/PE  []Hypercoagulable states (Factor V Leiden, Cancer, OCP, etc. )  []Prolonged immobility (hemiplegia/hemiparesis/post operative or any other extended immobilization)  [] Transferred from outside facility (Rehab or Long term care)  [] Age </= to 50.

## 2020-10-04 NOTE — CONSULT NOTE ADULT - SUBJECTIVE AND OBJECTIVE BOX
HPI:  Patient is a 57yoF pmhx DM2, HTN, CVA/TIA (s/p loop monitor, on plavix, residual R sided weakness) who presented to the ED and neurology was consulted for concern of L sided numbness. She reports sudden onset L sided numbness when she woke up this morning around 6am. She states it involves her  L face, arm, and leg. She reports hx of stroke with residual R sided weakness but states that this feels different. She denies associated weakness but reports associated phonophobia on the L and being able to hear the heartbeat in her L ear. She reports associated nausea and lightheadedness as well. She states that she took her BG at home yesterday and it was 372. Her BG readings at home today were 200-260s. She reports associated bad balance. She reports a b/l occipital headache radiating to the from. Denies blurry/double vision, cp, sob, weakness.  (03 Oct 2020 03:37)      PAST MEDICAL & SURGICAL HISTORY:  TIA (transient ischemic attack)    CVA (cerebral vascular accident)    Hypokalemia    IBS (Irritable Bowel Syndrome)    Generalized Headaches    Diabetes Mellitus Type II    Hypertension    No Past Surgical History        FAMILY HISTORY:  Family history of prostate cancer (Father)    Family history of acute myocardial infarction (Father)        Social History:    Outpatient Medications:  · 	insulin detemir 100 units/mL subcutaneous solution: 15 unit(s) subcutaneous once a day (at bedtime)   · 	potassium citrate 10 mEq oral tablet, extended release: 1 tab(s) orally 2 times a day   · 	atorvastatin 80 mg oral tablet: 1 tab(s) orally once a day  · 	clopidogrel 75 mg oral tablet: 1 tab(s) orally once a day  · 	lisinopril 40 mg oral tablet: 1 tab(s) orally once a day  · 	amLODIPine 5 mg oral tablet: 1 tab(s) orally once a day  · 	labetalol 200 mg oral tablet: 2 tab(s) orally 3 times a day  · 	hydrALAZINE 50 mg oral tablet: 1 tab(s) orally 3 times a day  · 	metFORMIN 500 mg oral tablet: 1 tab(s) orally 2 times a day  · 	Levemir FlexPen 100 units/mL subcutaneous solution: 15 unit(s) subcutaneous once a day (at bedtime)  · 	Aspirin Enteric Coated 81 mg oral delayed release tablet: 1 tab(s) orally once a day    MEDICATIONS  (STANDING):  amLODIPine   Tablet 5 milliGRAM(s) Oral daily  aspirin  chewable 81 milliGRAM(s) Oral daily  atorvastatin 80 milliGRAM(s) Oral at bedtime  clopidogrel Tablet 75 milliGRAM(s) Oral daily  dextrose 5%. 1000 milliLiter(s) (50 mL/Hr) IV Continuous <Continuous>  dextrose 50% Injectable 12.5 Gram(s) IV Push once  dextrose 50% Injectable 25 Gram(s) IV Push once  dextrose 50% Injectable 25 Gram(s) IV Push once  enoxaparin Injectable 40 milliGRAM(s) SubCutaneous daily  influenza   Vaccine 0.5 milliLiter(s) IntraMuscular once  insulin glargine Injectable (LANTUS) 15 Unit(s) SubCutaneous at bedtime  insulin lispro (HumaLOG) corrective regimen sliding scale   SubCutaneous three times a day before meals  insulin lispro (HumaLOG) corrective regimen sliding scale   SubCutaneous at bedtime  insulin lispro Injectable (HumaLOG) 5 Unit(s) SubCutaneous three times a day before meals  lisinopril 40 milliGRAM(s) Oral daily  sodium chloride 0.9%. 1000 milliLiter(s) (65 mL/Hr) IV Continuous <Continuous>    MEDICATIONS  (PRN):  dextrose 40% Gel 15 Gram(s) Oral once PRN Blood Glucose LESS THAN 70 milliGRAM(s)/deciliter  glucagon  Injectable 1 milliGRAM(s) IntraMuscular once PRN Glucose LESS THAN 70 milligrams/deciliter      Allergies    sulfa drugs (Angioedema; Swelling)  sulfa drugs (Rash)  Sulfac 10% (Unknown)    Intolerances      Review of Systems:  Constitutional: No fever  Eyes: No blurry vision  Neuro: No tremors  HEENT: No pain  Cardiovascular: No chest pain, palpitations  Respiratory: No SOB, no cough  GI: No nausea, vomiting, abdominal pain  : No dysuria  Skin: no rash  Psych: no depression  Endocrine: no polyuria, polydipsia  Hem/lymph: no swelling  Osteoporosis: no fractures    ALL OTHER SYSTEMS REVIEWED AND NEGATIVE    UNABLE TO OBTAIN    PHYSICAL EXAM:  VITALS: T(C): 37.2 (10-03-20 @ 20:35)  T(F): 98.9 (10-03-20 @ 20:35), Max: 98.9 (10-03-20 @ 20:35)  HR: 82 (10-04-20 @ 06:29) (74 - 88)  BP: 209/118 (10-04-20 @ 06:29) (187/105 - 209/118)  RR:  (10 - 18)  SpO2:  (97% - 100%)  Wt(kg): --  GENERAL: NAD, well-groomed, well-developed  EYES: No proptosis, no lid lag, anicteric  HEENT:  Atraumatic, Normocephalic, moist mucous membranes  THYROID: Normal size, no palpable nodules  RESPIRATORY: Clear to auscultation bilaterally; No rales, rhonchi, wheezing  CARDIOVASCULAR: Regular rate and rhythm; No murmurs; no peripheral edema  GI: Soft, nontender, non distended, normal bowel sounds  SKIN: Dry, intact, No rashes or lesions  MUSCULOSKELETAL: Full range of motion, normal strength  NEURO: sensation intact, extraocular movements intact, no tremor  PSYCH: Alert and oriented x 3, normal affect, normal mood  CUSHING'S SIGNS: no striae    POCT Blood Glucose.: 195 mg/dL (10-04-20 @ 11:55)  POCT Blood Glucose.: 258 mg/dL (10-04-20 @ 08:32)  POCT Blood Glucose.: 309 mg/dL (10-03-20 @ 21:43)  POCT Blood Glucose.: 324 mg/dL (10-03-20 @ 17:47)  POCT Blood Glucose.: 273 mg/dL (10-03-20 @ 13:05)  POCT Blood Glucose.: 204 mg/dL (10-03-20 @ 09:03)  POCT Blood Glucose.: 150 mg/dL (10-02-20 @ 19:52)  POCT Blood Glucose.: 155 mg/dL (10-02-20 @ 19:33)                            12.1   6.50  )-----------( 232      ( 03 Oct 2020 06:47 )             37.8       10-03    135  |  98  |  20  ----------------------------<  240<H>  3.4<L>   |  24  |  0.98    EGFR if : 73  EGFR if non : 63    Ca    9.7      10-03    TPro  7.2  /  Alb  3.9  /  TBili  0.4  /  DBili  x   /  AST  16  /  ALT  12  /  AlkPhos  85  10-03      Thyroid Function Tests:  10-03 @ 11:16 TSH 0.46 FreeT4 -- T3 -- Anti TPO -- Anti Thyroglobulin Ab -- TSI --          10-03 Chol 241<H> <H> HDL 52 Trig 104      Radiology:                HPI: 57F with history of uncontrolled DM2, HTN, CVA/TIA (s/p loop monitor, on plavix, residual R sided weakness) presented with L sided numbness. She reports sudden onset L sided numbness when she woke up the morning of admission 6am. She states it involves her  L face, arm, and leg. She reports hx of stroke with residual R sided weakness but states that this feels different. She denies associated weakness but reports associated phonophobia on the L and being able to hear the heartbeat in her L ear. She reports associated nausea and lightheadedness as well. She states that she took her BG at home yesterday and it was 372. She reports a b/l occipital headache radiating to the from. Denies blurry/double vision, cp, sob, weakness.   Endocrine consult requested for management of uncontrolled DM2. A1c 11.9%    Endocrine history:  Diagnosed with GDM during pregnancy 1998. Patient then told she had DM2 in 2003 at which time she was started on metformin. Patient then started on insulin 4 years ago.   Currently on Levemir 15units qhs and Metformin 500mg BID.  Patient was following with Endocrinologist at [facility] in [place], however has not been seen in over one year.   Reports AM , before lunch and dinner FS 200s. qhs .  Patient does not report blurred vision or h/o DM retinopathy, last opthalmology visit was 2018.  Patient does report b/l LE neuropathy.  Patient is a vegetarian however reports she drinks soda and juice and eats a lot of junk food.  Reports increased urination and thirst prior to admission.     PAST MEDICAL & SURGICAL HISTORY:  TIA (transient ischemic attack)  CVA (cerebral vascular accident)  Hypokalemia  IBS (Irritable Bowel Syndrome)  Generalized Headaches  Diabetes Mellitus Type II  Hypertension  No Past Surgical History    FAMILY HISTORY:  History of DM in mother and sister    Social History:  Does not report history of alcohol or tobacco use     Outpatient Medications:  · 	insulin detemir 100 units/mL subcutaneous solution: 15 unit(s) subcutaneous once a day (at bedtime)   · 	potassium citrate 10 mEq oral tablet, extended release: 1 tab(s) orally 2 times a day   · 	atorvastatin 80 mg oral tablet: 1 tab(s) orally once a day  · 	clopidogrel 75 mg oral tablet: 1 tab(s) orally once a day  · 	lisinopril 40 mg oral tablet: 1 tab(s) orally once a day  · 	amLODIPine 5 mg oral tablet: 1 tab(s) orally once a day  · 	labetalol 200 mg oral tablet: 2 tab(s) orally 3 times a day  · 	hydrALAZINE 50 mg oral tablet: 1 tab(s) orally 3 times a day  · 	metFORMIN 500 mg oral tablet: 1 tab(s) orally 2 times a day  · 	Levemir FlexPen 100 units/mL subcutaneous solution: 15 unit(s) subcutaneous once a day (at bedtime)  · 	Aspirin Enteric Coated 81 mg oral delayed release tablet: 1 tab(s) orally once a day    MEDICATIONS  (STANDING):  amLODIPine   Tablet 5 milliGRAM(s) Oral daily  aspirin  chewable 81 milliGRAM(s) Oral daily  atorvastatin 80 milliGRAM(s) Oral at bedtime  clopidogrel Tablet 75 milliGRAM(s) Oral daily  dextrose 5%. 1000 milliLiter(s) (50 mL/Hr) IV Continuous <Continuous>  dextrose 50% Injectable 12.5 Gram(s) IV Push once  dextrose 50% Injectable 25 Gram(s) IV Push once  dextrose 50% Injectable 25 Gram(s) IV Push once  enoxaparin Injectable 40 milliGRAM(s) SubCutaneous daily  influenza   Vaccine 0.5 milliLiter(s) IntraMuscular once  insulin glargine Injectable (LANTUS) 15 Unit(s) SubCutaneous at bedtime  insulin lispro (HumaLOG) corrective regimen sliding scale   SubCutaneous three times a day before meals  insulin lispro (HumaLOG) corrective regimen sliding scale   SubCutaneous at bedtime  insulin lispro Injectable (HumaLOG) 5 Unit(s) SubCutaneous three times a day before meals  lisinopril 40 milliGRAM(s) Oral daily  sodium chloride 0.9%. 1000 milliLiter(s) (65 mL/Hr) IV Continuous <Continuous>    MEDICATIONS  (PRN):  dextrose 40% Gel 15 Gram(s) Oral once PRN Blood Glucose LESS THAN 70 milliGRAM(s)/deciliter  glucagon  Injectable 1 milliGRAM(s) IntraMuscular once PRN Glucose LESS THAN 70 milligrams/deciliter    Alergies  sulfa drugs (Angioedema; Swelling)  sulfa drugs (Rash)  Sulfac 10% (Unknown)    Review of Systems:  Constitutional: No fever  Eyes: No blurry vision  Neuro: No tremors  HEENT: No pain  Cardiovascular: No chest pain, palpitations  Respiratory: No SOB, no cough  GI: No nausea, vomiting, abdominal pain  : No dysuria  Endocrine: +polyuria, +polydipsia  Hem/lymph: no swelling  Neuro: left sided numbness     ALL OTHER SYSTEMS REVIEWED AND NEGATIVE      PHYSICAL EXAM:  VITALS: T(C): 37.2 (10-03-20 @ 20:35)  T(F): 98.9 (10-03-20 @ 20:35), Max: 98.9 (10-03-20 @ 20:35)  HR: 82 (10-04-20 @ 06:29) (74 - 88)  BP: 209/118 (10-04-20 @ 06:29) (187/105 - 209/118)  RR:  (10 - 18)  SpO2:  (97% - 100%)  Wt(kg): 98kg   GENERAL: NAD, well-groomed, well-developed  EYES: No proptosis, anicteric  HEENT:  Atraumatic, Normocephalic, moist mucous membranes  THYROID: Normal size, no palpable nodules, nontender   RESPIRATORY: Clear to auscultation bilaterally; No rales, rhonchi, wheezing  CARDIOVASCULAR: Regular rate and rhythm; No murmurs; no peripheral edema  GI: Soft, nontender, non distended, normal bowel sounds  SKIN: Dry, intact, No rashes or lesions  MUSCULOSKELETAL: Full range of motion, normal strength  NEURO: sensation intact, extraocular movements intact, no tremor  PSYCH: Alert and oriented x 3, reactive affect     POCT Blood Glucose.: 195 mg/dL (10-04-20 @ 11:55) H5+2  POCT Blood Glucose.: 258 mg/dL (10-04-20 @ 08:32) H5+3  POCT Blood Glucose.: 309 mg/dL (10-03-20 @ 21:43) L15 H2  POCT Blood Glucose.: 324 mg/dL (10-03-20 @ 17:47) H4  POCT Blood Glucose.: 273 mg/dL (10-03-20 @ 13:05) H3  POCT Blood Glucose.: 204 mg/dL (10-03-20 @ 09:03)  POCT Blood Glucose.: 150 mg/dL (10-02-20 @ 19:52)  POCT Blood Glucose.: 155 mg/dL (10-02-20 @ 19:33)                            12.1   6.50  )-----------( 232      ( 03 Oct 2020 06:47 )             37.8       10-03    135  |  98  |  20  ----------------------------<  240<H>  3.4<L>   |  24  |  0.98    EGFR if : 73  EGFR if non : 63    Ca    9.7      10-03    TPro  7.2  /  Alb  3.9  /  TBili  0.4  /  DBili  x   /  AST  16  /  ALT  12  /  AlkPhos  85  10-03      Thyroid Function Tests:  10-03 @ 11:16 TSH 0.46     10-03 Chol 241<H> <H> HDL 52 Trig 104                     HPI: 57F with history of uncontrolled DM2, HTN, CVA/TIA (s/p loop monitor, on plavix, residual R sided weakness) presented with L sided numbness. She reports sudden onset L sided numbness when she woke up the morning of admission 6am. She states it involves her  L face, arm, and leg. She reports hx of stroke with residual R sided weakness but states that this feels different. She denies associated weakness but reports associated phonophobia on the L and being able to hear the heartbeat in her L ear. She reports associated nausea and lightheadedness as well. She states that she took her BG at home yesterday and it was 372. She reports a b/l occipital headache radiating to the from. Denies blurry/double vision, cp, sob, weakness.   Endocrine consult requested for management of uncontrolled DM2. A1c 11.9%    Endocrine history:  Diagnosed with GDM during pregnancy 1998. Patient then told she had DM2 in 2003 at which time she was started on metformin. Patient then started on insulin 4 years ago.   Currently on Levemir 15units qhs, novolog 5units premeal and Metformin 500mg BID.  Patient was following with Endocrinologist at [facility] in [place], however has not been seen in over one year.   Reports AM , before lunch and dinner FS 200s. qhs .  Patient does not report blurred vision or h/o DM retinopathy, last opthalmology visit was 2018.  Patient does report b/l LE neuropathy.  Patient is a vegetarian however reports she drinks soda and juice and eats a lot of junk food.  Reports increased urination and thirst prior to admission.     PAST MEDICAL & SURGICAL HISTORY:  TIA (transient ischemic attack)  CVA (cerebral vascular accident)  Hypokalemia  IBS (Irritable Bowel Syndrome)  Generalized Headaches  Diabetes Mellitus Type II  Hypertension  No Past Surgical History    FAMILY HISTORY:  History of DM in mother and sister    Social History:  Does not report history of alcohol or tobacco use     Outpatient Medications:  · 	insulin detemir 100 units/mL subcutaneous solution: 15 unit(s) subcutaneous once a day (at bedtime)   · 	potassium citrate 10 mEq oral tablet, extended release: 1 tab(s) orally 2 times a day   · 	atorvastatin 80 mg oral tablet: 1 tab(s) orally once a day  · 	clopidogrel 75 mg oral tablet: 1 tab(s) orally once a day  · 	lisinopril 40 mg oral tablet: 1 tab(s) orally once a day  · 	amLODIPine 5 mg oral tablet: 1 tab(s) orally once a day  · 	labetalol 200 mg oral tablet: 2 tab(s) orally 3 times a day  · 	hydrALAZINE 50 mg oral tablet: 1 tab(s) orally 3 times a day  · 	metFORMIN 500 mg oral tablet: 1 tab(s) orally 2 times a day  · 	Levemir FlexPen 100 units/mL subcutaneous solution: 15 unit(s) subcutaneous once a day (at bedtime)  · 	Aspirin Enteric Coated 81 mg oral delayed release tablet: 1 tab(s) orally once a day    MEDICATIONS  (STANDING):  amLODIPine   Tablet 5 milliGRAM(s) Oral daily  aspirin  chewable 81 milliGRAM(s) Oral daily  atorvastatin 80 milliGRAM(s) Oral at bedtime  clopidogrel Tablet 75 milliGRAM(s) Oral daily  dextrose 5%. 1000 milliLiter(s) (50 mL/Hr) IV Continuous <Continuous>  dextrose 50% Injectable 12.5 Gram(s) IV Push once  dextrose 50% Injectable 25 Gram(s) IV Push once  dextrose 50% Injectable 25 Gram(s) IV Push once  enoxaparin Injectable 40 milliGRAM(s) SubCutaneous daily  influenza   Vaccine 0.5 milliLiter(s) IntraMuscular once  insulin glargine Injectable (LANTUS) 15 Unit(s) SubCutaneous at bedtime  insulin lispro (HumaLOG) corrective regimen sliding scale   SubCutaneous three times a day before meals  insulin lispro (HumaLOG) corrective regimen sliding scale   SubCutaneous at bedtime  insulin lispro Injectable (HumaLOG) 5 Unit(s) SubCutaneous three times a day before meals  lisinopril 40 milliGRAM(s) Oral daily  sodium chloride 0.9%. 1000 milliLiter(s) (65 mL/Hr) IV Continuous <Continuous>    MEDICATIONS  (PRN):  dextrose 40% Gel 15 Gram(s) Oral once PRN Blood Glucose LESS THAN 70 milliGRAM(s)/deciliter  glucagon  Injectable 1 milliGRAM(s) IntraMuscular once PRN Glucose LESS THAN 70 milligrams/deciliter    Alergies  sulfa drugs (Angioedema; Swelling)  sulfa drugs (Rash)  Sulfac 10% (Unknown)    Review of Systems:  Constitutional: No fever  Eyes: No blurry vision  Neuro: No tremors  HEENT: No pain  Cardiovascular: No chest pain, palpitations  Respiratory: No SOB, no cough  GI: No nausea, vomiting, abdominal pain  : No dysuria  Endocrine: +polyuria, +polydipsia  Hem/lymph: no swelling  Neuro: left sided numbness     ALL OTHER SYSTEMS REVIEWED AND NEGATIVE      PHYSICAL EXAM:  VITALS: T(C): 37.2 (10-03-20 @ 20:35)  T(F): 98.9 (10-03-20 @ 20:35), Max: 98.9 (10-03-20 @ 20:35)  HR: 82 (10-04-20 @ 06:29) (74 - 88)  BP: 209/118 (10-04-20 @ 06:29) (187/105 - 209/118)  RR:  (10 - 18)  SpO2:  (97% - 100%)  Wt(kg): 98kg   GENERAL: NAD, well-groomed, well-developed  EYES: No proptosis, anicteric  HEENT:  Atraumatic, Normocephalic, moist mucous membranes  THYROID: Normal size, no palpable nodules, nontender   RESPIRATORY: Clear to auscultation bilaterally; No rales, rhonchi, wheezing  CARDIOVASCULAR: Regular rate and rhythm; No murmurs; no peripheral edema  GI: Soft, nontender, non distended, normal bowel sounds  SKIN: Dry, intact, No rashes or lesions  MUSCULOSKELETAL: Full range of motion, normal strength  NEURO: sensation intact, extraocular movements intact, no tremor  PSYCH: Alert and oriented x 3, reactive affect     POCT Blood Glucose.: 195 mg/dL (10-04-20 @ 11:55) H5+2  POCT Blood Glucose.: 258 mg/dL (10-04-20 @ 08:32) H5+3  POCT Blood Glucose.: 309 mg/dL (10-03-20 @ 21:43) L15 H2  POCT Blood Glucose.: 324 mg/dL (10-03-20 @ 17:47) H4  POCT Blood Glucose.: 273 mg/dL (10-03-20 @ 13:05) H3  POCT Blood Glucose.: 204 mg/dL (10-03-20 @ 09:03)  POCT Blood Glucose.: 150 mg/dL (10-02-20 @ 19:52)  POCT Blood Glucose.: 155 mg/dL (10-02-20 @ 19:33)                            12.1   6.50  )-----------( 232      ( 03 Oct 2020 06:47 )             37.8       10-03    135  |  98  |  20  ----------------------------<  240<H>  3.4<L>   |  24  |  0.98    EGFR if : 73  EGFR if non : 63    Ca    9.7      10-03    TPro  7.2  /  Alb  3.9  /  TBili  0.4  /  DBili  x   /  AST  16  /  ALT  12  /  AlkPhos  85  10-03      Thyroid Function Tests:  10-03 @ 11:16 TSH 0.46     10-03 Chol 241<H> <H> HDL 52 Trig 104                     HPI: 57F with history of uncontrolled DM2, HTN, CVA/TIA (s/p loop monitor, on plavix, residual R sided weakness) presented with L sided numbness. She reports sudden onset L sided numbness when she woke up the morning of admission 6am. She states it involves her  L face, arm, and leg. She reports hx of stroke with residual R sided weakness but states that this feels different. She denies associated weakness but reports associated phonophobia on the L and being able to hear the heartbeat in her L ear. She reports associated nausea and lightheadedness as well. She states that she took her BG at home yesterday and it was 372. She reports a b/l occipital headache radiating to the from. Denies blurry/double vision, cp, sob, weakness.   Endocrine consult requested for management of uncontrolled DM2. A1c 11.9%    Endocrine history:  Diagnosed with GDM during pregnancy 1998. Patient then told she had DM2 in 2003 at which time she was started on metformin. Patient then started on insulin 4 years ago.   Currently on Levemir 15units qhs, novolog 5units premeal and Metformin 500mg BID. Discussed with  and nurse, patient ran out of novolog for some time and has not taken premeal until 2 weeks ago.   Patient was following with Endocrinologist at [facility] in [place], however has not been seen in over one year.   Reports AM , before lunch and dinner FS 200s. qhs .  Patient does not report blurred vision or h/o DM retinopathy, last opthalmology visit was 2018.  Patient does report b/l LE neuropathy.  Patient is a vegetarian however reports she drinks soda and juice and eats a lot of junk food.  Reports increased urination and thirst prior to admission.     PAST MEDICAL & SURGICAL HISTORY:  TIA (transient ischemic attack)  CVA (cerebral vascular accident)  Hypokalemia  IBS (Irritable Bowel Syndrome)  Generalized Headaches  Diabetes Mellitus Type II  Hypertension  No Past Surgical History    FAMILY HISTORY:  History of DM in mother and sister    Social History:  Does not report history of alcohol or tobacco use     Outpatient Medications:  · 	insulin detemir 100 units/mL subcutaneous solution: 15 unit(s) subcutaneous once a day (at bedtime)   · 	potassium citrate 10 mEq oral tablet, extended release: 1 tab(s) orally 2 times a day   · 	atorvastatin 80 mg oral tablet: 1 tab(s) orally once a day  · 	clopidogrel 75 mg oral tablet: 1 tab(s) orally once a day  · 	lisinopril 40 mg oral tablet: 1 tab(s) orally once a day  · 	amLODIPine 5 mg oral tablet: 1 tab(s) orally once a day  · 	labetalol 200 mg oral tablet: 2 tab(s) orally 3 times a day  · 	hydrALAZINE 50 mg oral tablet: 1 tab(s) orally 3 times a day  · 	metFORMIN 500 mg oral tablet: 1 tab(s) orally 2 times a day  · 	Levemir FlexPen 100 units/mL subcutaneous solution: 15 unit(s) subcutaneous once a day (at bedtime)  · 	Aspirin Enteric Coated 81 mg oral delayed release tablet: 1 tab(s) orally once a day    MEDICATIONS  (STANDING):  amLODIPine   Tablet 5 milliGRAM(s) Oral daily  aspirin  chewable 81 milliGRAM(s) Oral daily  atorvastatin 80 milliGRAM(s) Oral at bedtime  clopidogrel Tablet 75 milliGRAM(s) Oral daily  dextrose 5%. 1000 milliLiter(s) (50 mL/Hr) IV Continuous <Continuous>  dextrose 50% Injectable 12.5 Gram(s) IV Push once  dextrose 50% Injectable 25 Gram(s) IV Push once  dextrose 50% Injectable 25 Gram(s) IV Push once  enoxaparin Injectable 40 milliGRAM(s) SubCutaneous daily  influenza   Vaccine 0.5 milliLiter(s) IntraMuscular once  insulin glargine Injectable (LANTUS) 15 Unit(s) SubCutaneous at bedtime  insulin lispro (HumaLOG) corrective regimen sliding scale   SubCutaneous three times a day before meals  insulin lispro (HumaLOG) corrective regimen sliding scale   SubCutaneous at bedtime  insulin lispro Injectable (HumaLOG) 5 Unit(s) SubCutaneous three times a day before meals  lisinopril 40 milliGRAM(s) Oral daily  sodium chloride 0.9%. 1000 milliLiter(s) (65 mL/Hr) IV Continuous <Continuous>    MEDICATIONS  (PRN):  dextrose 40% Gel 15 Gram(s) Oral once PRN Blood Glucose LESS THAN 70 milliGRAM(s)/deciliter  glucagon  Injectable 1 milliGRAM(s) IntraMuscular once PRN Glucose LESS THAN 70 milligrams/deciliter    Alergies  sulfa drugs (Angioedema; Swelling)  sulfa drugs (Rash)  Sulfac 10% (Unknown)    Review of Systems:  Constitutional: No fever  Eyes: No blurry vision  Neuro: No tremors  HEENT: No pain  Cardiovascular: No chest pain, palpitations  Respiratory: No SOB, no cough  GI: No nausea, vomiting, abdominal pain  : No dysuria  Endocrine: +polyuria, +polydipsia  Hem/lymph: no swelling  Neuro: left sided numbness     ALL OTHER SYSTEMS REVIEWED AND NEGATIVE      PHYSICAL EXAM:  VITALS: T(C): 37.2 (10-03-20 @ 20:35)  T(F): 98.9 (10-03-20 @ 20:35), Max: 98.9 (10-03-20 @ 20:35)  HR: 82 (10-04-20 @ 06:29) (74 - 88)  BP: 209/118 (10-04-20 @ 06:29) (187/105 - 209/118)  RR:  (10 - 18)  SpO2:  (97% - 100%)  Wt(kg): 98kg   GENERAL: NAD, well-groomed, well-developed  EYES: No proptosis, anicteric  HEENT:  Atraumatic, Normocephalic, moist mucous membranes  THYROID: Normal size, no palpable nodules, nontender   RESPIRATORY: Clear to auscultation bilaterally; No rales, rhonchi, wheezing  CARDIOVASCULAR: Regular rate and rhythm; No murmurs; no peripheral edema  GI: Soft, nontender, non distended, normal bowel sounds  SKIN: Dry, intact, No rashes or lesions  MUSCULOSKELETAL: Full range of motion, normal strength  NEURO: sensation intact, extraocular movements intact, no tremor  PSYCH: Alert and oriented x 3, reactive affect     POCT Blood Glucose.: 195 mg/dL (10-04-20 @ 11:55) H5+2  POCT Blood Glucose.: 258 mg/dL (10-04-20 @ 08:32) H5+3  POCT Blood Glucose.: 309 mg/dL (10-03-20 @ 21:43) L15 H2  POCT Blood Glucose.: 324 mg/dL (10-03-20 @ 17:47) H4  POCT Blood Glucose.: 273 mg/dL (10-03-20 @ 13:05) H3  POCT Blood Glucose.: 204 mg/dL (10-03-20 @ 09:03)  POCT Blood Glucose.: 150 mg/dL (10-02-20 @ 19:52)  POCT Blood Glucose.: 155 mg/dL (10-02-20 @ 19:33)                            12.1   6.50  )-----------( 232      ( 03 Oct 2020 06:47 )             37.8       10-03    135  |  98  |  20  ----------------------------<  240<H>  3.4<L>   |  24  |  0.98    EGFR if : 73  EGFR if non : 63    Ca    9.7      10-03    TPro  7.2  /  Alb  3.9  /  TBili  0.4  /  DBili  x   /  AST  16  /  ALT  12  /  AlkPhos  85  10-03      Thyroid Function Tests:  10-03 @ 11:16 TSH 0.46     10-03 Chol 241<H> <H> HDL 52 Trig 104

## 2020-10-04 NOTE — CONSULT NOTE ADULT - ASSESSMENT
uncontrolled DM2  A1c 11.9%, goal <7%  FS goal 100-180  Fs premeal and qhs   Recommend increase Lantus to 18 units qhs   Recommend continue Humalog 5units premeal   Recommend low premeal and qhs correctional scale   Nutrition consult   On discharge: basal/bolus v basal and oral medication. patient is interested in Freestyle Arlette  On discharge patient can follow up   [facility]   [address] [facility] [address]  [place] [id number] [phone]      HTN   goal 130/80  will defer to primary team     HLD  goal LDL <70    Patient will need diet modification and daily exercise  Recommend continue Atorvastatin 80mg daily 57F with history of uncontrolled DM2, HTN, CVA/TIA (s/p loop monitor, on plavix, residual R sided weakness) presented with L sided numbness, admitted for evaluation of CVA.  Endocrine consult requested for management of uncontrolled DM2.    uncontrolled DM2  A1c 11.9%, goal <7%  FS goal 100-180  Fs premeal and qhs   Recommend increase Lantus to 18 units qhs   Recommend continue Humalog 6units premeal   Recommend low premeal and qhs correctional scale   Nutrition consult   On discharge: basal/bolus v basal and oral medication. (Would check with pharmacy to determine which basal insulin is covered by insurance: lantus, basaglar or toujeo... )patient is interested in Freestyle Arlette  On discharge patient can follow up  [facility]  [address] [place] [address] [id number] [phone]      HTN   goal 130/80  will defer to primary team     HLD  goal LDL <70    Patient will need diet modification and daily exercise  Recommend continue Atorvastatin 80mg daily      Marcin Mccormick MD  Endocrine Fellow   Pager  57F with history of uncontrolled DM2, HTN, CVA/TIA (s/p loop monitor, on plavix, residual R sided weakness) presented with L sided numbness, admitted for evaluation of CVA.  Endocrine consult requested for management of uncontrolled DM2.    uncontrolled DM2  A1c 11.9%, goal <7%  FS goal 100-180  Fs premeal and qhs   Recommend increase Lantus to 18 units qhs   Recommend continue Humalog 6units premeal   Recommend low premeal and qhs correctional scale   Nutrition consult   On discharge: basal/bolus+ oral medication. (Would check with pharmacy to determine which basal insulin is covered by insurance: lantus, basaglar or toujeo... )patient is interested in Freestyle Arlette  On discharge patient can follow up  [facility]  [address] [place] [address] [address] [facility] [id number] [phone]      HTN   goal 130/80  will defer to primary team     HLD  goal LDL <70    Patient will need diet modification and daily exercise  Recommend continue Atorvastatin 80mg daily      Marcin Mccormick MD  Endocrine Fellow   Pager

## 2020-10-04 NOTE — CONSULT NOTE ADULT - ASSESSMENT
57yoF pmhx DM2, HTN, CVA/TIA (s/p loop monitor, on plavix, residual R sided weakness) who presented to the ED and neurology was consulted for concern of L sided numbness. She reported sudden onset L sided numbness when she woke up the morning of admission around 6am. She stated it involves her  L face, arm, and leg. She reports hx of stroke with residual R sided weakness but states that this feels different. She denied associated weakness but reports associated phonophobia on the L and being able to hear the heartbeat in her L ear. She reported associated nausea and lightheadedness as well. She states that she took her BG at home the day prior to admission and it was 372. Her BG readings at home the day of admission were 200-260s. She reported associated bad balance. She reported a b/l occipital headache radiating to the from. Denied blurry/double vision, cp, sob, weakness. NIHSS 1 on admission.

## 2020-10-04 NOTE — CONSULT NOTE ADULT - SUBJECTIVE AND OBJECTIVE BOX
CHIEF COMPLAINT: CVA    HISTORY OF PRESENT ILLNESS:   57yoF pmhx DM2, HTN, CVA/TIA (s/p loop monitor, on plavix, residual R sided weakness) who presented to the ED and neurology was consulted for concern of L sided numbness. She reported sudden onset L sided numbness when she woke up the morning of admission around 6am. She stated it involves her  L face, arm, and leg. She reports hx of stroke with residual R sided weakness but states that this feels different. She denied associated weakness but reports associated phonophobia on the L and being able to hear the heartbeat in her L ear. She reported associated nausea and lightheadedness as well. She states that she took her BG at home the day prior to admission and it was 372. Her BG readings at home the day of admission were 200-260s. She reported associated bad balance. She reported a b/l occipital headache radiating to the from. Denied blurry/double vision, cp, sob, weakness. NIHSS 1 on admission.    She previously had ILR which was placed in 2016 and explanted in 2018. She sparingly [facility].     PAST MEDICAL & SURGICAL HISTORY:  TIA (transient ischemic attack)    CVA (cerebral vascular accident)    Hypokalemia    IBS (Irritable Bowel Syndrome)    Generalized Headaches    Diabetes Mellitus Type II    Hypertension    No Past Surgical History            MEDICATIONS:  amLODIPine   Tablet 5 milliGRAM(s) Oral daily  aspirin  chewable 81 milliGRAM(s) Oral daily  clopidogrel Tablet 75 milliGRAM(s) Oral daily  enoxaparin Injectable 40 milliGRAM(s) SubCutaneous daily  lisinopril 40 milliGRAM(s) Oral daily        acetaminophen   Tablet .. 650 milliGRAM(s) Oral every 6 hours PRN      atorvastatin 80 milliGRAM(s) Oral at bedtime  dextrose 40% Gel 15 Gram(s) Oral once PRN  dextrose 50% Injectable 12.5 Gram(s) IV Push once  dextrose 50% Injectable 25 Gram(s) IV Push once  dextrose 50% Injectable 25 Gram(s) IV Push once  glucagon  Injectable 1 milliGRAM(s) IntraMuscular once PRN  insulin glargine Injectable (LANTUS) 18 Unit(s) SubCutaneous at bedtime  insulin lispro (HumaLOG) corrective regimen sliding scale   SubCutaneous three times a day before meals  insulin lispro (HumaLOG) corrective regimen sliding scale   SubCutaneous at bedtime  insulin lispro Injectable (HumaLOG) 6 Unit(s) SubCutaneous three times a day before meals    dextrose 5%. 1000 milliLiter(s) IV Continuous <Continuous>  influenza   Vaccine 0.5 milliLiter(s) IntraMuscular once  sodium chloride 0.9%. 1000 milliLiter(s) IV Continuous <Continuous>      FAMILY HISTORY:  Family history of prostate cancer (Father)    Family history of acute myocardial infarction (Father)        SOCIAL HISTORY:    [ ] Non-smoker  [ ] Smoker  [ ] Alcohol    Allergies    sulfa drugs (Angioedema; Swelling)  sulfa drugs (Rash)  Sulfac 10% (Unknown)    Intolerances    	    REVIEW OF SYSTEMS:  CONSTITUTIONAL: No fever, weight loss, + fatigue  EYES: No eye pain, visual disturbances, or discharge  ENMT:  No difficulty hearing, tinnitus, vertigo; No sinus or throat pain  NECK: No pain or stiffness  RESPIRATORY: No cough, wheezing, chills or hemoptysis; No Shortness of Breath  CARDIOVASCULAR: No chest pain, palpitations, passing out, dizziness, or leg swelling  GASTROINTESTINAL: No abdominal or epigastric pain. No nausea, vomiting, or hematemesis; No diarrhea or constipation. No melena or hematochezia.  GENITOURINARY: No dysuria, frequency, hematuria, or incontinence  NEUROLOGICAL: No headaches, memory loss, ++loss of strength, numbness, or tremors  SKIN: No itching, burning, rashes, or lesions   LYMPH Nodes: No enlarged glands  ENDOCRINE: No heat or cold intolerance; No hair loss  MUSCULOSKELETAL: No joint pain or swelling; No muscle, back, or extremity pain  PSYCHIATRIC: No depression, anxiety, mood swings, or difficulty sleeping  HEME/LYMPH: No easy bruising, or bleeding gums  ALLERY AND IMMUNOLOGIC: No hives or eczema	    [ ] All others negative	  [ ] Unable to obtain    PHYSICAL EXAM:  T(C): --  HR: 89 (10-04-20 @ 20:00) (74 - 98)  BP: 187/107 (10-04-20 @ 20:00) (147/123 - 209/118)  RR: 16 (10-04-20 @ 20:00) (10 - 21)  SpO2: 96% (10-04-20 @ 20:00) (96% - 100%)  Wt(kg): --  I&O's Summary      Appearance: NAD  HEENT:   Dry oral mucosa, PERRL, EOMI	  Lymphatic: No lymphadenopathy  Cardiovascular: Normal S1 S2, No JVD, No murmurs, No edema  Respiratory: Lungs clear to auscultation	  Psychiatry: A & O x 3, Mood & affect appropriate  Gastrointestinal:  Soft, Non-tender, + BS	  Skin: No rashes, No ecchymoses, No cyanosis	  Extremities: Normal range of motion, No clubbing, cyanosis or edema  Vascular: Peripheral pulses palpable 2+ bilaterally  NEUROLOGICAL EXAM:  Mental status: Awake, alert, oriented x3, speech fluent, no neglect, follows commands  Cranial Nerves: Mild R NLF flattening, no nystagmus, no dysarthria,  tongue midline  Motor exam: Normal tone, subtle LLE drift, Right anterior tibialis (4/5) - chronic, rest 5/5  Sensation: Mild decrease in temp sensation on the L side  Coordination/ Gait: No dysmetria, mild steppage gait due to right foot drop       TELEMETRY: SR 	    ECG:  	Unavailable   RADIOLOGY: < from: CT Angio Neck w/ IV Cont (10.02.20 @ 21:48) >    EXAM:  CT ANGIO NECK (W)AW IC                          EXAM:  CT ANGIO BRAIN (W)AW IC                            PROCEDURE DATE:  10/02/2020            INTERPRETATION:  CLINICAL INFORMATION:  Stroke Code left-sided numbness      TECHNIQUE: After a bolus of IV contrast is administered, a CT angiogram of the vertebral and carotid arterial vasculature from the aortic arch to the skull vertex is performed. Images are reformatted in sagittal and coronal planes. Maximum intensity projection images are also included. 70 cc of Omnipaque 300 are administered without event. 30 cc are discarded.    The study is correlated with an MRA of the head and neck from 6/13/2017.    FINDINGS:    There is a three-vessel aortic arch. The common carotid arteries are widely patent from the origins to the bifurcations. There is mild atherosclerotic disease at the common carotid artery bifurcations (right greater than left). There is no stenosis of the cervical internal carotid arteries based on NASCET criteria. The cervical vertebral arteries are patent from the origins to the skull base. The vertebral arteries are codominant. There is no evidence of cervical carotid or vertebral artery dissection.    The skull base and intracranial carotid arteries are patent without significant stenosis. There is mild atherosclerotic calcification of the cavernous and supraclinoid segments. The proximal MCAs and ACAs are patent without significant stenosis. The anterior communicating artery is not visualized. The distal MCAs and ACAs are grossly symmetric.    The skull base and intradural vertebral arteries and basilar artery are patent without significant stenosis. The proximal PCAs are patent without high-grade stenosis. There is short segment mild stenosis at the right P1-P2 junction. The posterior communicating arteries are extremely hypoplastic or absent. The superior cerebellar artery origins, small bilateral AICAs, and bilateral PICAs are identified.    There is no evidence of an intracranial arterial aneurysm or arteriovenous malformation on CTA.    The regional soft tissues of the neck are otherwise unremarkable apart from 2.1 cm nodule in the left thyroid gland and heterogeneous nodules in the right thyroid gland measuring up to 3.1 cm. There are multilevel degenerative changes of the spine.        IMPRESSION:    CTA NECK:  No significant stenosis of the cervical carotid arteries based on NASCET criteria. Patent cervical vertebral arteries. No evidence of cervical carotid or vertebral artery dissection..    CTA HEAD: No high-grade stenosis or occlusion of the major proximal arterial branches.    Short segment mild stenosis at the right P1-P2 junction of the posterior cerebral artery.                  GRAHAM BHAKTA D.O. ATTENDING RADIOLOGIST  This document has been electronically signed. Oct  2 2020 10:29PM    < end of copied text >  < from: MR Head No Cont (10.03.20 @ 18:20) >    EXAM:  MR BRAIN                            PROCEDURE DATE:  10/03/2020            INTERPRETATION:  .    CLINICAL INFORMATION: Left-sided numbness and tingling.    TECHNIQUE: Multiplanar multisequential MRI of the brain was acquired without the administration of IV gadolinium.    COMPARISON: Prior CT study of the head and CT angiogram studies of the head and neck from 10/2/2020.    FINDINGS: Small patchy areas of restricted diffusion are seen within the right side of the niko with associated T2/FLAIR prolongation compatible with cytotoxic edema. No abnormal susceptibility artifact or high signal areas on T1-weighted imaging are seen to suggest hemorrhagic transformation.    A chronic infarct is seen within the left cerebral peduncle. Multiple chronic lacunar infarcts are also seen within the bilateral corona radiata, bilateral basal ganglia, and bilateral thalami.    Multiple foci of susceptibility artifact are noted within the posterior fossa, bilateral thalami, left paramedian parietallobe, and left posterior temporal lobe.    Multiple additional patchy confluent nonspecific T2/FLAIR hyperintense signal changes are noted throughout the bihemispheric white matter without associated mass effect or restricted diffusion.    Ventricular size and configuration is unremarkable. No abnormal extra axial fluid collections are noted. Flow-voids are noted throughout the major intracranial vessels, on the T2 weighted images, consistent with their patency. The sellar area appears unremarkable.    Scattered mucosal thickening is seen throughout the paranasal sinuses. The mastoid air cells remain clear. Calvarial signal is unremarkable. The orbits appear within normal limits.    IMPRESSION: Small patchy acute/subacute infarcts within the right side of the niko with associated cytotoxic edema and without hemorrhagic transformation.    Multiple additional chronic infarcts and chronic white matter microvascular type changes, as discussed.    Multiple foci of susceptibility artifact within the thalami, supratentorial brain, and posterior fossa which may reflect areas of chronic microhemorrhage. Chronic hypertensive encephalopathy and amyloid angiopathy can also have a similar appearance.                          BAY KONG M.D., ATTENDING RADIOLOGIST  This document has been electronically signed. Oct  3 2020  7:29PM    < end of copied text >    OTHER: 	  	  LABS:	 	    CARDIAC MARKERS:                                  12.1   6.50  )-----------( 232      ( 03 Oct 2020 06:47 )             37.8     10-03    135  |  98  |  20  ----------------------------<  240<H>  3.4<L>   |  24  |  0.98    Ca    9.7      03 Oct 2020 06:47    TPro  7.2  /  Alb  3.9  /  TBili  0.4  /  DBili  x   /  AST  16  /  ALT  12  /  AlkPhos  85  10-03    proBNP:   Lipid Profile:   HgA1c:   TSH:

## 2020-10-04 NOTE — CONSULT NOTE ADULT - PROBLEM SELECTOR PROBLEM 3
Type 2 diabetes mellitus with hyperglycemia, with long-term current use of insulin
Hyperlipidemia, unspecified hyperlipidemia type

## 2020-10-05 DIAGNOSIS — E78.2 MIXED HYPERLIPIDEMIA: ICD-10-CM

## 2020-10-05 DIAGNOSIS — I10 ESSENTIAL (PRIMARY) HYPERTENSION: ICD-10-CM

## 2020-10-05 LAB
GLUCOSE BLDC GLUCOMTR-MCNC: 110 MG/DL — HIGH (ref 70–99)
GLUCOSE BLDC GLUCOMTR-MCNC: 127 MG/DL — HIGH (ref 70–99)
GLUCOSE BLDC GLUCOMTR-MCNC: 166 MG/DL — HIGH (ref 70–99)
GLUCOSE BLDC GLUCOMTR-MCNC: 177 MG/DL — HIGH (ref 70–99)

## 2020-10-05 PROCEDURE — 76536 US EXAM OF HEAD AND NECK: CPT | Mod: 26

## 2020-10-05 PROCEDURE — 99222 1ST HOSP IP/OBS MODERATE 55: CPT

## 2020-10-05 PROCEDURE — 99233 SBSQ HOSP IP/OBS HIGH 50: CPT

## 2020-10-05 PROCEDURE — 93306 TTE W/DOPPLER COMPLETE: CPT | Mod: 26

## 2020-10-05 RX ORDER — CEFTRIAXONE 500 MG/1
1000 INJECTION, POWDER, FOR SOLUTION INTRAMUSCULAR; INTRAVENOUS EVERY 24 HOURS
Refills: 0 | Status: DISCONTINUED | OUTPATIENT
Start: 2020-10-05 | End: 2020-10-06

## 2020-10-05 RX ORDER — LABETALOL HCL 100 MG
200 TABLET ORAL
Refills: 0 | Status: DISCONTINUED | OUTPATIENT
Start: 2020-10-05 | End: 2020-10-06

## 2020-10-05 RX ORDER — ENOXAPARIN SODIUM 100 MG/ML
18 INJECTION SUBCUTANEOUS
Qty: 1 | Refills: 0
Start: 2020-10-05 | End: 2020-11-03

## 2020-10-05 RX ORDER — DULAGLUTIDE 4.5 MG/.5ML
0.75 INJECTION, SOLUTION SUBCUTANEOUS
Qty: 3.21 | Refills: 0
Start: 2020-10-05 | End: 2020-11-03

## 2020-10-05 RX ADMIN — Medication 6 UNIT(S): at 12:37

## 2020-10-05 RX ADMIN — Medication 100 MILLIGRAM(S): at 05:22

## 2020-10-05 RX ADMIN — LISINOPRIL 40 MILLIGRAM(S): 2.5 TABLET ORAL at 05:22

## 2020-10-05 RX ADMIN — INSULIN GLARGINE 18 UNIT(S): 100 INJECTION, SOLUTION SUBCUTANEOUS at 21:33

## 2020-10-05 RX ADMIN — Medication 81 MILLIGRAM(S): at 12:39

## 2020-10-05 RX ADMIN — AMLODIPINE BESYLATE 5 MILLIGRAM(S): 2.5 TABLET ORAL at 05:22

## 2020-10-05 RX ADMIN — Medication 1: at 08:55

## 2020-10-05 RX ADMIN — CEFTRIAXONE 100 MILLIGRAM(S): 500 INJECTION, POWDER, FOR SOLUTION INTRAMUSCULAR; INTRAVENOUS at 05:33

## 2020-10-05 RX ADMIN — Medication 6 UNIT(S): at 17:44

## 2020-10-05 RX ADMIN — ENOXAPARIN SODIUM 40 MILLIGRAM(S): 100 INJECTION SUBCUTANEOUS at 12:40

## 2020-10-05 RX ADMIN — Medication 6 UNIT(S): at 08:56

## 2020-10-05 RX ADMIN — Medication 200 MILLIGRAM(S): at 17:37

## 2020-10-05 RX ADMIN — ATORVASTATIN CALCIUM 80 MILLIGRAM(S): 80 TABLET, FILM COATED ORAL at 21:33

## 2020-10-05 NOTE — PROGRESS NOTE ADULT - SUBJECTIVE AND OBJECTIVE BOX
Subjective: Patient seen and examined. No new events except as noted.   remains in Stroke unit   Hypertensive     REVIEW OF SYSTEMS:    CONSTITUTIONAL: + weakness, fevers or chills  EYES/ENT: No visual changes;  No vertigo or throat pain   NECK: No pain or stiffness  RESPIRATORY: No cough, wheezing, hemoptysis; No shortness of breath  CARDIOVASCULAR: No chest pain or palpitations  GASTROINTESTINAL: No abdominal or epigastric pain. No nausea, vomiting, or hematemesis; No diarrhea or constipation. No melena or hematochezia.  GENITOURINARY: No dysuria, frequency or hematuria  NEUROLOGICAL: No numbness or weakness  SKIN: No itching, burning, rashes, or lesions   All other review of systems is negative unless indicated above.    MEDICATIONS:  MEDICATIONS  (STANDING):  amLODIPine   Tablet 5 milliGRAM(s) Oral daily  aspirin  chewable 81 milliGRAM(s) Oral daily  atorvastatin 80 milliGRAM(s) Oral at bedtime  cefTRIAXone   IVPB 1000 milliGRAM(s) IV Intermittent every 24 hours  clopidogrel Tablet 75 milliGRAM(s) Oral daily  dextrose 5%. 1000 milliLiter(s) (50 mL/Hr) IV Continuous <Continuous>  dextrose 50% Injectable 12.5 Gram(s) IV Push once  dextrose 50% Injectable 25 Gram(s) IV Push once  dextrose 50% Injectable 25 Gram(s) IV Push once  enoxaparin Injectable 40 milliGRAM(s) SubCutaneous daily  influenza   Vaccine 0.5 milliLiter(s) IntraMuscular once  insulin glargine Injectable (LANTUS) 18 Unit(s) SubCutaneous at bedtime  insulin lispro (HumaLOG) corrective regimen sliding scale   SubCutaneous three times a day before meals  insulin lispro (HumaLOG) corrective regimen sliding scale   SubCutaneous at bedtime  insulin lispro Injectable (HumaLOG) 6 Unit(s) SubCutaneous three times a day before meals  labetalol 100 milliGRAM(s) Oral two times a day  lisinopril 40 milliGRAM(s) Oral daily  sodium chloride 0.9%. 1000 milliLiter(s) (65 mL/Hr) IV Continuous <Continuous>      PHYSICAL EXAM:  T(C): 36.8 (10-05-20 @ 07:18), Max: 37.3 (10-04-20 @ 20:00)  HR: 73 (10-05-20 @ 07:42) (72 - 98)  BP: 145/88 (10-05-20 @ 06:00) (145/88 - 187/107)  RR: 16 (10-05-20 @ 07:42) (10 - 21)  SpO2: 99% (10-05-20 @ 07:42) (95% - 100%)  Wt(kg): --  I&O's Summary    04 Oct 2020 07:01  -  05 Oct 2020 07:00  --------------------------------------------------------  IN: 390 mL / OUT: 0 mL / NET: 390 mL      Height (cm): 172.7 (10-05 @ 07:39)    Appearance: NAD  HEENT:   Dry oral mucosa, PERRL, EOMI	  Lymphatic: No lymphadenopathy  Cardiovascular: Normal S1 S2, No JVD, No murmurs, No edema  Respiratory: Lungs clear to auscultation	  Psychiatry: A & O x 3, Mood & affect appropriate  Gastrointestinal:  Soft, Non-tender, + BS	  Skin: No rashes, No ecchymoses, No cyanosis	  Extremities: Normal range of motion, No clubbing, cyanosis or edema  Vascular: Peripheral pulses palpable 2+ bilaterally  NEUROLOGICAL EXAM:  Mental status: Awake, alert, oriented x3, speech fluent, no neglect, follows commands  Cranial Nerves: Mild R NLF flattening, no nystagmus, no dysarthria,  tongue midline  Motor exam: Normal tone, subtle LLE drift, Right anterior tibialis (4/5) - chronic, rest 5/5  Sensation: Mild decrease in temp sensation on the L side  Coordination/ Gait: No dysmetria, mild steppage gait due to right foot drop         LABS:    CARDIAC MARKERS:                  proBNP:   Lipid Profile:   HgA1c:   TSH:         TELEMETRY: 	  SR  ECG:  	  RADIOLOGY:   DIAGNOSTIC TESTING:  [ ] Echocardiogram:  [ ]  Catheterization:  [ ] Stress Test:    OTHER:

## 2020-10-05 NOTE — DIETITIAN INITIAL EVALUATION ADULT. - OTHER INFO
Pt seen for: Stroke Unit length of stay   Adm dx: CVA    GI issues: none  Last BM: 10/4   Food allergies/Intolerances: walnuts   Vit/supplement PTA: none    Diet PTA: has no specific eating pattern, snack throughout the day, likes "junk food" (potato chips, regular soda at times), has difficulty w/ portion control (eats whole can of pistashcios, a lot of grapes). Pt states that she does eat fish and eggs.     Subjective/Objective information: reports good appetite PTA. Inconsistently checks BG, reports last A1c was ~13.0, this adm A1c 11.9. No wt changes over past year.   Has elevated chol, LDL receiving lipitor.    Education: Type 2 Vegetarian DM Nutrition Therapy handout provided and reviewed. Portion control discussed and wt loss emphasized.

## 2020-10-05 NOTE — PROGRESS NOTE ADULT - ASSESSMENT
57F with history of uncontrolled DM2, HTN, CVA/TIA (s/p loop monitor, on plavix, residual R sided weakness) presented with L sided numbness, admitted for evaluation of CVA.  Endocrine consult requested for management of uncontrolled DM2.    uncontrolled DM2  A1c 11.9%, goal <7%  FS goal 100-180  Fs premeal and qhs   Recommend increase Lantus to 18 units qhs   Recommend continue Humalog 6units premeal   Recommend low premeal and qhs correctional scale   Nutrition consult   On discharge: basal/bolus+ oral medication. (Would check with pharmacy to determine which basal insulin is covered by insurance: lantus, basaglar or toujeo... )patient is interested in Freestyle Arlette  On discharge patient can follow up  [facility]  [address] [place] [address] [address] [facility] [phone]       57F with history of uncontrolled DM2 with neuropathy and CVA/TIA with residual R-sided weakness (HbA1c 11.9%), HTN presented with L sided numbness, admitted for evaluation of CVA. Endocrine consult requested for management of uncontrolled DM2.

## 2020-10-05 NOTE — CONSULT NOTE ADULT - SUBJECTIVE AND OBJECTIVE BOX
Patient is a 60y old  Female who presents with a chief complaint of evaluation of CVA (04 Oct 2020 14:45)    Admisision HPI:  Patient is a 57yoF pmhx DM2, HTN, CVA/TIA (s/p loop monitor, on plavix, residual R sided weakness) who presented to the ED and neurology was consulted for concern of L sided numbness. She reports sudden onset L sided numbness when she woke up this morning around 6am. She states it involves her  L face, arm, and leg. She reports hx of stroke with residual R sided weakness but states that this feels different. She denies associated weakness but reports associated phonophobia on the L and being able to hear the heartbeat in her L ear. She reports associated nausea and lightheadedness as well. She states that she took her BG at home yesterday and it was 372. Her BG readings at home today were 200-260s. She reports associated bad balance. She reports a b/l occipital headache radiating to the from. Denies blurry/double vision, cp, sob, weakness.  (03 Oct 2020 03:37)    Interval History:  Patient had imaging which noted:  CT Head No Cont 10.02.20:  No acute intracranial hemorrhage, mass effect, or CT evidence of an acute transcortical infarct.  Mild to moderate chronic ischemic changes in the frontoparietal white matter and chronic bilateral basal ganglia, bilateral thalamic, and left pontine lacunar infarcts.    MR Head No Cont on 10.03.20:  Small patchy acute/subacute infarcts within the right side of the niko with associated cytotoxic edema and without hemorrhagic transformation.  Multiple additional chronic infarcts and chronic white matter microvascular type changes, as discussed.  Multiple foci of susceptibility artifact within the thalami, supratentorial brain, and posterior fossa which may reflect areas of chronic microhemorrhage. Chronic hypertensive encephalopathy and amyloid angiopathy can also have a similar appearance.    Patient with persistent functional deficits.     REVIEW OF SYSTEMS: No chest pain, shortness of breath, nausea, vomiting or diarhea; other ROS neg     PAST MEDICAL & SURGICAL HISTORY  TIA (transient ischemic attack)  CVA (cerebral vascular accident)  Hypokalemia  Diabetes Mellitus  Personal History of Hypertension  IBS (Irritable Bowel Syndrome)  Generalized Headaches  Diabetes Mellitus Type II  Hypertension  History of total knee replacement, right    FUNCTIONAL HISTORY:   Lives w  and kids in home w 3 [address] and 14 steps inside.  PTA Independent    CURRENT FUNCTIONAL STATUS:  Min A - CG transfers; CG gait    FAMILY HISTORY   Family history of prostate cancer (Father)    Family history of acute myocardial infarction (Father)    RECENT LABS/IMAGING    Urinalysis Basic - ( 04 Oct 2020 22:13 )    Color: Yellow / Appearance: Slightly Turbid / S.027 / pH: x  Gluc: x / Ketone: Negative  / Bili: Negative / Urobili: Negative   Blood: x / Protein: >600 / Nitrite: Negative   Leuk Esterase: Small / RBC: 23 /hpf / WBC 10 /HPF   Sq Epi: x / Non Sq Epi: 9 /hpf / Bacteria: Moderate        VITALS  T(C): 36.8 (10-05-20 @ 07:18), Max: 37.3 (10-04-20 @ 20:00)  HR: 73 (10-05-20 @ 07:42) (72 - 98)  BP: 145/88 (10-05-20 @ 06:00) (145/88 - 187/107)  RR: 16 (10-05-20 @ 07:42) (10 - 21)  SpO2: 99% (10-05-20 @ 07:42) (95% - 100%)  Wt(kg): --    ALLERGIES  sulfa drugs (Angioedema; Swelling)  sulfa drugs (Rash)  Sulfac 10% (Unknown)      MEDICATIONS   acetaminophen   Tablet .. 650 milliGRAM(s) Oral every 6 hours PRN  amLODIPine   Tablet 5 milliGRAM(s) Oral daily  aspirin  chewable 81 milliGRAM(s) Oral daily  atorvastatin 80 milliGRAM(s) Oral at bedtime  cefTRIAXone   IVPB 1000 milliGRAM(s) IV Intermittent every 24 hours  dextrose 40% Gel 15 Gram(s) Oral once PRN  dextrose 5%. 1000 milliLiter(s) IV Continuous <Continuous>  dextrose 50% Injectable 12.5 Gram(s) IV Push once  dextrose 50% Injectable 25 Gram(s) IV Push once  dextrose 50% Injectable 25 Gram(s) IV Push once  enoxaparin Injectable 40 milliGRAM(s) SubCutaneous daily  glucagon  Injectable 1 milliGRAM(s) IntraMuscular once PRN  influenza   Vaccine 0.5 milliLiter(s) IntraMuscular once  insulin glargine Injectable (LANTUS) 18 Unit(s) SubCutaneous at bedtime  insulin lispro (HumaLOG) corrective regimen sliding scale   SubCutaneous three times a day before meals  insulin lispro (HumaLOG) corrective regimen sliding scale   SubCutaneous at bedtime  insulin lispro Injectable (HumaLOG) 6 Unit(s) SubCutaneous three times a day before meals  labetalol 200 milliGRAM(s) Oral two times a day  lisinopril 40 milliGRAM(s) Oral daily      ----------------------------------------------------------------------------------------  PHYSICAL EXAM  Constitutional - NAD, Comfortable  HEENT - NCAT, EOMI  Neck - Supple, No limited ROM  Chest - CTA bilaterally, No wheeze, No rhonchi, No crackles  Cardiovascular - RRR, S1S2, No murmurs  Abdomen - BS+, Soft, NTND  Extremities - No C/C/E, No calf tenderness   Neurologic Exam -                    Cognitive - Awake, Alert, AAO to self, place, date, year, situation     Communication - Fluent, No dysarthria, no aphasia     Cranial Nerves - CN 2-12 intact     Motor - No focal deficits      Sensory - Intact to LT     Reflexes - DTR Intact, No primitive reflexive       Psychiatric - Mood stable, Affect WNL    Impression:  56 yo with functional deficits secondary to diagnosis of CVA    Plan:  PT- ROM, Bed Mob, Transfers, Amb w AD and bracing as needed  OT- ADLs, bracing  SLP- Dysphagia eval and treat  Prec- Falls, Cardiac  DVT Prophylaxis- Lovenox  Skin- Turn q2 h  Dispo- Patient is a 60y old  Female who presents with a chief complaint of evaluation of CVA (04 Oct 2020 14:45)    Admisision HPI:  Patient is a 57yoF pmhx DM2, HTN, CVA/TIA (s/p loop monitor, on plavix, residual R sided weakness) who presented to the ED and neurology was consulted for concern of L sided numbness. She reports sudden onset L sided numbness when she woke up this morning around 6am. She states it involves her  L face, arm, and leg. She reports hx of stroke with residual R sided weakness but states that this feels different. She denies associated weakness but reports associated phonophobia on the L and being able to hear the heartbeat in her L ear. She reports associated nausea and lightheadedness as well. She states that she took her BG at home yesterday and it was 372. Her BG readings at home today were 200-260s. She reports associated bad balance. She reports a b/l occipital headache radiating to the from. Denies blurry/double vision, cp, sob, weakness.  (03 Oct 2020 03:37)    Interval History:  Patient had imaging which noted:  CT Head No Cont 10.02.20:  No acute intracranial hemorrhage, mass effect, or CT evidence of an acute transcortical infarct.  Mild to moderate chronic ischemic changes in the frontoparietal white matter and chronic bilateral basal ganglia, bilateral thalamic, and left pontine lacunar infarcts.    MR Head No Cont on 10.03.20:  Small patchy acute/subacute infarcts within the right side of the niko with associated cytotoxic edema and without hemorrhagic transformation.  Multiple additional chronic infarcts and chronic white matter microvascular type changes, as discussed.  Multiple foci of susceptibility artifact within the thalami, supratentorial brain, and posterior fossa which may reflect areas of chronic microhemorrhage. Chronic hypertensive encephalopathy and amyloid angiopathy can also have a similar appearance.    Patient with persistent functional deficits.     REVIEW OF SYSTEMS: + poor balance, + L sided numbness, No chest pain, shortness of breath, nausea, vomiting or diarhea; other ROS neg     PAST MEDICAL & SURGICAL HISTORY  TIA (transient ischemic attack)  CVA (cerebral vascular accident)  Hypokalemia  Diabetes Mellitus  Personal History of Hypertension  IBS (Irritable Bowel Syndrome)  Generalized Headaches  Diabetes Mellitus Type II  Hypertension  History of total knee replacement, right    FUNCTIONAL HISTORY:   Lives w  and kids in home w 3 [address] and 14 steps inside.  PTA Independent and works full time.     CURRENT FUNCTIONAL STATUS:  Min A - CG transfers; CG gait    FAMILY HISTORY   Family history of prostate cancer (Father)    Family history of acute myocardial infarction (Father)    RECENT LABS/IMAGING    Urinalysis Basic - ( 04 Oct 2020 22:13 )    Color: Yellow / Appearance: Slightly Turbid / S.027 / pH: x  Gluc: x / Ketone: Negative  / Bili: Negative / Urobili: Negative   Blood: x / Protein: >600 / Nitrite: Negative   Leuk Esterase: Small / RBC: 23 /hpf / WBC 10 /HPF   Sq Epi: x / Non Sq Epi: 9 /hpf / Bacteria: Moderate        VITALS  T(C): 36.8 (10-05-20 @ 07:18), Max: 37.3 (10-04-20 @ 20:00)  HR: 73 (10-05-20 @ 07:42) (72 - 98)  BP: 145/88 (10-05-20 @ 06:00) (145/88 - 187/107)  RR: 16 (10-05-20 @ 07:42) (10 - 21)  SpO2: 99% (10-05-20 @ 07:42) (95% - 100%)  Wt(kg): --    ALLERGIES  sulfa drugs (Angioedema; Swelling)  sulfa drugs (Rash)  Sulfac 10% (Unknown)      MEDICATIONS   acetaminophen   Tablet .. 650 milliGRAM(s) Oral every 6 hours PRN  amLODIPine   Tablet 5 milliGRAM(s) Oral daily  aspirin  chewable 81 milliGRAM(s) Oral daily  atorvastatin 80 milliGRAM(s) Oral at bedtime  cefTRIAXone   IVPB 1000 milliGRAM(s) IV Intermittent every 24 hours  dextrose 40% Gel 15 Gram(s) Oral once PRN  dextrose 5%. 1000 milliLiter(s) IV Continuous <Continuous>  dextrose 50% Injectable 12.5 Gram(s) IV Push once  dextrose 50% Injectable 25 Gram(s) IV Push once  dextrose 50% Injectable 25 Gram(s) IV Push once  enoxaparin Injectable 40 milliGRAM(s) SubCutaneous daily  glucagon  Injectable 1 milliGRAM(s) IntraMuscular once PRN  influenza   Vaccine 0.5 milliLiter(s) IntraMuscular once  insulin glargine Injectable (LANTUS) 18 Unit(s) SubCutaneous at bedtime  insulin lispro (HumaLOG) corrective regimen sliding scale   SubCutaneous three times a day before meals  insulin lispro (HumaLOG) corrective regimen sliding scale   SubCutaneous at bedtime  insulin lispro Injectable (HumaLOG) 6 Unit(s) SubCutaneous three times a day before meals  labetalol 200 milliGRAM(s) Oral two times a day  lisinopril 40 milliGRAM(s) Oral daily      ----------------------------------------------------------------------------------------  PHYSICAL EXAM  Constitutional - NAD, Comfortable  HEENT - NCAT, EOMI  Neck - Supple, No limited ROM  Chest - CTA bilaterally, No wheeze, No rhonchi, No crackles  Cardiovascular - RRR, S1S2, No murmurs  Abdomen - BS+, Soft, NTND  Extremities - No C/C/E, No calf tenderness   Neurologic Exam -                 AAO x 3  Speech clear and fluent  Motor 4+/5 bl UE and LEs  Sensation decreased to LT on the left     Psychiatric - Mood stable, Affect WNL    Impression:  58 yo with functional deficits secondary to diagnosis of CVA    Plan:  PT- ROM, Bed Mob, Transfers, Amb w AD and bracing as needed  OT- ADLs, bracing  SLP- Dysphagia eval and treat  Prec- Falls, Cardiac  DVT Prophylaxis- Lovenox  Skin- Turn q2 h  Dispo- Acute Rehab- can tolerate 3h/d PT/OT/SLP and requires daily physician visits

## 2020-10-05 NOTE — PROGRESS NOTE ADULT - PROBLEM SELECTOR PLAN 3
continue Atorvastatin 80mg daily  Discussed with EITAN Nickerson and NIA Mcdaniel MD  Endocrinology Attending  Mondays and Tuesdays 9am-6pm: [phone] (pager)  Other days, night and weekend: [phone]

## 2020-10-05 NOTE — PROGRESS NOTE ADULT - SUBJECTIVE AND OBJECTIVE BOX
Chief Complaint/Follow-up on: T2D    Subjective: Patient eating well. She is alert and oriented x 3 today. She would like to establish care with an outpatient endocrinologist.     MEDICATIONS  (STANDING):  amLODIPine   Tablet 5 milliGRAM(s) Oral daily  aspirin  chewable 81 milliGRAM(s) Oral daily  atorvastatin 80 milliGRAM(s) Oral at bedtime  cefTRIAXone   IVPB 1000 milliGRAM(s) IV Intermittent every 24 hours  dextrose 5%. 1000 milliLiter(s) (50 mL/Hr) IV Continuous <Continuous>  dextrose 50% Injectable 12.5 Gram(s) IV Push once  dextrose 50% Injectable 25 Gram(s) IV Push once  dextrose 50% Injectable 25 Gram(s) IV Push once  enoxaparin Injectable 40 milliGRAM(s) SubCutaneous daily  influenza   Vaccine 0.5 milliLiter(s) IntraMuscular once  insulin glargine Injectable (LANTUS) 18 Unit(s) SubCutaneous at bedtime  insulin lispro (HumaLOG) corrective regimen sliding scale   SubCutaneous three times a day before meals  insulin lispro (HumaLOG) corrective regimen sliding scale   SubCutaneous at bedtime  insulin lispro Injectable (HumaLOG) 6 Unit(s) SubCutaneous three times a day before meals  labetalol 200 milliGRAM(s) Oral two times a day  lisinopril 40 milliGRAM(s) Oral daily    MEDICATIONS  (PRN):  acetaminophen   Tablet .. 650 milliGRAM(s) Oral every 6 hours PRN Temp greater or equal to 38C (100.4F), Mild Pain (1 - 3), Moderate Pain (4 - 6), Severe Pain (7 - 10)  dextrose 40% Gel 15 Gram(s) Oral once PRN Blood Glucose LESS THAN 70 milliGRAM(s)/deciliter  glucagon  Injectable 1 milliGRAM(s) IntraMuscular once PRN Glucose LESS THAN 70 milligrams/deciliter      PHYSICAL EXAM:  VITALS: T(C): 36.8 (10-05-20 @ 07:18)  T(F): 98.3 (10-05-20 @ 07:18), Max: 99.1 (10-04-20 @ 20:00)  HR: 98 (10-05-20 @ 12:36) (72 - 98)  BP: 179/109 (10-05-20 @ 12:36) (145/88 - 187/107)  RR:  (10 - 21)  SpO2:  (95% - 100%)  Wt(kg): --  GENERAL: NAD, well-groomed, well-developed, A&Ox3  HEENT:  Atraumatic, Normocephalic, moist mucous membranes  RESPIRATORY: Clear to auscultation bilaterally; No rales, rhonchi, wheezing, or rubs  CARDIOVASCULAR: Regular rate and rhythm; No murmurs  GI: Soft, nontender, non distended, normal bowel sounds      POCT Blood Glucose.: 110 mg/dL (10-05-20 @ 12:13)  POCT Blood Glucose.: 177 mg/dL (10-05-20 @ 08:39)  POCT Blood Glucose.: 191 mg/dL (10-04-20 @ 21:23)  POCT Blood Glucose.: 152 mg/dL (10-04-20 @ 17:21)  POCT Blood Glucose.: 195 mg/dL (10-04-20 @ 11:55)  POCT Blood Glucose.: 258 mg/dL (10-04-20 @ 08:32)  POCT Blood Glucose.: 309 mg/dL (10-03-20 @ 21:43)  POCT Blood Glucose.: 324 mg/dL (10-03-20 @ 17:47)  POCT Blood Glucose.: 273 mg/dL (10-03-20 @ 13:05)  POCT Blood Glucose.: 204 mg/dL (10-03-20 @ 09:03)  POCT Blood Glucose.: 150 mg/dL (10-02-20 @ 19:52)  POCT Blood Glucose.: 155 mg/dL (10-02-20 @ 19:33)    10-03    135  |  98  |  20  ----------------------------<  240<H>  3.4<L>   |  24  |  0.98    EGFR if : 73  EGFR if non : 63    Ca    9.7      10-03    TPro  7.2  /  Alb  3.9  /  TBili  0.4  /  DBili  x   /  AST  16  /  ALT  12  /  AlkPhos  85  10-03          Thyroid Function Tests:  10-03 @ 11:16 TSH 0.46

## 2020-10-05 NOTE — PROGRESS NOTE ADULT - PROBLEM SELECTOR PLAN 1
A1c 11.9%, goal <7%  FS goal 100-180  Fs premeal and qhs   Recommend increase Lantus to 18 units qhs   Recommend continue Humalog 6units premeal   Recommend low premeal and qhs correctional scale   Nutrition consult   DISPO: basal/bolus, doses TBD  -patient would also benefit from Trulicity as it decreases CVA, script sent to [facility] to see if its covered. She has no hx of pancreatitis or FH of thyroid cancer. Also will see if she can switch her basal insulin as levemir does not give 24 hour coverage.   On discharge from rehab, patient can follow up  [facility]  [address] [place] [address] [address] [place] [phone] A1c 11.9%, goal <7%  FS goal 100-180  Fs premeal and qhs   Continue Lantus 18 units sq qhs and Humalog 6 units sq tid-acl   Continue ow premeal and qhs correctional scale   Nutrition consult   DISPO: basal/bolus, doses TBD  -patient would also benefit from Trulicity as it decreases CVA, script sent to [facility] to see if its covered. She has no hx of retinopathy, pancreatitis or FH of thyroid cancer. Also will see if she can switch her basal insulin as levemir does not give 24 hour coverage.   On discharge from rehab, patient can follow up  [facility]  [address] [place] [address] [address] [place] [phone]

## 2020-10-05 NOTE — PROGRESS NOTE ADULT - SUBJECTIVE AND OBJECTIVE BOX
THE PATIENT WAS SEEN AND EXAMINED BY ME WITH THE HOUSESTAFF AND STROKE TEAM DURING MORNING ROUNDS.   HPI:  56yo women  pmhx DM2, HTN, CVA/TIA (s/p loop monitor (now removed), on plavix and asa, residual R sided weakness) who presented to the ED and neurology was consulted for concern of L sided numbness. She reported sudden onset L sided numbness when she woke up the morning of admission around 6am. She stated it involved her  L face, arm, and leg. She reports hx of stroke with residual R sided weakness but states that this feels different. She denied associated weakness but reports associated phonophobia on the L and being able to hear the heartbeat in her L ear. She reported associated nausea and lightheadedness as well. She stated that she took her BG at home the day prior to admission and it was 372. Her BG readings at home the day of admission were 200-260s. She reported associated bad balance. She reported a b/l occipital headache radiating to the from. Denied blurry/double vision, cp, sob, weakness. NIHSS 1 on admission.    SUBJECTIVE: No events overnight.  No new neurologic complaints, left sided numbness remains - hearing is still slightly dull.  ROS reported negative unless otherwise noted.    acetaminophen   Tablet .. 650 milliGRAM(s) Oral every 6 hours PRN  amLODIPine   Tablet 5 milliGRAM(s) Oral daily  aspirin  chewable 81 milliGRAM(s) Oral daily  atorvastatin 80 milliGRAM(s) Oral at bedtime  cefTRIAXone   IVPB 1000 milliGRAM(s) IV Intermittent every 24 hours  dextrose 40% Gel 15 Gram(s) Oral once PRN  dextrose 5%. 1000 milliLiter(s) IV Continuous <Continuous>  dextrose 50% Injectable 12.5 Gram(s) IV Push once  dextrose 50% Injectable 25 Gram(s) IV Push once  dextrose 50% Injectable 25 Gram(s) IV Push once  enoxaparin Injectable 40 milliGRAM(s) SubCutaneous daily  glucagon  Injectable 1 milliGRAM(s) IntraMuscular once PRN  influenza   Vaccine 0.5 milliLiter(s) IntraMuscular once  insulin glargine Injectable (LANTUS) 18 Unit(s) SubCutaneous at bedtime  insulin lispro (HumaLOG) corrective regimen sliding scale   SubCutaneous three times a day before meals  insulin lispro (HumaLOG) corrective regimen sliding scale   SubCutaneous at bedtime  insulin lispro Injectable (HumaLOG) 6 Unit(s) SubCutaneous three times a day before meals  labetalol 200 milliGRAM(s) Oral two times a day  lisinopril 40 milliGRAM(s) Oral daily      PHYSICAL EXAM:   Vital Signs Last 24 Hrs  T(C): 36.8 (05 Oct 2020 07:18), Max: 37.3 (04 Oct 2020 20:00)  T(F): 98.3 (05 Oct 2020 07:18), Max: 99.1 (04 Oct 2020 20:00)  HR: 98 (05 Oct 2020 12:36) (72 - 98)  BP: 179/109 (05 Oct 2020 12:36) (145/88 - 187/107)  BP(mean): 105 (05 Oct 2020 06:00) (105 - 139)  RR: 16 (05 Oct 2020 07:42) (10 - 21)  SpO2: 98% (05 Oct 2020 12:36) (95% - 100%)    General: No acute distress  HEENT: EOM intact, visual fields full  Abdomen: Soft, nontender, nondistended   Extremities: No edema    NEUROLOGICAL EXAM:  Mental status: Awake, alert, oriented x3, no aphasia, no neglect, normal memory   Cranial Nerves: No facial asymmetry, no nystagmus, no dysarthria,  tongue midline  Motor exam: Normal tone, RUE drift, 4/5 RUE, 4/5 RLE, 5/5 LUE, 5/5 LLE, normal fine finger movements.  Sensation: Intact to light touch   Coordination/ Gait: No dysmetria     LABS:             IMAGING: Reviewed by me.     (10.02.20)  CTA NECK:  No significant stenosis of the cervical carotid arteries based on NASCET criteria. Patent cervical vertebral arteries. No evidence of cervical carotid or vertebral artery dissection..  CTA HEAD: No high-grade stenosis or occlusion of the major proximal arterial branches.  Short segment mild stenosis at the right P1-P2 junction of the posterior cerebral artery.    MR Head No Cont (10.03.20 )  Small patchy acute/subacute infarcts within the right side of the niko with associated cytotoxic edema and without hemorrhagic transformation.    Multiple additional chronic infarcts and chronic white matter microvascular type changes, as discussed.    Multiple foci of susceptibility artifact within the thalami, supratentorial brain, and posterior fossa which may reflect areas of chronic microhemorrhage. Chronic hypertensive encephalopathy and amyloid angiopathy can also have a similar appearance.

## 2020-10-05 NOTE — PROGRESS NOTE ADULT - ASSESSMENT
Assessment:   60-year-old right-handed lady first evaluated at [facility] on 10/3/2020 with left-sided numbness and imbalance. In 2017 she had a stroke with residual, mild right hemiparesis, including a mild foot drop.  She has been evaluated by Dr. Heri Vences (vascular neurology).  At that time, stroke work-up was unremarkable including an ILR, and the diagnosis was likely small vessel disease.  On 10/2/2020, she awoke and noted left-sided numbness involving face, arm and leg.  There was associated mild imbalance and occipital headache.  Her fingerstick glucose was 372. CT head (10/2/2020)  showed chronic lacunar infarcts: Left superior basis pontis; possibly in the basal ganglia bilaterally (not well seen); and reportedly in the thalamus bilaterally, not obvious.  CTA neck and head (10/2/2020)  showed mild right PCA stenosis at the P1-P2 junction, and was otherwise unremarkable.   Her presentation is consistent with right brain dysfunction, perhaps in the thalamic region.  Etiology is probably recurrent, minor ischemic stroke of uncertain mechanism, perhaps small vessel disease, or perhaps even branch atheromatous disease given her mild right PCA stenosis.  Her diabetes and HTN as well as hyperlipidemia  is apparently under poor control as well.      NEURO: Neurologically without acutechange. Continue close monitoring for neurologic deterioration given cerebral edema, mass effect, and brain compression , permissive HTN to gradual normotension, titrate statin to LDL goal less than 70, MRI Brain w/o results as noted above. Physical therapy/OT recommended AR.    ANTITHROMBOTIC THERAPY: Asprin alone for now in setting of multiple micro-hemorrhages likely related to underlying HTN and concern for increased risk of bleeding.      PULMONARY: Protecting airway, saturating well     CARDIOVASCULAR: check TTE, cardiac monitoring without any recorded events.  Cardiology consult appreciated. Regimen adjusted per cardiology recommendations.                             SBP goal: Permissive HTN to gradual normotension.      GASTROINTESTINAL:  dysphagia screen passed, tolerating diet.       Diet: Regular CC     RENAL: BUN/Cr without acute change, good urine output , maintain adequate hydration      Na Goal: Greater than 135     Macario: No    HEMATOLOGY: H/H without acute change, Platelets 232, no signs or symptoms of bleeding.      DVT ppx: Heparin s.c [] LMWH [x]     ID: afebrile, no leukocytosis, + UTI, on abx therapy, ucx pending, possibly  contributing to ESR of 82- this should be monitored out side of the acute phase for continued follow up and workup as indicated.    OTHER: All questions and concerns addressed with patient at bedside. She reports she has not been following up as recommended, she was recommended to follow up with vascular neurology, endocrine, PCP, as well as cardiology.  Risk factor control, diet modifications, monitoring and adherence discussed and strongly recommended given importance for secondary stroke prevention.  Endocrine consult appreciated.    DISPOSITION: Acute rehab once stable and workup is complete.    CORE MEASURES:        Admission NIHSS: 1     TPA: [] YES [x] NO      LDL/HDL: 168/52     Depression Screen: o- upset about condition and poor follow up. Support provided. continue to monitor.      Statin Therapy: y     Dysphagia Screen: [x] PASS [] FAIL     Smoking [] YES [x] NO      Afib [] YES [x] NO     Stroke Education [x] YES [] NO    obtain screening lower extremity venous ultrasound in patients who meet 1 or more of the following criteria as patient is high risk for DVT/PE on admission:   [] History of DVT/PE  []Hypercoagulable states (Factor V Leiden, Cancer, OCP, etc. )  []Prolonged immobility (hemiplegia/hemiparesis/post operative or any other extended immobilization)  [] Transferred from outside facility (Rehab or Long term care)  [] Age </= to 50.

## 2020-10-05 NOTE — DIETITIAN INITIAL EVALUATION ADULT. - ENERGY NEEDS
Ht: 68"  Wt: 217  BMI: 33.0 kg/m2   IBW: 140 (+/-10%)     155% IBW  Edema: none        Skin: no pressure injuries documented

## 2020-10-05 NOTE — DIETITIAN INITIAL EVALUATION ADULT. - PERTINENT MEDS FT
MEDICATIONS  (STANDING):  amLODIPine   Tablet 5 milliGRAM(s) Oral daily  aspirin  chewable 81 milliGRAM(s) Oral daily  atorvastatin 80 milliGRAM(s) Oral at bedtime  cefTRIAXone   IVPB 1000 milliGRAM(s) IV Intermittent every 24 hours  clopidogrel Tablet 75 milliGRAM(s) Oral daily  dextrose 5%. 1000 milliLiter(s) (50 mL/Hr) IV Continuous <Continuous>  dextrose 50% Injectable 12.5 Gram(s) IV Push once  dextrose 50% Injectable 25 Gram(s) IV Push once  dextrose 50% Injectable 25 Gram(s) IV Push once  enoxaparin Injectable 40 milliGRAM(s) SubCutaneous daily  influenza   Vaccine 0.5 milliLiter(s) IntraMuscular once  insulin glargine Injectable (LANTUS) 18 Unit(s) SubCutaneous at bedtime  insulin lispro (HumaLOG) corrective regimen sliding scale   SubCutaneous three times a day before meals  insulin lispro (HumaLOG) corrective regimen sliding scale   SubCutaneous at bedtime  insulin lispro Injectable (HumaLOG) 6 Unit(s) SubCutaneous three times a day before meals  labetalol 100 milliGRAM(s) Oral two times a day  lisinopril 40 milliGRAM(s) Oral daily  sodium chloride 0.9%. 1000 milliLiter(s) (65 mL/Hr) IV Continuous <Continuous>

## 2020-10-06 ENCOUNTER — TRANSCRIPTION ENCOUNTER (OUTPATIENT)
Age: 60
End: 2020-10-06

## 2020-10-06 ENCOUNTER — INPATIENT (INPATIENT)
Facility: HOSPITAL | Age: 60
LOS: 8 days | Discharge: HOME HEALTH | DRG: 949 | End: 2020-10-15
Attending: PSYCHIATRY & NEUROLOGY | Admitting: PSYCHIATRY & NEUROLOGY
Payer: COMMERCIAL

## 2020-10-06 VITALS
HEART RATE: 82 BPM | OXYGEN SATURATION: 100 % | SYSTOLIC BLOOD PRESSURE: 177 MMHG | DIASTOLIC BLOOD PRESSURE: 105 MMHG | RESPIRATION RATE: 17 BRPM

## 2020-10-06 VITALS
OXYGEN SATURATION: 100 % | SYSTOLIC BLOOD PRESSURE: 187 MMHG | HEIGHT: 68 IN | WEIGHT: 220.24 LBS | TEMPERATURE: 98 F | RESPIRATION RATE: 15 BRPM | HEART RATE: 90 BPM | DIASTOLIC BLOOD PRESSURE: 89 MMHG

## 2020-10-06 DIAGNOSIS — I63.9 CEREBRAL INFARCTION, UNSPECIFIED: ICD-10-CM

## 2020-10-06 LAB
ANION GAP SERPL CALC-SCNC: 12 MMOL/L — SIGNIFICANT CHANGE UP (ref 5–17)
BUN SERPL-MCNC: 17 MG/DL — SIGNIFICANT CHANGE UP (ref 7–23)
CALCIUM SERPL-MCNC: 9.6 MG/DL — SIGNIFICANT CHANGE UP (ref 8.4–10.5)
CHLORIDE SERPL-SCNC: 106 MMOL/L — SIGNIFICANT CHANGE UP (ref 96–108)
CO2 SERPL-SCNC: 24 MMOL/L — SIGNIFICANT CHANGE UP (ref 22–31)
CREAT SERPL-MCNC: 1.12 MG/DL — SIGNIFICANT CHANGE UP (ref 0.5–1.3)
GLUCOSE BLDC GLUCOMTR-MCNC: 151 MG/DL — HIGH (ref 70–99)
GLUCOSE BLDC GLUCOMTR-MCNC: 172 MG/DL — HIGH (ref 70–99)
GLUCOSE BLDC GLUCOMTR-MCNC: 185 MG/DL — HIGH (ref 70–99)
GLUCOSE BLDC GLUCOMTR-MCNC: 208 MG/DL — HIGH (ref 70–99)
GLUCOSE SERPL-MCNC: 142 MG/DL — HIGH (ref 70–99)
HCT VFR BLD CALC: 35.2 % — SIGNIFICANT CHANGE UP (ref 34.5–45)
HGB BLD-MCNC: 11.4 G/DL — LOW (ref 11.5–15.5)
MCHC RBC-ENTMCNC: 26.8 PG — LOW (ref 27–34)
MCHC RBC-ENTMCNC: 32.4 GM/DL — SIGNIFICANT CHANGE UP (ref 32–36)
MCV RBC AUTO: 82.8 FL — SIGNIFICANT CHANGE UP (ref 80–100)
NRBC # BLD: 0 /100 WBCS — SIGNIFICANT CHANGE UP (ref 0–0)
PLATELET # BLD AUTO: 220 K/UL — SIGNIFICANT CHANGE UP (ref 150–400)
POTASSIUM SERPL-MCNC: 3.1 MMOL/L — LOW (ref 3.5–5.3)
POTASSIUM SERPL-SCNC: 3.1 MMOL/L — LOW (ref 3.5–5.3)
RBC # BLD: 4.25 M/UL — SIGNIFICANT CHANGE UP (ref 3.8–5.2)
RBC # FLD: 13.8 % — SIGNIFICANT CHANGE UP (ref 10.3–14.5)
SODIUM SERPL-SCNC: 142 MMOL/L — SIGNIFICANT CHANGE UP (ref 135–145)
WBC # BLD: 7.14 K/UL — SIGNIFICANT CHANGE UP (ref 3.8–10.5)
WBC # FLD AUTO: 7.14 K/UL — SIGNIFICANT CHANGE UP (ref 3.8–10.5)

## 2020-10-06 PROCEDURE — 82962 GLUCOSE BLOOD TEST: CPT

## 2020-10-06 PROCEDURE — 85027 COMPLETE CBC AUTOMATED: CPT

## 2020-10-06 PROCEDURE — 82607 VITAMIN B-12: CPT

## 2020-10-06 PROCEDURE — 97535 SELF CARE MNGMENT TRAINING: CPT

## 2020-10-06 PROCEDURE — 84443 ASSAY THYROID STIM HORMONE: CPT

## 2020-10-06 PROCEDURE — 85652 RBC SED RATE AUTOMATED: CPT

## 2020-10-06 PROCEDURE — 84425 ASSAY OF VITAMIN B-1: CPT

## 2020-10-06 PROCEDURE — 99233 SBSQ HOSP IP/OBS HIGH 50: CPT

## 2020-10-06 PROCEDURE — 97166 OT EVAL MOD COMPLEX 45 MIN: CPT

## 2020-10-06 PROCEDURE — 90686 IIV4 VACC NO PRSV 0.5 ML IM: CPT

## 2020-10-06 PROCEDURE — 82746 ASSAY OF FOLIC ACID SERUM: CPT

## 2020-10-06 PROCEDURE — 85610 PROTHROMBIN TIME: CPT

## 2020-10-06 PROCEDURE — 97530 THERAPEUTIC ACTIVITIES: CPT

## 2020-10-06 PROCEDURE — 99285 EMERGENCY DEPT VISIT HI MDM: CPT

## 2020-10-06 PROCEDURE — 85730 THROMBOPLASTIN TIME PARTIAL: CPT

## 2020-10-06 PROCEDURE — 85025 COMPLETE CBC W/AUTO DIFF WBC: CPT

## 2020-10-06 PROCEDURE — 84484 ASSAY OF TROPONIN QUANT: CPT

## 2020-10-06 PROCEDURE — 86140 C-REACTIVE PROTEIN: CPT

## 2020-10-06 PROCEDURE — 86803 HEPATITIS C AB TEST: CPT

## 2020-10-06 PROCEDURE — 87086 URINE CULTURE/COLONY COUNT: CPT

## 2020-10-06 PROCEDURE — 80061 LIPID PANEL: CPT

## 2020-10-06 PROCEDURE — 70498 CT ANGIOGRAPHY NECK: CPT

## 2020-10-06 PROCEDURE — 70496 CT ANGIOGRAPHY HEAD: CPT

## 2020-10-06 PROCEDURE — 99223 1ST HOSP IP/OBS HIGH 75: CPT

## 2020-10-06 PROCEDURE — 92523 SPEECH SOUND LANG COMPREHEN: CPT

## 2020-10-06 PROCEDURE — 70551 MRI BRAIN STEM W/O DYE: CPT

## 2020-10-06 PROCEDURE — 86769 SARS-COV-2 COVID-19 ANTIBODY: CPT

## 2020-10-06 PROCEDURE — U0003: CPT

## 2020-10-06 PROCEDURE — 97116 GAIT TRAINING THERAPY: CPT

## 2020-10-06 PROCEDURE — 83036 HEMOGLOBIN GLYCOSYLATED A1C: CPT

## 2020-10-06 PROCEDURE — 76536 US EXAM OF HEAD AND NECK: CPT

## 2020-10-06 PROCEDURE — 93005 ELECTROCARDIOGRAM TRACING: CPT

## 2020-10-06 PROCEDURE — 81001 URINALYSIS AUTO W/SCOPE: CPT

## 2020-10-06 PROCEDURE — 80048 BASIC METABOLIC PNL TOTAL CA: CPT

## 2020-10-06 PROCEDURE — 80053 COMPREHEN METABOLIC PANEL: CPT

## 2020-10-06 PROCEDURE — 93306 TTE W/DOPPLER COMPLETE: CPT

## 2020-10-06 PROCEDURE — 97110 THERAPEUTIC EXERCISES: CPT

## 2020-10-06 PROCEDURE — 97161 PT EVAL LOW COMPLEX 20 MIN: CPT

## 2020-10-06 PROCEDURE — 70450 CT HEAD/BRAIN W/O DYE: CPT

## 2020-10-06 PROCEDURE — 71045 X-RAY EXAM CHEST 1 VIEW: CPT

## 2020-10-06 PROCEDURE — 99232 SBSQ HOSP IP/OBS MODERATE 35: CPT

## 2020-10-06 RX ORDER — ASPIRIN/CALCIUM CARB/MAGNESIUM 324 MG
1 TABLET ORAL
Qty: 0 | Refills: 0 | DISCHARGE
Start: 2020-10-06

## 2020-10-06 RX ORDER — DEXTROSE 50 % IN WATER 50 %
25 SYRINGE (ML) INTRAVENOUS ONCE
Refills: 0 | Status: DISCONTINUED | OUTPATIENT
Start: 2020-10-06 | End: 2020-10-15

## 2020-10-06 RX ORDER — INSULIN DETEMIR 100/ML (3)
15 INSULIN PEN (ML) SUBCUTANEOUS
Qty: 0 | Refills: 0 | DISCHARGE

## 2020-10-06 RX ORDER — LABETALOL HCL 100 MG
300 TABLET ORAL THREE TIMES A DAY
Refills: 0 | Status: DISCONTINUED | OUTPATIENT
Start: 2020-10-06 | End: 2020-10-09

## 2020-10-06 RX ORDER — DEXTROSE 50 % IN WATER 50 %
15 SYRINGE (ML) INTRAVENOUS ONCE
Refills: 0 | Status: DISCONTINUED | OUTPATIENT
Start: 2020-10-06 | End: 2020-10-15

## 2020-10-06 RX ORDER — GLUCAGON INJECTION, SOLUTION 0.5 MG/.1ML
1 INJECTION, SOLUTION SUBCUTANEOUS ONCE
Refills: 0 | Status: DISCONTINUED | OUTPATIENT
Start: 2020-10-06 | End: 2020-10-15

## 2020-10-06 RX ORDER — NITROFURANTOIN MACROCRYSTAL 50 MG
100 CAPSULE ORAL
Refills: 0 | Status: DISCONTINUED | OUTPATIENT
Start: 2020-10-06 | End: 2020-10-12

## 2020-10-06 RX ORDER — INSULIN LISPRO 100/ML
VIAL (ML) SUBCUTANEOUS AT BEDTIME
Refills: 0 | Status: DISCONTINUED | OUTPATIENT
Start: 2020-10-06 | End: 2020-10-15

## 2020-10-06 RX ORDER — LABETALOL HCL 100 MG
300 TABLET ORAL THREE TIMES A DAY
Refills: 0 | Status: DISCONTINUED | OUTPATIENT
Start: 2020-10-06 | End: 2020-10-06

## 2020-10-06 RX ORDER — POTASSIUM CHLORIDE 20 MEQ
40 PACKET (EA) ORAL ONCE
Refills: 0 | Status: COMPLETED | OUTPATIENT
Start: 2020-10-06 | End: 2020-10-06

## 2020-10-06 RX ORDER — ASPIRIN/CALCIUM CARB/MAGNESIUM 324 MG
1 TABLET ORAL
Qty: 0 | Refills: 0 | DISCHARGE

## 2020-10-06 RX ORDER — NITROFURANTOIN MACROCRYSTAL 50 MG
1 CAPSULE ORAL
Qty: 0 | Refills: 0 | DISCHARGE
Start: 2020-10-06

## 2020-10-06 RX ORDER — ENOXAPARIN SODIUM 100 MG/ML
40 INJECTION SUBCUTANEOUS DAILY
Refills: 0 | Status: DISCONTINUED | OUTPATIENT
Start: 2020-10-06 | End: 2020-10-15

## 2020-10-06 RX ORDER — LABETALOL HCL 100 MG
1 TABLET ORAL
Qty: 0 | Refills: 0 | DISCHARGE
Start: 2020-10-06

## 2020-10-06 RX ORDER — SODIUM CHLORIDE 9 MG/ML
1000 INJECTION, SOLUTION INTRAVENOUS
Refills: 0 | Status: DISCONTINUED | OUTPATIENT
Start: 2020-10-06 | End: 2020-10-15

## 2020-10-06 RX ORDER — AMLODIPINE BESYLATE 2.5 MG/1
1 TABLET ORAL
Qty: 0 | Refills: 0 | DISCHARGE
Start: 2020-10-06

## 2020-10-06 RX ORDER — ATORVASTATIN CALCIUM 80 MG/1
1 TABLET, FILM COATED ORAL
Qty: 0 | Refills: 0 | DISCHARGE
Start: 2020-10-06

## 2020-10-06 RX ORDER — LISINOPRIL 2.5 MG/1
40 TABLET ORAL DAILY
Refills: 0 | Status: DISCONTINUED | OUTPATIENT
Start: 2020-10-06 | End: 2020-10-15

## 2020-10-06 RX ORDER — LISINOPRIL 2.5 MG/1
1 TABLET ORAL
Qty: 0 | Refills: 0 | DISCHARGE
Start: 2020-10-06

## 2020-10-06 RX ORDER — INSULIN LISPRO 100/ML
6 VIAL (ML) SUBCUTANEOUS
Qty: 1 | Refills: 0
Start: 2020-10-06

## 2020-10-06 RX ORDER — DEXTROSE 50 % IN WATER 50 %
12.5 SYRINGE (ML) INTRAVENOUS ONCE
Refills: 0 | Status: DISCONTINUED | OUTPATIENT
Start: 2020-10-06 | End: 2020-10-15

## 2020-10-06 RX ORDER — METFORMIN HYDROCHLORIDE 850 MG/1
500 TABLET ORAL
Refills: 0 | Status: DISCONTINUED | OUTPATIENT
Start: 2020-10-06 | End: 2020-10-15

## 2020-10-06 RX ORDER — ATORVASTATIN CALCIUM 80 MG/1
80 TABLET, FILM COATED ORAL AT BEDTIME
Refills: 0 | Status: DISCONTINUED | OUTPATIENT
Start: 2020-10-06 | End: 2020-10-15

## 2020-10-06 RX ORDER — ASPIRIN/CALCIUM CARB/MAGNESIUM 324 MG
81 TABLET ORAL DAILY
Refills: 0 | Status: DISCONTINUED | OUTPATIENT
Start: 2020-10-06 | End: 2020-10-15

## 2020-10-06 RX ORDER — INSULIN LISPRO 100/ML
VIAL (ML) SUBCUTANEOUS
Refills: 0 | Status: DISCONTINUED | OUTPATIENT
Start: 2020-10-06 | End: 2020-10-15

## 2020-10-06 RX ORDER — NITROFURANTOIN MACROCRYSTAL 50 MG
100 CAPSULE ORAL
Refills: 0 | Status: DISCONTINUED | OUTPATIENT
Start: 2020-10-06 | End: 2020-10-06

## 2020-10-06 RX ORDER — AMLODIPINE BESYLATE 2.5 MG/1
5 TABLET ORAL DAILY
Refills: 0 | Status: DISCONTINUED | OUTPATIENT
Start: 2020-10-06 | End: 2020-10-08

## 2020-10-06 RX ORDER — INSULIN GLARGINE 100 [IU]/ML
18 INJECTION, SOLUTION SUBCUTANEOUS AT BEDTIME
Refills: 0 | Status: DISCONTINUED | OUTPATIENT
Start: 2020-10-06 | End: 2020-10-15

## 2020-10-06 RX ADMIN — ATORVASTATIN CALCIUM 80 MILLIGRAM(S): 80 TABLET, FILM COATED ORAL at 21:10

## 2020-10-06 RX ADMIN — Medication 300 MILLIGRAM(S): at 21:10

## 2020-10-06 RX ADMIN — METFORMIN HYDROCHLORIDE 500 MILLIGRAM(S): 850 TABLET ORAL at 17:27

## 2020-10-06 RX ADMIN — Medication 300 MILLIGRAM(S): at 13:34

## 2020-10-06 RX ADMIN — Medication 1: at 17:28

## 2020-10-06 RX ADMIN — INFLUENZA VIRUS VACCINE 0.5 MILLILITER(S): 15; 15; 15; 15 SUSPENSION INTRAMUSCULAR at 15:27

## 2020-10-06 RX ADMIN — ENOXAPARIN SODIUM 40 MILLIGRAM(S): 100 INJECTION SUBCUTANEOUS at 11:38

## 2020-10-06 RX ADMIN — Medication 100 MILLIGRAM(S): at 17:27

## 2020-10-06 RX ADMIN — LISINOPRIL 40 MILLIGRAM(S): 2.5 TABLET ORAL at 05:11

## 2020-10-06 RX ADMIN — CEFTRIAXONE 100 MILLIGRAM(S): 500 INJECTION, POWDER, FOR SOLUTION INTRAMUSCULAR; INTRAVENOUS at 05:11

## 2020-10-06 RX ADMIN — Medication 1: at 09:24

## 2020-10-06 RX ADMIN — Medication 6 UNIT(S): at 09:23

## 2020-10-06 RX ADMIN — Medication 81 MILLIGRAM(S): at 11:38

## 2020-10-06 RX ADMIN — INSULIN GLARGINE 18 UNIT(S): 100 INJECTION, SOLUTION SUBCUTANEOUS at 21:10

## 2020-10-06 RX ADMIN — Medication 6 UNIT(S): at 13:28

## 2020-10-06 RX ADMIN — Medication 200 MILLIGRAM(S): at 05:11

## 2020-10-06 RX ADMIN — Medication 40 MILLIEQUIVALENT(S): at 09:28

## 2020-10-06 RX ADMIN — AMLODIPINE BESYLATE 5 MILLIGRAM(S): 2.5 TABLET ORAL at 05:11

## 2020-10-06 RX ADMIN — Medication 2: at 13:27

## 2020-10-06 NOTE — SPEECH LANGUAGE PATHOLOGY EVALUATION - SLP PERTINENT HISTORY OF CURRENT PROBLEM
H&P: Patient is a 57yoF pmhx DM2, HTN, CVA/TIA (s/p loop monitor, on plavix, residual R sided weakness) who presented to the ED and neurology was consulted for concern of L sided numbness. She reports sudden onset L sided numbness when she woke up this morning around 6am. She states it involves her  L face, arm, and leg. She reports hx of stroke with residual R sided weakness but states that this feels different.

## 2020-10-06 NOTE — PROGRESS NOTE ADULT - SUBJECTIVE AND OBJECTIVE BOX
Patient is a 60y old  Female who presents with a chief complaint of evaluation of CVA (04 Oct 2020 14:45)    Patient seen and examined- tolerating therapies.  Still feels off balance.   No CP, no SOB, no N/V, other ROS neg    MEDICATIONS  (STANDING):  amLODIPine   Tablet 5 milliGRAM(s) Oral daily  aspirin  chewable 81 milliGRAM(s) Oral daily  atorvastatin 80 milliGRAM(s) Oral at bedtime  cefTRIAXone   IVPB 1000 milliGRAM(s) IV Intermittent every 24 hours  dextrose 5%. 1000 milliLiter(s) (50 mL/Hr) IV Continuous <Continuous>  dextrose 50% Injectable 12.5 Gram(s) IV Push once  dextrose 50% Injectable 25 Gram(s) IV Push once  dextrose 50% Injectable 25 Gram(s) IV Push once  enoxaparin Injectable 40 milliGRAM(s) SubCutaneous daily  influenza   Vaccine 0.5 milliLiter(s) IntraMuscular once  insulin glargine Injectable (LANTUS) 18 Unit(s) SubCutaneous at bedtime  insulin lispro (HumaLOG) corrective regimen sliding scale   SubCutaneous three times a day before meals  insulin lispro (HumaLOG) corrective regimen sliding scale   SubCutaneous at bedtime  insulin lispro Injectable (HumaLOG) 6 Unit(s) SubCutaneous three times a day before meals  labetalol 200 milliGRAM(s) Oral two times a day  lisinopril 40 milliGRAM(s) Oral daily    MEDICATIONS  (PRN):  acetaminophen   Tablet .. 650 milliGRAM(s) Oral every 6 hours PRN Temp greater or equal to 38C (100.4F), Mild Pain (1 - 3), Moderate Pain (4 - 6), Severe Pain (7 - 10)  dextrose 40% Gel 15 Gram(s) Oral once PRN Blood Glucose LESS THAN 70 milliGRAM(s)/deciliter  glucagon  Injectable 1 milliGRAM(s) IntraMuscular once PRN Glucose LESS THAN 70 milligrams/deciliter    Vital Signs Last 24 Hrs  T(C): 37.1 (06 Oct 2020 07:50), Max: 37.1 (06 Oct 2020 07:50)  T(F): 98.7 (06 Oct 2020 07:50), Max: 98.7 (06 Oct 2020 07:50)  HR: 84 (06 Oct 2020 06:00) (71 - 98)  BP: 160/97 (06 Oct 2020 06:00) (152/103 - 179/109)  BP(mean): 114 (06 Oct 2020 06:00) (104 - 157)  RR: 12 (06 Oct 2020 06:00) (12 - 20)  SpO2: 100% (06 Oct 2020 06:00) (97% - 100%)    PHYSICAL EXAM  Constitutional - NAD, Comfortable  HEENT - NCAT, EOMI  Neck - Supple, No limited ROM  Chest - CTA bilaterally, No wheeze, No rhonchi, No crackles  Cardiovascular - RRR, S1S2, No murmurs  Abdomen - BS+, Soft, NTND  Extremities - No C/C/E, No calf tenderness   Neurologic Exam -                 AAO x 3  Speech clear and fluent  Motor 4+/5 bl UE and LEs  Sensation decreased to LT on the left     Psychiatric - Mood stable, Affect WNL                          11.4   7.14  )-----------( 220      ( 06 Oct 2020 05:22 )             35.2     10-06    142  |  106  |  17  ----------------------------<  142<H>  3.1<L>   |  24  |  1.12    Ca    9.6      06 Oct 2020 05:23    Impression:  56 yo with functional deficits secondary to diagnosis of CVA    Plan:  PT- ROM, Bed Mob, Transfers, Amb w AD and bracing as needed  OT- ADLs, bracing  SLP- Dysphagia eval and treat  Prec- Falls, Cardiac  DVT Prophylaxis- Lovenox  Monitor K+  Skin- Turn q2 h  Dispo- Acute Rehab- can tolerate 3h/d PT/OT/SLP and requires daily physician visits

## 2020-10-06 NOTE — PROGRESS NOTE ADULT - SUBJECTIVE AND OBJECTIVE BOX
Subjective: Patient seen and examined. No new events except as noted.     REVIEW OF SYSTEMS:    CONSTITUTIONAL: No weakness, fevers or chills  EYES/ENT: No visual changes;  No vertigo or throat pain   NECK: No pain or stiffness  RESPIRATORY: No cough, wheezing, hemoptysis; No shortness of breath  CARDIOVASCULAR: No chest pain or palpitations  GASTROINTESTINAL: No abdominal or epigastric pain. No nausea, vomiting, or hematemesis; No diarrhea or constipation. No melena or hematochezia.  GENITOURINARY: No dysuria, frequency or hematuria  NEUROLOGICAL: No numbness or weakness  SKIN: No itching, burning, rashes, or lesions   All other review of systems is negative unless indicated above.    MEDICATIONS:  MEDICATIONS  (STANDING):  amLODIPine   Tablet 5 milliGRAM(s) Oral daily  aspirin  chewable 81 milliGRAM(s) Oral daily  atorvastatin 80 milliGRAM(s) Oral at bedtime  cefTRIAXone   IVPB 1000 milliGRAM(s) IV Intermittent every 24 hours  dextrose 5%. 1000 milliLiter(s) (50 mL/Hr) IV Continuous <Continuous>  dextrose 50% Injectable 12.5 Gram(s) IV Push once  dextrose 50% Injectable 25 Gram(s) IV Push once  dextrose 50% Injectable 25 Gram(s) IV Push once  enoxaparin Injectable 40 milliGRAM(s) SubCutaneous daily  influenza   Vaccine 0.5 milliLiter(s) IntraMuscular once  insulin glargine Injectable (LANTUS) 18 Unit(s) SubCutaneous at bedtime  insulin lispro (HumaLOG) corrective regimen sliding scale   SubCutaneous three times a day before meals  insulin lispro (HumaLOG) corrective regimen sliding scale   SubCutaneous at bedtime  insulin lispro Injectable (HumaLOG) 6 Unit(s) SubCutaneous three times a day before meals  labetalol 200 milliGRAM(s) Oral two times a day  lisinopril 40 milliGRAM(s) Oral daily      PHYSICAL EXAM:  T(C): 37.1 (10-06-20 @ 07:50), Max: 37.1 (10-06-20 @ 07:50)  HR: 84 (10-06-20 @ 06:00) (71 - 98)  BP: 160/97 (10-06-20 @ 06:00) (152/103 - 179/109)  RR: 12 (10-06-20 @ 06:00) (12 - 20)  SpO2: 100% (10-06-20 @ 06:00) (97% - 100%)  Wt(kg): --  I&O's Summary    05 Oct 2020 07:01  -  06 Oct 2020 07:00  --------------------------------------------------------  IN: 240 mL / OUT: 0 mL / NET: 240 mL          Appearance: Normal	  HEENT:   Normal oral mucosa, PERRL, EOMI	  Lymphatic: No lymphadenopathy , no edema  Cardiovascular: Normal S1 S2, No JVD, No murmurs , Peripheral pulses palpable 2+ bilaterally  Respiratory: Lungs clear to auscultation, normal effort 	  Gastrointestinal:  Soft, Non-tender, + BS	  Skin: No rashes, No ecchymoses, No cyanosis, warm to touch  Musculoskeletal: Normal range of motion, normal strength  Psychiatry:  Mood & affect appropriate  Ext: No edema      LABS:    CARDIAC MARKERS:                                11.4   7.14  )-----------( 220      ( 06 Oct 2020 05:22 )             35.2     10-06    142  |  106  |  17  ----------------------------<  142<H>  3.1<L>   |  24  |  1.12    Ca    9.6      06 Oct 2020 05:23      proBNP:   Lipid Profile:   HgA1c:   TSH:         TELEMETRY: 	  SR  ECG:  	  RADIOLOGY: < from: US Thyroid (10.05.20 @ 15:01) >    EXAM:  US THYROID                            PROCEDURE DATE:  10/05/2020            INTERPRETATION:  CLINICAL INFORMATION: Left thyroid nodule seen CT from 10/2/2020.    COMPARISON: CTA of the neck from 10/2/2020.    TECHNIQUE: Sonography of the thyroid.    FINDINGS:  Right Lobe: 6.4 x 2.0 x 2.2 cm. 3.0 x 2.0 x 2.5 cm lower pole solid heterogeneous nodule. 0.7 x 0.5 x 0.8 cm mid pole homogeneous solid nodule. Several other smaller nodules including 8 mm x 7 mm x 5 mm nodule mildly increased in echogenicity and an incomplete surrounding halo.    Left Lobe: 4.9 x 1.6 x 2.8 cm. 1.7 x 1.7 x 1.5 cm lower pole solid homogeneous nodule of relative decreased echogenicity. 2.4 x 1.6 x 2.1 cm mid to lower pole solid heterogeneous nodule with tiny/small cystic components.  1.1 x 0.6 x 1.0 cm upper pole solid heterogeneous nodule.    Isthmus: 3 mm. 1.3 x 0.5 x 0.5 cm solid heterogeneous nodule within the right isthmus.    Cervical Lymph Nodes: Contain fatty ashley, benign-appearing.    Measurements are below:    Right sided lymph nodes: L2-1 0.7 x 1.0 x 1.8 cm  Left-sided lymph: L1-1 0.2 x 0.6 x 1.5 cm, 1.0 x 0.4 x 0.6 cm; L3-1 0.1 x 0.5 x 0.6 cm; L4-1 0.2 x 0.4 x 0.8 cm.    IMPRESSION: Enlarged thyroid gland containing multiple solid nodules consistent with a multinodular goiter.     Bilateral solid thyroid nodules measuring up to  3.0 x 2.0 x 2.5 cm (right lobe lower pole)TI-RAD 3  biopsy  2.4 x 1.6 x 2.1 cm (left lobe mid to lower pole) TI-RAD 3  can follow  1.7 x 1.7 x 1.5 cm (left lobe lower pole) TI-RAD 4   biopsy    Bilateral benign cervical lymph nodes as described above.    TI-RAD 3: Mildly suspicious (FNA if > 2.5 cm, Follow if > 1.5 cm)  TI-RAD 4: Moderately suspicious (FNA if > 1.5 cm, Follow if > 1 cm)  __________________________________  ACR Thyroid Imaging, Reporting and Data System (TI-RADS): White Paper of the ACR TI-RADS Committee. J Am Eileen Radiol 2017;14:587-595.    TI-RAD 1: Benign (No FNA)  TI-RAD 2: Not suspicious (No FNA)  TI-RAD 3: Mildly suspicious (FNA if > 2.5 cm, Follow if >1.5 cm)  TI-RAD 4: Moderately suspicious (FNA if > 1.5 cm, Follow if > 1 cm)  TI-RAD 5: Highly suspicious (FNA if > 1 cm, Follow if > 0.5 cm)                  ASHANTI BLACKWELL M.D., RADIOLOGY RESIDENT  This document has been electronically signed.  ELISA RICH M.D., ATTENDING RADIOLOGIST  This document has been electronically signed. Oct  5 2020  5:28PM    < end of copied text >    < end of copied text >    DIAGNOSTIC TESTING:  [ ] Echocardiogram:  < from: Transthoracic Echocardiogram (10.05.20 @ 10:08) >    Patient name: OMAR CASTANO  YOB: 1960   Age: 60 (F)   MR#: [id number]  Study Date: 10/5/2020  Location: O/PSonographer: Mei Mead RDCS  [facility] Sonographer: Maribel Black RDCS  Study quality: Technically fair  Referring Physician: Richard B Libman, MD  Blood Pressure: 145/88 mmHg  Height: 173 cm  Weight: 98 kg  BSA: 2.1 m2  ------------------------------------------------------------------------  PROCEDURE: Transthoracic echocardiogram with 2-D, M-Mode  and complete spectraland color flow Doppler.  INDICATION: Other cerebrovascular disease (I67.89)  ------------------------------------------------------------------------  Dimensions:    Normal Values:  LA:     3.7    2.0 - 4.0 cm  Ao:     3.6    2.0 - 3.8 cm  SEPTUM: 1.7    0.6 - 1.2 cm  PWT:    1.1    0.6 - 1.1 cm  LVIDd:  4.8    3.0 - 5.6 cm  LVIDs:  3.1    1.8 - 4.0 cm  Derived variables:  LVMI: 130 g/m2  RWT: 0.45  Fractional short: 35 %  EF (Visual Estimate): 70-75 %  Doppler Peak Velocity (m/sec): AoV=1.2  ------------------------------------------------------------------------  Observations:  Mitral Valve: Mild mitral annular calcification, otherwise  normal mitral valve. Minimal mitral regurgitation.  Aortic Valve/Aorta: Calcified trileaflet aortic valvewith  normal opening. Peak transaortic valve gradient equals 6 mm  Hg, mean transaortic valve gradient equals 3 mm Hg, aortic  valve velocity time integral equals 26 cm. Minimal aortic  regurgitation. Peak left ventricular outflow tract gradient  equals 3 mm Hg, LVOT velocity time integral equals 18 cm.  Aortic Root: 3.6 cm.  Left Atrium: Normal left atrium.  LA volume index = 14  cc/m2.  Left Ventricle: Normal left ventricular systolic function.  No segmental wall motion abnormalities. Concentric left  ventricular hypertrophy. Normal diastolic function.  Right Heart: Normal right atrium. Normal right ventricular  size and function. Normal tricuspid valve. Minimal  tricuspid regurgitation. Pulmonic valve not well  visualized, probably normal.No pulmonic regurgitation.  Pericardium/Pleura: Normal pericardium with no pericardial  effusion.  Hemodynamic: Estimated right atrial pressure equals 3 mmHg.  Inadequate tricuspid regurgitation Doppler envelope  precludes estimation of RVSP.  ------------------------------------------------------------------------  Conclusions:  1. Mild mitral annular calcification, otherwise normal  mitral valve. Minimal mitral regurgitation.  2. Calcified trileaflet aortic valve with normal opening.  Minimal aortic regurgitation.  3. Concentric left ventricular hypertrophy.  4. Normal left ventricular systolic function. No segmental  wall motion abnormalities.  5. Normal right ventricular size and function.  *** Compared with echocardiogram of 7/28/2016, results are  similar on today's study.  ------------------------------------------------------------------------  Confirmed on  10/5/2020 - 12:39:53 by Radha Tsai M.D.  ------------------------------------------------------------------------    [ ]  Catheterization:  [ ] Stress Test:    OTHER:

## 2020-10-06 NOTE — H&P ADULT - NSHPSOCIALHISTORY_GEN_ALL_CORE
FUNCTIONAL HISTORY:   Lives w  and kids in home w 3 [address] and 14 steps inside.  PTA Independent and works full time.     CURRENT FUNCTIONAL STATUS:  Min A - CG transfers; CG gait

## 2020-10-06 NOTE — H&P ADULT - ASSESSMENT
OMAR CASTANO is a 61yo Female with right pontine CVA, now with decreased functional mobility, gait instability and ADL impairments.    COMORBIDITES/ACTIVE MEDICAL ISSUES     Gait Instability, ADL impairments and Functional impairments: start Comprehensive Rehab Program of PT/OT     #CVA  -Aspirin, Lipitor  -start PT/OT/SLP  -fall and aspiration precautions    #HTN  -Norvasc and Lisinopril  -labetalol    #DM2  -lantus/Humalog  -FS ACHS  -consistent carbs diet  -nutrition eval    #UTI  -check voiding, PVR x1  -Abx as per hospitalist plan    Pain Mgmt - Tylenol PRN  GI/Bowel Mgmt - Colace, Senna,  Miralax PRN  /Bladder Mgmt - Voiding independently, PVR      Precautions / PROPHYLAXIS:   - Falls, Cardiac  - Ortho: Weight bearing status: WBAT   - Lungs: Aspiration, Incentive Spirometer   - Pressure injury/Skin: Turn Q2hrs while in bed, OOB to Chair, PT/OT    - DVT: Lovenox,    MEDICAL PROGNOSIS: GOOD            REHAB POTENTIAL: GOOD             ESTIMATED DISPOSITION: HOME WITH HOME CARE            ELOS: 10-14 Days   EXPECTED THERAPY:     P.T. 1hr/day       O.T. 1hr/day      S.L.P. 1hr/day     P&O Unnecessary     EXP FREQUENCY: 5 days per 7 day period     PRESCREEN COMPARISION:   I have reviewed the prescreen information and I have found no relevant changes between the preadmission screening and my post admission evaluation     RATIONALE FOR INPATIENT ADMISSION - Patient demonstrates the following: (check all that apply)  [X] Medically appropriate for rehabilitation admission  [X] Has attainable rehab goals with an appropriate initial discharge plan  [X] Has rehabilitation potential (expected to make a significant improvement within a reasonable period of time)   [X] Requires close medical management by a rehab physician, rehab nursing care, Hospitalist and comprehensive interdisciplinary team (including PT, OT, & or SLP, Prosthetics and Orthotics)     OMAR CASTANO is a 59yo Female with right pontine CVA, now with decreased functional mobility, gait instability and ADL impairments.    COMORBIDITES/ACTIVE MEDICAL ISSUES     Gait Instability, ADL impairments and Functional impairments: start Comprehensive Rehab Program of PT/OT     #CVA  -Aspirin, Lipitor  -start PT/OT/SLP  -fall and aspiration precautions    #HTN  -Norvasc and Lisinopril  -labetalol    #DM2  -lantus/Humalog  -FS ACHS  -consistent carbs diet  -nutrition eval    #UTI  -check voiding, PVR x1  -Abx as per hospitalist plan    #Costochondritis   -Left chest wall pain with palpation   -Started on 10/5 after she had TTE when pt endorsed to technician pushing too hard   -Tylenol PRN     Pain Mgmt - Tylenol PRN  GI/Bowel Mgmt - Colace, Senna,  Miralax PRN  /Bladder Mgmt - Voiding independently, PVR      Precautions / PROPHYLAXIS:   - Falls, Cardiac  - Ortho: Weight bearing status: WBAT   - Lungs: Aspiration, Incentive Spirometer   - Pressure injury/Skin: Turn Q2hrs while in bed, OOB to Chair, PT/OT    - DVT: Lovenox,    MEDICAL PROGNOSIS: GOOD            REHAB POTENTIAL: GOOD             ESTIMATED DISPOSITION: HOME WITH HOME CARE            ELOS: 10-14 Days   EXPECTED THERAPY:     P.T. 1hr/day       O.T. 1hr/day      S.L.P. 1hr/day     P&O Unnecessary     EXP FREQUENCY: 5 days per 7 day period     PRESCREEN COMPARISION:   I have reviewed the prescreen information and I have found no relevant changes between the preadmission screening and my post admission evaluation     RATIONALE FOR INPATIENT ADMISSION - Patient demonstrates the following: (check all that apply)  [X] Medically appropriate for rehabilitation admission  [X] Has attainable rehab goals with an appropriate initial discharge plan  [X] Has rehabilitation potential (expected to make a significant improvement within a reasonable period of time)   [X] Requires close medical management by a rehab physician, rehab nursing care, Hospitalist and comprehensive interdisciplinary team (including PT, OT, & or SLP, Prosthetics and Orthotics)

## 2020-10-06 NOTE — H&P ADULT - NSHPPHYSICALEXAM_GEN_ALL_CORE
Gen - NAD, Comfortable  HEENT - NCAT, EOMI, MMM  Neck - Supple, No limited ROM  Pulm - CTAB, No wheeze, No rhonchi, No crackles  Chest: Pain elicited when left sided chest was palpated  Cardiovascular - RRR, S1S2, No murmurs  Abdomen - Soft, NT/ND, +BS  Extremities - No C/C/E, No calf tenderness  Neuro-     Cognitive - AAOx3     Communication - Fluent, No dysarthria     Attention: Intact      Judgement: Good evidence of judgement     Memory: Recall 3 objects immediate and 3 min later         Cranial Nerves - Decreased sensation on the left side of the face      Motor -                     LEFT    UE - ShAB 5/5, EF 5/5, EE 5/5, WE 5/5,  5/5                    RIGHT UE - ShAB 4/5, EF 5/5, EE 5/5, WE 5/5,  5/5                    LEFT    LE - HF 5/5, KE 5/5, DF 5/5, PF 5/5                    RIGHT LE - HF 4/5, KE 5/5, DF 2/5, PF 3+/5        Sensory - Decreased to sensation on the LUE and LLE      Reflexes - DTR Intact, No primitive reflexive     Coordination - FTN intact     Tone - normal  Psychiatric - Mood stable, Affect WNL  Skin:  all skin intact    Wounds: None Present Gen - NAD, Comfortable  HEENT - NCAT, EOMI, MMM  Neck - Supple, No limited ROM  Pulm - CTAB, No wheeze, No rhonchi, No crackles  Chest: Pain elicited when left sided chest was palpated  Cardiovascular - RRR, S1S2, No murmurs  Abdomen - Soft, NT/ND, +BS  Extremities - No C/C/E, No calf tenderness  Neuro-     Cognitive - AAOx3     Communication - Fluent, No dysarthria     Attention: Intact      Judgement: Good evidence of judgement     Memory: Recall 3 objects immediate and 3 min later         Cranial Nerves - Decreased sensation on the left side of the face      Motor -                     LEFT    UE - ShAB 5/5, EF 5/5, EE 5/5, WE 5/5,  5/5                    RIGHT UE - ShAB 4/5, EF 5/5, EE 5/5, WE 5/5,  5/5                    LEFT    LE - HF 5/5, KE 5/5, DF 5/5, PF 5/5                    RIGHT LE - HF 4/5, KE 5/5, DF 2/5, PF 3+/5        Sensory - Decreased to LT sensation on the LUE and LLE      Reflexes - DTR Intact, No primitive reflexive     Coordination - FTN intact     Tone - normal  Psychiatric - Mood stable, Affect WNL  Skin:  all skin intact    Wounds: None Present

## 2020-10-06 NOTE — PROGRESS NOTE ADULT - ATTENDING COMMENTS
agree with above; ROS otherwise negative
Advanced care planning was discussed with patient and family.  Advanced care planning forms were reviewed and discussed as appropriate.  Differential diagnosis and plan of care discussed with patient after the evaluation.   Pain assessed and judicious use of narcotics when appropriate was discussed.  Importance of Fall prevention discussed.  Counseling on Smoking and Alcohol cessation was offered when appropriate.  Counseling on Diet, exercise, and medication compliance was done.
Advanced care planning was discussed with patient and family.  Advanced care planning forms were reviewed and discussed as appropriate.  Differential diagnosis and plan of care discussed with patient after the evaluation.   Pain assessed and judicious use of narcotics when appropriate was discussed.  Importance of Fall prevention discussed.  Counseling on Smoking and Alcohol cessation was offered when appropriate.  Counseling on Diet, exercise, and medication compliance was done.

## 2020-10-06 NOTE — DISCHARGE NOTE PROVIDER - CARE PROVIDER_API CALL
Hilton Minor  CARDIOLOGY  [address] [place] [address], [address]  [place] [address]  Phone: [phone]  Fax: [phone]  Follow Up Time: 2 weeks    Donald Morgan  NEUROLOGY  [address] [facility] [address]  [place] [address]  Phone: [phone]  Fax: [phone]  Follow Up Time: 1 week

## 2020-10-06 NOTE — H&P ADULT - NSHPREVIEWOFSYSTEMS_GEN_ALL_CORE
CONSTITUTIONAL: No fever, weight loss, or fatigue  EYES: No eye pain, visual disturbances, or discharge  ENMT:  No difficulty hearing, tinnitus, vertigo; No sinus or throat pain  NECK: No pain or stiffness  BREASTS: No pain, masses, or nipple discharge  RESPIRATORY: No cough, wheezing, chills or hemoptysis; No shortness of breath  CARDIOVASCULAR: No chest pain, palpitations, dizziness, or leg swelling  GASTROINTESTINAL: No abdominal or epigastric pain. No nausea, vomiting, or hematemesis; No diarrhea or constipation. No melena or hematochezia.  GENITOURINARY: No dysuria, frequency, hematuria, or incontinence  NEUROLOGICAL: No headaches, memory loss, loss of strength, numbness, or tremors  SKIN: No itching, burning, rashes, or lesions   LYMPH NODES: No enlarged glands  ENDOCRINE: No heat or cold intolerance; No hair loss  MUSCULOSKELETAL: No joint pain or swelling; No muscle, back, or extremity pain  PSYCHIATRIC: No depression, anxiety, mood swings, or difficulty sleeping  HEME/LYMPH: No easy bruising, or bleeding gums  ALLERY AND IMMUNOLOGIC: No hives or eczema CONSTITUTIONAL: No fever, weight loss, or +fatigue   EYES: No eye pain, visual disturbances, or discharge  ENMT:  No difficulty hearing, tinnitus, vertigo; No sinus or throat pain  NECK: No pain or stiffness  BREASTS: No pain, masses, or nipple discharge  RESPIRATORY: No cough, wheezing, chills or hemoptysis; No shortness of breath  CARDIOVASCULAR: No chest pain, palpitations, dizziness, or leg swelling  GASTROINTESTINAL: No abdominal or epigastric pain. No nausea, vomiting, or hematemesis; No diarrhea or constipation. No melena or hematochezia.  GENITOURINARY: No dysuria, frequency, hematuria, or incontinence  NEUROLOGICAL: No headaches, memory loss, +residual weakness on the right from prior CVA, + numbness on the left side   SKIN: No itching, burning, rashes, or lesions   LYMPH NODES: No enlarged glands  ENDOCRINE: No heat or cold intolerance; No hair loss  MUSCULOSKELETAL: +Muscular pain on the chestm No joint pain or swelling; No  back, or extremity pain  PSYCHIATRIC: No depression, anxiety, mood swings, or difficulty sleeping  HEME/LYMPH: No easy bruising, or bleeding gums  ALLERY AND IMMUNOLOGIC: No hives or eczema CONSTITUTIONAL: No fever, weight loss, or +fatigue   EYES: No eye pain, visual disturbances, or discharge  ENMT:  No difficulty hearing, tinnitus, vertigo; No sinus or throat pain  NECK: No pain or stiffness  BREASTS: No pain, masses, or nipple discharge  RESPIRATORY: No cough, wheezing, chills or hemoptysis; No shortness of breath  CARDIOVASCULAR: No chest pain, palpitations, dizziness, or leg swelling  GASTROINTESTINAL: No abdominal or epigastric pain. No nausea, vomiting, or hematemesis; No diarrhea or constipation. No melena or hematochezia.  GENITOURINARY: No dysuria, frequency, hematuria, or incontinence  NEUROLOGICAL: No headaches, memory loss, +residual weakness on the right from prior CVA, +chronic right foot drop, + numbness on the left side   SKIN: No itching, burning, rashes, or lesions   LYMPH NODES: No enlarged glands  ENDOCRINE: No heat or cold intolerance; No hair loss  MUSCULOSKELETAL: +Muscular pain on the chest wall, No joint pain or swelling; No  back, or extremity pain  PSYCHIATRIC: No depression, anxiety, mood swings, or difficulty sleeping  HEME/LYMPH: No easy bruising, or bleeding gums  ALLERY AND IMMUNOLOGIC: No hives or eczema

## 2020-10-06 NOTE — SPEECH LANGUAGE PATHOLOGY EVALUATION - SLP CONVERSATIONAL SPEECH
occasional breakdown in word retrieval however, patient pausing and allowing increased time to respond to questions. patient independently using this as compensatory strategy 75% with minimal need for verbal cues.

## 2020-10-06 NOTE — PROGRESS NOTE ADULT - ASSESSMENT
57F w/h/o uncontrolled DM2 (A1C 11.9%). DM c/b  neuropathy and CVA/TIA with residual R-sided weakness, Also h/o HTN/IBS. Here with  L sided numbness, admitted for evaluation of CVA. Endocrine consult requested for management of uncontrolled DM2. Pt reports adherent to Levemir 15 units q hs and Metformin 500mg bid but not taking Novolog> states giving her a reaction (rash in back). Tolerating POs with glycemic control mostly at goal while on present insulin regimen. No hypoglycemia. On PO antibiotic for UTI. Will c/w present insulin regimen since BG at goal 100 to 180s most of the time.    Met with patient and reviewed the following:    -A1c LEVEL: Present and goal  -Blood glucose goals: 100s to 150s as out pt  -Glucose monitoring frequency: ac and hs  -Healthy eating and portion control. Pt states she has not been following any diet and bakes bread and home which she eats a lot. Denies drinking juice or using regular sugar. Eats fish and seafood and the rest of her meals are carbs. Pt is aware of the need to make nutritional adjustments   -Insulin(s) action, time of administration and side effects. Doesn't want to use Novolog due to reaction. Pt willing to start GLP1a to improve DM control.  -Importance of follow up care. Follows at [facility] with endo  Pt able to verbalize understanding and is also concern about this event since she already had a stroke.    Spent over 35 minutes providing face to face education which was more than 50% of encounter that also included assessing pt/labs/meds and discussing plan of care with primary team.

## 2020-10-06 NOTE — H&P ADULT - HISTORY OF PRESENT ILLNESS
yo Female c Pmhx DM2, HTN, CVA 2017 (s/p loop monitor, on Plavix, residual Right sided weakness) who presented to [facility] ED 10/2 with Left sided numbness with poor balance, also associated nausea, lightheadedness. CTH 10/2 showed no new CVA. MR head 10/3 showed right pontine CVA with associated cytotoxic edema and without hemorrhagic transformation. Multiple additional chronic infarcts and chronic white matter microvascular type changes. CTA H/N 10/2  showed mild right PCA stenosis otherwise unremarkable. Asprin alone for now in setting of multiple micro-hemorrhages likely related to underlying HTN.    Hospitalization complicated by + UTI, on abx therapy. Evaluated by PMR and AR recommended. Cleared for d/c on 10/6.    61yo Female c Pmhx DM2, HTN, CVA 2017 (s/p loop monitor, on Plavix, residual Right sided weakness) who presented to [facility] ED 10/2 with Left sided numbness with poor balance, also associated nausea, lightheadedness. CTH 10/2 showed no new CVA. MR head 10/3 showed right pontine CVA with associated cytotoxic edema and without hemorrhagic transformation. Multiple additional chronic infarcts and chronic white matter microvascular type changes. CTA H/N 10/2  showed mild right PCA stenosis otherwise unremarkable. Asprin alone for now in setting of multiple micro-hemorrhages likely related to underlying HTN.    Hospitalization complicated by + UTI, on abx therapy. Evaluated by PMR and AR recommended. Cleared for d/c on 10/6.

## 2020-10-06 NOTE — H&P ADULT - NSICDXPASTMEDICALHX_GEN_ALL_CORE_FT
PAST MEDICAL HISTORY:  CVA (cerebral vascular accident)     Diabetes Mellitus Type II     Generalized Headaches     Hypertension     Hypokalemia     IBS (Irritable Bowel Syndrome)     TIA (transient ischemic attack)

## 2020-10-06 NOTE — DISCHARGE NOTE PROVIDER - NSDCCPCAREPLAN_GEN_ALL_CORE_FT
PRINCIPAL DISCHARGE DIAGNOSIS  Diagnosis: Stroke  Assessment and Plan of Treatment: Please follow up with neurologist in 1 week. The office will call you to schedule an appointment, if you do not hear from them please call [phone]. Continue taking medications as prescribed. If you were prescribed a statin for your cholesterol please make sure you have your liver enzymes checked with your primary care physician. Monitor your blood pressure. Reduce fat, cholesterol and salt in your diet. Increase intake of fruits and vegetables. Limit alcohol to minimum and do not smoke. You may be at risk for falling, make changes to your home to help you walk easier. Keep up to date on vaccinations.  If you experience any symptoms of facial drooping, slurred speech, arm or leg weakness, severe headache, vision changes or any worsening symptoms, notify provider immediately and return to ER.

## 2020-10-06 NOTE — SPEECH LANGUAGE PATHOLOGY EVALUATION - SLP DIAGNOSIS
Pt is a 59 y/o F with L sensory loss with imbalance due to right pontine infarct, due to small vessel disease. Pt now presents with.... H&P: Patient is a 57yoF pmhx DM2, HTN, CVA/TIA (s/p loop monitor, on plavix, residual R sided weakness) who presented to the ED and neurology was consulted for concern of L sided numbness. H&P: Patient is a 57yoF pmhx DM2, HTN, CVA/TIA (s/p loop monitor, on plavix, residual R sided weakness) who presented to the ED for concern of L sided numbness. Pt found to have Right pontine infarct, due to small vessel disease. Pt presents with mild word retrieval deficits at the conversational level. Pt aware of errors and attempting to use compensatory strategies to improve functional communication. During extended utterances, pt noted to have fatigue of the oral musculature resulting in imprecise articulation and reduced coordination of respiration and phonation. Patient is a 57yoF pmhx DM2, HTN, CVA/TIA (s/p loop monitor, on plavix, residual R sided weakness) who presented to the ED for concern of L sided numbness. Pt found to have Right pontine infarct, due to small vessel disease. Pt presents with mild word retrieval deficits at the conversational level. Pt aware of errors and attempting to use compensatory strategies to improve functional communication. During extended utterances, pt noted to have fatigue of the oral musculature resulting in imprecise articulation and reduced coordination of respiration and phonation.

## 2020-10-06 NOTE — DISCHARGE NOTE PROVIDER - HOSPITAL COURSE
Patient is a 57yoF pmhx DM2, HTN, CVA/TIA (s/p loop monitor, on plavix, residual R sided weakness) who presented to the ED and neurology was consulted for concern of L sided numbness. She reports sudden onset L sided numbness when she woke up this morning around 6am. She states it involves her  L face, arm, and leg. She reports hx of stroke with residual R sided weakness but states that this feels different. She denies associated weakness but reports associated phonophobia on the L and being able to hear the heartbeat in her L ear. She reports associated nausea and lightheadedness as well. She states that she took her BG at home and it was 372. Her BG readings at home today were 200-260s. She reports associated bad balance. She reports a b/l occipital headache radiating to the from. Denies blurry/double vision, cp, sob, weakness.       Hospital Course:     Imaging:     MR Head No Cont (10/3/20):  Small patchy acute/subacute infarcts within the right side of the niko with associated cytotoxic edema and without hemorrhagic transformation. Multiple additional chronic infarcts and chronic white matter microvascular type changes, as discussed. Multiple foci of susceptibility artifact within the thalami, supratentorial brain, and posterior fossa which may reflect areas of chronic microhemorrhage. Chronic hypertensive encephalopathy and amyloid angiopathy can also have a similar appearance.    CT Head (10/2/20):  No acute intracranial hemorrhage, mass effect, or CT evidence of an acute transcortical infarct. Mild to moderate chronic ischemic changes in the frontoparietal white matter and chronic bilateral basal ganglia, bilateral thalamic, and left pontine lacunar infarcts.    CTA NECK (10/2/20):    No significant stenosis of the cervical carotid arteries based on NASCET criteria. Patent cervical vertebral arteries. No evidence of cervical carotid or vertebral artery dissection..    CTA HEAD (10/2/20):   No high-grade stenosis or occlusion of the major proximal arterial branches. Short segment mild stenosis at the right P1-P2 junction of the posterior cerebral artery.    US Thyroid: Enlarged thyroid gland containing multiple solid nodules consistent with a multinodular goiter.    Consults:     [] Endocrinology for elevated blood sugar, hx DM (uncontrolled): recommendations include: increase Lantus from 15 IU to 18 IU qhs, Humalog 6 IU premeal, and keep on the low correctional scale and f/u outpatient. Also per recommendatins, patient would benefit from Trulicity as it decreased CVA, endocrinology sent script to [facility] for authorization. On discharge from rehab, patient can follow up  [facility]  [address] [facility] [address] [place] [id number] [phone].    [] Cardiology consulted for uncontrolled blood pressure: recommendations include: add labetolol 100 mg BID and continue with rest of home medications. Low salt diet, TTE. Monitor on Tele. F/u outpatient with Dr. Minor.     Labs:     [] Hypokalemic: patient supplemented with potassium and electrolytes trended on BMP.   [] UA showed UTI - started on ceftriaxone for 5-7 days   [] LDL: 168, A1c: 11.9    Plan:     [] DC to [facility]   [] F/u with outpatient specialities with continuation of care   [] Continue on medications as prescribed    Patient is a 57yoF pmhx DM2, HTN, CVA/TIA (s/p loop monitor, on plavix, residual R sided weakness) who presented to the ED and neurology was consulted for concern of L sided numbness. She reports sudden onset L sided numbness when she woke up this morning around 6am. She states it involves her  L face, arm, and leg. She reports hx of stroke with residual R sided weakness but states that this feels different. She denies associated weakness but reports associated phonophobia on the L and being able to hear the heartbeat in her L ear. She reports associated nausea and lightheadedness as well. She states that she took her BG at home and it was 372. Her BG readings at home today were 200-260s. She reports associated bad balance. She reports a b/l occipital headache radiating to the from. Denies blurry/double vision, cp, sob, weakness.       Hospital Course:     Imaging:     MR Head No Cont (10/3/20):  Small patchy acute/subacute infarcts within the right side of the niko with associated cytotoxic edema and without hemorrhagic transformation. Multiple additional chronic infarcts and chronic white matter microvascular type changes, as discussed. Multiple foci of susceptibility artifact within the thalami, supratentorial brain, and posterior fossa which may reflect areas of chronic microhemorrhage. Chronic hypertensive encephalopathy and amyloid angiopathy can also have a similar appearance.    CT Head (10/2/20):  No acute intracranial hemorrhage, mass effect, or CT evidence of an acute transcortical infarct. Mild to moderate chronic ischemic changes in the frontoparietal white matter and chronic bilateral basal ganglia, bilateral thalamic, and left pontine lacunar infarcts.    CTA NECK (10/2/20):    No significant stenosis of the cervical carotid arteries based on NASCET criteria. Patent cervical vertebral arteries. No evidence of cervical carotid or vertebral artery dissection..    CTA HEAD (10/2/20):   No high-grade stenosis or occlusion of the major proximal arterial branches. Short segment mild stenosis at the right P1-P2 junction of the posterior cerebral artery.    US Thyroid: Enlarged thyroid gland containing multiple solid nodules consistent with a multinodular goiter.    Consults:     [] Endocrinology for elevated blood sugar, hx DM (uncontrolled): recommendations include: increase Lantus from 15 IU to 18 IU qhs, Humalog 6 IU premeal, and keep on the low correctional scale and f/u outpatient. Also per recommendatins, patient would benefit from Trulicity as it decreased CVA, endocrinology sent script to [facility] for authorization. On discharge from rehab, patient can follow up  [facility]  [address] [facility] [address] [place] [id number] [phone].  [] Patient has an enlarged thyroid gland with multiple nodules. Spoke with patient and informed her of the US Thyroid findings. Patient already established with Endocrinology for diabetes and will f/u on the thyroid nodules.     [] Cardiology consulted for uncontrolled blood pressure: recommendations include: add labetolol 100 mg BID and continue with rest of home medications. Low salt diet, TTE. Monitor on Tele. F/u outpatient with Dr. Minor.     Labs:     [] Hypokalemic: patient supplemented with potassium and electrolytes trended on BMP.   [] UA showed UTI - started on ceftriaxone for 5-7 days   [] LDL: 168, A1c: 11.9    Plan:     [] DC to [facility]   [] F/u with outpatient specialities with continuation of care   [] Continue on medications as prescribed   [] Educated on stroke, diabetes control and blood pressure control   Patient is a 57yoF pmhx DM2, HTN, CVA/TIA (s/p loop monitor, on plavix, residual R sided weakness) who presented to the ED and neurology was consulted for concern of L sided numbness. She reports sudden onset L sided numbness when she woke up this morning around 6am. She states it involves her  L face, arm, and leg. She reports hx of stroke with residual R sided weakness but states that this feels different. She denies associated weakness but reports associated phonophobia on the L and being able to hear the heartbeat in her L ear. She reports associated nausea and lightheadedness as well. She states that she took her BG at home and it was 372. Her BG readings at home today were 200-260s. She reports associated bad balance. She reports a b/l occipital headache radiating to the from. Denies blurry/double vision, cp, sob, weakness.       Hospital Course:     Imaging:     MR Head No Cont (10/3/20):  Small patchy acute/subacute infarcts within the right side of the niko with associated cytotoxic edema and without hemorrhagic transformation. Multiple additional chronic infarcts and chronic white matter microvascular type changes, as discussed. Multiple foci of susceptibility artifact within the thalami, supratentorial brain, and posterior fossa which may reflect areas of chronic microhemorrhage. Chronic hypertensive encephalopathy and amyloid angiopathy can also have a similar appearance.    CT Head (10/2/20):  No acute intracranial hemorrhage, mass effect, or CT evidence of an acute transcortical infarct. Mild to moderate chronic ischemic changes in the frontoparietal white matter and chronic bilateral basal ganglia, bilateral thalamic, and left pontine lacunar infarcts.    CTA NECK (10/2/20):    No significant stenosis of the cervical carotid arteries based on NASCET criteria. Patent cervical vertebral arteries. No evidence of cervical carotid or vertebral artery dissection..    CTA HEAD (10/2/20):   No high-grade stenosis or occlusion of the major proximal arterial branches. Short segment mild stenosis at the right P1-P2 junction of the posterior cerebral artery.    US Thyroid: Enlarged thyroid gland containing multiple solid nodules consistent with a multinodular goiter.    Consults:     [] Endocrinology for elevated blood sugar, hx DM (uncontrolled): recommendations include: increase Lantus from 15 IU to 18 IU qhs, Humalog 6 IU premeal, and keep on the low correctional scale and f/u outpatient. Also per recommendatins, patient would benefit from Trulicity as it decreased CVA, endocrinology sent script to [facility] for authorization. On discharge from rehab, patient can follow up  [facility]  [address] [facility] [address] [place] [id number] [phone].  [] Patient has an enlarged thyroid gland with multiple nodules. Spoke with patient and informed her of the US Thyroid findings. Patient already established with Endocrinology for diabetes and will f/u on the thyroid nodules.     [] Cardiology consulted for uncontrolled blood pressure: recommendations include: add labetolol 100 mg BID and continue with rest of home medications. Low salt diet, TTE. Monitor on Tele. F/u outpatient with Dr. Minor.     [] Speech and Swallow evaluation consulted for patients c/o slurred speech and she was recommended for speech therapy at rehab.     Labs:     [] Hypokalemic: patient supplemented with potassium and electrolytes trended on BMP.   [] UA showed UTI - started on ceftriaxone for 5-7 days   [] LDL: 168, A1c: 11.9    Plan:     [] DC to [facility]   [] F/u with outpatient specialities with continuation of care   [] Continue on medications as prescribed   [] Speech therapy recommended and occupational therapy while at rehab   [] Educated on stroke, diabetes control and blood pressure control

## 2020-10-06 NOTE — H&P ADULT - ATTENDING COMMENTS
Patient examined, medical records reviewed   Admit to [facility]  Resume all medications  Admit labs  Fall precautions  Medicine evaluation   Goal LDL <70  Goal Hb A1c < 7

## 2020-10-06 NOTE — DISCHARGE NOTE PROVIDER - NSDCMRMEDTOKEN_GEN_ALL_CORE_FT
amLODIPine 5 mg oral tablet: 1 tab(s) orally once a day  Aspirin Enteric Coated 81 mg oral delayed release tablet: 1 tab(s) orally once a day  atorvastatin 80 mg oral tablet: 1 tab(s) orally once a day (at bedtime)  hydrALAZINE 50 mg oral tablet: 1 tab(s) orally 3 times a day  insulin detemir 100 units/mL subcutaneous solution: 15 unit(s) subcutaneous once a day (at bedtime)   labetalol 200 mg oral tablet: 2 tab(s) orally 3 times a day  Lantus Solostar Pen 100 units/mL subcutaneous solution: 18 unit(s) subcutaneous once a day (at bedtime)   Levemir FlexPen 100 units/mL subcutaneous solution: 15 unit(s) subcutaneous once a day (at bedtime)  lisinopril 40 mg oral tablet: 1 tab(s) orally once a day  metFORMIN 500 mg oral tablet: 1 tab(s) orally 2 times a day  Trulicity Pen 0.75 mg/0.5 mL subcutaneous solution: 0.75 milligram(s) subcutaneously every 7 days    amLODIPine 5 mg oral tablet: 1 tab(s) orally once a day  Aspirin Enteric Coated 81 mg oral delayed release tablet: 1 tab(s) orally once a day  Aspirin Low Dose 81 mg oral tablet, chewable: 1 tab(s) orally once a day  atorvastatin 80 mg oral tablet: 1 tab(s) orally once a day (at bedtime)  hydrALAZINE 50 mg oral tablet: 1 tab(s) orally 3 times a day  insulin detemir 100 units/mL subcutaneous solution: 15 unit(s) subcutaneous once a day (at bedtime)   labetalol 200 mg oral tablet: 2 tab(s) orally 3 times a day  Lantus Solostar Pen 100 units/mL subcutaneous solution: 18 unit(s) subcutaneous once a day (at bedtime)   lisinopril 40 mg oral tablet: 1 tab(s) orally once a day  metFORMIN 500 mg oral tablet: 1 tab(s) orally 2 times a day  Norvasc 5 mg oral tablet: 1 tab(s) orally once a day  Trulicity Pen 0.75 mg/0.5 mL subcutaneous solution: 0.75 milligram(s) subcutaneously every 7 days    Aspirin Low Dose 81 mg oral tablet, chewable: 1 tab(s) orally once a day  atorvastatin 80 mg oral tablet: 1 tab(s) orally once a day (at bedtime)  HumaLOG 100 units/mL injectable solution: 6 unit(s) injectable 3 times a day before meals  hydrALAZINE 50 mg oral tablet: 1 tab(s) orally 3 times a day  labetalol 300 mg oral tablet: 1 tab(s) orally 3 times a day  Lantus Solostar Pen 100 units/mL subcutaneous solution: 18 unit(s) subcutaneous once a day (at bedtime)   lisinopril 40 mg oral tablet: 1 tab(s) orally once a day  metFORMIN 500 mg oral tablet: 1 tab(s) orally 2 times a day  nitrofurantoin macrocrystals-monohydrate 100 mg oral capsule: 1 cap(s) orally 2 times a day  Norvasc 5 mg oral tablet: 1 tab(s) orally once a day  Trulicity Pen 0.75 mg/0.5 mL subcutaneous solution: 0.75 milligram(s) subcutaneously every 7 days

## 2020-10-06 NOTE — DISCHARGE NOTE PROVIDER - CARE PROVIDERS DIRECT ADDRESSES
,DirectAddress_Unknown,[email or web address]

## 2020-10-06 NOTE — PROGRESS NOTE ADULT - SUBJECTIVE AND OBJECTIVE BOX
THE PATIENT WAS SEEN AND EXAMINED BY ME WITH THE HOUSESTAFF AND STROKE TEAM DURING MORNING ROUNDS.   HPI:  58yo women  pmhx DM2, HTN, CVA/TIA (s/p loop monitor (now removed), on plavix and asa, residual R sided weakness) who presented to the ED and neurology was consulted for concern of L sided numbness. She reported sudden onset L sided numbness when she woke up the morning of admission around 6am. She stated it involved her  L face, arm, and leg. She reports hx of stroke with residual R sided weakness but states that this feels different. She denied associated weakness but reports associated phonophobia on the L and being able to hear the heartbeat in her L ear. She reported associated nausea and lightheadedness as well. She stated that she took her BG at home the day prior to admission and it was 372. Her BG readings at home the day of admission were 200-260s. She reported associated bad balance. She reported a b/l occipital headache radiating to the from. Denied blurry/double vision, cp, sob, weakness. NIHSS 1 on admission.    SUBJECTIVE: No events overnight.  No new neurologic complaints.  ROS reported negative unless otherwise noted.    acetaminophen   Tablet .. 650 milliGRAM(s) Oral every 6 hours PRN  amLODIPine   Tablet 5 milliGRAM(s) Oral daily  aspirin  chewable 81 milliGRAM(s) Oral daily  atorvastatin 80 milliGRAM(s) Oral at bedtime  cefTRIAXone   IVPB 1000 milliGRAM(s) IV Intermittent every 24 hours  dextrose 40% Gel 15 Gram(s) Oral once PRN  dextrose 5%. 1000 milliLiter(s) IV Continuous <Continuous>  dextrose 50% Injectable 12.5 Gram(s) IV Push once  dextrose 50% Injectable 25 Gram(s) IV Push once  dextrose 50% Injectable 25 Gram(s) IV Push once  enoxaparin Injectable 40 milliGRAM(s) SubCutaneous daily  glucagon  Injectable 1 milliGRAM(s) IntraMuscular once PRN  influenza   Vaccine 0.5 milliLiter(s) IntraMuscular once  insulin glargine Injectable (LANTUS) 18 Unit(s) SubCutaneous at bedtime  insulin lispro (HumaLOG) corrective regimen sliding scale   SubCutaneous three times a day before meals  insulin lispro (HumaLOG) corrective regimen sliding scale   SubCutaneous at bedtime  insulin lispro Injectable (HumaLOG) 6 Unit(s) SubCutaneous three times a day before meals  labetalol 200 milliGRAM(s) Oral two times a day  lisinopril 40 milliGRAM(s) Oral daily  potassium chloride    Tablet ER 40 milliEquivalent(s) Oral once      PHYSICAL EXAM:   Vital Signs Last 24 Hrs  T(C): 37.1 (06 Oct 2020 07:50), Max: 37.1 (06 Oct 2020 07:50)  T(F): 98.7 (06 Oct 2020 07:50), Max: 98.7 (06 Oct 2020 07:50)  HR: 84 (06 Oct 2020 06:00) (71 - 98)  BP: 160/97 (06 Oct 2020 06:00) (152/103 - 179/109)  BP(mean): 114 (06 Oct 2020 06:00) (104 - 157)  RR: 12 (06 Oct 2020 06:00) (12 - 20)  SpO2: 100% (06 Oct 2020 06:00) (97% - 100%)    General: No acute distress  HEENT: EOM intact, visual fields full  Abdomen: Soft, nontender, nondistended   Extremities: No edema    NEUROLOGICAL EXAM:  Mental status: Awake, alert, oriented x3, no aphasia, no neglect, normal memory   Cranial Nerves: No facial asymmetry, no nystagmus, no dysarthria,  tongue midline  Motor exam: Normal tone, RUE drift, 4/5 RUE, 4/5 RLE, 5/5 LUE, 5/5 LLE, normal fine finger movements.  Sensation: Intact to light touch   Coordination/ Gait: No dysmetria   LABS:                        11.4   7.14  )-----------( 220      ( 06 Oct 2020 05:22 )             35.2    10-06    142  |  106  |  17  ----------------------------<  142<H>  3.1<L>   |  24  |  1.12    Ca    9.6      06 Oct 2020 05:23          IMAGING: Reviewed by me.     (10.02.20)  CTA NECK:  No significant stenosis of the cervical carotid arteries based on NASCET criteria. Patent cervical vertebral arteries. No evidence of cervical carotid or vertebral artery dissection..  CTA HEAD: No high-grade stenosis or occlusion of the major proximal arterial branches.  Short segment mild stenosis at the right P1-P2 junction of the posterior cerebral artery.    MR Head No Cont (10.03.20 )  Small patchy acute/subacute infarcts within the right side of the niko with associated cytotoxic edema and without hemorrhagic transformation.    Multiple additional chronic infarcts and chronic white matter microvascular type changes, as discussed.    Multiple foci of susceptibility artifact within the thalami, supratentorial brain, and posterior fossa which may reflect areas of chronic microhemorrhage. Chronic hypertensive encephalopathy and amyloid angiopathy can also have a similar appearance.   THE PATIENT WAS SEEN AND EXAMINED BY ME WITH THE HOUSESTAFF AND STROKE TEAM DURING MORNING ROUNDS.   HPI:  56yo women  pmhx DM2, HTN, CVA/TIA (s/p loop monitor (now removed), on plavix and asa, residual R sided weakness) who presented to the ED and neurology was consulted for concern of L sided numbness. She reported sudden onset L sided numbness when she woke up the morning of admission around 6am. She stated it involved her  L face, arm, and leg. She reports hx of stroke with residual R sided weakness but states that this feels different. She denied associated weakness but reports associated phonophobia on the L and being able to hear the heartbeat in her L ear. She reported associated nausea and lightheadedness as well. She stated that she took her BG at home the day prior to admission and it was 372. Her BG readings at home the day of admission were 200-260s. She reported associated bad balance. She reported a b/l occipital headache radiating to the from. Denied blurry/double vision, cp, sob, weakness. NIHSS 1 on admission.    SUBJECTIVE: No events overnight.  No new neurologic complaints.  ROS reported negative unless otherwise noted.    acetaminophen   Tablet .. 650 milliGRAM(s) Oral every 6 hours PRN  amLODIPine   Tablet 5 milliGRAM(s) Oral daily  aspirin  chewable 81 milliGRAM(s) Oral daily  atorvastatin 80 milliGRAM(s) Oral at bedtime  cefTRIAXone   IVPB 1000 milliGRAM(s) IV Intermittent every 24 hours  dextrose 40% Gel 15 Gram(s) Oral once PRN  dextrose 5%. 1000 milliLiter(s) IV Continuous <Continuous>  dextrose 50% Injectable 12.5 Gram(s) IV Push once  dextrose 50% Injectable 25 Gram(s) IV Push once  dextrose 50% Injectable 25 Gram(s) IV Push once  enoxaparin Injectable 40 milliGRAM(s) SubCutaneous daily  glucagon  Injectable 1 milliGRAM(s) IntraMuscular once PRN  influenza   Vaccine 0.5 milliLiter(s) IntraMuscular once  insulin glargine Injectable (LANTUS) 18 Unit(s) SubCutaneous at bedtime  insulin lispro (HumaLOG) corrective regimen sliding scale   SubCutaneous three times a day before meals  insulin lispro (HumaLOG) corrective regimen sliding scale   SubCutaneous at bedtime  insulin lispro Injectable (HumaLOG) 6 Unit(s) SubCutaneous three times a day before meals  labetalol 200 milliGRAM(s) Oral two times a day  lisinopril 40 milliGRAM(s) Oral daily  potassium chloride    Tablet ER 40 milliEquivalent(s) Oral once      PHYSICAL EXAM:   Vital Signs Last 24 Hrs  T(C): 37.1 (06 Oct 2020 07:50), Max: 37.1 (06 Oct 2020 07:50)  T(F): 98.7 (06 Oct 2020 07:50), Max: 98.7 (06 Oct 2020 07:50)  HR: 84 (06 Oct 2020 06:00) (71 - 98)  BP: 160/97 (06 Oct 2020 06:00) (152/103 - 179/109)  BP(mean): 114 (06 Oct 2020 06:00) (104 - 157)  RR: 12 (06 Oct 2020 06:00) (12 - 20)  SpO2: 100% (06 Oct 2020 06:00) (97% - 100%)    General: No acute distress  HEENT: EOM intact, visual fields full  Abdomen: Soft, nontender, nondistended   Extremities: No edema    NEUROLOGICAL EXAM:  Mental status: Awake, alert, oriented x3, no aphasia, no neglect, normal memory   Cranial Nerves: No facial asymmetry, no nystagmus, no dysarthria,  tongue midline  Motor exam: Normal tone, RUE drift, 4/5 RUE, 4/5 RLE, 5/5 LUE, 5/5 LLE, normal fine finger movements.  Sensation: Intact to light touch   Coordination/ Gait: No dysmetria ,  decreased FFM on right   LABS:                        11.4   7.14  )-----------( 220      ( 06 Oct 2020 05:22 )             35.2    10-06    142  |  106  |  17  ----------------------------<  142<H>  3.1<L>   |  24  |  1.12    Ca    9.6      06 Oct 2020 05:23          IMAGING: Reviewed by me.     (10.02.20)  CTA NECK:  No significant stenosis of the cervical carotid arteries based on NASCET criteria. Patent cervical vertebral arteries. No evidence of cervical carotid or vertebral artery dissection..  CTA HEAD: No high-grade stenosis or occlusion of the major proximal arterial branches.  Short segment mild stenosis at the right P1-P2 junction of the posterior cerebral artery.    MR Head No Cont (10.03.20 )  Small patchy acute/subacute infarcts within the right side of the niko with associated cytotoxic edema and without hemorrhagic transformation.    Multiple additional chronic infarcts and chronic white matter microvascular type changes, as discussed.    Multiple foci of susceptibility artifact within the thalami, supratentorial brain, and posterior fossa which may reflect areas of chronic microhemorrhage. Chronic hypertensive encephalopathy and amyloid angiopathy can also have a similar appearance.

## 2020-10-06 NOTE — SPEECH LANGUAGE PATHOLOGY EVALUATION - ARTICULATION
mild imprecise articulation noted. + fatigue of OM resulting in reduced coordination of respiration and phonation and slightly imprecise articulation in conversation./imprecise

## 2020-10-06 NOTE — PROGRESS NOTE ADULT - PROBLEM SELECTOR PLAN 1
-Test BG premeal and qhs   -Continue Lantus 18 units sq qhs and Humalog 6 units sq tid- ac for now  -Continue low dose Humalog premeal and qhs correctional scales  DISPO: TBD closer to discharge based on BG and insulin needs. Could benefit from Lantus instead of Levemir plus any GLP1a her insurance covers.   -On discharge from rehab, patient can follow up  [facility]  [address] [place] [phone]  -Plan discussed with pt/team.  Contact info: [phone] (24/7). pager [phone]

## 2020-10-06 NOTE — H&P ADULT - NSICDXFAMILYHX_GEN_ALL_CORE_FT
FAMILY HISTORY:  Father  Still living? No  Family history of acute myocardial infarction, Age at diagnosis: Age Unknown  Family history of prostate cancer, Age at diagnosis: Age Unknown

## 2020-10-06 NOTE — PATIENT PROFILE ADULT - ABILITY TO HEAR (WITH HEARING AID OR HEARING APPLIANCE IF NORMALLY USED):
----- Message from Shannen Becerra sent at 5/18/2018  8:36 AM CDT -----  Contact: pt  The pt states she is returning a missed call, can be reached at [phone]///thxMW   Adequate: hears normal conversation without difficulty

## 2020-10-06 NOTE — SPEECH LANGUAGE PATHOLOGY EVALUATION - COMMENTS
H&P cont: " She denies associated weakness but reports associated phonophobia on the L and being able to hear the heartbeat in her L ear. She reports associated nausea and lightheadedness as well. She states that she took her BG at home yesterday and it was 372. Her BG readings at home today were 200-260s. She reports associated bad balance. She reports a b/l occipital headache radiating to the from. Denies blurry/double vision, cp, sob, weakness."       IMAGING:  MR Head No Cont (10/3/20):  Small patchy acute/subacute infarcts within the right side of the niko with associated cytotoxic edema and without hemorrhagic transformation. Multiple additional chronic infarcts and chronic white matter microvascular type changes, as discussed. Multiple foci of susceptibility artifact within the thalami, supratentorial brain, and posterior fossa which may reflect areas of chronic microhemorrhage. Chronic hypertensive encephalopathy and amyloid angiopathy can also have a similar appearance.    CT Head (10/2/20):  No acute intracranial hemorrhage, mass effect, or CT evidence of an acute transcortical infarct. Mild to moderate chronic ischemic changes in the frontoparietal white matter and chronic bilateral basal ganglia, bilateral thalamic, and left pontine lacunar infarcts.    CTA NECK (10/2/20):    No significant stenosis of the cervical carotid arteries based on NASCET criteria. Patent cervical vertebral arteries. No evidence of cervical carotid or vertebral artery dissection..    CTA HEAD (10/2/20):   No high-grade stenosis or occlusion of the major proximal arterial branches. Short segment mild stenosis at the right P1-P2 junction of the posterior cerebral artery. H&P cont: " She denies associated weakness but reports associated phonophobia on the L and being able to hear the heartbeat in her L ear. She reports associated nausea and lightheadedness as well. She states that she took her BG at home yesterday and it was 372. Her BG readings at home today were 200-260s. She reports associated bad balance. She reports a b/l occipital headache radiating to the from. Denies blurry/double vision, cp, sob, weakness."     Pt was seen by Neurology on 10/4/2020, and pt was dx with L sensory loss with imbalance due to right pontine infarct, due to small vessel disease. Pt is actively followed by Endocrinology and Cardiology for management of uncontrolled DM2, HTN, and HLD.     IMAGING:  MR Head No Cont (10/3/20):  Small patchy acute/subacute infarcts within the right side of the niko with associated cytotoxic edema and without hemorrhagic transformation. Multiple additional chronic infarcts and chronic white matter microvascular type changes, as discussed. Multiple foci of susceptibility artifact within the thalami, supratentorial brain, and posterior fossa which may reflect areas of chronic microhemorrhage. Chronic hypertensive encephalopathy and amyloid angiopathy can also have a similar appearance.    CT Head (10/2/20):  No acute intracranial hemorrhage, mass effect, or CT evidence of an acute transcortical infarct. Mild to moderate chronic ischemic changes in the frontoparietal white matter and chronic bilateral basal ganglia, bilateral thalamic, and left pontine lacunar infarcts.    CTA NECK (10/2/20):    No significant stenosis of the cervical carotid arteries based on NASCET criteria. Patent cervical vertebral arteries. No evidence of cervical carotid or vertebral artery dissection..    CTA HEAD (10/2/20):   No high-grade stenosis or occlusion of the major proximal arterial branches. Short segment mild stenosis at the right P1-P2 junction of the posterior cerebral artery. D/W NIA Figueroa and Neuro [facility]. OT services recommended due to Right hand weakness noted with writing task. +Fatigue during tasks negatively impacting patient's ability to continue with writing task. subjectively judged as wfl.

## 2020-10-06 NOTE — DISCHARGE NOTE PROVIDER - PROVIDER TOKENS
PROVIDER:[TOKEN:[[id number]],FOLLOWUP:[2 weeks]],PROVIDER:[TOKEN:[[id number]],FOLLOWUP:[1 week]]

## 2020-10-06 NOTE — H&P ADULT - NSHPLABSRESULTS_GEN_ALL_CORE
EXAM:  MR BRAIN                            PROCEDURE DATE:  10/03/2020            INTERPRETATION:  .    CLINICAL INFORMATION: Left-sided numbness and tingling.    TECHNIQUE: Multiplanar multisequential MRI of the brain was acquired without the administration of IV gadolinium.    COMPARISON: Prior CT study of the head and CT angiogram studies of the head and neck from 10/2/2020.    FINDINGS: Small patchy areas of restricted diffusion are seen within the right side of the niko with associated T2/FLAIR prolongation compatible with cytotoxic edema. No abnormal susceptibility artifact or high signal areas on T1-weighted imaging are seen to suggest hemorrhagic transformation.    A chronic infarct is seen within the left cerebral peduncle. Multiple chronic lacunar infarcts are also seen within the bilateral corona radiata, bilateral basal ganglia, and bilateral thalami.    Multiple foci of susceptibility artifact are noted within the posterior fossa, bilateral thalami, left paramedian parietallobe, and left posterior temporal lobe.    Multiple additional patchy confluent nonspecific T2/FLAIR hyperintense signal changes are noted throughout the bihemispheric white matter without associated mass effect or restricted diffusion.    Ventricular size and configuration is unremarkable. No abnormal extra axial fluid collections are noted. Flow-voids are noted throughout the major intracranial vessels, on the T2 weighted images, consistent with their patency. The sellar area appears unremarkable.    Scattered mucosal thickening is seen throughout the paranasal sinuses. The mastoid air cells remain clear. Calvarial signal is unremarkable. The orbits appear within normal limits.    IMPRESSION: Small patchy acute/subacute infarcts within the right side of the niko with associated cytotoxic edema and without hemorrhagic transformation.    Multiple additional chronic infarcts and chronic white matter microvascular type changes, as discussed.    Multiple foci of susceptibility artifact within the thalami, supratentorial brain, and posterior fossa which may reflect areas of chronic microhemorrhage. Chronic hypertensive encephalopathy and amyloid angiopathy can also have a similar appearance.            BAY KONG M.D., ATTENDING RADIOLOGIST  This document has been electronically signed. Oct  3 2020  7:29PM EXAM:  MR BRAIN                            PROCEDURE DATE:  10/03/2020            INTERPRETATION:  .    CLINICAL INFORMATION: Left-sided numbness and tingling.    TECHNIQUE: Multiplanar multisequential MRI of the brain was acquired without the administration of IV gadolinium.    COMPARISON: Prior CT study of the head and CT angiogram studies of the head and neck from 10/2/2020.    FINDINGS: Small patchy areas of restricted diffusion are seen within the right side of the niko with associated T2/FLAIR prolongation compatible with cytotoxic edema. No abnormal susceptibility artifact or high signal areas on T1-weighted imaging are seen to suggest hemorrhagic transformation.    A chronic infarct is seen within the left cerebral peduncle. Multiple chronic lacunar infarcts are also seen within the bilateral corona radiata, bilateral basal ganglia, and bilateral thalami.    Multiple foci of susceptibility artifact are noted within the posterior fossa, bilateral thalami, left paramedian parietallobe, and left posterior temporal lobe.    Multiple additional patchy confluent nonspecific T2/FLAIR hyperintense signal changes are noted throughout the bihemispheric white matter without associated mass effect or restricted diffusion.    Ventricular size and configuration is unremarkable. No abnormal extra axial fluid collections are noted. Flow-voids are noted throughout the major intracranial vessels, on the T2 weighted images, consistent with their patency. The sellar area appears unremarkable.    Scattered mucosal thickening is seen throughout the paranasal sinuses. The mastoid air cells remain clear. Calvarial signal is unremarkable. The orbits appear within normal limits.    IMPRESSION: Small patchy acute/subacute infarcts within the right side of the niko with associated cytotoxic edema and without hemorrhagic transformation.    Multiple additional chronic infarcts and chronic white matter microvascular type changes, as discussed.    Multiple foci of susceptibility artifact within the thalami, supratentorial brain, and posterior fossa which may reflect areas of chronic microhemorrhage. Chronic hypertensive encephalopathy and amyloid angiopathy can also have a similar appearance.                        BAY KONG M.D., ATTENDING RADIOLOGIST  This document has been electronically signed. Oct  3 2020  7:29PM

## 2020-10-06 NOTE — H&P ADULT - REASON FOR ADMISSION
1.1 left body, right pontine CVA. 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

## 2020-10-06 NOTE — PROGRESS NOTE ADULT - ASSESSMENT
Assessment:   60-year-old right-handed lady first evaluated at [facility] on 10/3/2020 with left-sided numbness and imbalance. In 2017 she had a stroke with residual, mild right hemiparesis, including a mild foot drop.  She has been evaluated by Dr. Heri Vences (vascular neurology).  At that time, stroke work-up was unremarkable including an ILR, and the diagnosis was likely small vessel disease.  On 10/2/2020, she awoke and noted left-sided numbness involving face, arm and leg.  There was associated mild imbalance and occipital headache.  Her fingerstick glucose was 372. CT head (10/2/2020)  showed chronic lacunar infarcts: Left superior basis pontis; possibly in the basal ganglia bilaterally (not well seen); and reportedly in the thalamus bilaterally, not obvious.  CTA neck and head (10/2/2020)  showed mild right PCA stenosis at the P1-P2 junction, and was otherwise unremarkable.   Her presentation is consistent with right brain dysfunction, pontine infarct.  Etiology is probably recurrent, minor ischemic stroke due to  small vessel disease, or perhaps even branch atheromatous disease given her mild right PCA stenosis.  Her diabetes and HTN as well as hyperlipidemia  is apparently under poor control as well.      NEURO: Neurologically without acute change. Continue close monitoring for neurologic deterioration given cerebral edema, mass effect, and brain compression , permissive HTN to gradual normotension, titrate statin to LDL goal less than 70, MRI Brain w/o results as noted above. Physical therapy/OT recommended AR.    ANTITHROMBOTIC THERAPY: Asprin alone for now in setting of multiple micro-hemorrhages likely related to underlying HTN and concern for increased risk of bleeding.      PULMONARY: Protecting airway, saturating well     CARDIOVASCULAR:  TTE: ef 70-75%, minimal MR< minimal AR, minimal TR , cardiac monitoring without any recorded events.  Cardiology consult appreciated. Regimen adjusted per cardiology recommendations.                             SBP goal: Permissive HTN to gradual normotension.      GASTROINTESTINAL:  dysphagia screen passed, tolerating diet.       Diet: Regular CC     RENAL: BUN/Cr without acute change, good urine output , maintain adequate hydration , KCl supplement for hypokalemia      Na Goal: Greater than 135     Macario: No    HEMATOLOGY: H/H without acute change, Platelets 220, no signs or symptoms of bleeding. She should have all age and risk appropriate malignancy screenings.      DVT ppx: Heparin s.c [] LMWH [x]     ID: afebrile, no leukocytosis, + UTI, on abx therapy, ucx pending, possibly  contributing to ESR of 82- this should be monitored out side of the acute phase for continued follow up and workup as indicated.    OTHER: All questions and concerns addressed with patient at bedside. She reports she has not been following up as recommended, she was recommended to follow up with vascular neurology, endocrine, PCP, as well as cardiology.  Risk factor control, diet modifications, monitoring and adherence discussed and strongly recommended given importance for secondary stroke prevention.  Endocrine consult appreciated. She should follow up thyroid lesions as well.     DISPOSITION: Acute rehab      CORE MEASURES:        Admission NIHSS: 1     TPA: [] YES [x] NO      LDL/HDL: 168/52     Depression Screen: o- upset about condition and poor follow up. Support provided. continue to monitor.      Statin Therapy: y     Dysphagia Screen: [x] PASS [] FAIL     Smoking [] YES [x] NO      Afib [] YES [x] NO     Stroke Education [x] YES [] NO    obtain screening lower extremity venous ultrasound in patients who meet 1 or more of the following criteria as patient is high risk for DVT/PE on admission:   [] History of DVT/PE  []Hypercoagulable states (Factor V Leiden, Cancer, OCP, etc. )  []Prolonged immobility (hemiplegia/hemiparesis/post operative or any other extended immobilization)  [] Transferred from outside facility (Rehab or Long term care)  [] Age </= to 50.

## 2020-10-06 NOTE — PROGRESS NOTE ADULT - SUBJECTIVE AND OBJECTIVE BOX
DIABETES FOLLOW UP NOTE: Saw pt earlier today  INTERVAL HX: 57F w/h/o uncontrolled DM2 (A1C 11.9%). DM c/b  neuropathy and CVA/TIA with residual R-sided weakness, Also h/o HTN/IBS. Here with  L sided numbness, admitted for evaluation of CVA. Endocrine consult requested for management of uncontrolled DM2. Pt reports adherent to Levemir 15 units q hs and Metformin 500mg bid but not taking Novolog> states giving her a reaction (rash in back). States tolerating POs and feeling much better. Denies any further numbness and has good L sided strength by report. No HA. Glycemic control mostly  at goal while on present insulin regimen. No hypoglycemia. On PO antibiotic for UTI    Review of Systems:  General: As above  Cardiovascular: No chest pain, palpitations  Respiratory: No SOB, no cough  GI: No nausea, vomiting, abdominal pain  Endocrine: no polyuria, polydipsia or S&Sx of hypoglycemia    Allergies    sulfa drugs (Angioedema; Swelling)  sulfa drugs (Rash)  Sulfac 10% (Unknown)  walnut (Urticaria)    Intolerances    lactose (Diarrhea)    MEDICATIONS:  atorvastatin 80 milliGRAM(s) Oral at bedtime  insulin glargine Injectable (LANTUS) 18 Unit(s) SubCutaneous at bedtime  insulin lispro (HumaLOG) corrective regimen sliding scale   SubCutaneous three times a day before meals  insulin lispro (HumaLOG) corrective regimen sliding scale   SubCutaneous at bedtime  insulin lispro Injectable (HumaLOG) 6 Unit(s) SubCutaneous three times a day before meals    PHYSICAL EXAM:  VITALS: T(C): 37.1 (10-06-20 @ 07:50)  T(F): 98.7 (10-06-20 @ 07:50), Max: 98.7 (10-06-20 @ 07:50)  HR: 82 (10-06-20 @ 12:00) (71 - 85)  BP: 177/105 (10-06-20 @ 12:00) (152/103 - 177/105)  RR:  (12 - 20)  SpO2:  (97% - 100%)  Wt(kg): --  GENERAL: Female sitting in chair in NAD  Abdomen: Soft, nontender, non distended, central adiposity  Extremities: Warm, no edema in all 4 exts  NEURO: A&O X3    LABS:  POCT Blood Glucose.: 208 mg/dL (10-06-20 @ 12:32)  POCT Blood Glucose.: 151 mg/dL (10-06-20 @ 08:26)  POCT Blood Glucose.: 166 mg/dL (10-05-20 @ 21:24)  POCT Blood Glucose.: 127 mg/dL (10-05-20 @ 17:13)  POCT Blood Glucose.: 110 mg/dL (10-05-20 @ 12:13)  POCT Blood Glucose.: 177 mg/dL (10-05-20 @ 08:39)  POCT Blood Glucose.: 191 mg/dL (10-04-20 @ 21:23)  POCT Blood Glucose.: 152 mg/dL (10-04-20 @ 17:21)  POCT Blood Glucose.: 195 mg/dL (10-04-20 @ 11:55)  POCT Blood Glucose.: 258 mg/dL (10-04-20 @ 08:32)  POCT Blood Glucose.: 309 mg/dL (10-03-20 @ 21:43)  POCT Blood Glucose.: 324 mg/dL (10-03-20 @ 17:47)                            11.4   7.14  )-----------( 220      ( 06 Oct 2020 05:22 )             35.2       10-06    142  |  106  |  17  ----------------------------<  142<H>  3.1<L>   |  24  |  1.12    EGFR if : 62      Ca    9.6      10-06        Thyroid Function Tests:  10-03 @ 11:16 TSH 0.46 FreeT4 -- T3 -- Anti TPO -- Anti Thyroglobulin Ab -- TSI --      A1C with Estimated Average Glucose Result: 11.9 % (10-03-20 @ 11:04)      Estimated Average Glucose: 295 mg/dL (10-03-20 @ 11:04)        10-03 Chol 241<H> <H> HDL 52 Trig 104

## 2020-10-06 NOTE — DISCHARGE NOTE PROVIDER - NSFOLLOWUPCLINICS_GEN_ALL_ED_FT
[facility]  Endocrinology  [address] [place] [address]  Phone: [phone]  Fax:   Follow Up Time:

## 2020-10-07 LAB
ALBUMIN SERPL ELPH-MCNC: 3 G/DL — LOW (ref 3.3–5)
ALP SERPL-CCNC: 75 U/L — SIGNIFICANT CHANGE UP (ref 40–120)
ALT FLD-CCNC: 16 U/L — SIGNIFICANT CHANGE UP (ref 10–45)
ANION GAP SERPL CALC-SCNC: 10 MMOL/L — SIGNIFICANT CHANGE UP (ref 5–17)
AST SERPL-CCNC: 14 U/L — SIGNIFICANT CHANGE UP (ref 10–40)
BASOPHILS # BLD AUTO: 0.02 K/UL — SIGNIFICANT CHANGE UP (ref 0–0.2)
BASOPHILS NFR BLD AUTO: 0.4 % — SIGNIFICANT CHANGE UP (ref 0–2)
BILIRUB SERPL-MCNC: 0.5 MG/DL — SIGNIFICANT CHANGE UP (ref 0.2–1.2)
BUN SERPL-MCNC: 18 MG/DL — SIGNIFICANT CHANGE UP (ref 7–23)
CALCIUM SERPL-MCNC: 9.2 MG/DL — SIGNIFICANT CHANGE UP (ref 8.4–10.5)
CHLORIDE SERPL-SCNC: 106 MMOL/L — SIGNIFICANT CHANGE UP (ref 96–108)
CO2 SERPL-SCNC: 27 MMOL/L — SIGNIFICANT CHANGE UP (ref 22–31)
CREAT SERPL-MCNC: 1.12 MG/DL — SIGNIFICANT CHANGE UP (ref 0.5–1.3)
CULTURE RESULTS: SIGNIFICANT CHANGE UP
EOSINOPHIL # BLD AUTO: 0.2 K/UL — SIGNIFICANT CHANGE UP (ref 0–0.5)
EOSINOPHIL NFR BLD AUTO: 3.9 % — SIGNIFICANT CHANGE UP (ref 0–6)
GLUCOSE BLDC GLUCOMTR-MCNC: 130 MG/DL — HIGH (ref 70–99)
GLUCOSE BLDC GLUCOMTR-MCNC: 190 MG/DL — HIGH (ref 70–99)
GLUCOSE BLDC GLUCOMTR-MCNC: 191 MG/DL — HIGH (ref 70–99)
GLUCOSE BLDC GLUCOMTR-MCNC: 212 MG/DL — HIGH (ref 70–99)
GLUCOSE SERPL-MCNC: 186 MG/DL — HIGH (ref 70–99)
HCT VFR BLD CALC: 35.3 % — SIGNIFICANT CHANGE UP (ref 34.5–45)
HGB BLD-MCNC: 11.2 G/DL — LOW (ref 11.5–15.5)
IMM GRANULOCYTES NFR BLD AUTO: 0.2 % — SIGNIFICANT CHANGE UP (ref 0–1.5)
LYMPHOCYTES # BLD AUTO: 1.77 K/UL — SIGNIFICANT CHANGE UP (ref 1–3.3)
LYMPHOCYTES # BLD AUTO: 34.8 % — SIGNIFICANT CHANGE UP (ref 13–44)
MCHC RBC-ENTMCNC: 26.4 PG — LOW (ref 27–34)
MCHC RBC-ENTMCNC: 31.7 GM/DL — LOW (ref 32–36)
MCV RBC AUTO: 83.1 FL — SIGNIFICANT CHANGE UP (ref 80–100)
MONOCYTES # BLD AUTO: 0.45 K/UL — SIGNIFICANT CHANGE UP (ref 0–0.9)
MONOCYTES NFR BLD AUTO: 8.9 % — SIGNIFICANT CHANGE UP (ref 2–14)
NEUTROPHILS # BLD AUTO: 2.63 K/UL — SIGNIFICANT CHANGE UP (ref 1.8–7.4)
NEUTROPHILS NFR BLD AUTO: 51.8 % — SIGNIFICANT CHANGE UP (ref 43–77)
NRBC # BLD: 0 /100 WBCS — SIGNIFICANT CHANGE UP (ref 0–0)
PLATELET # BLD AUTO: 216 K/UL — SIGNIFICANT CHANGE UP (ref 150–400)
POTASSIUM SERPL-MCNC: 3.5 MMOL/L — SIGNIFICANT CHANGE UP (ref 3.5–5.3)
POTASSIUM SERPL-SCNC: 3.5 MMOL/L — SIGNIFICANT CHANGE UP (ref 3.5–5.3)
PROT SERPL-MCNC: 7.1 G/DL — SIGNIFICANT CHANGE UP (ref 6–8.3)
RBC # BLD: 4.25 M/UL — SIGNIFICANT CHANGE UP (ref 3.8–5.2)
RBC # FLD: 13.9 % — SIGNIFICANT CHANGE UP (ref 10.3–14.5)
SODIUM SERPL-SCNC: 143 MMOL/L — SIGNIFICANT CHANGE UP (ref 135–145)
SPECIMEN SOURCE: SIGNIFICANT CHANGE UP
WBC # BLD: 5.08 K/UL — SIGNIFICANT CHANGE UP (ref 3.8–10.5)
WBC # FLD AUTO: 5.08 K/UL — SIGNIFICANT CHANGE UP (ref 3.8–10.5)

## 2020-10-07 PROCEDURE — 99223 1ST HOSP IP/OBS HIGH 75: CPT

## 2020-10-07 PROCEDURE — 96116 NUBHVL XM PHYS/QHP 1ST HR: CPT

## 2020-10-07 PROCEDURE — 99232 SBSQ HOSP IP/OBS MODERATE 35: CPT

## 2020-10-07 RX ORDER — AMLODIPINE BESYLATE 2.5 MG/1
2.5 TABLET ORAL ONCE
Refills: 0 | Status: COMPLETED | OUTPATIENT
Start: 2020-10-07 | End: 2020-10-07

## 2020-10-07 RX ORDER — AMLODIPINE BESYLATE 2.5 MG/1
7.5 TABLET ORAL DAILY
Refills: 0 | Status: DISCONTINUED | OUTPATIENT
Start: 2020-10-08 | End: 2020-10-08

## 2020-10-07 RX ADMIN — Medication 300 MILLIGRAM(S): at 05:17

## 2020-10-07 RX ADMIN — Medication 300 MILLIGRAM(S): at 21:24

## 2020-10-07 RX ADMIN — METFORMIN HYDROCHLORIDE 500 MILLIGRAM(S): 850 TABLET ORAL at 17:00

## 2020-10-07 RX ADMIN — AMLODIPINE BESYLATE 5 MILLIGRAM(S): 2.5 TABLET ORAL at 05:17

## 2020-10-07 RX ADMIN — Medication 100 MILLIGRAM(S): at 17:00

## 2020-10-07 RX ADMIN — ENOXAPARIN SODIUM 40 MILLIGRAM(S): 100 INJECTION SUBCUTANEOUS at 11:31

## 2020-10-07 RX ADMIN — Medication 2: at 07:51

## 2020-10-07 RX ADMIN — Medication 300 MILLIGRAM(S): at 14:20

## 2020-10-07 RX ADMIN — Medication 81 MILLIGRAM(S): at 11:31

## 2020-10-07 RX ADMIN — Medication 0: at 16:05

## 2020-10-07 RX ADMIN — Medication 100 MILLIGRAM(S): at 05:17

## 2020-10-07 RX ADMIN — LISINOPRIL 40 MILLIGRAM(S): 2.5 TABLET ORAL at 05:17

## 2020-10-07 RX ADMIN — METFORMIN HYDROCHLORIDE 500 MILLIGRAM(S): 850 TABLET ORAL at 07:51

## 2020-10-07 RX ADMIN — INSULIN GLARGINE 18 UNIT(S): 100 INJECTION, SOLUTION SUBCUTANEOUS at 21:24

## 2020-10-07 RX ADMIN — Medication 1: at 11:56

## 2020-10-07 RX ADMIN — AMLODIPINE BESYLATE 2.5 MILLIGRAM(S): 2.5 TABLET ORAL at 11:31

## 2020-10-07 RX ADMIN — ATORVASTATIN CALCIUM 80 MILLIGRAM(S): 80 TABLET, FILM COATED ORAL at 21:24

## 2020-10-07 NOTE — CHART NOTE - NSCHARTNOTEFT_GEN_A_CORE
REHABILITATION DIAGNOSIS/IMPAIRMENT GROUP CODE:  1.3 bilateral weakness, new right niko cva/old left BG cva    COMORBIDITIES/COMPLICATING CONDITIONS IMPACTING REHABILITATION:  HEALTH ISSUES - PROBLEM Dx: uncontrolled HTN, DM2, IBS, depression        PAST MEDICAL & SURGICAL HISTORY:  TIA (transient ischemic attack)    CVA (cerebral vascular accident)    Hypokalemia    IBS (Irritable Bowel Syndrome)    Generalized Headaches    Diabetes Mellitus Type II    Hypertension    No Past Surgical History        Based upon consideration of the patient's impairments, functional status, complicating conditions and any other contributing factors and after information garnered from the assessments of all therapy disciplines involved in treating the patient and other pertinent clinicians:    INTERDISCIPLINARY REHABILITATION INTERVENTIONS:    [ x  ] Transfer Training  [ x  ] Bed Mobility  [ x  ] Therapeutic Exercise  [ x  ] Balance/Coordination Exercises  [ x  ] Locomotion retraining  [  x ] Stairs  [  x ] Functional Transfer Training  [ x  ] Bowel/Bladder program  [ x  ] Pain Management  [ x  ] Skin/Wound Care  [ x  ] Visual/Perceptual Training  [ x  ] Therapeutic Recreation Activities  [ x  ] Neuromuscular Re-education  [ x  ] Activities of Daily Living  [ x  ] Speech Exercise  [ x  ] Swallowing Exercises  [ x  ] Vital Stim  [ x  ] Dietary Supplements  [   ] Calorie Count  [ x  ] Cognitive Exercises  [  x ] Congnitive/Linguistic Treatment  [   ] Behavior Program  [  x ] Neuropsych Therapy  [  x ] Patient/Family Counseling  [ x  ] Family Training  [ x  ] Community Re-entry  [  x ] Orthotic Evaluation  [   ] Prosthetic Eval/Training    MEDICAL PROGNOSIS:  fair    REHAB POTENTIAL:  excellent    EXPECTED DAILY THERAPY:         PT: 1h       OT: 1h       ST: 1h       S&O: eval    EXPECTED INTENSITY OF PROGRAM:  3h daily, 5d week    EXPECTED FREQUENCY OF PROGRAM:  daily    ESTIMATED LOS:  14d    ESTIMATED DISPOSITION:  home    INTERDISCIPLINARY FUNCTIONAL OUTCOMES?GOALS:         Gait/Mobility:       Transfers:       ADLs:       Functional Transfers:       Medication Management:       Communication:       Cognitive:       Dysphagia:       Bladder       Bowel:

## 2020-10-07 NOTE — DIETITIAN INITIAL EVALUATION ADULT. - ADD RECOMMEND
1. Continue diet as ordered. Accepts eggs and fish. 2. Diet education provided 3. Accommodated preferences. Diet office updated. 4. Insulin regimen per medical team.

## 2020-10-07 NOTE — DIETITIAN INITIAL EVALUATION ADULT. - OTHER INFO
61yo Female with right pontine CVA, now with decreased functional mobility and past medical history of DM2 (A1C 11.9), HTN. Writer met with patient to obtain and accommodate preferences. Patient reports that she is vegetarian; eats fish and eggs. Doesn’t eat dairy or any milk alternative d/t lactose intolerance and doesn’t like the “milky” taste. She denies any weight change, and states that she hasn’t gained any weight either. She reports she knows how to count carbs and which foods contain carbs, but doesn’t have portion control when eating “snacky foods” such as chips. Written educational materials/handouts provided to patient and all questions/concerns addressed at this time. Reports she ate oatmeal and a croissant for breakfast. Educated on building a balanced meal by incorporating a protein/fat with meals. Patient verbalized understanding. Will continue to monitor as appropriate.

## 2020-10-07 NOTE — CONSULT NOTE ADULT - ASSESSMENT
OMAR CASTANO is a 59yo Female with right pontine CVA, now with decreased functional mobility, gait instability and ADL impairments.    COMORBIDITES/ACTIVE MEDICAL ISSUES     Gait Instability, ADL impairments and Functional impairments: start Comprehensive Rehab Program of PT/OT     #CVA  -Aspirin, Lipitor  -start PT/OT/SLP  -fall and aspiration precautions    #HTN  -Norvasc and Lisinopril  -labetalol    #DM2  -lantus/Humalog  -FS ACHS  -consistent carbs diet  -nutrition eval    #UTI  -check voiding, PVR x1      #Costochondritis   -Left chest wall pain with palpation   -Started on 10/5 after she had TTE when pt endorsed to technician pushing too hard   -Tylenol PRN     Pain Mgmt - Tylenol PRN  GI/Bowel Mgmt - Colace, Senna,  Miralax PRN  /Bladder Mgmt - Voiding independently, PVR      Precautions / PROPHYLAXIS:   - Falls, Cardiac  - Ortho: Weight bearing status: WBAT   - Lungs: Aspiration, Incentive Spirometer   - Pressure injury/Skin: Turn Q2hrs while in bed, OOB to Chair, PT/OT    - DVT: Lovenox,    MEDICAL PROGNOSIS: GOOD            REHAB POTENTIAL: GOOD             ESTIMATED DISPOSITION: HOME WITH HOME CARE            ELOS: 10-14 Days   EXPECTED THERAPY:     P.T. 1hr/day       O.T. 1hr/day      S.L.P. 1hr/day     P&O Unnecessary     EXP FREQUENCY: 5 days per 7 day period     PRESCREEN COMPARISION:   I have reviewed the prescreen information and I have found no relevant changes between the preadmission screening and my post admission evaluation     RATIONALE FOR INPATIENT ADMISSION - Patient demonstrates the following: (check all that apply)  [X] Medically appropriate for rehabilitation admission  [X] Has attainable rehab goals with an appropriate initial discharge plan  [X] Has rehabilitation potential (expected to make a significant improvement within a reasonable period of time)   [X] Requires close medical management by a rehab physician, rehab nursing care, Hospitalist and comprehensive interdisciplinary team (including PT, OT, & or SLP, Prosthetics and Orthotics)     60 Female with right pontine CVA high CAD risk factors     Admitted for post CVA gait abn medical deconditioning     #CVA  -Aspirin, Lipitor  BP control, increase the norvasc to 7.5 mg daily secondary to continued uncontrol trend/time   -start PT/OT/SLP  -fall and aspiration precautions    #Essential HTN uncontrolled as above   -Norvasc (increased) and Lisinopril  -labetalol    IDDM2 hbq1c 11.9   continue the current lantus ,prandial   RISS   endo team to see     #UTI  -check voiding, PVR x1  continue current dc abx to complete 7 days   check urine cx on fu       #Costochondritis on off   -Left chest wall pain with palpation   ddx cad risk   monitor   cad risk rx     Reviewed w patient and IDT, rehab plan of care   VTE proph with lovenox   Full Code

## 2020-10-07 NOTE — CONSULT NOTE ADULT - SUBJECTIVE AND OBJECTIVE BOX
ROS all others are neg     ICU Vital Signs Last 24 Hrs  T(C): 36.7 (07 Oct 2020 07:54), Max: 36.7 (06 Oct 2020 21:06)  T(F): 98 (07 Oct 2020 07:54), Max: 98.1 (06 Oct 2020 21:06)  HR: 83 (07 Oct 2020 05:13) (83 - 90)  BP: 158/92 (07 Oct 2020 07:54) (158/92 - 187/89)  RR: 14 (07 Oct 2020 07:54) (14 - 15)  SpO2: 97% (07 Oct 2020 07:54) (97% - 100%)                          11.2   5.08  )-----------( 216      ( 07 Oct 2020 05:30 )             35.3     10-07    143  |  106  |  18  ----------------------------<  186<H>  3.5   |  27  |  1.12    Ca    9.2      07 Oct 2020 05:30    TPro  7.1  /  Alb  3.0<L>  /  TBili  0.5  /  DBili  x   /  AST  14  /  ALT  16  /  AlkPhos  75  10-07    Home Medications:  Aspirin Low Dose 81 mg oral tablet, chewable: 1 tab(s) orally once a day (06 Oct 2020 13:09)  atorvastatin 80 mg oral tablet: 1 tab(s) orally once a day (at bedtime) (06 Oct 2020 08:43)  hydrALAZINE 50 mg oral tablet: 1 tab(s) orally 3 times a day (31 Oct 2017 13:27)  labetalol 300 mg oral tablet: 1 tab(s) orally 3 times a day (06 Oct 2020 13:12)  lisinopril 40 mg oral tablet: 1 tab(s) orally once a day (06 Oct 2020 08:43)  metFORMIN 500 mg oral tablet: 1 tab(s) orally 2 times a day (31 Oct 2017 13:27)  nitrofurantoin macrocrystals-monohydrate 100 mg oral capsule: 1 cap(s) orally 2 times a day (06 Oct 2020 13:20)  Norvasc 5 mg oral tablet: 1 tab(s) orally once a day (06 Oct 2020 13:09)    MEDICATIONS  (STANDING):  amLODIPine   Tablet 5 milliGRAM(s) Oral daily  aspirin enteric coated 81 milliGRAM(s) Oral daily  atorvastatin 80 milliGRAM(s) Oral at bedtime  dextrose 5%. 1000 milliLiter(s) (50 mL/Hr) IV Continuous <Continuous>  dextrose 50% Injectable 12.5 Gram(s) IV Push once  dextrose 50% Injectable 25 Gram(s) IV Push once  dextrose 50% Injectable 25 Gram(s) IV Push once  enoxaparin Injectable 40 milliGRAM(s) SubCutaneous daily  insulin glargine Injectable (LANTUS) 18 Unit(s) SubCutaneous at bedtime  insulin lispro (HumaLOG) corrective regimen sliding scale   SubCutaneous three times a day before meals  insulin lispro (HumaLOG) corrective regimen sliding scale   SubCutaneous at bedtime  labetalol 300 milliGRAM(s) Oral three times a day  lisinopril 40 milliGRAM(s) Oral daily  metFORMIN 500 milliGRAM(s) Oral two times a day with meals  nitrofurantoin monohydrate/macrocrystals (MACROBID) 100 milliGRAM(s) Oral two times a day    MEDICATIONS  (PRN):  dextrose 40% Gel 15 Gram(s) Oral once PRN Blood Glucose LESS THAN 70 milliGRAM(s)/deciliter  glucagon  Injectable 1 milliGRAM(s) IntraMuscular once PRN Glucose LESS THAN 70 milligrams/deciliter         57 F transferred to BIU  post CVA right side of the niko with associated cytotoxic edema seen by neuro placed on asa, and high intensity statin, cards team seen and started on labetalol in addition to her home meds for uncontrolled HTN, seen by endo team and increased lantus, prandial insulin for uncontrolled glucose hba1c 11.9.  In addition started on abx for abn ua      I have seen patient at  BIU   No co     no chest pain   no sob     reviewed plan during IDT team rounds this am     ROS all others are neg       PMH    ·	Essential HTN   ·	IDDM II uncontrolled  A1c: 11.9  ·	CVA L sided weakness   ·	Loop recorder ro dysarhythmia  ·	Multinodular goiter  ·	IBS   ·	HA on off   ·	Hx of hypokalemia    ·	HPL LDL: 168    PAST MEDICAL & SURGICAL HISTORY:    ·	TIA (transient ischemic attack)  ·	CVA (cerebral vascular accident)  ·	Hypokalemia    Fam hx CAD       Social History:  FUNCTIONAL HISTORY:   Lives w  and kids in home w 3 [address] and 14 steps inside.  PTA Independent and works full time.     CURRENT FUNCTIONAL STATUS:  Min A - CG transfers; CG gait (06 Oct 2020 12:15)      ROS all others are neg     ICU Vital Signs Last 24 Hrs  T(C): 36.7 (07 Oct 2020 07:54), Max: 36.7 (06 Oct 2020 21:06)  T(F): 98 (07 Oct 2020 07:54), Max: 98.1 (06 Oct 2020 21:06)  HR: 83 (07 Oct 2020 05:13) (83 - 90)  BP: 158/92 (07 Oct 2020 07:54) (158/92 - 187/89)  RR: 14 (07 Oct 2020 07:54) (14 - 15)  SpO2: 97% (07 Oct 2020 07:54) (97% - 100%)                          11.2   5.08  )-----------( 216      ( 07 Oct 2020 05:30 )             35.3     10-07    143  |  106  |  18  ----------------------------<  186<H>  3.5   |  27  |  1.12    Ca    9.2      07 Oct 2020 05:30    TPro  7.1  /  Alb  3.0<L>  /  TBili  0.5  /  DBili  x   /  AST  14  /  ALT  16  /  AlkPhos  75  10-07    Home Medications:  Aspirin Low Dose 81 mg oral tablet, chewable: 1 tab(s) orally once a day (06 Oct 2020 13:09)  atorvastatin 80 mg oral tablet: 1 tab(s) orally once a day (at bedtime) (06 Oct 2020 08:43)  hydrALAZINE 50 mg oral tablet: 1 tab(s) orally 3 times a day (31 Oct 2017 13:27)  labetalol 300 mg oral tablet: 1 tab(s) orally 3 times a day (06 Oct 2020 13:12)  lisinopril 40 mg oral tablet: 1 tab(s) orally once a day (06 Oct 2020 08:43)  metFORMIN 500 mg oral tablet: 1 tab(s) orally 2 times a day (31 Oct 2017 13:27)  nitrofurantoin macrocrystals-monohydrate 100 mg oral capsule: 1 cap(s) orally 2 times a day (06 Oct 2020 13:20)  Norvasc 5 mg oral tablet: 1 tab(s) orally once a day (06 Oct 2020 13:09)    MEDICATIONS  (STANDING):  amLODIPine   Tablet 5 milliGRAM(s) Oral daily  aspirin enteric coated 81 milliGRAM(s) Oral daily  atorvastatin 80 milliGRAM(s) Oral at bedtime  dextrose 5%. 1000 milliLiter(s) (50 mL/Hr) IV Continuous <Continuous>  dextrose 50% Injectable 12.5 Gram(s) IV Push once  dextrose 50% Injectable 25 Gram(s) IV Push once  dextrose 50% Injectable 25 Gram(s) IV Push once  enoxaparin Injectable 40 milliGRAM(s) SubCutaneous daily  insulin glargine Injectable (LANTUS) 18 Unit(s) SubCutaneous at bedtime  insulin lispro (HumaLOG) corrective regimen sliding scale   SubCutaneous three times a day before meals  insulin lispro (HumaLOG) corrective regimen sliding scale   SubCutaneous at bedtime  labetalol 300 milliGRAM(s) Oral three times a day  lisinopril 40 milliGRAM(s) Oral daily  metFORMIN 500 milliGRAM(s) Oral two times a day with meals  nitrofurantoin monohydrate/macrocrystals (MACROBID) 100 milliGRAM(s) Oral two times a day    MEDICATIONS  (PRN):  dextrose 40% Gel 15 Gram(s) Oral once PRN Blood Glucose LESS THAN 70 milliGRAM(s)/deciliter  glucagon  Injectable 1 milliGRAM(s) IntraMuscular once PRN Glucose LESS THAN 70 milligrams/deciliter

## 2020-10-07 NOTE — PROGRESS NOTE ADULT - ASSESSMENT
OMAR CASTANO is a 59yo Female with right pontine CVA, now with decreased functional mobility, gait instability and ADL impairments.    COMORBIDITES/ACTIVE MEDICAL ISSUES     Gait Instability, ADL impairments and Functional impairments: start Comprehensive Rehab Program of PT/OT     #CVA  -Aspirin, Lipitor continues.  -start PT/OT/SLP  -fall and aspiration precautions  -neuropsychology evaluation, add rec therapy    #HTN  -Norvasc and Lisinopril, Norvasc increased as per hospitalist plan.  -labetalol    #DM2  -Lantus/Humalog, Metformin BID.  -FS ACHS  -consistent carbs diet  -Nutrition eval  -endocrinology eval for A1C 11.9 and recurrent small vessel CVA.    #UTI  -Check voiding, PVR x1.   -Abx as per hospitalist plan    #Costochondritis   -Left chest wall pain with palpation   -Started on 10/5 after she had TTE when pt endorsed to technician pushing too hard   -Tylenol PRN     Pain Mgmt - Tylenol PRN  GI/Bowel Mgmt - Colace, Senna,  Miralax PRN  /Bladder Mgmt - Voiding independently, PVR      Precautions / PROPHYLAXIS:   - Falls, Cardiac  - Ortho: Weight bearing status: WBAT   - Lungs: Aspiration, Incentive Spirometer   - Pressure injury/Skin: Turn Q2hrs while in bed, OOB to Chair, PT/OT    - DVT: Lovenox,    MEDICAL PROGNOSIS: GOOD            REHAB POTENTIAL: GOOD             ESTIMATED DISPOSITION: HOME WITH HOME CARE            ELOS: 10-14 Days   EXPECTED THERAPY:     P.T. 1hr/day       O.T. 1hr/day      S.L.P. 1hr/day     P&O Unnecessary     EXP FREQUENCY: 5 days per 7 day period     PRESCREEN COMPARISION:   I have reviewed the prescreen information and I have found no relevant changes between the preadmission screening and my post admission evaluation     RATIONALE FOR INPATIENT ADMISSION - Patient demonstrates the following: (check all that apply)  [X] Medically appropriate for rehabilitation admission  [X] Has attainable rehab goals with an appropriate initial discharge plan  [X] Has rehabilitation potential (expected to make a significant improvement within a reasonable period of time)   [X] Requires close medical management by a rehab physician, rehab nursing care, Hospitalist and comprehensive interdisciplinary team (including PT, OT, & or SLP, Prosthetics and Orthotics)     OMAR CASTANO is a 59yo Female with right pontine CVA, now with decreased functional mobility, gait instability and ADL impairments.    COMORBIDITES/ACTIVE MEDICAL ISSUES     Gait Instability, ADL impairments and Functional impairments: start Comprehensive Rehab Program of PT/OT     # Right pontine CVA  -Aspirin, Lipitor ( failed Plavix)   -start PT/OT/SLP  -fall and aspiration precautions  -neuropsychology evaluation, add recreational  therapy    #HTN  -Norvasc  -Lisinopril,   -Norvasc increased as per hospitalist plan.  -Labetalol    #DM2  -Lantus/Humalog, Metformin BID.  -FS ACHS  -consistent carbs diet  -Nutrition eval  -endocrinology eval for A1C 11.9 and recurrent small vessel CVA.    #UTI  -Check voiding, PVR x1.   -Abx as per hospitalist plan    #Costochondritis   -Left chest wall pain with palpation   -Started on 10/5 after she had TTE when pt endorsed to technician pushing too hard   -Tylenol PRN     Pain Mgmt - Tylenol PRN  GI/Bowel Mgmt - Colace, Senna,  Miralax PRN  /Bladder Mgmt - Voiding independently, PVR

## 2020-10-07 NOTE — CONSULT NOTE ADULT - SUBJECTIVE AND OBJECTIVE BOX
Pt is a 59 y/o right-handed female wtih PHMx of DM2, HTN, CVA 2017 (s/p loop monitor, on Plavix, residual Right sided weakness) who presented to [facility] ED 10/2 with Left sided numbness with poor balance, also associated nausea, lightheadedness. CTH 10/2 showed no new CVA. MR head 10/3 showed right pontine CVA with associated cytotoxic edema and without hemorrhagic transformation. Multiple additional chronic infarcts and chronic white matter microvascular type changes. CTA H/N 10/2  showed mild right PCA stenosis, otherwise unremarkable. Asprin alone for now in setting of multiple micro-hemorrhages likely related to underlying HTN. Hospitalization complicated by + UTI, on abx therapy. Evaluated by PMR and AR recommended. Cleared for d/c on 10/6. PMHx:   . Current meds: Please see list in Pt’s chart. Social Hx:     Findings: Pt was seen for an initial assessment of his/her cognitive and emotional functioning. MoCA was administered; Pt’s results (/30) were in the range. His/Her scores in mood measures suggested levels of anxiety and depression (GAD7 = /21; PHQ9 = /27). Pt was alert,  Ox3, and cooperative.  Attn/Conc: Simple auditory attention - . Sustained auditory attention - . Concentration - . Working memory for calculations – ( /5 serial 7s).	 Language: Pt’s speech was of  . Naming - . Sentence repetition - . Phonemic fluency - .	Memory: Encoding of 5 words was (/5); delayed verbal recall – (/5, improving to /5 with cueing). LTM was for US presidents (/4, improving to /4 with cueing). Visual memory –. Visuospatial: Visuomotor integration – for copy of a 3D figure, was noted. Executive Functions: Set-shifting - . Organizational skills - . Abstract reasoning - . Initiation - . Self-awareness - . Verbal problem solving – .   Emotional functioning: Affect - 	. Mood - ; Pt reported experiencing . On mood measures s/he additionally reported .  Thought processes were.  No abnormal thought contents were observed.  Pt denied any AH/VH. Pt also denied SI/HI/I/P. Insight - WFL. Judgment - fair.     Pt is a 61 y/o right-handed female wtih PHMx of DM2, HTN, CVA 2017 (s/p loop monitor, on Plavix, residual Right sided weakness) who presented to [facility] ED 10/2 with Left sided numbness with poor balance, also associated nausea, lightheadedness. CTH 10/2 showed no new CVA. MR head 10/3 showed right pontine CVA with associated cytotoxic edema and without hemorrhagic transformation. Multiple additional chronic infarcts and chronic white matter microvascular type changes. CTA H/N 10/2  showed mild right PCA stenosis, otherwise unremarkable. Asprin alone for now in setting of multiple micro-hemorrhages likely related to underlying HTN. Hospitalization complicated by + UTI, on abx therapy. Evaluated by PMR and AR recommended. Cleared for d/c on 10/6. PMHx: As noted above. Current meds: Please see list in Pt’s chart. Social Hx: Pt is  and has seven children. 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 She lacks hx of mental illness and substance abuse. Pt is Restorationism. She enjoys knitting, reading, and participating in Jehovah's witness activities.    Findings: Pt was seen for an initial assessment of her cognitive and emotional functioning. MoCA was administered; Pt’s results (26/30) were in the Normal range. Her scores in mood measures suggested mild levels of anxiety and depression (GAD7 = 4/21; PHQ9 = 8/27). Pt was alert, fully Ox3, and cooperative. Attn/Conc: Simple auditory attention - intact. Sustained auditory attention - closely intact. Concentration - intact. Working memory for calculations – mildly impaired (3/5 serial 7s). Language: Pt’s speech was of normal volume, pitch and pace. Naming - intact. Sentence repetition - mildly impaired. Phonemic fluency - intact. Memory: Encoding of 5 words was intact (5/5); delayed verbal recall – closely intact (4/5, improving to 5/5 with cueing). LTM was intact for US presidents (4/4). Visual memory – intact. Visuospatial: Visuomotor integration – impaired for copy of a 3D figure, rotation and sloppiness were noted. Executive Functions: Set-shifting - intact. Organizational skills - intact. Abstract reasoning - intact. Initiation - intact.  Verbal problem solving – mildly impaired. Emotional functioning: Affect - mildly constricted. Mood - euthymic ("better"); Pt reported experiencing sadness, poor sleep, decreased appetite, low energy and fatigue. On mood measures she additionally reported frequent poor appetite and low self-esteem, and ocasional depressed mood, poor sleep, low energy/fatigue, restlessness, anxiety, worry and difficulty relaxing. Thought processes were goal-directed.  No abnormal thought contents were observed.  Pt denied any AH/VH. Pt also denied SI/HI/I/P. Insight - WFL. Judgment - fair.

## 2020-10-07 NOTE — CONSULT NOTE ADULT - ASSESSMENT
Assessment:    FIM scores: Social Interaction ; Problem Solving ; Memory .  Plan: Individual supportive psychotherapy to monitor cognition, affect/mood, and behavior. Cognitive remediation during speech therapy sessions. Participation in recreation/art therapy in order to have pleasure and mastery experiences and regain/reestablish a sense of routine.     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 She evidenced difficulty with working memory, short-term memory for verbal information and visuospatial skills. Mild difficulties in repetition were also noted. Her affect is mildly constricted and she reports a few anxiety and depression symptoms in response to her current medical status. FIM scores: Social Interaction 5; Problem Solving 6; Memory 6.  Plan: Individual supportive psychotherapy to monitor cognition, affect/mood, and behavior. Cognitive remediation during speech therapy sessions is recommended. Participation in recreation/art therapy in order to have pleasure and mastery experiences and regain/reestablish a sense of routine.

## 2020-10-07 NOTE — DIETITIAN INITIAL EVALUATION ADULT. - PERTINENT MEDS FT
MEDICATIONS  (STANDING):  amLODIPine   Tablet 5 milliGRAM(s) Oral daily  aspirin enteric coated 81 milliGRAM(s) Oral daily  atorvastatin 80 milliGRAM(s) Oral at bedtime  dextrose 5%. 1000 milliLiter(s) (50 mL/Hr) IV Continuous <Continuous>  dextrose 50% Injectable 12.5 Gram(s) IV Push once  dextrose 50% Injectable 25 Gram(s) IV Push once  dextrose 50% Injectable 25 Gram(s) IV Push once  enoxaparin Injectable 40 milliGRAM(s) SubCutaneous daily  insulin glargine Injectable (LANTUS) 18 Unit(s) SubCutaneous at bedtime  insulin lispro (HumaLOG) corrective regimen sliding scale   SubCutaneous three times a day before meals  insulin lispro (HumaLOG) corrective regimen sliding scale   SubCutaneous at bedtime  labetalol 300 milliGRAM(s) Oral three times a day  lisinopril 40 milliGRAM(s) Oral daily  metFORMIN 500 milliGRAM(s) Oral two times a day with meals  nitrofurantoin monohydrate/macrocrystals (MACROBID) 100 milliGRAM(s) Oral two times a day    MEDICATIONS  (PRN):  dextrose 40% Gel 15 Gram(s) Oral once PRN Blood Glucose LESS THAN 70 milliGRAM(s)/deciliter  glucagon  Injectable 1 milliGRAM(s) IntraMuscular once PRN Glucose LESS THAN 70 milligrams/deciliter

## 2020-10-07 NOTE — PROGRESS NOTE ADULT - SUBJECTIVE AND OBJECTIVE BOX
CHIEF COMPLAINT: Impaired balance, impaired gait s/p CVA      HISTORY OF PRESENT ILLNESS   61yo Female c Pmhx DM2, HTN, CVA 2017 (s/p loop monitor, on Plavix, residual Right sided weakness) who presented to [facility] ED 10/2 with Left sided numbness with poor balance, also associated nausea, lightheadedness. CTH 10/2 showed no new CVA. MR head 10/3 showed right pontine CVA with associated cytotoxic edema and without hemorrhagic transformation. Multiple additional chronic infarcts and chronic white matter microvascular type changes. CTA H/N 10/2  showed mild right PCA stenosis otherwise unremarkable. Asprin alone for now in setting of multiple micro-hemorrhages likely related to underlying HTN.    Hospitalization complicated by + UTI, on abx therapy. Evaluated by PMR and AR recommended. Cleared for d/c on 10/6.    (06 Oct 2020 12:15)      PAST MEDICAL & SURGICAL HISTORY:  TIA (transient ischemic attack)    CVA (cerebral vascular accident)    Hypokalemia    IBS (Irritable Bowel Syndrome)    Generalized Headaches    Diabetes Mellitus Type II    Hypertension    No Past Surgical History      VITALS  Vital Signs Last 24 Hrs  T(C): 36.7 (07 Oct 2020 07:54), Max: 36.7 (06 Oct 2020 21:06)  T(F): 98 (07 Oct 2020 07:54), Max: 98.1 (06 Oct 2020 21:06)  HR: 83 (07 Oct 2020 05:13) (83 - 90)  BP: 158/92 (07 Oct 2020 07:54) (158/92 - 187/89)  BP(mean): --  RR: 14 (07 Oct 2020 07:54) (14 - 15)  SpO2: 97% (07 Oct 2020 07:54) (97% - 100%)      PHYSICAL EXAM  Constitutional - NAD, Comfortable  HEENT - NCAT, EOMI  Neck - Supple, No limited ROM  Chest - CTA bilaterally, No wheeze, No rhonchi, No crackles  Cardiovascular - RRR, S1S2, No murmurs  Abdomen - BS+, Soft, NTND  Extremities - No C/C/E, No calf tenderness   Skin-no rash      Neurologic Exam - mild left sided weakness, right foot drop                     Balance - imapired     Psychiatric - depressed       FUNCTIONAL PROGRESS  Gait - eval  ADLs - eval  Transfers - eval  Functional transfer - eval    RECENT LABS                        11.2   5.08  )-----------( 216      ( 07 Oct 2020 05:30 )             35.3     10-07    143  |  106  |  18  ----------------------------<  186<H>  3.5   |  27  |  1.12    Ca    9.2      07 Oct 2020 05:30    TPro  7.1  /  Alb  3.0<L>  /  TBili  0.5  /  DBili  x   /  AST  14  /  ALT  16  /  AlkPhos  75  10-07      LIVER FUNCTIONS - ( 07 Oct 2020 05:30 )  Alb: 3.0 g/dL / Pro: 7.1 g/dL / ALK PHOS: 75 U/L / ALT: 16 U/L / AST: 14 U/L / GGT: x             Direct LDL: 168 mg/dL (10-03-20 @ 12:00)                RADIOLOGY/OTHER RESULTS      CURRENT MEDICATIONS  MEDICATIONS  (STANDING):  amLODIPine   Tablet 5 milliGRAM(s) Oral daily  aspirin enteric coated 81 milliGRAM(s) Oral daily  atorvastatin 80 milliGRAM(s) Oral at bedtime  dextrose 5%. 1000 milliLiter(s) (50 mL/Hr) IV Continuous <Continuous>  dextrose 50% Injectable 12.5 Gram(s) IV Push once  dextrose 50% Injectable 25 Gram(s) IV Push once  dextrose 50% Injectable 25 Gram(s) IV Push once  enoxaparin Injectable 40 milliGRAM(s) SubCutaneous daily  insulin glargine Injectable (LANTUS) 18 Unit(s) SubCutaneous at bedtime  insulin lispro (HumaLOG) corrective regimen sliding scale   SubCutaneous three times a day before meals  insulin lispro (HumaLOG) corrective regimen sliding scale   SubCutaneous at bedtime  labetalol 300 milliGRAM(s) Oral three times a day  lisinopril 40 milliGRAM(s) Oral daily  metFORMIN 500 milliGRAM(s) Oral two times a day with meals  nitrofurantoin monohydrate/macrocrystals (MACROBID) 100 milliGRAM(s) Oral two times a day    MEDICATIONS  (PRN):  dextrose 40% Gel 15 Gram(s) Oral once PRN Blood Glucose LESS THAN 70 milliGRAM(s)/deciliter  glucagon  Injectable 1 milliGRAM(s) IntraMuscular once PRN Glucose LESS THAN 70 milligrams/deciliter      ASSESSMENT & PLAN    GI/Bowel Management - prn   Management - Toilet Q2  Skin - Turn Q2  Pain - Tylenol PRN  DVT PPX - Lovenox  Diet - reg    Continue comprehensive acute rehab program consisting of 3hrs/day of OT/PT and SLP.

## 2020-10-08 LAB
GLUCOSE BLDC GLUCOMTR-MCNC: 138 MG/DL — HIGH (ref 70–99)
GLUCOSE BLDC GLUCOMTR-MCNC: 145 MG/DL — HIGH (ref 70–99)
GLUCOSE BLDC GLUCOMTR-MCNC: 176 MG/DL — HIGH (ref 70–99)
GLUCOSE BLDC GLUCOMTR-MCNC: 213 MG/DL — HIGH (ref 70–99)

## 2020-10-08 PROCEDURE — 99222 1ST HOSP IP/OBS MODERATE 55: CPT

## 2020-10-08 PROCEDURE — 99233 SBSQ HOSP IP/OBS HIGH 50: CPT

## 2020-10-08 PROCEDURE — 99232 SBSQ HOSP IP/OBS MODERATE 35: CPT

## 2020-10-08 RX ORDER — AMLODIPINE BESYLATE 2.5 MG/1
2.5 TABLET ORAL ONCE
Refills: 0 | Status: COMPLETED | OUTPATIENT
Start: 2020-10-08 | End: 2020-10-08

## 2020-10-08 RX ORDER — LOSARTAN POTASSIUM 100 MG/1
25 TABLET, FILM COATED ORAL DAILY
Refills: 0 | Status: DISCONTINUED | OUTPATIENT
Start: 2020-10-08 | End: 2020-10-08

## 2020-10-08 RX ORDER — HYDRALAZINE HCL 50 MG
10 TABLET ORAL THREE TIMES A DAY
Refills: 0 | Status: DISCONTINUED | OUTPATIENT
Start: 2020-10-08 | End: 2020-10-08

## 2020-10-08 RX ORDER — AMLODIPINE BESYLATE 2.5 MG/1
10 TABLET ORAL DAILY
Refills: 0 | Status: DISCONTINUED | OUTPATIENT
Start: 2020-10-09 | End: 2020-10-15

## 2020-10-08 RX ORDER — HYDRALAZINE HCL 50 MG
25 TABLET ORAL THREE TIMES A DAY
Refills: 0 | Status: DISCONTINUED | OUTPATIENT
Start: 2020-10-08 | End: 2020-10-09

## 2020-10-08 RX ADMIN — AMLODIPINE BESYLATE 7.5 MILLIGRAM(S): 2.5 TABLET ORAL at 06:05

## 2020-10-08 RX ADMIN — Medication 0: at 16:25

## 2020-10-08 RX ADMIN — Medication 300 MILLIGRAM(S): at 06:05

## 2020-10-08 RX ADMIN — ATORVASTATIN CALCIUM 80 MILLIGRAM(S): 80 TABLET, FILM COATED ORAL at 21:33

## 2020-10-08 RX ADMIN — Medication 100 MILLIGRAM(S): at 06:05

## 2020-10-08 RX ADMIN — Medication 300 MILLIGRAM(S): at 21:34

## 2020-10-08 RX ADMIN — Medication 81 MILLIGRAM(S): at 12:05

## 2020-10-08 RX ADMIN — Medication 100 MILLIGRAM(S): at 17:26

## 2020-10-08 RX ADMIN — AMLODIPINE BESYLATE 2.5 MILLIGRAM(S): 2.5 TABLET ORAL at 12:07

## 2020-10-08 RX ADMIN — Medication 25 MILLIGRAM(S): at 14:18

## 2020-10-08 RX ADMIN — INSULIN GLARGINE 18 UNIT(S): 100 INJECTION, SOLUTION SUBCUTANEOUS at 21:34

## 2020-10-08 RX ADMIN — Medication 1: at 12:04

## 2020-10-08 RX ADMIN — Medication 300 MILLIGRAM(S): at 14:11

## 2020-10-08 RX ADMIN — Medication 25 MILLIGRAM(S): at 21:33

## 2020-10-08 RX ADMIN — METFORMIN HYDROCHLORIDE 500 MILLIGRAM(S): 850 TABLET ORAL at 17:26

## 2020-10-08 RX ADMIN — Medication 2: at 07:35

## 2020-10-08 RX ADMIN — METFORMIN HYDROCHLORIDE 500 MILLIGRAM(S): 850 TABLET ORAL at 07:34

## 2020-10-08 RX ADMIN — LISINOPRIL 40 MILLIGRAM(S): 2.5 TABLET ORAL at 06:05

## 2020-10-08 RX ADMIN — ENOXAPARIN SODIUM 40 MILLIGRAM(S): 100 INJECTION SUBCUTANEOUS at 12:07

## 2020-10-08 NOTE — CONSULT NOTE ADULT - ATTENDING COMMENTS
hypertension  previously patient was on a diuretic but apparently had some paradoxical leg swelling. in th interim would consider increasing dose of labetalol to outpatient dosing for patient. i.e. 400 tid. maintain ACE lisinopril  along with low dose of hydralazine 25 tid ? tolerant. along with amlodipine 5-10 mg per day.

## 2020-10-08 NOTE — CONSULT NOTE ADULT - SUBJECTIVE AND OBJECTIVE BOX
Chief Complaint:   h1yo Female c Pmhx DM2, HTN, CVA 2017 (s/p loop monitor, on Plavix, residual Right sided weakness) who presented to [facility] ED 10/2 with Left sided numbness with poor balance, also associated nausea, lightheadedness. CTH 10/2 showed no new CVA. MR head 10/3 showed right pontine CVA with associated cytotoxic edema and without hemorrhagic transformation. Multiple additional chronic infarcts and chronic white matter microvascular type changes. CTA H/N 10/2  showed mild right PCA stenosis otherwise unremarkable. Asprin alone for now in setting of multiple micro-hemorrhages likely related to underlying HTN.  Hospitalization complicated by + UTI, on abx therapy. Evaluated by PMR and AR recommended. Cleared for d/c on 10/6.  cardio eval requested for eval of hyperetniosn     HPI:    PMH:   TIA (transient ischemic attack)    CVA (cerebral vascular accident)    Hypokalemia    Diabetes Mellitus    Personal History of Hypertension    IBS (Irritable Bowel Syndrome)    Generalized Headaches    Diabetes Mellitus Type II    Hypertension      PSH:   History of total knee replacement, right    No Past Surgical History      Family History:  FAMILY HISTORY:  Family history of prostate cancer (Father)    Family history of acute myocardial infarction (Father)        Social History:  Smoking:  Alcohol:  Drugs:    Allergies:  lactose (Diarrhea)  sulfa drugs (Angioedema; Swelling)  sulfa drugs (Rash)  Sulfac 10% (Unknown)  walnut (Urticaria)      Medications:  aspirin enteric coated 81 milliGRAM(s) Oral daily  atorvastatin 80 milliGRAM(s) Oral at bedtime  dextrose 40% Gel 15 Gram(s) Oral once PRN  dextrose 5%. 1000 milliLiter(s) IV Continuous <Continuous>  dextrose 50% Injectable 12.5 Gram(s) IV Push once  dextrose 50% Injectable 25 Gram(s) IV Push once  dextrose 50% Injectable 25 Gram(s) IV Push once  enoxaparin Injectable 40 milliGRAM(s) SubCutaneous daily  glucagon  Injectable 1 milliGRAM(s) IntraMuscular once PRN  hydrALAZINE 25 milliGRAM(s) Oral three times a day  insulin glargine Injectable (LANTUS) 18 Unit(s) SubCutaneous at bedtime  insulin lispro (HumaLOG) corrective regimen sliding scale   SubCutaneous three times a day before meals  insulin lispro (HumaLOG) corrective regimen sliding scale   SubCutaneous at bedtime  labetalol 300 milliGRAM(s) Oral three times a day  lisinopril 40 milliGRAM(s) Oral daily  metFORMIN 500 milliGRAM(s) Oral two times a day with meals  nitrofurantoin monohydrate/macrocrystals (MACROBID) 100 milliGRAM(s) Oral two times a day      REVIEW OF SYSTEMS:  CONSTITUTIONAL: No fever, weight loss, or fatigue  EYES: No eye pain, visual disturbances, or discharge  ENMT:  No difficulty hearing, tinnitus, vertigo; No sinus or throat pain  NECK: No pain or stiffness  BREASTS: No pain, masses, or nipple discharge  RESPIRATORY: No cough, wheezing, chills or hemoptysis; No shortness of breath  CARDIOVASCULAR: No chest pain, palpitations, dizziness, or leg swelling  GASTROINTESTINAL: No abdominal or epigastric pain. No nausea, vomiting, or hematemesis; No diarrhea or constipation. No melena or hematochezia.  GENITOURINARY: No dysuria, frequency, hematuria, or incontinence  NEUROLOGICAL: No headaches, memory loss, loss of strength, numbness, or tremors  SKIN: No itching, burning, rashes, or lesions   LYMPH NODES: No enlarged glands  ENDOCRINE: No heat or cold intolerance; No hair loss  MUSCULOSKELETAL: No joint pain or swelling; No muscle, back, or extremity pain  PSYCHIATRIC: No depression, anxiety, mood swings, or difficulty sleeping  HEME/LYMPH: No easy bruising, or bleeding gums  ALLERY AND IMMUNOLOGIC: No hives or eczema    Physical Exam:  T(C): 36.4 (10-08-20 @ 20:26), Max: 36.8 (10-08-20 @ 07:29)  HR: 68 (10-08-20 @ 20:26) (68 - 81)  BP: 149/90 (10-08-20 @ 20:26) (149/90 - 174/97)  RR: 14 (10-08-20 @ 20:26) (14 - 14)  SpO2: 100% (10-08-20 @ 20:26) (99% - 100%)  Wt(kg): --    GENERAL: NAD, well-groomed, well-developed  HEAD:  Atraumatic, Normocephalic  EYES: EOMI, conjunctiva and sclera clear  ENT: Moist mucous membranes,  NECK: Supple, No JVD, no bruits  CHEST/LUNG: Clear to percussion bilaterally; No rales, rhonchi, wheezing, or rubs  HEART: Regular rate and rhythm; No murmurs, rubs, or gallops PMI non displaced.  ABDOMEN: Soft, Nontender, Nondistended; Bowel sounds present  EXTREMITIES:  2+ Peripheral Pulses, No clubbing, cyanosis, or edema  SKIN: No rashes or lesions  NERVOUS SYSTEM:  Cranial Nerves II-XII intact     Cardiovascular Diagnostic Testing:  ECG:    < from: 12 Lead ECG (10.04.20 @ 20:10) >  Diagnosis Line NORMAL SINUS RHYTHM  POOR R WAVE PROGRESSION  WHEN COMPARED WITH ECG OF 02-OCT-2020 19:43, (UNCONFIRMED)  NO  SIGNIFICANT CHANGES HAVE OCCURRED  Confirmed by MD JOY, MANUELA ([id number]) on 10/5/2020 6:56:39 AM    < end of copied text >      ECHO:    e< from: Transthoracic Echocardiogram (10.05.20 @ 10:08) >  1. Mild mitral annular calcification, otherwise normal  mitral valve. Minimal mitral regurgitation.  2. Calcified trileaflet aortic valve with normal opening.  Minimal aortic regurgitation.  3. Concentric left ventricular hypertrophy.  4. Normal left ventricular systolic function. No segmental  wall motion abnormalities.  5. Normal right ventricular size and function.  *** Compared with echocardiogram of 7/28/2016, results are  similar on today's study.  ------------------------------------------------------------------------  Confirmed on  10/5/2020 - 12:39:53 by Radha Tsai M.D.    < end of copied text >      Labs:                        11.2   5.08  )-----------( 216      ( 07 Oct 2020 05:30 )             35.3     10-07    143  |  106  |  18  ----------------------------<  186<H>  3.5   |  27  |  1.12    Ca    9.2      07 Oct 2020 05:30    TPro  7.1  /  Alb  3.0<L>  /  TBili  0.5  /  DBili  x   /  AST  14  /  ALT  16  /  AlkPhos  75  10-07      Thyroid Stimulating Hormone, Serum: 0.46 uIU/mL (10-03 @ 11:16)      Imaging:    < from: Xray Chest 1 View- PORTABLE-Routine (Xray Chest 1 View- PORTABLE-Routine .) (10.04.20 @ 17:05) >  INTERPRETATION:  A single chest x-ray was obtained on October 4, 2020.    Indication: CVA. Rule out pneumonia.    Impression:    The heart is normal in size. The Lungs are clear. No pleural effusion. No pneumothorax. Degenerative changes of the thoracic spine.        MARC SCHWARTZ M.D., ATTENDING RADIOLOGIST  This document has been electronically signed. Oct  4 2020  8:07PM    < end of copied text >

## 2020-10-08 NOTE — PROGRESS NOTE ADULT - SUBJECTIVE AND OBJECTIVE BOX
seen and examined at bedside     watching "Srinivas" this am on her ipad    ros all others are neg   no chest pain   no sob   no fc   no nv     ICU Vital Signs Last 24 Hrs  T(C): 36.8 (08 Oct 2020 07:29), Max: 36.8 (08 Oct 2020 07:29)  T(F): 98.3 (08 Oct 2020 07:29), Max: 98.3 (08 Oct 2020 07:29)  HR: 79 (08 Oct 2020 07:29) (79 - 84)  BP: 174/97 (08 Oct 2020 07:29) (154/93 - 182/87)  RR: 14 (08 Oct 2020 07:29) (14 - 14)  SpO2: 99% (08 Oct 2020 07:29) (97% - 99%)                          11.2   5.08  )-----------( 216      ( 07 Oct 2020 05:30 )             35.3   10-07    143  |  106  |  18  ----------------------------<  186<H>  3.5   |  27  |  1.12    Ca    9.2      07 Oct 2020 05:30    TPro  7.1  /  Alb  3.0<L>  /  TBili  0.5  /  DBili  x   /  AST  14  /  ALT  16  /  AlkPhos  75  10-07    Home Medications:  Aspirin Low Dose 81 mg oral tablet, chewable: 1 tab(s) orally once a day (06 Oct 2020 13:09)  atorvastatin 80 mg oral tablet: 1 tab(s) orally once a day (at bedtime) (06 Oct 2020 08:43)  hydrALAZINE 50 mg oral tablet: 1 tab(s) orally 3 times a day (31 Oct 2017 13:27)  labetalol 300 mg oral tablet: 1 tab(s) orally 3 times a day (06 Oct 2020 13:12)  lisinopril 40 mg oral tablet: 1 tab(s) orally once a day (06 Oct 2020 08:43)  metFORMIN 500 mg oral tablet: 1 tab(s) orally 2 times a day (31 Oct 2017 13:27)  nitrofurantoin macrocrystals-monohydrate 100 mg oral capsule: 1 cap(s) orally 2 times a day (06 Oct 2020 13:20)  Norvasc 5 mg oral tablet: 1 tab(s) orally once a day (06 Oct 2020 13:09)    MEDICATIONS  (STANDING):  aspirin enteric coated 81 milliGRAM(s) Oral daily  atorvastatin 80 milliGRAM(s) Oral at bedtime  dextrose 5%. 1000 milliLiter(s) (50 mL/Hr) IV Continuous <Continuous>  dextrose 50% Injectable 12.5 Gram(s) IV Push once  dextrose 50% Injectable 25 Gram(s) IV Push once  dextrose 50% Injectable 25 Gram(s) IV Push once  enoxaparin Injectable 40 milliGRAM(s) SubCutaneous daily  hydrALAZINE 10 milliGRAM(s) Oral three times a day  insulin glargine Injectable (LANTUS) 18 Unit(s) SubCutaneous at bedtime  insulin lispro (HumaLOG) corrective regimen sliding scale   SubCutaneous three times a day before meals  insulin lispro (HumaLOG) corrective regimen sliding scale   SubCutaneous at bedtime  labetalol 300 milliGRAM(s) Oral three times a day  lisinopril 40 milliGRAM(s) Oral daily  metFORMIN 500 milliGRAM(s) Oral two times a day with meals  nitrofurantoin monohydrate/macrocrystals (MACROBID) 100 milliGRAM(s) Oral two times a day    MEDICATIONS  (PRN):  dextrose 40% Gel 15 Gram(s) Oral once PRN Blood Glucose LESS THAN 70 milliGRAM(s)/deciliter  glucagon  Injectable 1 milliGRAM(s) IntraMuscular once PRN Glucose LESS THAN 70 milligrams/deciliter

## 2020-10-08 NOTE — PROGRESS NOTE ADULT - ASSESSMENT
60 Female with right pontine CVA high CAD risk factors     Admitted for post CVA gait abn medical deconditioning     #CVA  -Aspirin, Lipitor  BP control, place her on her home bp medication regimen   -start PT/OT/SLP  -fall and aspiration precautions    #Essential HTN uncontrolled as above   home regimen, may need to uptitrate the labetalol, and or HDN     IDDM2 hbq1c 11.9   continue the current lantus ,prandial   RISS   endo team seen as outlined     #UTI resolved (not septic)   -check voiding, PVR x1  continue current dc abx to complete 7 days   check urine cx on fu       #Costochondritis on off (resolved)   -Left chest wall pain with palpation   ddx cad risk   monitor   cad risk rx     Reviewed w patient and IDT, rehab plan of care   VTE proph with lovenox   Full Code

## 2020-10-08 NOTE — PROGRESS NOTE ADULT - SUBJECTIVE AND OBJECTIVE BOX
CHIEF COMPLAINT: Improving left sided weakness. Elevated BPs.       HISTORY OF PRESENT ILLNESS   61yo Female c Pmhx DM2, HTN, CVA 2017 (s/p loop monitor, on Plavix, residual Right sided weakness) who presented to [facility] ED 10/2 with Left sided numbness with poor balance, also associated nausea, lightheadedness. CTH 10/2 showed no new CVA. MR head 10/3 showed right pontine CVA with associated cytotoxic edema and without hemorrhagic transformation. Multiple additional chronic infarcts and chronic white matter microvascular type changes. CTA H/N 10/2  showed mild right PCA stenosis otherwise unremarkable. Asprin alone for now in setting of multiple micro-hemorrhages likely related to underlying HTN.    Hospitalization complicated by + UTI, on abx therapy. Evaluated by PMR and AR recommended. Cleared for d/c on 10/6.       PAST MEDICAL & SURGICAL HISTORY:  TIA (transient ischemic attack)    CVA (cerebral vascular accident)    Hypokalemia    IBS (Irritable Bowel Syndrome)    Generalized Headaches    Diabetes Mellitus Type II    Hypertension    No Past Surgical History    x -        REVIEW OF SYMPTOMS  [X] Constitutional WNL     [X] Cardio WNL            [X] Resp WNL           [X] GI WNL                          [X]  WNL                   [X] Heme WNL              [X] Endo WNL                     [X] Skin WNL                 [X] MSK WNL            [X] Neuro WNL                   [X] Cognitive WNL        [X] Psych WNL      VITALS  Vital Signs Last 24 Hrs  T(C): 36.8 (08 Oct 2020 07:29), Max: 36.8 (08 Oct 2020 07:29)  T(F): 98.3 (08 Oct 2020 07:29), Max: 98.3 (08 Oct 2020 07:29)  HR: 79 (08 Oct 2020 07:29) (79 - 84)  BP: 174/97 (08 Oct 2020 07:29) (154/93 - 182/87)  BP(mean): --  RR: 14 (08 Oct 2020 07:29) (14 - 14)  SpO2: 99% (08 Oct 2020 07:29) (97% - 99%)      PHYSICAL EXAM  Constitutional - NAD, Comfortable  HEENT - NCAT, EOMI  Neck - Supple, No limited ROM  Chest - CTA bilaterally, No wheeze, No rhonchi, No crackles  Cardiovascular - RRR, S1S2, No murmurs  Abdomen - BS+, Soft, NTND  Extremities - No C/C/E, No calf tenderness   Skin-no rash  Woumds-      Neurologic Exam -                   HIF  - Awake, Alert, AAO to self, place, date, year, situation      No aphasia, normal attention, normal concentration, no apraxia, agnosia     Cranial Nerves - CN 2-12 intact     Motor - No focal deficits                            Sensory - Intact to LT,PP,Temprature,JPS, vibration                      Cortical sensory modalities intact     Reflexes - DTR Intact     Toes are flexor     Coordination/dysmetria - FTN intact             Balance - WNL Static and dynamic     Psychiatric - Mood stable, Affect WNL         FUNCTIONAL PROGRESS  Gait -   ADLs -   Transfers -  Functional transfer -     RECENT LABS                        11.2   5.08  )-----------( 216      ( 07 Oct 2020 05:30 )             35.3     10-07    143  |  106  |  18  ----------------------------<  186<H>  3.5   |  27  |  1.12    Ca    9.2      07 Oct 2020 05:30    TPro  7.1  /  Alb  3.0<L>  /  TBili  0.5  /  DBili  x   /  AST  14  /  ALT  16  /  AlkPhos  75  10-07      LIVER FUNCTIONS - ( 07 Oct 2020 05:30 )  Alb: 3.0 g/dL / Pro: 7.1 g/dL / ALK PHOS: 75 U/L / ALT: 16 U/L / AST: 14 U/L / GGT: x             Direct LDL: 168 mg/dL (10-03-20 @ 12:00)                RADIOLOGY/OTHER RESULTS      CURRENT MEDICATIONS  MEDICATIONS  (STANDING):  amLODIPine   Tablet 2.5 milliGRAM(s) Oral once  aspirin enteric coated 81 milliGRAM(s) Oral daily  atorvastatin 80 milliGRAM(s) Oral at bedtime  dextrose 5%. 1000 milliLiter(s) (50 mL/Hr) IV Continuous <Continuous>  dextrose 50% Injectable 12.5 Gram(s) IV Push once  dextrose 50% Injectable 25 Gram(s) IV Push once  dextrose 50% Injectable 25 Gram(s) IV Push once  enoxaparin Injectable 40 milliGRAM(s) SubCutaneous daily  insulin glargine Injectable (LANTUS) 18 Unit(s) SubCutaneous at bedtime  insulin lispro (HumaLOG) corrective regimen sliding scale   SubCutaneous three times a day before meals  insulin lispro (HumaLOG) corrective regimen sliding scale   SubCutaneous at bedtime  labetalol 300 milliGRAM(s) Oral three times a day  lisinopril 40 milliGRAM(s) Oral daily  metFORMIN 500 milliGRAM(s) Oral two times a day with meals  nitrofurantoin monohydrate/macrocrystals (MACROBID) 100 milliGRAM(s) Oral two times a day    MEDICATIONS  (PRN):  dextrose 40% Gel 15 Gram(s) Oral once PRN Blood Glucose LESS THAN 70 milliGRAM(s)/deciliter  glucagon  Injectable 1 milliGRAM(s) IntraMuscular once PRN Glucose LESS THAN 70 milligrams/deciliter      ASSESSMENT & PLAN          GI/Bowel Management - Dulcolax PRN, Fleet PRN   Management - Toilet Q2  Skin - Turn Q2  Pain - Tylenol PRN  DVT PPX - TEDs, SCDs  Diet -     Continue comprehensive acute rehab program consisting of 3hrs/day of OT/PT and SLP. CHIEF COMPLAINT: Improving left sided weakness. Elevated BPs.       HISTORY OF PRESENT ILLNESS   59yo Female c Pmhx DM2, HTN, CVA 2017 (s/p loop monitor, on Plavix, residual Right sided weakness) who presented to [facility] ED 10/2 with Left sided numbness with poor balance, also associated nausea, lightheadedness. CTH 10/2 showed no new CVA. MR head 10/3 showed right pontine CVA with associated cytotoxic edema and without hemorrhagic transformation. Multiple additional chronic infarcts and chronic white matter microvascular type changes. CTA H/N 10/2  showed mild right PCA stenosis otherwise unremarkable. Asprin alone for now in setting of multiple micro-hemorrhages likely related to underlying HTN.    Hospitalization complicated by + UTI, on abx therapy. Evaluated by PMR and AR recommended. Cleared for d/c on 10/6.       PAST MEDICAL & SURGICAL HISTORY:  TIA (transient ischemic attack)    CVA (cerebral vascular accident)    Hypokalemia    IBS (Irritable Bowel Syndrome)    Generalized Headaches    Diabetes Mellitus Type II    Hypertension    No Past Surgical History      VITALS  Vital Signs Last 24 Hrs  T(C): 36.8 (08 Oct 2020 07:29), Max: 36.8 (08 Oct 2020 07:29)  T(F): 98.3 (08 Oct 2020 07:29), Max: 98.3 (08 Oct 2020 07:29)  HR: 79 (08 Oct 2020 07:29) (79 - 84)  BP: 174/97 (08 Oct 2020 07:29) (154/93 - 182/87)  BP(mean): --  RR: 14 (08 Oct 2020 07:29) (14 - 14)  SpO2: 99% (08 Oct 2020 07:29) (97% - 99%)      PHYSICAL EXAM  Constitutional - NAD, Comfortable  HEENT - NCAT, EOMI  Neck - Supple, No limited ROM  Chest - CTA bilaterally, No wheeze, No rhonchi, No crackles  Cardiovascular - RRR, S1S2, No murmurs  Abdomen - BS+, Soft, NTND  Extremities - No C/C/E, No calf tenderness   Skin-no rash      Neurologic Exam - mild left sided weakness, right foot drop                     Balance - imapired     Psychiatric - depressed       RECENT LABS                        11.2   5.08  )-----------( 216      ( 07 Oct 2020 05:30 )             35.3     10-07    143  |  106  |  18  ----------------------------<  186<H>  3.5   |  27  |  1.12    Ca    9.2      07 Oct 2020 05:30    TPro  7.1  /  Alb  3.0<L>  /  TBili  0.5  /  DBili  x   /  AST  14  /  ALT  16  /  AlkPhos  75  10-07      LIVER FUNCTIONS - ( 07 Oct 2020 05:30 )  Alb: 3.0 g/dL / Pro: 7.1 g/dL / ALK PHOS: 75 U/L / ALT: 16 U/L / AST: 14 U/L / GGT: x             Direct LDL: 168 mg/dL (10-03-20 @ 12:00)                RADIOLOGY/OTHER RESULTS      CURRENT MEDICATIONS  MEDICATIONS  (STANDING):  amLODIPine   Tablet 2.5 milliGRAM(s) Oral once  aspirin enteric coated 81 milliGRAM(s) Oral daily  atorvastatin 80 milliGRAM(s) Oral at bedtime  dextrose 5%. 1000 milliLiter(s) (50 mL/Hr) IV Continuous <Continuous>  dextrose 50% Injectable 12.5 Gram(s) IV Push once  dextrose 50% Injectable 25 Gram(s) IV Push once  dextrose 50% Injectable 25 Gram(s) IV Push once  enoxaparin Injectable 40 milliGRAM(s) SubCutaneous daily  insulin glargine Injectable (LANTUS) 18 Unit(s) SubCutaneous at bedtime  insulin lispro (HumaLOG) corrective regimen sliding scale   SubCutaneous three times a day before meals  insulin lispro (HumaLOG) corrective regimen sliding scale   SubCutaneous at bedtime  labetalol 300 milliGRAM(s) Oral three times a day  lisinopril 40 milliGRAM(s) Oral daily  metFORMIN 500 milliGRAM(s) Oral two times a day with meals  nitrofurantoin monohydrate/macrocrystals (MACROBID) 100 milliGRAM(s) Oral two times a day    MEDICATIONS  (PRN):  dextrose 40% Gel 15 Gram(s) Oral once PRN Blood Glucose LESS THAN 70 milliGRAM(s)/deciliter  glucagon  Injectable 1 milliGRAM(s) IntraMuscular once PRN Glucose LESS THAN 70 milligrams/deciliter      ASSESSMENT & PLAN    GI/Bowel Management - prn   Management - Toilet Q2  Skin - Turn Q2  Pain - Tylenol PRN  DVT PPX - Lovenox  Diet - reg    Continue comprehensive acute rehab program consisting of 3hrs/day of OT/PT and SLP.

## 2020-10-08 NOTE — CONSULT NOTE ADULT - ASSESSMENT
60 yr. old female with uncontrolled T2 Dm, Thyroid goitre, clinically Euthyroid, HTN, L hemiparesis  Suggest: continue Basal/ bolus insulin now and adjust as needed.  On discharge: add SGLT2 inhibitors like Jardiance 10mg po daily .  TFT with thyroid antibodies level now.  Thyroid sono after discharge to be followed by her Physician.  Advised Pt. to be compliance with her medications, SMBG, diet, follow up with her Doctor.

## 2020-10-08 NOTE — PROGRESS NOTE ADULT - ASSESSMENT
OMAR CASTANO is a 59yo Female with right pontine CVA, now with decreased functional mobility, gait instability and ADL impairments.    COMORBIDITES/ACTIVE MEDICAL ISSUES     Gait Instability, ADL impairments and Functional impairments: continue Comprehensive Rehab Program of PT/OT     # Right pontine CVA  -Aspirin, Lipitor ( failed Plavix)   -continue PT/OT/SLP. SLp decreased to 30min. Increase PT  -fall and aspiration precautions  -neuropsychology evaluation, add recreational  therapy    #HTN  -Norvasc increased to 10mg from 7.5mg for BPs 170s/100s in therapy.   -Lisinopril continues   -Labetalol continues.  -hospitalist following    #DM2  -Lantus/Humalog, Metformin BID.  -FS ACHS  -consistent carbs diet  -Nutrition eval  -endocrinology eval for A1C 11.9 and recurrent small vessel CVA. Plan Lantus/jardiance 10mg home.     #UTI  -Check voiding, PVR x1-low  -Abx as per hospitalist plan    #goiter  -endocrne in, thyroid work up entered as per endocrine request      #Costochondritis   -Left chest wall pain with palpation   -Started on 10/5 after she had TTE when pt endorsed to technician pushing too hard   -Tylenol PRN     Pain Mgmt - Tylenol PRN  GI/Bowel Mgmt - Colace, Senna,  Miralax PRN  /Bladder Mgmt - Voiding independently, PVR

## 2020-10-08 NOTE — CONSULT NOTE ADULT - SUBJECTIVE AND OBJECTIVE BOX
Pt. examined and consult dictated.  60 yrs. old female with new onset of acute CVA with L hemiparesis, HTN, Uncontrolled T2 DM with A1c 12.9  Pt. has Thyroid nodular goitre also

## 2020-10-09 LAB
ALBUMIN SERPL ELPH-MCNC: 3.1 G/DL — LOW (ref 3.3–5)
ALP SERPL-CCNC: 76 U/L — SIGNIFICANT CHANGE UP (ref 40–120)
ALT FLD-CCNC: 18 U/L — SIGNIFICANT CHANGE UP (ref 10–45)
ANION GAP SERPL CALC-SCNC: 8 MMOL/L — SIGNIFICANT CHANGE UP (ref 5–17)
AST SERPL-CCNC: 18 U/L — SIGNIFICANT CHANGE UP (ref 10–40)
BILIRUB SERPL-MCNC: 0.5 MG/DL — SIGNIFICANT CHANGE UP (ref 0.2–1.2)
BUN SERPL-MCNC: 20 MG/DL — SIGNIFICANT CHANGE UP (ref 7–23)
CALCIUM SERPL-MCNC: 9 MG/DL — SIGNIFICANT CHANGE UP (ref 8.4–10.5)
CHLORIDE SERPL-SCNC: 104 MMOL/L — SIGNIFICANT CHANGE UP (ref 96–108)
CO2 SERPL-SCNC: 27 MMOL/L — SIGNIFICANT CHANGE UP (ref 22–31)
CREAT SERPL-MCNC: 1.13 MG/DL — SIGNIFICANT CHANGE UP (ref 0.5–1.3)
GLUCOSE BLDC GLUCOMTR-MCNC: 112 MG/DL — HIGH (ref 70–99)
GLUCOSE BLDC GLUCOMTR-MCNC: 136 MG/DL — HIGH (ref 70–99)
GLUCOSE BLDC GLUCOMTR-MCNC: 157 MG/DL — HIGH (ref 70–99)
GLUCOSE BLDC GLUCOMTR-MCNC: 174 MG/DL — HIGH (ref 70–99)
GLUCOSE SERPL-MCNC: 169 MG/DL — HIGH (ref 70–99)
HCT VFR BLD CALC: 32.9 % — LOW (ref 34.5–45)
HGB BLD-MCNC: 10.8 G/DL — LOW (ref 11.5–15.5)
MCHC RBC-ENTMCNC: 27 PG — SIGNIFICANT CHANGE UP (ref 27–34)
MCHC RBC-ENTMCNC: 32.8 GM/DL — SIGNIFICANT CHANGE UP (ref 32–36)
MCV RBC AUTO: 82.3 FL — SIGNIFICANT CHANGE UP (ref 80–100)
NRBC # BLD: 0 /100 WBCS — SIGNIFICANT CHANGE UP (ref 0–0)
PLATELET # BLD AUTO: 211 K/UL — SIGNIFICANT CHANGE UP (ref 150–400)
POTASSIUM SERPL-MCNC: 3.5 MMOL/L — SIGNIFICANT CHANGE UP (ref 3.5–5.3)
POTASSIUM SERPL-SCNC: 3.5 MMOL/L — SIGNIFICANT CHANGE UP (ref 3.5–5.3)
PROT SERPL-MCNC: 7.2 G/DL — SIGNIFICANT CHANGE UP (ref 6–8.3)
RBC # BLD: 4 M/UL — SIGNIFICANT CHANGE UP (ref 3.8–5.2)
RBC # FLD: 13.1 % — SIGNIFICANT CHANGE UP (ref 10.3–14.5)
SODIUM SERPL-SCNC: 139 MMOL/L — SIGNIFICANT CHANGE UP (ref 135–145)
T3 SERPL-MCNC: 96 NG/DL — SIGNIFICANT CHANGE UP (ref 80–200)
T4 AB SER-ACNC: 8.6 UG/DL — SIGNIFICANT CHANGE UP (ref 4.6–12)
THYROPEROXIDASE AB SERPL-ACNC: <10 IU/ML — SIGNIFICANT CHANGE UP
TSH SERPL-MCNC: 0.59 UIU/ML — SIGNIFICANT CHANGE UP (ref 0.27–4.2)
VIT B1 SERPL-MCNC: 79 NMOL/L — SIGNIFICANT CHANGE UP (ref 66.5–200)
WBC # BLD: 5.47 K/UL — SIGNIFICANT CHANGE UP (ref 3.8–10.5)
WBC # FLD AUTO: 5.47 K/UL — SIGNIFICANT CHANGE UP (ref 3.8–10.5)

## 2020-10-09 PROCEDURE — 99232 SBSQ HOSP IP/OBS MODERATE 35: CPT

## 2020-10-09 PROCEDURE — 99233 SBSQ HOSP IP/OBS HIGH 50: CPT

## 2020-10-09 RX ORDER — HYDRALAZINE HCL 50 MG
25 TABLET ORAL ONCE
Refills: 0 | Status: COMPLETED | OUTPATIENT
Start: 2020-10-09 | End: 2020-10-09

## 2020-10-09 RX ORDER — LABETALOL HCL 100 MG
400 TABLET ORAL THREE TIMES A DAY
Refills: 0 | Status: DISCONTINUED | OUTPATIENT
Start: 2020-10-09 | End: 2020-10-15

## 2020-10-09 RX ORDER — HYDRALAZINE HCL 50 MG
50 TABLET ORAL THREE TIMES A DAY
Refills: 0 | Status: DISCONTINUED | OUTPATIENT
Start: 2020-10-09 | End: 2020-10-13

## 2020-10-09 RX ADMIN — Medication 25 MILLIGRAM(S): at 13:22

## 2020-10-09 RX ADMIN — AMLODIPINE BESYLATE 10 MILLIGRAM(S): 2.5 TABLET ORAL at 05:35

## 2020-10-09 RX ADMIN — INSULIN GLARGINE 18 UNIT(S): 100 INJECTION, SOLUTION SUBCUTANEOUS at 21:44

## 2020-10-09 RX ADMIN — Medication 81 MILLIGRAM(S): at 11:55

## 2020-10-09 RX ADMIN — Medication 400 MILLIGRAM(S): at 21:35

## 2020-10-09 RX ADMIN — Medication 1: at 08:17

## 2020-10-09 RX ADMIN — LISINOPRIL 40 MILLIGRAM(S): 2.5 TABLET ORAL at 05:35

## 2020-10-09 RX ADMIN — ATORVASTATIN CALCIUM 80 MILLIGRAM(S): 80 TABLET, FILM COATED ORAL at 21:37

## 2020-10-09 RX ADMIN — Medication 25 MILLIGRAM(S): at 05:35

## 2020-10-09 RX ADMIN — METFORMIN HYDROCHLORIDE 500 MILLIGRAM(S): 850 TABLET ORAL at 17:18

## 2020-10-09 RX ADMIN — Medication 1: at 11:56

## 2020-10-09 RX ADMIN — Medication 300 MILLIGRAM(S): at 05:35

## 2020-10-09 RX ADMIN — ENOXAPARIN SODIUM 40 MILLIGRAM(S): 100 INJECTION SUBCUTANEOUS at 11:55

## 2020-10-09 RX ADMIN — Medication 100 MILLIGRAM(S): at 05:35

## 2020-10-09 RX ADMIN — Medication 50 MILLIGRAM(S): at 21:36

## 2020-10-09 RX ADMIN — Medication 100 MILLIGRAM(S): at 17:18

## 2020-10-09 RX ADMIN — Medication 400 MILLIGRAM(S): at 13:22

## 2020-10-09 RX ADMIN — METFORMIN HYDROCHLORIDE 500 MILLIGRAM(S): 850 TABLET ORAL at 08:17

## 2020-10-09 RX ADMIN — Medication 25 MILLIGRAM(S): at 15:26

## 2020-10-09 NOTE — CONSULT NOTE ADULT - SUBJECTIVE AND OBJECTIVE BOX
DIABETES MANAGEMENT SERVICE INITIAL CONSULT     Information obtained from: patient, chart review    HPI: 61 y/o female with PMHx significant for Type 2 diabetes, HTN, CVA 2017 (s/p loop monitor, on Plavix, residual Right sided weakness) who presented to [facility] ED 10/2 with Left sided numbness with poor balance, also associated nausea, lightheadedness. CTH 10/2 showed no new CVA. MR head 10/3 showed right pontine CVA with associated cytotoxic edema and without hemorrhagic transformation. Multiple additional chronic infarcts and chronic white matter microvascular type changes. CTA H/N 10/2  showed mild right PCA stenosis otherwise unremarkable. Asprin alone for now in setting of multiple micro-hemorrhages likely related to underlying HTN.  Hospital course c/b positive UTI, on Abx therapy.     Current hospital course is significant for mild hyperglycemia with max fingerstick of 213 mg/dL.  Admitted to Rehab s/p  right pontine CVA      Diabetes History:  Mrs. Rehana Richardson, stated she had gestational diabetes in 1998 and was diagnosed with type 2 diabetes 2003, she was initial started on orals (Metformin) and 4-5 years ago transition to a insulin regiment.  Patient reports poor control as an outpatient. Most recent A1c is 11.9% from 12.9%.  Most recent home antihyperglycemic includes Novolog 5 units TID, Levemir 15 unit QHS. Home total daily dose of insulin was 30 units. According to patient she ran out of novolog and have not taking pre-meal insulin until 3 weeks ago.   Patient checks blood sugars reports that fingersticks run between 120 mg/dL and 370's. Patient reports no hypoglycemia but report hypoglycemic symptoms when blood glucose is less than 120 mg/dL. No admission for hypo/hyperglycemia or DKA per patient.  Patient report polyuria, and polydipsia prior to admission. Denies polyphagia, or vision changes. Patient reports no weight changes. Patient follows a  vegetarian diet. However, reports she drinks soda and juice and eats a lot of junk food. Plan to modify her diet to exclude soda and junk foods.      Known  history of microvascular complications including diabetic neuropathy dines retinopathy.  Known history of  macrovascular complications including CVA with right side residual.   Patient currently receives DM care with her PCP in the [facility], and was also followed by an Endocrinologist at [facility] in [place], however has not been seen in over one year.      Steroid therapy:  None    Outpatient Endocrinologist?   in the past     Last Ophthalmology visit: > 1 year ago    Last Podiatry visit:  Never (pt will make appointment)    Occupation:  Parent coordinator (School)    Current Endocrine Meds:   atorvastatin 80 milliGRAM(s) Oral at bedtime  dextrose 40% Gel 15 Gram(s) Oral once PRN  dextrose 50% Injectable 12.5 Gram(s) IV Push once  dextrose 50% Injectable 25 Gram(s) IV Push once  dextrose 50% Injectable 25 Gram(s) IV Push once  glucagon  Injectable 1 milliGRAM(s) IntraMuscular once PRN  insulin glargine Injectable (LANTUS) 18 Unit(s) SubCutaneous at bedtime  insulin lispro (HumaLOG) corrective regimen sliding scale   SubCutaneous three times a day before meals  insulin lispro (HumaLOG) corrective regimen sliding scale   SubCutaneous at bedtime  metFORMIN 500 milliGRAM(s) Oral two times a day with meals      Home DM Medications:  Novolog 5 units with meals three time daily  Levemir 15 units bedtime      History of Medication Compliance:  yes      Current (Non-Endocrine) Meds:  amLODIPine   Tablet 10 milliGRAM(s) Oral daily  aspirin enteric coated 81 milliGRAM(s) Oral daily  dextrose 5%. 1000 milliLiter(s) IV Continuous <Continuous>  enoxaparin Injectable 40 milliGRAM(s) SubCutaneous daily  hydrALAZINE 25 milliGRAM(s) Oral three times a day  labetalol 400 milliGRAM(s) Oral three times a day  lisinopril 40 milliGRAM(s) Oral daily  nitrofurantoin monohydrate/macrocrystals (MACROBID) 100 milliGRAM(s) Oral two times a day      PAST MEDICAL & SURGICAL HISTORY:  TIA (transient ischemic attack)  CVA (cerebral vascular accident)  Hypokalemia  IBS (Irritable Bowel Syndrome)  Generalized Headaches  Diabetes Mellitus Type II  Hypertension    No Past Surgical History      FAMILY HISTORY:  Family history of prostate cancer (Father)  Family history of acute myocardial infarction (Father)    Allergies:  lactose (Diarrhea)  sulfa drugs (Angioedema; Swelling)  sulfa drugs (Rash)  Sulfac 10% (Unknown)  walnut (Urticaria)      Height, weight BMI  Height (cm): 172.7 (10-06 @ 17:22), 172.7 (10-05 @ 07:39)  Weight (kg): 99.9 (10-06 @ 17:22), 98.4 (10-02 @ 19:27)  BMI (kg/m2): 33.5 (10-06 @ 17:22), 33 (10-05 @ 07:39)    Vital Signs Last 24 Hrs  T(C): 36.8 (09 Oct 2020 09:03), Max: 36.8 (09 Oct 2020 09:03)  T(F): 98.2 (09 Oct 2020 09:03), Max: 98.2 (09 Oct 2020 09:03)  HR: 78 (09 Oct 2020 13:23) (68 - 78)  BP: 155/93 (09 Oct 2020 13:23) (127/84 - 155/93)  RR: 14 (09 Oct 2020 13:23) (12 - 14)  SpO2: 100% (09 Oct 2020 13:23) (100% - 100%)    LABS:                        10.8   5.47  )-----------( 211      ( 09 Oct 2020 06:00 )             32.9     10-09    139  |  104  |  20  ----------------------------<  169<H>  3.5   |  27  |  1.13    Ca    9.0      09 Oct 2020 06:00    TPro  7.2  /  Alb  3.1<L>  /  TBili  0.5  /  DBili  x   /  AST  18  /  ALT  18  /  AlkPhos  76  10-09    Thyroid Stimulating Hormone, Serum: 0.59 uIU/mL (10-09-20 @ 06:00)  Thyroid Stimulating Hormone, Serum: 0.46 uIU/mL (10-03-20 @ 11:16)                      FINGERSTICKS:  POCT Blood Glucose.: 157 mg/dL (10-09-20 @ 11:54)  POCT Blood Glucose.: 174 mg/dL (10-09-20 @ 08:11)  POCT Blood Glucose.: 145 mg/dL (10-08-20 @ 21:33)  POCT Blood Glucose.: 138 mg/dL (10-08-20 @ 16:21)  POCT Blood Glucose.: 176 mg/dL (10-08-20 @ 11:57)  POCT Blood Glucose.: 213 mg/dL (10-08-20 @ 07:33)  POCT Blood Glucose.: 191 mg/dL (10-07-20 @ 20:34)  POCT Blood Glucose.: 130 mg/dL (10-07-20 @ 16:02)  POCT Blood Glucose.: 190 mg/dL (10-07-20 @ 11:54)  POCT Blood Glucose.: 212 mg/dL (10-07-20 @ 07:45)  POCT Blood Glucose.: 172 mg/dL (10-06-20 @ 21:09)  POCT Blood Glucose.: 185 mg/dL (10-06-20 @ 17:16)      PHYSICAL EXAM:  Neuro:  AOx 3, normal affect/mood.  Ext: no edema, no ulcers, pedal pulses palpable bilaterally  Feet:   sensation intact in feet      ASSESSMENT AND PLAN:    Assessment     61 y/o female with underlying uncontrolled type 2 diabetes admitted to Rehab s/p right pontine CVA  A1C  11.9%  Patient has been mostly euglycemic with some mild postprandial hyperglycemia normalized with correctional insulin.  Complications of diabetes include: CAD, CVA   Glycemic goals are to maintain blood sugars between  100 mg/dL and  180 mg/dL while admitted.      Plan  Continue basal insulin: glargine 18 units bedtime  Metformin 500 mg twice daily (with breakfast and dinner).   ·	After one week, you should increase to 1000 mg twice daily as long as you do not develop diarrhea   3.	Continue correction insulin: Humalog 2 unit for every 50 mg/dL > 151 mg/dL given regardless of PO intake to correct for hyperglycemia .   4.	Continue bedtime correction insulin  5.	Continue fingersticks: AC, HS and with any symptoms of hypoglycemia  6.	Please keep our service informed of any relevant clinical or nutritional changes as insulin therapy poses a high risk for hypoglycemia.        Patient Education:  Educated on impact of current A1C on long and short term complications of Diabetes.  Patient educated on CHO, appropriate portion size using "MyPlate," and to avoid juice and sugar-sweetened beverages (except for hypoglycemia). Patient was educated on Carb counting - and was given reading material as re-enforcement.  Insulin discussed as patient will be d/c home on insulin, and the importance of monitoring his finger sticks four times daily (before meals and bedtime) and to keep a log of blood glucose. Correct insulin storage also discussed.  Eye and foot care discussed with patient. Signs, symptoms, and treatment of hypoglycemia explained. Patient has sufficient cognitive ability and knowledge and dexterity to complete insulin a correct insulin storage discussed. Patient verbalized interest in wearing Arlette - encourage to speak with PCP to obtain script.     Discharge recommendations: d/c home on basal/bolus insulin dose TBD, and Metformin. Per Endocrinologist Dr. Omer patient would benefit from Jardiance 10 mg in the morning, which also has shown in preventing cardiovascular events as well as reducing the progression of renal disease in patient with diabetes.    Patient can follow up at discharge with: in the [facility] in [place]    Thank you for allowing the Diabetes Management Services to participate in the care of this patient.   DIABETES MANAGEMENT SERVICE INITIAL CONSULT     Information obtained from: patient, chart review    HPI: 59 y/o female with PMHx significant for Type 2 diabetes, HTN, CVA 2017 (s/p loop monitor, on Plavix, residual Right sided weakness) who presented to [facility] ED 10/2 with Left sided numbness with poor balance, also associated nausea, lightheadedness. CTH 10/2 showed no new CVA. MR head 10/3 showed right pontine CVA with associated cytotoxic edema and without hemorrhagic transformation. Multiple additional chronic infarcts and chronic white matter microvascular type changes. CTA H/N 10/2  showed mild right PCA stenosis otherwise unremarkable. Asprin alone for now in setting of multiple micro-hemorrhages likely related to underlying HTN.  Hospital course c/b positive UTI, on Abx therapy.     Current hospital course is significant for mild hyperglycemia with max fingerstick of 213 mg/dL.  Admitted to Rehab s/p  right pontine CVA      Diabetes History:  Mrs. Rehana Richardson, stated she had gestational diabetes in 1998 and was diagnosed with type 2 diabetes 2003, she was initial started on orals (Metformin) and 4-5 years ago transition to a insulin regiment.  Patient reports poor control as an outpatient. Most recent A1c is 11.9% from 12.9%.  Most recent home antihyperglycemic includes Novolog 5 units TID, Levemir 15 unit QHS. Home total daily dose of insulin was 30 units. According to patient she ran out of novolog and have not taking pre-meal insulin until 3 weeks ago.   Patient checks blood sugars reports that fingersticks run between 120 mg/dL and 370's. Patient reports no hypoglycemia but report hypoglycemic symptoms when blood glucose is less than 120 mg/dL. No admission for hypo/hyperglycemia or DKA per patient.  Patient report polyuria, and polydipsia prior to admission. Denies polyphagia, or vision changes. Patient reports no weight changes. Patient follows a vegetarian diet. However, reports she drinks soda and juice and eats a lot of junk food and often skips lunch. Per patient she plan to modify her diet to exclude sodas and junk foods. Known  history of microvascular complications including diabetic neuropathy denies retinopathy.  Known history of  macrovascular complications including CVA with right side residual.  Patient currently receives DM care with her PCP in the [facility], and was also followed by an Endocrinologist at [facility] in [place], however has not been seen in over one year.        Steroid therapy:  None    Outpatient Endocrinologist?   in the past     Last Ophthalmology visit: > 1 year ago    Last Podiatry visit:  Never (pt will make appointment)    Occupation:  Parent coordinator (School)    Current Endocrine Meds:   atorvastatin 80 milliGRAM(s) Oral at bedtime  dextrose 40% Gel 15 Gram(s) Oral once PRN  dextrose 50% Injectable 12.5 Gram(s) IV Push once  dextrose 50% Injectable 25 Gram(s) IV Push once  dextrose 50% Injectable 25 Gram(s) IV Push once  glucagon  Injectable 1 milliGRAM(s) IntraMuscular once PRN  insulin glargine Injectable (LANTUS) 18 Unit(s) SubCutaneous at bedtime  insulin lispro (HumaLOG) corrective regimen sliding scale   SubCutaneous three times a day before meals  insulin lispro (HumaLOG) corrective regimen sliding scale   SubCutaneous at bedtime  metFORMIN 500 milliGRAM(s) Oral two times a day with meals      Home DM Medications:  Novolog 5 units with meals three time daily  Levemir 15 units bedtime      History of Medication Compliance:  yes      Current (Non-Endocrine) Meds:  amLODIPine   Tablet 10 milliGRAM(s) Oral daily  aspirin enteric coated 81 milliGRAM(s) Oral daily  dextrose 5%. 1000 milliLiter(s) IV Continuous <Continuous>  enoxaparin Injectable 40 milliGRAM(s) SubCutaneous daily  hydrALAZINE 25 milliGRAM(s) Oral three times a day  labetalol 400 milliGRAM(s) Oral three times a day  lisinopril 40 milliGRAM(s) Oral daily  nitrofurantoin monohydrate/macrocrystals (MACROBID) 100 milliGRAM(s) Oral two times a day      PAST MEDICAL & SURGICAL HISTORY:  TIA (transient ischemic attack)  CVA (cerebral vascular accident)  Hypokalemia  IBS (Irritable Bowel Syndrome)  Generalized Headaches  Diabetes Mellitus Type II  Hypertension    No Past Surgical History      FAMILY HISTORY:  Family history of prostate cancer (Father)  Family history of acute myocardial infarction (Father)    Allergies:  lactose (Diarrhea)  sulfa drugs (Angioedema; Swelling)  sulfa drugs (Rash)  Sulfac 10% (Unknown)  walnut (Urticaria)      Height, weight BMI  Height (cm): 172.7 (10-06 @ 17:22), 172.7 (10-05 @ 07:39)  Weight (kg): 99.9 (10-06 @ 17:22), 98.4 (10-02 @ 19:27)  BMI (kg/m2): 33.5 (10-06 @ 17:22), 33 (10-05 @ 07:39)    Vital Signs Last 24 Hrs  T(C): 36.8 (09 Oct 2020 09:03), Max: 36.8 (09 Oct 2020 09:03)  T(F): 98.2 (09 Oct 2020 09:03), Max: 98.2 (09 Oct 2020 09:03)  HR: 78 (09 Oct 2020 13:23) (68 - 78)  BP: 155/93 (09 Oct 2020 13:23) (127/84 - 155/93)  RR: 14 (09 Oct 2020 13:23) (12 - 14)  SpO2: 100% (09 Oct 2020 13:23) (100% - 100%)    LABS:                        10.8   5.47  )-----------( 211      ( 09 Oct 2020 06:00 )             32.9     10-09    139  |  104  |  20  ----------------------------<  169<H>  3.5   |  27  |  1.13    Ca    9.0      09 Oct 2020 06:00    TPro  7.2  /  Alb  3.1<L>  /  TBili  0.5  /  DBili  x   /  AST  18  /  ALT  18  /  AlkPhos  76  10-09    Thyroid Stimulating Hormone, Serum: 0.59 uIU/mL (10-09-20 @ 06:00)  Thyroid Stimulating Hormone, Serum: 0.46 uIU/mL (10-03-20 @ 11:16)                      FINGERSTICKS:  POCT Blood Glucose.: 157 mg/dL (10-09-20 @ 11:54)  POCT Blood Glucose.: 174 mg/dL (10-09-20 @ 08:11)  POCT Blood Glucose.: 145 mg/dL (10-08-20 @ 21:33)  POCT Blood Glucose.: 138 mg/dL (10-08-20 @ 16:21)  POCT Blood Glucose.: 176 mg/dL (10-08-20 @ 11:57)  POCT Blood Glucose.: 213 mg/dL (10-08-20 @ 07:33)  POCT Blood Glucose.: 191 mg/dL (10-07-20 @ 20:34)  POCT Blood Glucose.: 130 mg/dL (10-07-20 @ 16:02)  POCT Blood Glucose.: 190 mg/dL (10-07-20 @ 11:54)  POCT Blood Glucose.: 212 mg/dL (10-07-20 @ 07:45)  POCT Blood Glucose.: 172 mg/dL (10-06-20 @ 21:09)  POCT Blood Glucose.: 185 mg/dL (10-06-20 @ 17:16)      PHYSICAL EXAM:  Neuro:  AOx 3, normal affect/mood.  Ext: no edema, no ulcers, pedal pulses palpable bilaterally  Feet:   sensation intact in feet      ASSESSMENT AND PLAN:    Assessment     59 y/o female with underlying uncontrolled type 2 diabetes admitted to Rehab s/p right pontine CVA  A1C  11.9%  Patient has been mostly euglycemic with some mild postprandial hyperglycemia normalized with correctional insulin.  Complications of diabetes include: CAD, CVA   Glycemic goals are to maintain blood sugars between  100 mg/dL and  180 mg/dL while admitted.      Plan  Continue basal insulin: glargine 18 units bedtime  Metformin 500 mg twice daily (with breakfast and dinner).   ·	After one week, you should increase to 1000 mg twice daily as long as you do not develop diarrhea   3.	Continue correction insulin: Humalog 2 unit for every 50 mg/dL > 151 mg/dL given regardless of PO intake to correct for hyperglycemia .   4.	Continue bedtime correction insulin  5.	Continue fingersticks: AC, HS and with any symptoms of hypoglycemia  6.	Please keep our service informed of any relevant clinical or nutritional changes as insulin therapy poses a high risk for hypoglycemia.        Patient Education:  Educated on impact of current A1C on long and short term complications of Diabetes.  Patient educated on CHO, appropriate portion size using "MyPlate," and to avoid juice and sugar-sweetened beverages (except for hypoglycemia). Patient was educated on Carb counting - and was given reading material as re-enforcement.  Insulin discussed as patient will be d/c home on insulin, and the importance of monitoring his finger sticks four times daily (before meals and bedtime) and to keep a log of blood glucose. Correct insulin storage also discussed.  Eye and foot care discussed with patient. Signs, symptoms, and treatment of hypoglycemia explained. Patient has sufficient cognitive ability and knowledge and dexterity to complete insulin a correct insulin storage discussed. Patient verbalized interest in wearing Arlette - encourage to speak with PCP to obtain script.     Discharge recommendations: d/c home on basal/bolus insulin dose TBD, and Metformin. Per Endocrinologist Dr. Omer patient would benefit from Jardiance 10 mg in the morning, which also has shown in preventing cardiovascular events as well as reducing the progression of renal disease in patient with diabetes.    Patient can follow up at discharge with: in the [facility] center in [place]    Thank you for allowing the Diabetes Management Services to participate in the care of this patient.

## 2020-10-09 NOTE — PROGRESS NOTE ADULT - ASSESSMENT
60 Female with right pontine CVA high CAD risk factors     Admitted for post CVA gait abn medical deconditioning     #CVA  -Aspirin, Lipitor  BP control, place her on her home bp medication regimen, uptitrate labetalol cards team seen assist appreciated    -start PT/OT/SLP  -fall and aspiration precautions    #Essential HTN uncontrolled as above   home regimen, may need to uptitrate the labetalol, keep on current hdn     IDDM2 hbq1c 11.9   continue the current lantus ,prandial   RISS   endo team seen as outlined     multi-nodular goiter   no pain   no URI symptoms   TSH ok   endo team to see in op setting     #UTI resolved (not septic)   -check voiding, PVR x1  continue current dc abx to complete 7 days   check urine cx on fu     #Costochondritis on off (resolved)   -Left chest wall pain with palpation   ddx cad risk   monitor   cad risk rx     Reviewed w patient and IDT, rehab plan of care   VTE proph with lovenox   Full Code

## 2020-10-09 NOTE — PROGRESS NOTE ADULT - SUBJECTIVE AND OBJECTIVE BOX
seen and examined at bedside     ros all others are neg     ICU Vital Signs Last 24 Hrs  T(C): 36.8 (09 Oct 2020 09:03), Max: 36.8 (09 Oct 2020 09:03)  T(F): 98.2 (09 Oct 2020 09:03), Max: 98.2 (09 Oct 2020 09:03)  HR: 78 (09 Oct 2020 13:23) (68 - 78)  BP: 155/93 (09 Oct 2020 13:23) (127/84 - 172/106)  RR: 14 (09 Oct 2020 13:23) (12 - 14)  SpO2: 100% (09 Oct 2020 13:23) (100% - 100%)                          10.8   5.47  )-----------( 211      ( 09 Oct 2020 06:00 )             32.9   10-09    139  |  104  |  20  ----------------------------<  169<H>  3.5   |  27  |  1.13    Ca    9.0      09 Oct 2020 06:00    TPro  7.2  /  Alb  3.1<L>  /  TBili  0.5  /  DBili  x   /  AST  18  /  ALT  18  /  AlkPhos  76  10-09    Home Medications:  Aspirin Low Dose 81 mg oral tablet, chewable: 1 tab(s) orally once a day (06 Oct 2020 13:09)  atorvastatin 80 mg oral tablet: 1 tab(s) orally once a day (at bedtime) (06 Oct 2020 08:43)  hydrALAZINE 50 mg oral tablet: 1 tab(s) orally 3 times a day (31 Oct 2017 13:27)  labetalol 300 mg oral tablet: 1 tab(s) orally 3 times a day (06 Oct 2020 13:12)  lisinopril 40 mg oral tablet: 1 tab(s) orally once a day (06 Oct 2020 08:43)  metFORMIN 500 mg oral tablet: 1 tab(s) orally 2 times a day (31 Oct 2017 13:27)  nitrofurantoin macrocrystals-monohydrate 100 mg oral capsule: 1 cap(s) orally 2 times a day (06 Oct 2020 13:20)  Norvasc 5 mg oral tablet: 1 tab(s) orally once a day (06 Oct 2020 13:09)      MEDICATIONS  (STANDING):  amLODIPine   Tablet 10 milliGRAM(s) Oral daily  aspirin enteric coated 81 milliGRAM(s) Oral daily  atorvastatin 80 milliGRAM(s) Oral at bedtime  dextrose 5%. 1000 milliLiter(s) (50 mL/Hr) IV Continuous <Continuous>  dextrose 50% Injectable 12.5 Gram(s) IV Push once  dextrose 50% Injectable 25 Gram(s) IV Push once  dextrose 50% Injectable 25 Gram(s) IV Push once  enoxaparin Injectable 40 milliGRAM(s) SubCutaneous daily  hydrALAZINE 25 milliGRAM(s) Oral three times a day  insulin glargine Injectable (LANTUS) 18 Unit(s) SubCutaneous at bedtime  insulin lispro (HumaLOG) corrective regimen sliding scale   SubCutaneous three times a day before meals  insulin lispro (HumaLOG) corrective regimen sliding scale   SubCutaneous at bedtime  labetalol 400 milliGRAM(s) Oral three times a day  lisinopril 40 milliGRAM(s) Oral daily  metFORMIN 500 milliGRAM(s) Oral two times a day with meals  nitrofurantoin monohydrate/macrocrystals (MACROBID) 100 milliGRAM(s) Oral two times a day    MEDICATIONS  (PRN):  dextrose 40% Gel 15 Gram(s) Oral once PRN Blood Glucose LESS THAN 70 milliGRAM(s)/deciliter  glucagon  Injectable 1 milliGRAM(s) IntraMuscular once PRN Glucose LESS THAN 70 milligrams/deciliter

## 2020-10-09 NOTE — PROGRESS NOTE ADULT - ASSESSMENT
OMAR CASTANO is a 61yo Female with right pontine CVA, now with decreased functional mobility, gait instability and ADL impairments.    COMORBIDITES/ACTIVE MEDICAL ISSUES     Gait Instability, ADL impairments and Functional impairments: continue Comprehensive Rehab Program of PT/OT     # Right pontine CVA  -Aspirin, Lipitor ( failed Plavix)   -continue PT/OT/SLP. SLp decreased to 30min. Increase PT  -fall and aspiration precautions  -neuropsychology evaluation, add recreational  therapy    #HTN  -Norvasc increased to 10mg   -Lisinopril continues   -Labetalol to 400mg TID as per cardiology  -hospitalist following    #DM2  -Lantus/Humalog, Metformin BID.  -FS ACHS  -consistent carbs diet  -Nutrition eval  -endocrinology eval for A1C 11.9 and recurrent small vessel CVA. Plan Lantus/jardiance 10mg home.     #UTI  -Check voiding, PVR x1-low  -Abx as per hospitalist plan    #goiter  -endocrne in, thyroid work up entered as per endocrine request      #Costochondritis   -Left chest wall pain with palpation   -Started on 10/5 after she had TTE when pt endorsed to technician pushing too hard   -Tylenol PRN     Pain Mgmt - Tylenol PRN  GI/Bowel Mgmt - Colace, Senna,  Miralax PRN  /Bladder Mgmt - Voiding independently, PVR

## 2020-10-09 NOTE — PROGRESS NOTE ADULT - SUBJECTIVE AND OBJECTIVE BOX
CHIEF COMPLAINT: Improving balance and gait. Night uneventful, slept well. NAD.      HISTORY OF PRESENT ILLNESS   61yo Female c Pmhx DM2, HTN, CVA 2017 (s/p loop monitor, on Plavix, residual Right sided weakness) who presented to [facility] ED 10/2 with Left sided numbness with poor balance, also associated nausea, lightheadedness. CTH 10/2 showed no new CVA. MR head 10/3 showed right pontine CVA with associated cytotoxic edema and without hemorrhagic transformation. Multiple additional chronic infarcts and chronic white matter microvascular type changes. CTA H/N 10/2  showed mild right PCA stenosis otherwise unremarkable. Asprin alone for now in setting of multiple micro-hemorrhages likely related to underlying HTN.    Hospitalization complicated by + UTI, on abx therapy. Evaluated by PMR and AR recommended. Cleared for d/c on 10/6.        PAST MEDICAL & SURGICAL HISTORY:  TIA (transient ischemic attack)    CVA (cerebral vascular accident)    Hypokalemia    IBS (Irritable Bowel Syndrome)    Generalized Headaches    Diabetes Mellitus Type II    Hypertension    No Past Surgical History      VITALS  Vital Signs Last 24 Hrs  T(C): 36.8 (09 Oct 2020 09:03), Max: 36.8 (09 Oct 2020 09:03)  T(F): 98.2 (09 Oct 2020 09:03), Max: 98.2 (09 Oct 2020 09:03)  HR: 78 (09 Oct 2020 13:23) (68 - 78)  BP: 155/93 (09 Oct 2020 13:23) (127/84 - 172/106)  BP(mean): --  RR: 14 (09 Oct 2020 13:23) (12 - 14)  SpO2: 100% (09 Oct 2020 13:23) (100% - 100%)  PHYSICAL EXAM  Constitutional - NAD, Comfortable  HEENT - NCAT, EOMI  Neck - Supple, No limited ROM  Chest - CTA bilaterally, No wheeze, No rhonchi, No crackles  Cardiovascular - RRR, S1S2, No murmurs  Abdomen - BS+, Soft, NTND  Extremities - No C/C/E, No calf tenderness   Skin-no rash      Neurologic Exam - mild left sided weakness, right foot drop                     Balance - imapired     Psychiatric - depressed       RECENT LABS                        10.8   5.47  )-----------( 211      ( 09 Oct 2020 06:00 )             32.9     10-09    139  |  104  |  20  ----------------------------<  169<H>  3.5   |  27  |  1.13    Ca    9.0      09 Oct 2020 06:00    TPro  7.2  /  Alb  3.1<L>  /  TBili  0.5  /  DBili  x   /  AST  18  /  ALT  18  /  AlkPhos  76  10-09      LIVER FUNCTIONS - ( 09 Oct 2020 06:00 )  Alb: 3.1 g/dL / Pro: 7.2 g/dL / ALK PHOS: 76 U/L / ALT: 18 U/L / AST: 18 U/L / GGT: x             Direct LDL: 168 mg/dL (10-03-20 @ 12:00)                RADIOLOGY/OTHER RESULTS      CURRENT MEDICATIONS  MEDICATIONS  (STANDING):  amLODIPine   Tablet 10 milliGRAM(s) Oral daily  aspirin enteric coated 81 milliGRAM(s) Oral daily  atorvastatin 80 milliGRAM(s) Oral at bedtime  dextrose 5%. 1000 milliLiter(s) (50 mL/Hr) IV Continuous <Continuous>  dextrose 50% Injectable 12.5 Gram(s) IV Push once  dextrose 50% Injectable 25 Gram(s) IV Push once  dextrose 50% Injectable 25 Gram(s) IV Push once  enoxaparin Injectable 40 milliGRAM(s) SubCutaneous daily  hydrALAZINE 25 milliGRAM(s) Oral three times a day  insulin glargine Injectable (LANTUS) 18 Unit(s) SubCutaneous at bedtime  insulin lispro (HumaLOG) corrective regimen sliding scale   SubCutaneous three times a day before meals  insulin lispro (HumaLOG) corrective regimen sliding scale   SubCutaneous at bedtime  labetalol 400 milliGRAM(s) Oral three times a day  lisinopril 40 milliGRAM(s) Oral daily  metFORMIN 500 milliGRAM(s) Oral two times a day with meals  nitrofurantoin monohydrate/macrocrystals (MACROBID) 100 milliGRAM(s) Oral two times a day    MEDICATIONS  (PRN):  dextrose 40% Gel 15 Gram(s) Oral once PRN Blood Glucose LESS THAN 70 milliGRAM(s)/deciliter  glucagon  Injectable 1 milliGRAM(s) IntraMuscular once PRN Glucose LESS THAN 70 milligrams/deciliter      ASSESSMENT & PLAN    GI/Bowel Management - prn   Management - Toilet Q2  Skin - Turn Q2  Pain - Tylenol PRN  DVT PPX - Lovenox  Diet - reg    Continue comprehensive acute rehab program consisting of 3hrs/day of OT/PT and SLP.

## 2020-10-10 LAB
GLUCOSE BLDC GLUCOMTR-MCNC: 100 MG/DL — HIGH (ref 70–99)
GLUCOSE BLDC GLUCOMTR-MCNC: 112 MG/DL — HIGH (ref 70–99)
GLUCOSE BLDC GLUCOMTR-MCNC: 121 MG/DL — HIGH (ref 70–99)
GLUCOSE BLDC GLUCOMTR-MCNC: 151 MG/DL — HIGH (ref 70–99)
GLUCOSE BLDC GLUCOMTR-MCNC: 91 MG/DL — SIGNIFICANT CHANGE UP (ref 70–99)

## 2020-10-10 PROCEDURE — 99231 SBSQ HOSP IP/OBS SF/LOW 25: CPT

## 2020-10-10 PROCEDURE — 99233 SBSQ HOSP IP/OBS HIGH 50: CPT

## 2020-10-10 RX ORDER — SACCHAROMYCES BOULARDII 250 MG
250 POWDER IN PACKET (EA) ORAL
Refills: 0 | Status: DISCONTINUED | OUTPATIENT
Start: 2020-10-10 | End: 2020-10-15

## 2020-10-10 RX ADMIN — Medication 50 MILLIGRAM(S): at 05:22

## 2020-10-10 RX ADMIN — Medication 0: at 22:01

## 2020-10-10 RX ADMIN — ATORVASTATIN CALCIUM 80 MILLIGRAM(S): 80 TABLET, FILM COATED ORAL at 21:59

## 2020-10-10 RX ADMIN — Medication 100 MILLIGRAM(S): at 17:43

## 2020-10-10 RX ADMIN — Medication 81 MILLIGRAM(S): at 12:16

## 2020-10-10 RX ADMIN — Medication 100 MILLIGRAM(S): at 05:22

## 2020-10-10 RX ADMIN — Medication 400 MILLIGRAM(S): at 13:29

## 2020-10-10 RX ADMIN — LISINOPRIL 40 MILLIGRAM(S): 2.5 TABLET ORAL at 05:22

## 2020-10-10 RX ADMIN — INSULIN GLARGINE 18 UNIT(S): 100 INJECTION, SOLUTION SUBCUTANEOUS at 21:56

## 2020-10-10 RX ADMIN — Medication 50 MILLIGRAM(S): at 13:29

## 2020-10-10 RX ADMIN — METFORMIN HYDROCHLORIDE 500 MILLIGRAM(S): 850 TABLET ORAL at 17:42

## 2020-10-10 RX ADMIN — AMLODIPINE BESYLATE 10 MILLIGRAM(S): 2.5 TABLET ORAL at 05:21

## 2020-10-10 RX ADMIN — METFORMIN HYDROCHLORIDE 500 MILLIGRAM(S): 850 TABLET ORAL at 07:47

## 2020-10-10 RX ADMIN — Medication 50 MILLIGRAM(S): at 22:00

## 2020-10-10 RX ADMIN — Medication 400 MILLIGRAM(S): at 05:22

## 2020-10-10 RX ADMIN — Medication 400 MILLIGRAM(S): at 21:59

## 2020-10-10 RX ADMIN — ENOXAPARIN SODIUM 40 MILLIGRAM(S): 100 INJECTION SUBCUTANEOUS at 12:16

## 2020-10-10 RX ADMIN — Medication 250 MILLIGRAM(S): at 17:42

## 2020-10-10 RX ADMIN — Medication 1: at 07:47

## 2020-10-10 NOTE — PROGRESS NOTE ADULT - SUBJECTIVE AND OBJECTIVE BOX
Patient is a 60y old  Female who presents with a chief complaint of 1.3 bilateral weakness, new right pontine CVA with trace left sided weakness+old left BG CVA with right sided weakness/foot drop. (10 Oct 2020 10:58)      HPI:   59yo Female c Pmhx DM2, HTN, CVA 2017 (s/p loop monitor, on Plavix, residual Right sided weakness) who presented to [facility] ED 10/2 with Left sided numbness with poor balance, also associated nausea, lightheadedness. CTH 10/2 showed no new CVA. MR head 10/3 showed right pontine CVA with associated cytotoxic edema and without hemorrhagic transformation. Multiple additional chronic infarcts and chronic white matter microvascular type changes. CTA H/N 10/2  showed mild right PCA stenosis otherwise unremarkable. Asprin alone for now in setting of multiple micro-hemorrhages likely related to underlying HTN.    Hospitalization complicated by + UTI, on abx therapy. Evaluated by PMR and AR recommended. Cleared for d/c on 10/6.    (06 Oct 2020 12:15)      PAST MEDICAL & SURGICAL HISTORY:  TIA (transient ischemic attack)    CVA (cerebral vascular accident)    Hypokalemia    IBS (Irritable Bowel Syndrome)    Generalized Headaches    Diabetes Mellitus Type II    Hypertension    No Past Surgical History        MEDICATIONS  (STANDING):  amLODIPine   Tablet 10 milliGRAM(s) Oral daily  aspirin enteric coated 81 milliGRAM(s) Oral daily  atorvastatin 80 milliGRAM(s) Oral at bedtime  dextrose 5%. 1000 milliLiter(s) (50 mL/Hr) IV Continuous <Continuous>  dextrose 50% Injectable 12.5 Gram(s) IV Push once  dextrose 50% Injectable 25 Gram(s) IV Push once  dextrose 50% Injectable 25 Gram(s) IV Push once  enoxaparin Injectable 40 milliGRAM(s) SubCutaneous daily  hydrALAZINE 50 milliGRAM(s) Oral three times a day  insulin glargine Injectable (LANTUS) 18 Unit(s) SubCutaneous at bedtime  insulin lispro (HumaLOG) corrective regimen sliding scale   SubCutaneous three times a day before meals  insulin lispro (HumaLOG) corrective regimen sliding scale   SubCutaneous at bedtime  labetalol 400 milliGRAM(s) Oral three times a day  lisinopril 40 milliGRAM(s) Oral daily  metFORMIN 500 milliGRAM(s) Oral two times a day with meals  nitrofurantoin monohydrate/macrocrystals (MACROBID) 100 milliGRAM(s) Oral two times a day  saccharomyces boulardii 250 milliGRAM(s) Oral two times a day    MEDICATIONS  (PRN):  dextrose 40% Gel 15 Gram(s) Oral once PRN Blood Glucose LESS THAN 70 milliGRAM(s)/deciliter  glucagon  Injectable 1 milliGRAM(s) IntraMuscular once PRN Glucose LESS THAN 70 milligrams/deciliter      Allergies    sulfa drugs (Angioedema; Swelling)  sulfa drugs (Rash)  Sulfac 10% (Unknown)  walnut (Urticaria)    Intolerances    lactose (Diarrhea)        VITALS  60y  Vital Signs Last 24 Hrs  T(C): 36.6 (10 Oct 2020 08:26), Max: 36.6 (10 Oct 2020 08:26)  T(F): 97.9 (10 Oct 2020 08:26), Max: 97.9 (10 Oct 2020 08:26)  HR: 76 (10 Oct 2020 10:45) (73 - 95)  BP: 137/82 (10 Oct 2020 10:45) (135/83 - 158/83)  BP(mean): --  RR: 16 (10 Oct 2020 08:26) (13 - 16)  SpO2: 100% (10 Oct 2020 08:26) (100% - 100%)  Daily     Daily Weight in k (09 Oct 2020 22:30)        RECENT LABS:                          10.8   5.47  )-----------( 211      ( 09 Oct 2020 06:00 )             32.9     10-09    139  |  104  |  20  ----------------------------<  169<H>  3.5   |  27  |  1.13    Ca    9.0      09 Oct 2020 06:00    TPro  7.2  /  Alb  3.1<L>  /  TBili  0.5  /  DBili  x   /  AST  18  /  ALT  18  /  AlkPhos  76  10-09    LIVER FUNCTIONS - ( 09 Oct 2020 06:00 )  Alb: 3.1 g/dL / Pro: 7.2 g/dL / ALK PHOS: 76 U/L / ALT: 18 U/L / AST: 18 U/L / GGT: x                 Culture - Urine (collected 10-06-20 @ 03:26)  Source: .Urine Clean Catch (Midstream)  Final Report (10-07-20 @ 10:02):    <10,000 CFU/mL Normal Urogenital Deborah        CAPILLARY BLOOD GLUCOSE      POCT Blood Glucose.: 100 mg/dL (10 Oct 2020 12:02)  POCT Blood Glucose.: 121 mg/dL (10 Oct 2020 10:39)  POCT Blood Glucose.: 151 mg/dL (10 Oct 2020 07:28)  POCT Blood Glucose.: 112 mg/dL (09 Oct 2020 21:40)  POCT Blood Glucose.: 136 mg/dL (09 Oct 2020 16:51)

## 2020-10-10 NOTE — PROGRESS NOTE ADULT - ASSESSMENT
60 Female with right pontine CVA high CAD risk factors     Admitted for post CVA gait abn medical deconditioning     CVA  -Aspirin, Lipitor  BP controlled   -start PT/OT/SLP  -fall and aspiration precautions    Essential HTN uncontrolled as above   continue the current regimen, if remains high will need to adjust labetalol, hdn     IDDM2 hbq1c 11.9   continue the current lantus ,prandial   RISS   endo team seen as outlined     multi-nodular goiter   no pain   no URI symptoms   TSH ok   endo team to see in op setting     #UTI resolved (not septic)   -check voiding, PVR x1  continue current dc abx to complete 7 days   check urine cx on fu     #Costochondritis (resolved)   -Left chest wall pain with palpation   ddx cad risk   monitor   cad risk rx     VTE proph with lovenox   Full Code

## 2020-10-10 NOTE — PROGRESS NOTE ADULT - ASSESSMENT
OMAR CASTANO is a 59yo Female with right pontine CVA, now with decreased functional mobility, gait instability and ADL impairments.    # Right pontine CVA  -Aspirin, Lipitor ( failed Plavix)   -continue PT/OT/SLP. SLp decreased to 30min. Increase PT 1-1/2 hours  -fall and aspiration precautions  -neuropsychology, recreational  therapy    #HTN  -Norvasc  10mg   -Lisinopril    -Labetalol  400mg TID   -137/82 10/10    #DM2  -Lantus/Humalog, Metformin BID. Loose stool may be due to metformin, montior  -FS ACHS  -endocrinology eval for A1C 11.9 and recurrent small vessel CVA. Plan: Lantus/jardiance 10mg home.     #UTI  -Check voiding, PVR x1-low  -continue macrobid  -florastor added 10/10    #goiter  -endocrne in, thyroid work up entered as per endocrine request    #Costochondritis   -Left chest wall pain with palpation   -Tylenol PRN          OMAR CASTANO is a 59yo Female with right pontine CVA, now with decreased functional mobility, gait instability and ADL impairments.    # Right pontine CVA  -Aspirin, Lipitor ( failed Plavix)   -continue PT/OT/SLP. SLp decreased to 30min. Increase PT 1-1/2 hours  -fall and aspiration precautions  -neuropsychology, recreational  therapy    #HTN  -Norvasc  10mg   -Lisinopril    -Labetalol  400mg TID   -137/82 10/10    #DM2  -Lantus/Humalog, Metformin BID. Loose stool may be due to metformin, montior  -FS ACHS  -endocrinology eval for A1C 11.9 and recurrent small vessel CVA. Plan: Lantus/jardiance 10mg home.     #UTI  -Check voiding, PVR x1-low  -continue macrobid  -florastor added 10/10    #goiter  -endocrne in, thyroid work up entered as per endocrine request    #Costochondritis   -Left chest wall pain with palpation   -Tylenol PRN   -imrpoved

## 2020-10-10 NOTE — PROGRESS NOTE ADULT - SUBJECTIVE AND OBJECTIVE BOX
seen and examined at bedside     loose stools x 3 today (new)   no abdo pain     bp better     ROS all others are neg     ICU Vital Signs Last 24 Hrs  T(C): 36.6 (10 Oct 2020 08:26), Max: 36.6 (10 Oct 2020 08:26)  T(F): 97.9 (10 Oct 2020 08:26), Max: 97.9 (10 Oct 2020 08:26)  HR: 76 (10 Oct 2020 10:45) (73 - 95)  BP: 137/82 (10 Oct 2020 10:45) (135/83 - 158/83)  RR: 16 (10 Oct 2020 08:26) (13 - 16)  SpO2: 100% (10 Oct 2020 08:26) (100% - 100%)                          10.8   5.47  )-----------( 211      ( 09 Oct 2020 06:00 )             32.9     10-09    139  |  104  |  20  ----------------------------<  169<H>  3.5   |  27  |  1.13    Ca    9.0      09 Oct 2020 06:00    TPro  7.2  /  Alb  3.1<L>  /  TBili  0.5  /  DBili  x   /  AST  18  /  ALT  18  /  AlkPhos  76  10-09    Home Medications:  Aspirin Low Dose 81 mg oral tablet, chewable: 1 tab(s) orally once a day (06 Oct 2020 13:09)  atorvastatin 80 mg oral tablet: 1 tab(s) orally once a day (at bedtime) (06 Oct 2020 08:43)  hydrALAZINE 50 mg oral tablet: 1 tab(s) orally 3 times a day (31 Oct 2017 13:27)  labetalol 300 mg oral tablet: 1 tab(s) orally 3 times a day (06 Oct 2020 13:12)  lisinopril 40 mg oral tablet: 1 tab(s) orally once a day (06 Oct 2020 08:43)  metFORMIN 500 mg oral tablet: 1 tab(s) orally 2 times a day (31 Oct 2017 13:27)  nitrofurantoin macrocrystals-monohydrate 100 mg oral capsule: 1 cap(s) orally 2 times a day (06 Oct 2020 13:20)  Norvasc 5 mg oral tablet: 1 tab(s) orally once a day (06 Oct 2020 13:09)    MEDICATIONS  (STANDING):  amLODIPine   Tablet 10 milliGRAM(s) Oral daily  aspirin enteric coated 81 milliGRAM(s) Oral daily  atorvastatin 80 milliGRAM(s) Oral at bedtime  dextrose 5%. 1000 milliLiter(s) (50 mL/Hr) IV Continuous <Continuous>  dextrose 50% Injectable 12.5 Gram(s) IV Push once  dextrose 50% Injectable 25 Gram(s) IV Push once  dextrose 50% Injectable 25 Gram(s) IV Push once  enoxaparin Injectable 40 milliGRAM(s) SubCutaneous daily  hydrALAZINE 50 milliGRAM(s) Oral three times a day  insulin glargine Injectable (LANTUS) 18 Unit(s) SubCutaneous at bedtime  insulin lispro (HumaLOG) corrective regimen sliding scale   SubCutaneous three times a day before meals  insulin lispro (HumaLOG) corrective regimen sliding scale   SubCutaneous at bedtime  labetalol 400 milliGRAM(s) Oral three times a day  lisinopril 40 milliGRAM(s) Oral daily  metFORMIN 500 milliGRAM(s) Oral two times a day with meals  nitrofurantoin monohydrate/macrocrystals (MACROBID) 100 milliGRAM(s) Oral two times a day    MEDICATIONS  (PRN):  dextrose 40% Gel 15 Gram(s) Oral once PRN Blood Glucose LESS THAN 70 milliGRAM(s)/deciliter  glucagon  Injectable 1 milliGRAM(s) IntraMuscular once PRN Glucose LESS THAN 70 milligrams/deciliter

## 2020-10-11 LAB
GLUCOSE BLDC GLUCOMTR-MCNC: 128 MG/DL — HIGH (ref 70–99)
GLUCOSE BLDC GLUCOMTR-MCNC: 149 MG/DL — HIGH (ref 70–99)
GLUCOSE BLDC GLUCOMTR-MCNC: 95 MG/DL — SIGNIFICANT CHANGE UP (ref 70–99)
GLUCOSE BLDC GLUCOMTR-MCNC: 99 MG/DL — SIGNIFICANT CHANGE UP (ref 70–99)

## 2020-10-11 PROCEDURE — 99232 SBSQ HOSP IP/OBS MODERATE 35: CPT

## 2020-10-11 RX ADMIN — Medication 50 MILLIGRAM(S): at 13:34

## 2020-10-11 RX ADMIN — Medication 100 MILLIGRAM(S): at 17:31

## 2020-10-11 RX ADMIN — AMLODIPINE BESYLATE 10 MILLIGRAM(S): 2.5 TABLET ORAL at 05:20

## 2020-10-11 RX ADMIN — Medication 50 MILLIGRAM(S): at 05:20

## 2020-10-11 RX ADMIN — Medication 250 MILLIGRAM(S): at 17:31

## 2020-10-11 RX ADMIN — INSULIN GLARGINE 18 UNIT(S): 100 INJECTION, SOLUTION SUBCUTANEOUS at 21:17

## 2020-10-11 RX ADMIN — ATORVASTATIN CALCIUM 80 MILLIGRAM(S): 80 TABLET, FILM COATED ORAL at 21:16

## 2020-10-11 RX ADMIN — Medication 100 MILLIGRAM(S): at 05:19

## 2020-10-11 RX ADMIN — Medication 400 MILLIGRAM(S): at 13:34

## 2020-10-11 RX ADMIN — Medication 50 MILLIGRAM(S): at 21:17

## 2020-10-11 RX ADMIN — Medication 400 MILLIGRAM(S): at 05:20

## 2020-10-11 RX ADMIN — Medication 400 MILLIGRAM(S): at 21:17

## 2020-10-11 RX ADMIN — Medication 81 MILLIGRAM(S): at 11:43

## 2020-10-11 RX ADMIN — ENOXAPARIN SODIUM 40 MILLIGRAM(S): 100 INJECTION SUBCUTANEOUS at 11:43

## 2020-10-11 RX ADMIN — LISINOPRIL 40 MILLIGRAM(S): 2.5 TABLET ORAL at 05:20

## 2020-10-11 RX ADMIN — METFORMIN HYDROCHLORIDE 500 MILLIGRAM(S): 850 TABLET ORAL at 17:31

## 2020-10-11 RX ADMIN — Medication 250 MILLIGRAM(S): at 05:20

## 2020-10-11 RX ADMIN — METFORMIN HYDROCHLORIDE 500 MILLIGRAM(S): 850 TABLET ORAL at 08:31

## 2020-10-11 NOTE — PROGRESS NOTE ADULT - SUBJECTIVE AND OBJECTIVE BOX
seen and examined at bedside     no co     ros all others are neg     ICU Vital Signs Last 24 Hrs  T(C): 36.5 (11 Oct 2020 08:44), Max: 36.5 (11 Oct 2020 08:44)  T(F): 97.7 (11 Oct 2020 08:44), Max: 97.7 (11 Oct 2020 08:44)  HR: 75 (11 Oct 2020 13:35) (75 - 92)  BP: 145/90 (11 Oct 2020 13:35) (115/78 - 159/88)  BP(mean): --  ABP: --  ABP(mean): --  RR: 16 (11 Oct 2020 08:44) (16 - 16)  SpO2: 97% (11 Oct 2020 08:44) (97% - 100%)    Home Medications:  Aspirin Low Dose 81 mg oral tablet, chewable: 1 tab(s) orally once a day (06 Oct 2020 13:09)  atorvastatin 80 mg oral tablet: 1 tab(s) orally once a day (at bedtime) (06 Oct 2020 08:43)  hydrALAZINE 50 mg oral tablet: 1 tab(s) orally 3 times a day (31 Oct 2017 13:27)  labetalol 300 mg oral tablet: 1 tab(s) orally 3 times a day (06 Oct 2020 13:12)  lisinopril 40 mg oral tablet: 1 tab(s) orally once a day (06 Oct 2020 08:43)  metFORMIN 500 mg oral tablet: 1 tab(s) orally 2 times a day (31 Oct 2017 13:27)  nitrofurantoin macrocrystals-monohydrate 100 mg oral capsule: 1 cap(s) orally 2 times a day (06 Oct 2020 13:20)  Norvasc 5 mg oral tablet: 1 tab(s) orally once a day (06 Oct 2020 13:09)    MEDICATIONS  (STANDING):  amLODIPine   Tablet 10 milliGRAM(s) Oral daily  aspirin enteric coated 81 milliGRAM(s) Oral daily  atorvastatin 80 milliGRAM(s) Oral at bedtime  dextrose 5%. 1000 milliLiter(s) (50 mL/Hr) IV Continuous <Continuous>  dextrose 50% Injectable 12.5 Gram(s) IV Push once  dextrose 50% Injectable 25 Gram(s) IV Push once  dextrose 50% Injectable 25 Gram(s) IV Push once  enoxaparin Injectable 40 milliGRAM(s) SubCutaneous daily  hydrALAZINE 50 milliGRAM(s) Oral three times a day  insulin glargine Injectable (LANTUS) 18 Unit(s) SubCutaneous at bedtime  insulin lispro (HumaLOG) corrective regimen sliding scale   SubCutaneous three times a day before meals  insulin lispro (HumaLOG) corrective regimen sliding scale   SubCutaneous at bedtime  labetalol 400 milliGRAM(s) Oral three times a day  lisinopril 40 milliGRAM(s) Oral daily  metFORMIN 500 milliGRAM(s) Oral two times a day with meals  nitrofurantoin monohydrate/macrocrystals (MACROBID) 100 milliGRAM(s) Oral two times a day  saccharomyces boulardii 250 milliGRAM(s) Oral two times a day    MEDICATIONS  (PRN):  dextrose 40% Gel 15 Gram(s) Oral once PRN Blood Glucose LESS THAN 70 milliGRAM(s)/deciliter  glucagon  Injectable 1 milliGRAM(s) IntraMuscular once PRN Glucose LESS THAN 70 milligrams/deciliter

## 2020-10-11 NOTE — PROGRESS NOTE ADULT - CONSTITUTIONAL
detailed exam
Well-developed, well nourished

## 2020-10-11 NOTE — PROGRESS NOTE ADULT - ADDITIONAL PE
labs and med list reviewed, viewed
labs and med list reviewed, viewed
labs med list reviewed, viewed
labs and med list reviewed

## 2020-10-11 NOTE — PROGRESS NOTE ADULT - ASSESSMENT
60 female with right pontine CVA high CAD risk factors     Admitted for post CVA gait abn medical deconditioning     CVA  -Aspirin, Lipitor  BP controlled   -start PT/OT/SLP  -fall and aspiration precautions    Essential HTN better controlled as above   continue the current regimen,    IDDM2 hbq1c 11.9   continue the current lantus ,prandial   RISS   endo team seen as outlined     multi-nodular goiter   no pain   no URI symptoms   TSH ok   endo team to see in op setting     #UTI resolved (not septic)   -check voiding, PVR x1  continue current dc abx to complete 7 days   check urine cx on fu     #Costochondritis (resolved)   -Left chest wall pain with palpation   ddx cad risk   monitor   cad risk rx     VTE proph with lovenox   Full Code

## 2020-10-11 NOTE — PROGRESS NOTE ADULT - ASSESSMENT
OMAR CASTANO is a 59yo Female with right pontine CVA, now with decreased functional mobility, gait instability and ADL impairments.    # Right pontine CVA  -Aspirin, Lipitor ( failed Plavix)   -continue PT/OT/SLP. SLp decreased to 30min. Increase PT 1-1/2 hours  -neuropsychology, recreational  therapy  -fall and aspiration precautions    #HTN  -Norvasc  10mg   -Lisinopril    -Labetalol  400mg TID   -115/78 ( 10/11 AM    #DM2  -Lantus/Humalog, Metformin BID. Loose stool may be due to metformin, improved today 10/11  -FS ACHS  -endocrinology eval for A1C 11.9 and recurrent small vessel CVA. Plan: Lantus/jardiance 10mg home.     #UTI  -Check voiding, PVR x1-low  -continue macrobid  -florastor added 10/10, GI symptoms improved    #goiter  -endocrne in, thyroid work up entered as per endocrine request    #Costochondritis   -Left chest wall pain with palpation   -Tylenol PRN   -imrpoved

## 2020-10-11 NOTE — PROGRESS NOTE ADULT - EXTREMITIES
detailed exam
No cyanosis, clubbing or edema

## 2020-10-11 NOTE — PROGRESS NOTE ADULT - SUBJECTIVE AND OBJECTIVE BOX
Patient is a 60y old  Female who presents with a chief complaint of 1.3 bilateral weakness, new right pontine CVA with trace left sided weakness+old left BG CVA with right sided weakness/foot drop. (10 Oct 2020 13:30)      HPI:   61yo Female c Pmhx DM2, HTN, CVA 2017 (s/p loop monitor, on Plavix, residual Right sided weakness) who presented to [facility] ED 10/2 with Left sided numbness with poor balance, also associated nausea, lightheadedness. CTH 10/2 showed no new CVA. MR head 10/3 showed right pontine CVA with associated cytotoxic edema and without hemorrhagic transformation. Multiple additional chronic infarcts and chronic white matter microvascular type changes. CTA H/N 10/2  showed mild right PCA stenosis otherwise unremarkable. Asprin alone for now in setting of multiple micro-hemorrhages likely related to underlying HTN.    Hospitalization complicated by + UTI, on abx therapy. Evaluated by PMR and AR recommended. Cleared for d/c on 10/6.    (06 Oct 2020 12:15)      PAST MEDICAL & SURGICAL HISTORY:  TIA (transient ischemic attack)    CVA (cerebral vascular accident)    Hypokalemia    IBS (Irritable Bowel Syndrome)    Generalized Headaches    Diabetes Mellitus Type II    Hypertension    No Past Surgical History        MEDICATIONS  (STANDING):  amLODIPine   Tablet 10 milliGRAM(s) Oral daily  aspirin enteric coated 81 milliGRAM(s) Oral daily  atorvastatin 80 milliGRAM(s) Oral at bedtime  dextrose 5%. 1000 milliLiter(s) (50 mL/Hr) IV Continuous <Continuous>  dextrose 50% Injectable 12.5 Gram(s) IV Push once  dextrose 50% Injectable 25 Gram(s) IV Push once  dextrose 50% Injectable 25 Gram(s) IV Push once  enoxaparin Injectable 40 milliGRAM(s) SubCutaneous daily  hydrALAZINE 50 milliGRAM(s) Oral three times a day  insulin glargine Injectable (LANTUS) 18 Unit(s) SubCutaneous at bedtime  insulin lispro (HumaLOG) corrective regimen sliding scale   SubCutaneous three times a day before meals  insulin lispro (HumaLOG) corrective regimen sliding scale   SubCutaneous at bedtime  labetalol 400 milliGRAM(s) Oral three times a day  lisinopril 40 milliGRAM(s) Oral daily  metFORMIN 500 milliGRAM(s) Oral two times a day with meals  nitrofurantoin monohydrate/macrocrystals (MACROBID) 100 milliGRAM(s) Oral two times a day  saccharomyces boulardii 250 milliGRAM(s) Oral two times a day    MEDICATIONS  (PRN):  dextrose 40% Gel 15 Gram(s) Oral once PRN Blood Glucose LESS THAN 70 milliGRAM(s)/deciliter  glucagon  Injectable 1 milliGRAM(s) IntraMuscular once PRN Glucose LESS THAN 70 milligrams/deciliter      Allergies    sulfa drugs (Angioedema; Swelling)  sulfa drugs (Rash)  Sulfac 10% (Unknown)  walnut (Urticaria)    Intolerances    lactose (Diarrhea)        VITALS  60y  Vital Signs Last 24 Hrs  T(C): 36.5 (11 Oct 2020 08:44), Max: 36.5 (11 Oct 2020 08:44)  T(F): 97.7 (11 Oct 2020 08:44), Max: 97.7 (11 Oct 2020 08:44)  HR: 81 (11 Oct 2020 08:44) (78 - 92)  BP: 115/78 (11 Oct 2020 08:44) (115/78 - 159/88)  BP(mean): --  RR: 16 (11 Oct 2020 08:44) (16 - 16)  SpO2: 97% (11 Oct 2020 08:44) (97% - 100%)  Daily     Daily Weight in k (11 Oct 2020 01:00)        RECENT LABS:                      CAPILLARY BLOOD GLUCOSE      POCT Blood Glucose.: 95 mg/dL (11 Oct 2020 07:23)  POCT Blood Glucose.: 91 mg/dL (10 Oct 2020 21:54)  POCT Blood Glucose.: 112 mg/dL (10 Oct 2020 16:50)  POCT Blood Glucose.: 100 mg/dL (10 Oct 2020 12:02)    Review of Systems:   · Additional ROS	Patient states she continues to have loose stool yesterday, not watery, no N/V and no abdominal pain, however sto;pped in evening and no further loose stool this morning    On florastor. No H/A, slept well, no other pain    Physical Exam:   · Constitutional	detailed exam  · Constitutional Comments	alert, NAD O x 3. pleasant, follows multi step commands  · Respiratory	detailed exam  · Respiratory Details	respirations non-labored; clear to auscultation bilaterally  · Cardiovascular	detailed exam  · Cardiovascular Details	regular rate and rhythm  · Gastrointestinal	detailed exam  · GI Normal	soft; nontender; bowel sounds normal; no rebound tenderness  · Extremities	detailed exam  · Extremities Comments	  no calf swelling +soft, NT no erythema or warmth bilaterally

## 2020-10-12 LAB
ALBUMIN SERPL ELPH-MCNC: 3.2 G/DL — LOW (ref 3.3–5)
ALP SERPL-CCNC: 73 U/L — SIGNIFICANT CHANGE UP (ref 40–120)
ALT FLD-CCNC: 27 U/L — SIGNIFICANT CHANGE UP (ref 10–45)
ANION GAP SERPL CALC-SCNC: 11 MMOL/L — SIGNIFICANT CHANGE UP (ref 5–17)
AST SERPL-CCNC: 31 U/L — SIGNIFICANT CHANGE UP (ref 10–40)
BILIRUB SERPL-MCNC: 0.5 MG/DL — SIGNIFICANT CHANGE UP (ref 0.2–1.2)
BUN SERPL-MCNC: 23 MG/DL — SIGNIFICANT CHANGE UP (ref 7–23)
CALCIUM SERPL-MCNC: 9.3 MG/DL — SIGNIFICANT CHANGE UP (ref 8.4–10.5)
CHLORIDE SERPL-SCNC: 104 MMOL/L — SIGNIFICANT CHANGE UP (ref 96–108)
CO2 SERPL-SCNC: 26 MMOL/L — SIGNIFICANT CHANGE UP (ref 22–31)
CREAT SERPL-MCNC: 1.09 MG/DL — SIGNIFICANT CHANGE UP (ref 0.5–1.3)
GLUCOSE BLDC GLUCOMTR-MCNC: 134 MG/DL — HIGH (ref 70–99)
GLUCOSE BLDC GLUCOMTR-MCNC: 141 MG/DL — HIGH (ref 70–99)
GLUCOSE BLDC GLUCOMTR-MCNC: 147 MG/DL — HIGH (ref 70–99)
GLUCOSE BLDC GLUCOMTR-MCNC: 199 MG/DL — HIGH (ref 70–99)
GLUCOSE SERPL-MCNC: 127 MG/DL — HIGH (ref 70–99)
HCT VFR BLD CALC: 34.8 % — SIGNIFICANT CHANGE UP (ref 34.5–45)
HGB BLD-MCNC: 11.1 G/DL — LOW (ref 11.5–15.5)
MCHC RBC-ENTMCNC: 26.4 PG — LOW (ref 27–34)
MCHC RBC-ENTMCNC: 31.9 GM/DL — LOW (ref 32–36)
MCV RBC AUTO: 82.9 FL — SIGNIFICANT CHANGE UP (ref 80–100)
NRBC # BLD: 0 /100 WBCS — SIGNIFICANT CHANGE UP (ref 0–0)
PLATELET # BLD AUTO: 198 K/UL — SIGNIFICANT CHANGE UP (ref 150–400)
POTASSIUM SERPL-MCNC: 3.7 MMOL/L — SIGNIFICANT CHANGE UP (ref 3.5–5.3)
POTASSIUM SERPL-SCNC: 3.7 MMOL/L — SIGNIFICANT CHANGE UP (ref 3.5–5.3)
PROT SERPL-MCNC: 7.3 G/DL — SIGNIFICANT CHANGE UP (ref 6–8.3)
RBC # BLD: 4.2 M/UL — SIGNIFICANT CHANGE UP (ref 3.8–5.2)
RBC # FLD: 13.3 % — SIGNIFICANT CHANGE UP (ref 10.3–14.5)
SODIUM SERPL-SCNC: 141 MMOL/L — SIGNIFICANT CHANGE UP (ref 135–145)
WBC # BLD: 5.65 K/UL — SIGNIFICANT CHANGE UP (ref 3.8–10.5)
WBC # FLD AUTO: 5.65 K/UL — SIGNIFICANT CHANGE UP (ref 3.8–10.5)

## 2020-10-12 PROCEDURE — 99233 SBSQ HOSP IP/OBS HIGH 50: CPT

## 2020-10-12 RX ORDER — ACETAMINOPHEN 500 MG
650 TABLET ORAL EVERY 6 HOURS
Refills: 0 | Status: DISCONTINUED | OUTPATIENT
Start: 2020-10-12 | End: 2020-10-15

## 2020-10-12 RX ADMIN — METFORMIN HYDROCHLORIDE 500 MILLIGRAM(S): 850 TABLET ORAL at 17:40

## 2020-10-12 RX ADMIN — Medication 650 MILLIGRAM(S): at 13:15

## 2020-10-12 RX ADMIN — Medication 400 MILLIGRAM(S): at 21:35

## 2020-10-12 RX ADMIN — Medication 50 MILLIGRAM(S): at 21:35

## 2020-10-12 RX ADMIN — Medication 400 MILLIGRAM(S): at 05:29

## 2020-10-12 RX ADMIN — LISINOPRIL 40 MILLIGRAM(S): 2.5 TABLET ORAL at 05:29

## 2020-10-12 RX ADMIN — Medication 650 MILLIGRAM(S): at 12:29

## 2020-10-12 RX ADMIN — Medication 50 MILLIGRAM(S): at 05:29

## 2020-10-12 RX ADMIN — Medication 100 MILLIGRAM(S): at 05:29

## 2020-10-12 RX ADMIN — ENOXAPARIN SODIUM 40 MILLIGRAM(S): 100 INJECTION SUBCUTANEOUS at 12:28

## 2020-10-12 RX ADMIN — INSULIN GLARGINE 18 UNIT(S): 100 INJECTION, SOLUTION SUBCUTANEOUS at 21:36

## 2020-10-12 RX ADMIN — Medication 50 MILLIGRAM(S): at 13:33

## 2020-10-12 RX ADMIN — Medication 250 MILLIGRAM(S): at 17:40

## 2020-10-12 RX ADMIN — AMLODIPINE BESYLATE 10 MILLIGRAM(S): 2.5 TABLET ORAL at 05:29

## 2020-10-12 RX ADMIN — ATORVASTATIN CALCIUM 80 MILLIGRAM(S): 80 TABLET, FILM COATED ORAL at 21:35

## 2020-10-12 RX ADMIN — Medication 400 MILLIGRAM(S): at 13:33

## 2020-10-12 RX ADMIN — Medication 81 MILLIGRAM(S): at 12:28

## 2020-10-12 RX ADMIN — Medication 250 MILLIGRAM(S): at 05:29

## 2020-10-12 RX ADMIN — METFORMIN HYDROCHLORIDE 500 MILLIGRAM(S): 850 TABLET ORAL at 08:59

## 2020-10-12 NOTE — PROGRESS NOTE ADULT - ASSESSMENT
OMAR CASTANO is a 61yo Female with right pontine CVA, now with decreased functional mobility, gait instability and ADL impairments.    COMORBIDITES/ACTIVE MEDICAL ISSUES     Gait Instability, ADL impairments and Functional impairments: continue Comprehensive Rehab Program of PT/OT     # Right pontine CVA  -Aspirin, Lipitor ( failed Plavix)   -continue PT/OT/SLP. SLp decreased to 30min. Increase PT  -fall and aspiration precautions  -neuropsychology evaluation, add recreational  therapy    #HTN  -Norvasc increased to 10mg   -Lisinopril continues   -Labetalol to 400mg TID as per cardiology  -hospitalist to follow up BP 110s with lightheadedness in therapy this am, improved in bed.    #DM2  -Lantus/Humalog, Metformin BID.  -FS ACHS  -consistent carbs diet  -Nutrition eval  -endocrinology eval for A1C 11.9 and recurrent small vessel CVA. Plan Lantus/jardiance 10mg home.     #UTI  -Check voiding, PVR x1-low  -Abx as per hospitalist plan, completed today    #goiter  -endocrne in, thyroid work up entered as per endocrine request      #Costochondritis   -Left chest wall pain with palpation   -Started on 10/5 after she had TTE when pt endorsed to technician pushing too hard   -Tylenol PRN     Pain Mgmt - Tylenol PRN  GI/Bowel Mgmt - Colace, Senna,  Miralax PRN  /Bladder Mgmt - Voiding independently, PVR     OMAR CASTANO is a 59yo Female with right pontine CVA, now with decreased functional mobility, gait instability and ADL impairments.    COMORBIDITES/ACTIVE MEDICAL ISSUES     Gait Instability, ADL impairments and Functional impairments: continue Comprehensive Rehab Program of PT/OT     # Right pontine CVA  -Aspirin, Lipitor ( failed Plavix)   -continue PT/OT/SLP. SLp decreased to 30min. Increase PT  -fall and aspiration precautions  -neuropsychology evaluation, add recreational  therapy    #HTN  -Norvasc increased to 10mg   -Lisinopril   -Labetalol to 400mg TID as per cardiology  -hospitalist to follow up BP 110s with lightheadedness in therapy this am, improved in bed.    #DM2  -Lantus/Humalog, Metformin BID.  -FS ACHS  -consistent carbs diet  -Nutrition eval  -endocrinology eval for A1C 11.9 and recurrent small vessel CVA. Plan Lantus/jardiance 10mg home.     #UTI  -Check voiding, PVR x1-low  -Abx as per hospitalist plan, completed today    #goiter  -endocrne in, thyroid work up entered as per endocrine request      #Costochondritis   -Left chest wall pain with palpation   -Started on 10/5 after she had TTE when pt endorsed to technician pushing too hard   -Tylenol PRN     Pain Mgmt - Tylenol PRN  GI/Bowel Mgmt - Colace, Senna,  Miralax PRN  /Bladder Mgmt - Voiding independently, PVR

## 2020-10-12 NOTE — PROGRESS NOTE ADULT - SUBJECTIVE AND OBJECTIVE BOX
Patient is a 60y old  Female who presents with a chief complaint of 1.3 bilateral weakness, new right pontine CVA with trace left sided weakness+old left BG CVA with right sided weakness/foot drop. (12 Oct 2020 09:22)      Patient seen and examined at bedside. Was informed by RN that patient had a dizzy episode after taking a hot shower while putting on her socks, as well as "I wet myself". Patient immediately felt better upon laying in her bed. I asked RN to do orthostatics on this patient - and it turns out that the patient was orthostatic this AM. Patient has never urinated on herself in the past.    ALLERGIES:  lactose (Diarrhea)  sulfa drugs (Angioedema; Swelling)  sulfa drugs (Rash)  Sulfac 10% (Unknown)  walnut (Urticaria)    MEDICATIONS  (STANDING):  amLODIPine   Tablet 10 milliGRAM(s) Oral daily  aspirin enteric coated 81 milliGRAM(s) Oral daily  atorvastatin 80 milliGRAM(s) Oral at bedtime  dextrose 5%. 1000 milliLiter(s) (50 mL/Hr) IV Continuous <Continuous>  dextrose 50% Injectable 12.5 Gram(s) IV Push once  dextrose 50% Injectable 25 Gram(s) IV Push once  dextrose 50% Injectable 25 Gram(s) IV Push once  enoxaparin Injectable 40 milliGRAM(s) SubCutaneous daily  hydrALAZINE 50 milliGRAM(s) Oral three times a day  insulin glargine Injectable (LANTUS) 18 Unit(s) SubCutaneous at bedtime  insulin lispro (HumaLOG) corrective regimen sliding scale   SubCutaneous three times a day before meals  insulin lispro (HumaLOG) corrective regimen sliding scale   SubCutaneous at bedtime  labetalol 400 milliGRAM(s) Oral three times a day  lisinopril 40 milliGRAM(s) Oral daily  metFORMIN 500 milliGRAM(s) Oral two times a day with meals  saccharomyces boulardii 250 milliGRAM(s) Oral two times a day    MEDICATIONS  (PRN):  acetaminophen   Tablet .. 650 milliGRAM(s) Oral every 6 hours PRN Moderate Pain (4 - 6)  dextrose 40% Gel 15 Gram(s) Oral once PRN Blood Glucose LESS THAN 70 milliGRAM(s)/deciliter  glucagon  Injectable 1 milliGRAM(s) IntraMuscular once PRN Glucose LESS THAN 70 milligrams/deciliter    Vital Signs Last 24 Hrs  T(F): 97.5 (12 Oct 2020 09:13), Max: 98.1 (12 Oct 2020 07:44)  HR: 79 (12 Oct 2020 13:35) (70 - 80)  BP: 148/90 (12 Oct 2020 13:35) (123/76 - 156/83)  RR: 16 (12 Oct 2020 09:13) (15 - 17)  SpO2: 97% (12 Oct 2020 09:13) (95% - 100%)  I&O's Summary    11 Oct 2020 07:01  -  12 Oct 2020 07:00  --------------------------------------------------------  IN: 480 mL / OUT: 0 mL / NET: 480 mL    12 Oct 2020 07:01  -  12 Oct 2020 14:06  --------------------------------------------------------  IN: 0 mL / OUT: 0 mL / NET: 0 mL      PHYSICAL EXAM:  General: NAD, A/O x 3  ENT: Moist mucous membranes, no thrush  Neck: Supple, No JVD  Lungs: Clear to auscultation bilaterally, good air entry, non-labored breathing  Cardio: RRR, S1/S2, No murmur  Abdomen: Soft, Nontender, Nondistended; Bowel sounds present  Extremities: No calf tenderness, No pitting edema    LABS:                        11.1   5.65  )-----------( 198      ( 12 Oct 2020 05:30 )             34.8     10-12    141  |  104  |  23  ----------------------------<  127  3.7   |  26  |  1.09    Ca    9.3      12 Oct 2020 05:30    TPro  7.3  /  Alb  3.2  /  TBili  0.5  /  DBili  x   /  AST  31  /  ALT  27  /  AlkPhos  73  10-12        eGFR if Non African American: 55 mL/min/1.73M2 (10-12-20 @ 05:30)  eGFR if African American: 64 mL/min/1.73M2 (10-12-20 @ 05:30)      10-03 Chol 241 mg/dL  mg/dL HDL 52 mg/dL Trig 104 mg/dL      POCT Blood Glucose.: 141 mg/dL (12 Oct 2020 12:27)  POCT Blood Glucose.: 147 mg/dL (12 Oct 2020 07:32)  POCT Blood Glucose.: 149 mg/dL (11 Oct 2020 21:16)  POCT Blood Glucose.: 128 mg/dL (11 Oct 2020 17:06)    Culture - Urine (collected 06 Oct 2020 03:26)  Source: .Urine Clean Catch (Midstream)  Final Report (07 Oct 2020 10:02):    <10,000 CFU/mL Normal Urogenital Deborah

## 2020-10-12 NOTE — PROGRESS NOTE ADULT - SUBJECTIVE AND OBJECTIVE BOX
CHIEF COMPLAINT: Low BP with lightheadedness this am, . Episode of urinary incontinence.       HISTORY OF PRESENT ILLNESS   61yo Female c Pmhx DM2, HTN, CVA 2017 (s/p loop monitor, on Plavix, residual Right sided weakness) who presented to [facility] ED 10/2 with Left sided numbness with poor balance, also associated nausea, lightheadedness. CTH 10/2 showed no new CVA. MR head 10/3 showed right pontine CVA with associated cytotoxic edema and without hemorrhagic transformation. Multiple additional chronic infarcts and chronic white matter microvascular type changes. CTA H/N 10/2  showed mild right PCA stenosis otherwise unremarkable. Asprin alone for now in setting of multiple micro-hemorrhages likely related to underlying HTN.    Hospitalization complicated by + UTI, on abx therapy. Evaluated by PMR and AR recommended. Cleared for d/c on 10/6.      PAST MEDICAL & SURGICAL HISTORY:  TIA (transient ischemic attack)    CVA (cerebral vascular accident)    Hypokalemia    IBS (Irritable Bowel Syndrome)    Generalized Headaches    Diabetes Mellitus Type II    Hypertension    No Past Surgical History      VITALS  Vital Signs Last 24 Hrs  T(C): 36.4 (12 Oct 2020 09:13), Max: 36.7 (12 Oct 2020 07:44)  T(F): 97.5 (12 Oct 2020 09:13), Max: 98.1 (12 Oct 2020 07:44)  HR: 80 (12 Oct 2020 09:13) (70 - 80)  BP: 134/81 (12 Oct 2020 09:13) (123/76 - 156/83)  BP(mean): --  RR: 16 (12 Oct 2020 09:13) (15 - 17)  SpO2: 97% (12 Oct 2020 09:13) (95% - 100%)    PHYSICAL EXAM  Constitutional - NAD, Comfortable  HEENT - NCAT, EOMI  Neck - Supple, No limited ROM  Chest - CTA bilaterally, No wheeze, No rhonchi, No crackles  Cardiovascular - RRR, S1S2, No murmurs  Abdomen - BS+, Soft, NTND  Extremities - No C/C/E, No calf tenderness   Skin-no rash      Neurologic Exam - mild left sided weakness, right foot drop                     Balance - impaired     Psychiatric - anxious       FUNCTIONAL PROGRESS  Gait - 100ft CG  ADLs - CG  Transfers - CG  Functional transfer - CG    RECENT LABS                        11.1   5.65  )-----------( 198      ( 12 Oct 2020 05:30 )             34.8     10-12    141  |  104  |  23  ----------------------------<  127<H>  3.7   |  26  |  1.09    Ca    9.3      12 Oct 2020 05:30    TPro  7.3  /  Alb  3.2<L>  /  TBili  0.5  /  DBili  x   /  AST  31  /  ALT  27  /  AlkPhos  73  10-12      LIVER FUNCTIONS - ( 12 Oct 2020 05:30 )  Alb: 3.2 g/dL / Pro: 7.3 g/dL / ALK PHOS: 73 U/L / ALT: 27 U/L / AST: 31 U/L / GGT: x             Direct LDL: 168 mg/dL (10-03-20 @ 12:00)                RADIOLOGY/OTHER RESULTS      CURRENT MEDICATIONS  MEDICATIONS  (STANDING):  amLODIPine   Tablet 10 milliGRAM(s) Oral daily  aspirin enteric coated 81 milliGRAM(s) Oral daily  atorvastatin 80 milliGRAM(s) Oral at bedtime  dextrose 5%. 1000 milliLiter(s) (50 mL/Hr) IV Continuous <Continuous>  dextrose 50% Injectable 12.5 Gram(s) IV Push once  dextrose 50% Injectable 25 Gram(s) IV Push once  dextrose 50% Injectable 25 Gram(s) IV Push once  enoxaparin Injectable 40 milliGRAM(s) SubCutaneous daily  hydrALAZINE 50 milliGRAM(s) Oral three times a day  insulin glargine Injectable (LANTUS) 18 Unit(s) SubCutaneous at bedtime  insulin lispro (HumaLOG) corrective regimen sliding scale   SubCutaneous three times a day before meals  insulin lispro (HumaLOG) corrective regimen sliding scale   SubCutaneous at bedtime  labetalol 400 milliGRAM(s) Oral three times a day  lisinopril 40 milliGRAM(s) Oral daily  metFORMIN 500 milliGRAM(s) Oral two times a day with meals  nitrofurantoin monohydrate/macrocrystals (MACROBID) 100 milliGRAM(s) Oral two times a day  saccharomyces boulardii 250 milliGRAM(s) Oral two times a day    MEDICATIONS  (PRN):  dextrose 40% Gel 15 Gram(s) Oral once PRN Blood Glucose LESS THAN 70 milliGRAM(s)/deciliter  glucagon  Injectable 1 milliGRAM(s) IntraMuscular once PRN Glucose LESS THAN 70 milligrams/deciliter      ASSESSMENT & PLAN          GI/Bowel Management - prn   Management - Toilet Q2, completes abx today for 7d course  Skin - Turn Q2  Pain - Tylenol PRN  DVT PPX - Lovenox sq  Diet - consistent carbs    Continue comprehensive acute rehab program consisting of 3hrs/day of OT/PT and SLP. CHIEF COMPLAINT: Low BP with lightheadedness this am, . Episode of urinary incontinence after shower      HISTORY OF PRESENT ILLNESS   59yo Female c Pmhx DM2, HTN, CVA 2017 (s/p loop monitor, on Plavix, residual Right sided weakness) who presented to [facility] ED 10/2 with Left sided numbness with poor balance, also associated nausea, lightheadedness. CTH 10/2 showed no new CVA. MR head 10/3 showed right pontine CVA with associated cytotoxic edema and without hemorrhagic transformation. Multiple additional chronic infarcts and chronic white matter microvascular type changes. CTA H/N 10/2  showed mild right PCA stenosis otherwise unremarkable. Asprin alone for now in setting of multiple micro-hemorrhages likely related to underlying HTN.    Hospitalization complicated by + UTI, on abx therapy. Evaluated by PMR and AR recommended. Cleared for d/c on 10/6.      PAST MEDICAL & SURGICAL HISTORY:  TIA (transient ischemic attack)    CVA (cerebral vascular accident)    Hypokalemia    IBS (Irritable Bowel Syndrome)    Generalized Headaches    Diabetes Mellitus Type II    Hypertension    No Past Surgical History      VITALS  Vital Signs Last 24 Hrs  T(C): 36.4 (12 Oct 2020 09:13), Max: 36.7 (12 Oct 2020 07:44)  T(F): 97.5 (12 Oct 2020 09:13), Max: 98.1 (12 Oct 2020 07:44)  HR: 80 (12 Oct 2020 09:13) (70 - 80)  BP: 134/81 (12 Oct 2020 09:13) (123/76 - 156/83)  BP(mean): --  RR: 16 (12 Oct 2020 09:13) (15 - 17)  SpO2: 97% (12 Oct 2020 09:13) (95% - 100%)    PHYSICAL EXAM  Constitutional - NAD, Comfortable  HEENT - NCAT, EOMI  Neck - Supple, No limited ROM  Chest - CTA bilaterally, No wheeze, No rhonchi, No crackles  Cardiovascular - RRR, S1S2, No murmurs  Abdomen - BS+, Soft, NTND  Extremities - No C/C/E, No calf tenderness   Skin-no rash      Neurologic Exam - mild left sided weakness, right foot drop                     Balance - impaired     Psychiatric - anxious       FUNCTIONAL PROGRESS  Gait - 100ft CG  ADLs - CG  Transfers - CG  Functional transfer - CG    RECENT LABS                        11.1   5.65  )-----------( 198      ( 12 Oct 2020 05:30 )             34.8     10-12    141  |  104  |  23  ----------------------------<  127<H>  3.7   |  26  |  1.09    Ca    9.3      12 Oct 2020 05:30    TPro  7.3  /  Alb  3.2<L>  /  TBili  0.5  /  DBili  x   /  AST  31  /  ALT  27  /  AlkPhos  73  10-12      LIVER FUNCTIONS - ( 12 Oct 2020 05:30 )  Alb: 3.2 g/dL / Pro: 7.3 g/dL / ALK PHOS: 73 U/L / ALT: 27 U/L / AST: 31 U/L / GGT: x             Direct LDL: 168 mg/dL (10-03-20 @ 12:00)                RADIOLOGY/OTHER RESULTS      CURRENT MEDICATIONS  MEDICATIONS  (STANDING):  amLODIPine   Tablet 10 milliGRAM(s) Oral daily  aspirin enteric coated 81 milliGRAM(s) Oral daily  atorvastatin 80 milliGRAM(s) Oral at bedtime  dextrose 5%. 1000 milliLiter(s) (50 mL/Hr) IV Continuous <Continuous>  dextrose 50% Injectable 12.5 Gram(s) IV Push once  dextrose 50% Injectable 25 Gram(s) IV Push once  dextrose 50% Injectable 25 Gram(s) IV Push once  enoxaparin Injectable 40 milliGRAM(s) SubCutaneous daily  hydrALAZINE 50 milliGRAM(s) Oral three times a day  insulin glargine Injectable (LANTUS) 18 Unit(s) SubCutaneous at bedtime  insulin lispro (HumaLOG) corrective regimen sliding scale   SubCutaneous three times a day before meals  insulin lispro (HumaLOG) corrective regimen sliding scale   SubCutaneous at bedtime  labetalol 400 milliGRAM(s) Oral three times a day  lisinopril 40 milliGRAM(s) Oral daily  metFORMIN 500 milliGRAM(s) Oral two times a day with meals  nitrofurantoin monohydrate/macrocrystals (MACROBID) 100 milliGRAM(s) Oral two times a day  saccharomyces boulardii 250 milliGRAM(s) Oral two times a day    MEDICATIONS  (PRN):  dextrose 40% Gel 15 Gram(s) Oral once PRN Blood Glucose LESS THAN 70 milliGRAM(s)/deciliter  glucagon  Injectable 1 milliGRAM(s) IntraMuscular once PRN Glucose LESS THAN 70 milligrams/deciliter      ASSESSMENT & PLAN          GI/Bowel Management - prn   Management - Toilet Q2, completes abx today for 7d course  Skin - Turn Q2  Pain - Tylenol PRN  DVT PPX - Lovenox sq  Diet - consistent carbs    Continue comprehensive acute rehab program consisting of 3hrs/day of OT/PT and SLP.

## 2020-10-12 NOTE — PROGRESS NOTE ADULT - ASSESSMENT
60F with DM2, HTN, CVA (2017) now at [facility] after being diagnosed with new right pontine CVA    #Right pontine CVA  -ASA, statin  -BP control  -PT/OT/SLP     #Essential HTN with   #Orthostatic hypotension  -overall BP improved on Norvasc hydralazine, labetalol, lisinopril  -however, patient was orthostatic this morning  -encourage PO intake  -check orthostatic q shift   -may need to decrease dose of one of the above medications if patient continues to be orthostatic  -will reconsider BP medication dosing in AM    #DM2 on insulin  -A1C 11.9  -ISS, Lantus  -Sugars controlled  -c/w Metformin    #UTI  -There is no evidence that patient had a positive UTI  -Stop all antibiotics     #multi-nodular goiter   -outpatient endocrine f/u    #DVT ppx: lovenox

## 2020-10-13 ENCOUNTER — TRANSCRIPTION ENCOUNTER (OUTPATIENT)
Age: 60
End: 2020-10-13

## 2020-10-13 LAB
GLUCOSE BLDC GLUCOMTR-MCNC: 110 MG/DL — HIGH (ref 70–99)
GLUCOSE BLDC GLUCOMTR-MCNC: 115 MG/DL — HIGH (ref 70–99)
GLUCOSE BLDC GLUCOMTR-MCNC: 136 MG/DL — HIGH (ref 70–99)
GLUCOSE BLDC GLUCOMTR-MCNC: 151 MG/DL — HIGH (ref 70–99)

## 2020-10-13 PROCEDURE — 99232 SBSQ HOSP IP/OBS MODERATE 35: CPT

## 2020-10-13 RX ORDER — LISINOPRIL 2.5 MG/1
1 TABLET ORAL
Qty: 0 | Refills: 0 | DISCHARGE
Start: 2020-10-13

## 2020-10-13 RX ORDER — ACETAMINOPHEN 500 MG
2 TABLET ORAL
Qty: 0 | Refills: 0 | DISCHARGE
Start: 2020-10-13

## 2020-10-13 RX ORDER — HYDRALAZINE HCL 50 MG
1 TABLET ORAL
Qty: 0 | Refills: 0 | DISCHARGE
Start: 2020-10-13

## 2020-10-13 RX ORDER — LABETALOL HCL 100 MG
2 TABLET ORAL
Qty: 0 | Refills: 0 | DISCHARGE
Start: 2020-10-13

## 2020-10-13 RX ORDER — HYDRALAZINE HCL 50 MG
25 TABLET ORAL AT BEDTIME
Refills: 0 | Status: DISCONTINUED | OUTPATIENT
Start: 2020-10-13 | End: 2020-10-15

## 2020-10-13 RX ORDER — ONDANSETRON 8 MG/1
4 TABLET, FILM COATED ORAL ONCE
Refills: 0 | Status: COMPLETED | OUTPATIENT
Start: 2020-10-13 | End: 2020-10-13

## 2020-10-13 RX ORDER — AMLODIPINE BESYLATE 2.5 MG/1
1 TABLET ORAL
Qty: 0 | Refills: 0 | DISCHARGE
Start: 2020-10-13

## 2020-10-13 RX ORDER — METFORMIN HYDROCHLORIDE 850 MG/1
1 TABLET ORAL
Qty: 0 | Refills: 0 | DISCHARGE

## 2020-10-13 RX ORDER — HYDRALAZINE HCL 50 MG
50 TABLET ORAL
Refills: 0 | Status: DISCONTINUED | OUTPATIENT
Start: 2020-10-13 | End: 2020-10-15

## 2020-10-13 RX ORDER — HYDRALAZINE HCL 50 MG
1 TABLET ORAL
Qty: 0 | Refills: 0 | DISCHARGE

## 2020-10-13 RX ADMIN — Medication 250 MILLIGRAM(S): at 17:17

## 2020-10-13 RX ADMIN — Medication 1: at 08:02

## 2020-10-13 RX ADMIN — Medication 81 MILLIGRAM(S): at 11:29

## 2020-10-13 RX ADMIN — METFORMIN HYDROCHLORIDE 500 MILLIGRAM(S): 850 TABLET ORAL at 08:02

## 2020-10-13 RX ADMIN — Medication 400 MILLIGRAM(S): at 22:21

## 2020-10-13 RX ADMIN — Medication 50 MILLIGRAM(S): at 14:14

## 2020-10-13 RX ADMIN — INSULIN GLARGINE 18 UNIT(S): 100 INJECTION, SOLUTION SUBCUTANEOUS at 22:22

## 2020-10-13 RX ADMIN — Medication 25 MILLIGRAM(S): at 22:21

## 2020-10-13 RX ADMIN — ATORVASTATIN CALCIUM 80 MILLIGRAM(S): 80 TABLET, FILM COATED ORAL at 22:21

## 2020-10-13 RX ADMIN — AMLODIPINE BESYLATE 10 MILLIGRAM(S): 2.5 TABLET ORAL at 05:47

## 2020-10-13 RX ADMIN — Medication 250 MILLIGRAM(S): at 05:47

## 2020-10-13 RX ADMIN — ENOXAPARIN SODIUM 40 MILLIGRAM(S): 100 INJECTION SUBCUTANEOUS at 11:29

## 2020-10-13 RX ADMIN — Medication 50 MILLIGRAM(S): at 05:47

## 2020-10-13 RX ADMIN — Medication 400 MILLIGRAM(S): at 05:46

## 2020-10-13 RX ADMIN — METFORMIN HYDROCHLORIDE 500 MILLIGRAM(S): 850 TABLET ORAL at 17:17

## 2020-10-13 RX ADMIN — LISINOPRIL 40 MILLIGRAM(S): 2.5 TABLET ORAL at 05:47

## 2020-10-13 RX ADMIN — ONDANSETRON 4 MILLIGRAM(S): 8 TABLET, FILM COATED ORAL at 18:34

## 2020-10-13 RX ADMIN — Medication 400 MILLIGRAM(S): at 14:14

## 2020-10-13 NOTE — PROGRESS NOTE ADULT - SUBJECTIVE AND OBJECTIVE BOX
CHIEF COMPLAINT: Improving balance and gait s/p CVA.      HISTORY OF PRESENT ILLNESS   61yo Female c Pmhx DM2, HTN, CVA 2017 (s/p loop monitor, on Plavix, residual Right sided weakness) who presented to [facility] ED 10/2 with Left sided numbness with poor balance, also associated nausea, lightheadedness. CTH 10/2 showed no new CVA. MR head 10/3 showed right pontine CVA with associated cytotoxic edema and without hemorrhagic transformation. Multiple additional chronic infarcts and chronic white matter microvascular type changes. CTA H/N 10/2  showed mild right PCA stenosis otherwise unremarkable. Asprin alone for now in setting of multiple micro-hemorrhages likely related to underlying HTN.    Hospitalization complicated by + UTI, on abx therapy. Evaluated by PMR and AR recommended. Cleared for d/c on 10/6.        PAST MEDICAL & SURGICAL HISTORY:  TIA (transient ischemic attack)    CVA (cerebral vascular accident)    Hypokalemia    IBS (Irritable Bowel Syndrome)    Generalized Headaches    Diabetes Mellitus Type II    Hypertension    No Past Surgical History      VITALS  Vital Signs Last 24 Hrs  T(C): 36.6 (12 Oct 2020 20:45), Max: 36.6 (12 Oct 2020 20:45)  T(F): 97.8 (12 Oct 2020 20:45), Max: 97.8 (12 Oct 2020 20:45)  HR: 89 (13 Oct 2020 07:52) (77 - 89)  BP: 147/79 (13 Oct 2020 07:52) (122/70 - 148/90)  BP(mean): --  RR: 16 (13 Oct 2020 07:52) (16 - 16)  SpO2: 99% (13 Oct 2020 07:52) (99% - 100%)    PHYSICAL EXAM  Constitutional - NAD, Comfortable  HEENT - NCAT, EOMI  Neck - Supple, No limited ROM  Chest - CTA bilaterally, No wheeze, No rhonchi, No crackles  Cardiovascular - RRR, S1S2, No murmurs  Abdomen - BS+, Soft, NTND  Extremities - No C/C/E, No calf tenderness   Skin-no rash      Neurologic Exam - mild left sided weakness, right foot drop                     Balance - impaired     Psychiatric - anxious       FUNCTIONAL PROGRESS  Gait - 100ft CG  ADLs - CG  Transfers - CG  Functional transfer - CG      RECENT LABS                        11.1   5.65  )-----------( 198      ( 12 Oct 2020 05:30 )             34.8     10-12    141  |  104  |  23  ----------------------------<  127<H>  3.7   |  26  |  1.09    Ca    9.3      12 Oct 2020 05:30    TPro  7.3  /  Alb  3.2<L>  /  TBili  0.5  /  DBili  x   /  AST  31  /  ALT  27  /  AlkPhos  73  10-12      LIVER FUNCTIONS - ( 12 Oct 2020 05:30 )  Alb: 3.2 g/dL / Pro: 7.3 g/dL / ALK PHOS: 73 U/L / ALT: 27 U/L / AST: 31 U/L / GGT: x             Direct LDL: 168 mg/dL (10-03-20 @ 12:00)                RADIOLOGY/OTHER RESULTS      CURRENT MEDICATIONS  MEDICATIONS  (STANDING):  amLODIPine   Tablet 10 milliGRAM(s) Oral daily  aspirin enteric coated 81 milliGRAM(s) Oral daily  atorvastatin 80 milliGRAM(s) Oral at bedtime  dextrose 5%. 1000 milliLiter(s) (50 mL/Hr) IV Continuous <Continuous>  dextrose 50% Injectable 12.5 Gram(s) IV Push once  dextrose 50% Injectable 25 Gram(s) IV Push once  dextrose 50% Injectable 25 Gram(s) IV Push once  enoxaparin Injectable 40 milliGRAM(s) SubCutaneous daily  hydrALAZINE 50 milliGRAM(s) Oral three times a day  insulin glargine Injectable (LANTUS) 18 Unit(s) SubCutaneous at bedtime  insulin lispro (HumaLOG) corrective regimen sliding scale   SubCutaneous three times a day before meals  insulin lispro (HumaLOG) corrective regimen sliding scale   SubCutaneous at bedtime  labetalol 400 milliGRAM(s) Oral three times a day  lisinopril 40 milliGRAM(s) Oral daily  metFORMIN 500 milliGRAM(s) Oral two times a day with meals  saccharomyces boulardii 250 milliGRAM(s) Oral two times a day    MEDICATIONS  (PRN):  acetaminophen   Tablet .. 650 milliGRAM(s) Oral every 6 hours PRN Moderate Pain (4 - 6)  dextrose 40% Gel 15 Gram(s) Oral once PRN Blood Glucose LESS THAN 70 milliGRAM(s)/deciliter  glucagon  Injectable 1 milliGRAM(s) IntraMuscular once PRN Glucose LESS THAN 70 milligrams/deciliter      ASSESSMENT & PLAN      GI/Bowel Management - prn   Management - Toilet Q2, completed abx for 7d course  Skin - Turn Q2  Pain - Tylenol PRN  DVT PPX - Lovenox sq  Diet - consistent carbs    Continue comprehensive acute rehab program consisting of 3hrs/day of OT/PT and SLP.

## 2020-10-13 NOTE — DISCHARGE NOTE PROVIDER - CARE PROVIDERS DIRECT ADDRESSES
,DirectAddress_Unknown,[email or web address] ,DirectAddress_Unknown,[email or web address],[email or web address]

## 2020-10-13 NOTE — PROGRESS NOTE ADULT - SUBJECTIVE AND OBJECTIVE BOX
CHIEF COMPLAINT: Improved balance and gait.       HISTORY OF PRESENT ILLNESS   59yo Female c Pmhx DM2, HTN, CVA 2017 (s/p loop monitor, on Plavix, residual Right sided weakness) who presented to [facility] ED 10/2 with Left sided numbness with poor balance, also associated nausea, lightheadedness. CTH 10/2 showed no new CVA. MR head 10/3 showed right pontine CVA with associated cytotoxic edema and without hemorrhagic transformation. Multiple additional chronic infarcts and chronic white matter microvascular type changes. CTA H/N 10/2  showed mild right PCA stenosis otherwise unremarkable. Asprin alone for now in setting of multiple micro-hemorrhages likely related to underlying HTN.    Hospitalization complicated by + UTI, on abx therapy. Evaluated by PMR and AR recommended. Cleared for d/c on 10/6.       PAST MEDICAL & SURGICAL HISTORY:  TIA (transient ischemic attack)    CVA (cerebral vascular accident)    Hypokalemia    IBS (Irritable Bowel Syndrome)    Generalized Headaches    Diabetes Mellitus Type II    Hypertension    No Past Surgical History      VITALS  Vital Signs Last 24 Hrs  T(C): 36.6 (12 Oct 2020 20:45), Max: 36.6 (12 Oct 2020 20:45)  T(F): 97.8 (12 Oct 2020 20:45), Max: 97.8 (12 Oct 2020 20:45)  HR: 89 (13 Oct 2020 07:52) (77 - 89)  BP: 147/79 (13 Oct 2020 07:52) (122/70 - 148/90)  BP(mean): --  RR: 16 (13 Oct 2020 07:52) (16 - 16)  SpO2: 99% (13 Oct 2020 07:52) (99% - 100%)    PHYSICAL EXAM  Constitutional - NAD, Comfortable  HEENT - NCAT, EOMI  Neck - Supple, No limited ROM  Chest - CTA bilaterally, No wheeze, No rhonchi, No crackles  Cardiovascular - RRR, S1S2, No murmurs  Abdomen - BS+, Soft, NTND  Extremities - No C/C/E, No calf tenderness   Skin-no rash      Neurologic Exam - mild left sided weakness, right foot drop                     Balance - impaired     Psychiatric - anxious       FUNCTIONAL PROGRESS  Gait - 100ft CG  ADLs - CG  Transfers - CG  Functional transfer - CG      RECENT LABS                        11.1   5.65  )-----------( 198      ( 12 Oct 2020 05:30 )             34.8     10-12    141  |  104  |  23  ----------------------------<  127<H>  3.7   |  26  |  1.09    Ca    9.3      12 Oct 2020 05:30    TPro  7.3  /  Alb  3.2<L>  /  TBili  0.5  /  DBili  x   /  AST  31  /  ALT  27  /  AlkPhos  73  10-12      LIVER FUNCTIONS - ( 12 Oct 2020 05:30 )  Alb: 3.2 g/dL / Pro: 7.3 g/dL / ALK PHOS: 73 U/L / ALT: 27 U/L / AST: 31 U/L / GGT: x             Direct LDL: 168 mg/dL (10-03-20 @ 12:00)                RADIOLOGY/OTHER RESULTS      CURRENT MEDICATIONS  MEDICATIONS  (STANDING):  amLODIPine   Tablet 10 milliGRAM(s) Oral daily  aspirin enteric coated 81 milliGRAM(s) Oral daily  atorvastatin 80 milliGRAM(s) Oral at bedtime  dextrose 5%. 1000 milliLiter(s) (50 mL/Hr) IV Continuous <Continuous>  dextrose 50% Injectable 12.5 Gram(s) IV Push once  dextrose 50% Injectable 25 Gram(s) IV Push once  dextrose 50% Injectable 25 Gram(s) IV Push once  enoxaparin Injectable 40 milliGRAM(s) SubCutaneous daily  hydrALAZINE 50 milliGRAM(s) Oral three times a day  insulin glargine Injectable (LANTUS) 18 Unit(s) SubCutaneous at bedtime  insulin lispro (HumaLOG) corrective regimen sliding scale   SubCutaneous three times a day before meals  insulin lispro (HumaLOG) corrective regimen sliding scale   SubCutaneous at bedtime  labetalol 400 milliGRAM(s) Oral three times a day  lisinopril 40 milliGRAM(s) Oral daily  metFORMIN 500 milliGRAM(s) Oral two times a day with meals  saccharomyces boulardii 250 milliGRAM(s) Oral two times a day    MEDICATIONS  (PRN):  acetaminophen   Tablet .. 650 milliGRAM(s) Oral every 6 hours PRN Moderate Pain (4 - 6)  dextrose 40% Gel 15 Gram(s) Oral once PRN Blood Glucose LESS THAN 70 milliGRAM(s)/deciliter  glucagon  Injectable 1 milliGRAM(s) IntraMuscular once PRN Glucose LESS THAN 70 milligrams/deciliter      ASSESSMENT & PLAN          GI/Bowel Management - Dulcolax PRN, Fleet PRN   Management - Toilet Q2  Skin - Turn Q2  Pain - Tylenol PRN  DVT PPX - TEDs, SCDs  Diet -     Continue comprehensive acute rehab program consisting of 3hrs/day of OT/PT and SLP.

## 2020-10-13 NOTE — DISCHARGE NOTE PROVIDER - NSDCACTIVITY_GEN_ALL_CORE
Showering allowed/Do not drive or operate machinery/Do not make important decisions/Walking - Outdoors allowed/Walking - Indoors allowed/No heavy lifting/straining/Sex allowed/Stairs allowed

## 2020-10-13 NOTE — DISCHARGE NOTE PROVIDER - NSDCCPCAREPLAN_GEN_ALL_CORE_FT
PRINCIPAL DISCHARGE DIAGNOSIS  Diagnosis: Cerebrovascular accident (CVA)  Assessment and Plan of Treatment:       SECONDARY DISCHARGE DIAGNOSES  Diagnosis: HLD (hyperlipidemia)  Assessment and Plan of Treatment:     Diagnosis: DM2 (diabetes mellitus, type 2)  Assessment and Plan of Treatment:     Diagnosis: HTN (hypertension)  Assessment and Plan of Treatment:      PRINCIPAL DISCHARGE DIAGNOSIS  Diagnosis: Cerebrovascular accident (CVA)  Assessment and Plan of Treatment: continue aspirin and lipitor, follow up with neurology      SECONDARY DISCHARGE DIAGNOSES  Diagnosis: History of goiter  Assessment and Plan of Treatment: Follow up endocrinology    Diagnosis: HLD (hyperlipidemia)  Assessment and Plan of Treatment: continue lipitor and low fat diet, follow up PCP    Diagnosis: DM2 (diabetes mellitus, type 2)  Assessment and Plan of Treatment: continue Lantus and Jardiance, check blood glucose daily, follow up endocrinology, diabetic diet    Diagnosis: HTN (hypertension)  Assessment and Plan of Treatment: Check BP daily, continue BP regimen, hold BP meds if BP<110. Follow up PCP.

## 2020-10-13 NOTE — DISCHARGE NOTE PROVIDER - NSDCMRMEDTOKEN_GEN_ALL_CORE_FT
acetaminophen 325 mg oral tablet: 2 tab(s) orally every 6 hours, As needed, Moderate Pain (4 - 6)  amLODIPine 10 mg oral tablet: 1 tab(s) orally once a day  Aspirin Low Dose 81 mg oral tablet, chewable: 1 tab(s) orally once a day  atorvastatin 80 mg oral tablet: 1 tab(s) orally once a day (at bedtime)  hydrALAZINE 50 mg oral tablet: 1 tab(s) orally 3 times a day  labetalol 200 mg oral tablet: 2 tab(s) orally 3 times a day  Lantus Solostar Pen 100 units/mL subcutaneous solution: 18 unit(s) subcutaneous once a day (at bedtime)   lisinopril 40 mg oral tablet: 1 tab(s) orally once a day   acetaminophen 325 mg oral tablet: 2 tab(s) orally every 6 hours, As needed, Moderate Pain (4 - 6)  amLODIPine 10 mg oral tablet: 1 tab(s) orally once a day  Aspirin Low Dose 81 mg oral tablet, chewable: 1 tab(s) orally once a day  atorvastatin 80 mg oral tablet: 1 tab(s) orally once a day (at bedtime)  diabetic pen needles as per insurance: 1 application subcutaneous once a day   hydrALAZINE 25 mg oral tablet: 1 tab(s) orally once a day (at bedtime)   hydrALAZINE 50 mg oral tablet: 1 tab(s) orally 2 times a day 6am, 2pm  Jardiance 10 mg oral tablet: 1 tab(s) orally once a day   labetalol 200 mg oral tablet: 2 tab(s) orally 3 times a day  Lantus Solostar Pen 100 units/mL subcutaneous solution: 18 unit(s) subcutaneous once a day (at bedtime)   lisinopril 40 mg oral tablet: 1 tab(s) orally once a day

## 2020-10-13 NOTE — DISCHARGE NOTE PROVIDER - CARE PROVIDER_API CALL
Hilton Minor  CARDIOLOGY  [address] [place] [address], [address]  [place] [address]  Phone: [phone]  Fax: [phone]  Follow Up Time:     Donald Morgan  NEUROLOGY  [address] [facility] [address]  [place] [address]  Phone: [phone]  Fax: [phone]  Follow Up Time:    Hilton Minor  CARDIOLOGY  [address] [place] [address], [address]  [place] [address]  Phone: [phone]  Fax: [phone]  Follow Up Time:     Donald Morgan  NEUROLOGY  [address] [facility] [facility] [address]  [place] [address]  Phone: [phone]  Fax: [phone]  Follow Up Time:     [place] [address]  Phone: [phone]  Fax: [phone]  Follow Up Time:

## 2020-10-13 NOTE — PROGRESS NOTE ADULT - SUBJECTIVE AND OBJECTIVE BOX
Patient is a 60y old  Female who presents with a chief complaint of 1.3 bilateral weakness, new right pontine CVA with trace left sided weakness+old left BG CVA with right sided weakness/foot drop. (13 Oct 2020 12:48)      Patient seen and examined at bedside. No overnight events reported. Feels much better this morning. No more lightheadedness      ALLERGIES:  lactose (Diarrhea)  sulfa drugs (Angioedema; Swelling)  sulfa drugs (Rash)  Sulfac 10% (Unknown)  walnut (Urticaria)    MEDICATIONS  (STANDING):  amLODIPine   Tablet 10 milliGRAM(s) Oral daily  aspirin enteric coated 81 milliGRAM(s) Oral daily  atorvastatin 80 milliGRAM(s) Oral at bedtime  dextrose 5%. 1000 milliLiter(s) (50 mL/Hr) IV Continuous <Continuous>  dextrose 50% Injectable 12.5 Gram(s) IV Push once  dextrose 50% Injectable 25 Gram(s) IV Push once  dextrose 50% Injectable 25 Gram(s) IV Push once  enoxaparin Injectable 40 milliGRAM(s) SubCutaneous daily  hydrALAZINE 50 milliGRAM(s) Oral three times a day  insulin glargine Injectable (LANTUS) 18 Unit(s) SubCutaneous at bedtime  insulin lispro (HumaLOG) corrective regimen sliding scale   SubCutaneous three times a day before meals  insulin lispro (HumaLOG) corrective regimen sliding scale   SubCutaneous at bedtime  labetalol 400 milliGRAM(s) Oral three times a day  lisinopril 40 milliGRAM(s) Oral daily  metFORMIN 500 milliGRAM(s) Oral two times a day with meals  saccharomyces boulardii 250 milliGRAM(s) Oral two times a day    MEDICATIONS  (PRN):  acetaminophen   Tablet .. 650 milliGRAM(s) Oral every 6 hours PRN Moderate Pain (4 - 6)  dextrose 40% Gel 15 Gram(s) Oral once PRN Blood Glucose LESS THAN 70 milliGRAM(s)/deciliter  glucagon  Injectable 1 milliGRAM(s) IntraMuscular once PRN Glucose LESS THAN 70 milligrams/deciliter    Vital Signs Last 24 Hrs  T(F): 97.8 (12 Oct 2020 20:45), Max: 97.8 (12 Oct 2020 20:45)  HR: 80 (13 Oct 2020 14:12) (77 - 89)  BP: 150/90 (13 Oct 2020 14:12) (122/70 - 150/90)  RR: 16 (13 Oct 2020 07:52) (16 - 16)  SpO2: 99% (13 Oct 2020 07:52) (99% - 100%)  I&O's Summary    12 Oct 2020 07:01  -  13 Oct 2020 07:00  --------------------------------------------------------  IN: 0 mL / OUT: 0 mL / NET: 0 mL      PHYSICAL EXAM:  General: NAD, A/O x 3  ENT: Moist mucous membranes, no thrush  Neck: Supple, No JVD  Lungs: Clear to auscultation bilaterally, good air entry, non-labored breathing  Cardio: RRR, S1/S2, No murmur  Abdomen: Soft, Nontender, Nondistended; Bowel sounds present  Extremities: No calf tenderness, No pitting edema    LABS:                        11.1   5.65  )-----------( 198      ( 12 Oct 2020 05:30 )             34.8     10-12    141  |  104  |  23  ----------------------------<  127  3.7   |  26  |  1.09    Ca    9.3      12 Oct 2020 05:30    TPro  7.3  /  Alb  3.2  /  TBili  0.5  /  DBili  x   /  AST  31  /  ALT  27  /  AlkPhos  73  10-12        eGFR if Non African American: 55 mL/min/1.73M2 (10-12-20 @ 05:30)  eGFR if African American: 64 mL/min/1.73M2 (10-12-20 @ 05:30)              10-03 Chol 241 mg/dL  mg/dL HDL 52 mg/dL Trig 104 mg/dL              POCT Blood Glucose.: 110 mg/dL (13 Oct 2020 11:29)  POCT Blood Glucose.: 151 mg/dL (13 Oct 2020 08:01)  POCT Blood Glucose.: 199 mg/dL (12 Oct 2020 21:31)  POCT Blood Glucose.: 134 mg/dL (12 Oct 2020 16:48)          RADIOLOGY & ADDITIONAL TESTS:    Care Discussed with Consultants/Other Providers:

## 2020-10-13 NOTE — DISCHARGE NOTE PROVIDER - PROVIDER TOKENS
PROVIDER:[TOKEN:[[id number]]],PROVIDER:[TOKEN:[[id number]]] PROVIDER:[TOKEN:[[id number]]],PROVIDER:[TOKEN:[[id number]]],PROVIDER:[TOKEN:[[id number]]]

## 2020-10-13 NOTE — PROGRESS NOTE ADULT - ASSESSMENT
60F with DM2, HTN, CVA (2017) now at [facility] after being diagnosed with new right pontine CVA    #Right pontine CVA  -ASA, statin  -BP control  -PT/OT/SLP     #Essential HTN with   #Orthostatic hypotension  -overall BP improved on Norvasc hydralazine, labetalol, lisinopril  -however, patient was orthostatic this morning (again), but was not orthostatic last evening  -encourage PO intake  -check orthostatic q shift   -will decrease hydralazine dose (at night) to 25 mg  -would consider decreasing remaining doses of hydralazine to 25 mg tid pending orthostatic BP readings tomorrow    #DM2 on insulin  -A1C 11.9  -ISS, Lantus  -Sugars controlled  -c/w Metformin    #UTI - ruled out, no need for antibiotics     #multi-nodular goiter   -outpatient endocrine f/u    #DVT ppx: lovenox

## 2020-10-13 NOTE — PROGRESS NOTE ADULT - ASSESSMENT
OMAR CASTANO is a 59yo Female with right pontine CVA, now with decreased functional mobility, gait instability and ADL impairments.    COMORBIDITES/ACTIVE MEDICAL ISSUES     Gait Instability, ADL impairments and Functional impairments: continue Comprehensive Rehab Program of PT/OT     # Right pontine CVA  -Aspirin, Lipitor ( failed Plavix)   -continue PT/OT/SLP. SLp decreased to 30min. Increase PT  -fall and aspiration precautions  -neuropsychology evaluation, add recreational  therapy    #HTN  -Norvasc increased to 10mg   -Lisinopril   -Labetalol to 400mg TID as per cardiology  -hospitalist following      #DM2  -Lantus/Humalog, Metformin BID.  -FS ACHS  -consistent carbs diet  -Nutrition eval  -endocrinology eval for A1C 11.9 and recurrent small vessel CVA. Plan Lantus/jardiance 10mg home.     #UTI  -Check voiding, PVR x1-low  -Abx completed    #goiter  -endocrne in, thyroid work up entered as per endocrine request      #Costochondritis   -Left chest wall pain with palpation   -Started on 10/5 after she had TTE when pt endorsed to technician pushing too hard   -Tylenol PRN     Pain Mgmt - Tylenol PRN  GI/Bowel Mgmt - Colace, Senna,  Miralax PRN  /Bladder Mgmt - Voiding independently, PVR

## 2020-10-13 NOTE — CHART NOTE - NSCHARTNOTEFT_GEN_A_CORE
NUTRITION FOLLOW UP    SOURCE: Interdisciplinary rounding [X)   Family [ ]    Medical Record (X)    Diet, Consistent Carbohydrate/No Snacks:   Lactose Restricted (Milk Sugar Intoler.)  [place] Vegetarian (Accepts Fish) (10-07-20 @ 10:04) [Active]    Nutritionally stable; discussed during rounding. Eating well and weight stable. LBM 10/12. POCT (10/12) 134-199mg/dl. Skin WDL. Will continue to follow and monitor as indicated. [place] allergy and lactose intolerant.     PO INTAKE:   %     CURRENT WEIGHT: 221# (10/12)  220# (10/8)    PERTINENT MEDS:   Pertinent Medications: MEDICATIONS  (STANDING):  amLODIPine   Tablet 10 milliGRAM(s) Oral daily  aspirin enteric coated 81 milliGRAM(s) Oral daily  atorvastatin 80 milliGRAM(s) Oral at bedtime  dextrose 5%. 1000 milliLiter(s) (50 mL/Hr) IV Continuous <Continuous>  dextrose 50% Injectable 12.5 Gram(s) IV Push once  dextrose 50% Injectable 25 Gram(s) IV Push once  dextrose 50% Injectable 25 Gram(s) IV Push once  enoxaparin Injectable 40 milliGRAM(s) SubCutaneous daily  hydrALAZINE 50 milliGRAM(s) Oral three times a day  insulin glargine Injectable (LANTUS) 18 Unit(s) SubCutaneous at bedtime  insulin lispro (HumaLOG) corrective regimen sliding scale   SubCutaneous three times a day before meals  insulin lispro (HumaLOG) corrective regimen sliding scale   SubCutaneous at bedtime  labetalol 400 milliGRAM(s) Oral three times a day  lisinopril 40 milliGRAM(s) Oral daily  metFORMIN 500 milliGRAM(s) Oral two times a day with meals  saccharomyces boulardii 250 milliGRAM(s) Oral two times a day    MEDICATIONS  (PRN):  acetaminophen   Tablet .. 650 milliGRAM(s) Oral every 6 hours PRN Moderate Pain (4 - 6)  dextrose 40% Gel 15 Gram(s) Oral once PRN Blood Glucose LESS THAN 70 milliGRAM(s)/deciliter  glucagon  Injectable 1 milliGRAM(s) IntraMuscular once PRN Glucose LESS THAN 70 milligrams/deciliter      PERTINENT LABS:  10-12 Na141 mmol/L Glu 127 mg/dL<H> K+ 3.7 mmol/L Cr  1.09 mg/dL BUN 23 mg/dL 10-12 Alb 3.2 g/dL<L> 10-03 Chol 241 mg/dL<H>  mg/dL<H> HDL 52 mg/dL Trig 104 mg/dL  10-03-20 @ 11:04 A1C 11.9      SKIN:  intact  EDEMA: none noted   LAST BM: 10/12    ESTIMATED NEEDS:   [X] no change since previous assessment  [ ] recalculated:     PREVIOUS NUTRITION DIAGNOSIS:  altered nutrition-related lab values.    NUTRITION DIAGNOSIS is :  ( x)  Ongoing      (    ) Resolved,  RD will follow as per nutrition department protocol.    NUTRITION RECOMMENDATIONS:   1. Continue diet as ordered. Accepts eggs and fish. Lactose intolerant. [place] allergy.  2. Diet education provided.  3. Accommodated preferences. Diet office updated.   4. Insulin regimen per medical team.    MONITORING AND EVALUATION:   1. Tolerance to diet prescription   2. PO intake  3. Weights  4. Labs  5. Follow Up per protocol     RD to remain available   Glenda Cabrera RDN #[id number]

## 2020-10-13 NOTE — DISCHARGE NOTE PROVIDER - NSDCFUADDAPPT_GEN_ALL_CORE_FT
[address] [address]  •	Wed 11/4 – 11 noon – Occupational therapy evaluation  •	Wed 11/4 – 12 noon – Intake  •	Thurs 11/5 – 10 am – Speech therapy evaluation  •	Mon 11/23 – 3 pm – Physical therapy evaluation      Please follow up with Dr. Bunny Vences directly to schedule PCP appointment ([phone])  Follow up PCP and Endocrinology in 1 week.

## 2020-10-13 NOTE — DISCHARGE NOTE PROVIDER - REASON FOR ADMISSION
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

## 2020-10-13 NOTE — DISCHARGE NOTE PROVIDER - HOSPITAL COURSE
61yo Female c Pmhx DM2, HTN, CVA 2017 (s/p loop monitor, on Plavix, residual Right sided weakness) who presented to [facility] ED 10/2 with Left sided numbness with poor balance, also associated nausea, lightheadedness. CTH 10/2 showed no new CVA. MR head 10/3 showed right pontine CVA with associated cytotoxic edema and without hemorrhagic transformation. Multiple additional chronic infarcts and chronic white matter microvascular type changes. CTA H/N 10/2  showed mild right PCA stenosis otherwise unremarkable. Asprin alone for now in setting of multiple micro-hemorrhages likely related to underlying HTN.    Hospitalization complicated by + UTI, on abx therapy. Evaluated by PMR and AR recommended. Cleared for d/c on 10/6.    59yo Female c Pmhx DM2, HTN, CVA 2017 (s/p loop monitor, on Plavix, residual Right sided weakness) who presented to [facility] ED 10/2 with Left sided numbness with poor balance, also associated nausea, lightheadedness. CTH 10/2 showed no new CVA. MR head 10/3 showed right pontine CVA with associated cytotoxic edema and without hemorrhagic transformation. Multiple additional chronic infarcts and chronic white matter microvascular type changes. CTA H/N 10/2  showed mild right PCA stenosis otherwise unremarkable. Asprin alone for now in setting of multiple micro-hemorrhages likely related to underlying HTN.    Hospitalization complicated by + UTI, on abx therapy. Evaluated by PMR and AR recommended. Cleared for d/c on 10/6.   At [facility] rehab at [facility] patient completed comprehensive PT/OT/SLP program and performs activities with CG assistance. While at rehab patient completed antibiotics for uti. Evaluated by medicine, cardiology and endocrinology. BP regimen adjusted. Patient cleared for d/c home with follow ups on 10/15.

## 2020-10-14 ENCOUNTER — TRANSCRIPTION ENCOUNTER (OUTPATIENT)
Age: 60
End: 2020-10-14

## 2020-10-14 LAB
ALBUMIN SERPL ELPH-MCNC: 3.3 G/DL — SIGNIFICANT CHANGE UP (ref 3.3–5)
ALP SERPL-CCNC: 73 U/L — SIGNIFICANT CHANGE UP (ref 40–120)
ALT FLD-CCNC: 37 U/L — SIGNIFICANT CHANGE UP (ref 10–45)
ANION GAP SERPL CALC-SCNC: 11 MMOL/L — SIGNIFICANT CHANGE UP (ref 5–17)
AST SERPL-CCNC: 34 U/L — SIGNIFICANT CHANGE UP (ref 10–40)
BILIRUB SERPL-MCNC: 0.4 MG/DL — SIGNIFICANT CHANGE UP (ref 0.2–1.2)
BUN SERPL-MCNC: 26 MG/DL — HIGH (ref 7–23)
CALCIUM SERPL-MCNC: 9.3 MG/DL — SIGNIFICANT CHANGE UP (ref 8.4–10.5)
CHLORIDE SERPL-SCNC: 104 MMOL/L — SIGNIFICANT CHANGE UP (ref 96–108)
CO2 SERPL-SCNC: 25 MMOL/L — SIGNIFICANT CHANGE UP (ref 22–31)
CREAT SERPL-MCNC: 1.21 MG/DL — SIGNIFICANT CHANGE UP (ref 0.5–1.3)
GLUCOSE BLDC GLUCOMTR-MCNC: 117 MG/DL — HIGH (ref 70–99)
GLUCOSE BLDC GLUCOMTR-MCNC: 124 MG/DL — HIGH (ref 70–99)
GLUCOSE BLDC GLUCOMTR-MCNC: 124 MG/DL — HIGH (ref 70–99)
GLUCOSE BLDC GLUCOMTR-MCNC: 132 MG/DL — HIGH (ref 70–99)
GLUCOSE SERPL-MCNC: 123 MG/DL — HIGH (ref 70–99)
HCT VFR BLD CALC: 34.2 % — LOW (ref 34.5–45)
HGB BLD-MCNC: 10.9 G/DL — LOW (ref 11.5–15.5)
MCHC RBC-ENTMCNC: 26.3 PG — LOW (ref 27–34)
MCHC RBC-ENTMCNC: 31.9 GM/DL — LOW (ref 32–36)
MCV RBC AUTO: 82.4 FL — SIGNIFICANT CHANGE UP (ref 80–100)
NRBC # BLD: 0 /100 WBCS — SIGNIFICANT CHANGE UP (ref 0–0)
PLATELET # BLD AUTO: 214 K/UL — SIGNIFICANT CHANGE UP (ref 150–400)
POTASSIUM SERPL-MCNC: 3.8 MMOL/L — SIGNIFICANT CHANGE UP (ref 3.5–5.3)
POTASSIUM SERPL-SCNC: 3.8 MMOL/L — SIGNIFICANT CHANGE UP (ref 3.5–5.3)
PROT SERPL-MCNC: 7.4 G/DL — SIGNIFICANT CHANGE UP (ref 6–8.3)
RBC # BLD: 4.15 M/UL — SIGNIFICANT CHANGE UP (ref 3.8–5.2)
RBC # FLD: 13.2 % — SIGNIFICANT CHANGE UP (ref 10.3–14.5)
SODIUM SERPL-SCNC: 140 MMOL/L — SIGNIFICANT CHANGE UP (ref 135–145)
WBC # BLD: 6.44 K/UL — SIGNIFICANT CHANGE UP (ref 3.8–10.5)
WBC # FLD AUTO: 6.44 K/UL — SIGNIFICANT CHANGE UP (ref 3.8–10.5)

## 2020-10-14 PROCEDURE — 99232 SBSQ HOSP IP/OBS MODERATE 35: CPT

## 2020-10-14 PROCEDURE — 90832 PSYTX W PT 30 MINUTES: CPT

## 2020-10-14 RX ADMIN — INSULIN GLARGINE 18 UNIT(S): 100 INJECTION, SOLUTION SUBCUTANEOUS at 21:38

## 2020-10-14 RX ADMIN — Medication 400 MILLIGRAM(S): at 13:18

## 2020-10-14 RX ADMIN — Medication 25 MILLIGRAM(S): at 21:37

## 2020-10-14 RX ADMIN — Medication 81 MILLIGRAM(S): at 12:10

## 2020-10-14 RX ADMIN — METFORMIN HYDROCHLORIDE 500 MILLIGRAM(S): 850 TABLET ORAL at 17:04

## 2020-10-14 RX ADMIN — Medication 50 MILLIGRAM(S): at 13:19

## 2020-10-14 RX ADMIN — Medication 50 MILLIGRAM(S): at 05:37

## 2020-10-14 RX ADMIN — Medication 400 MILLIGRAM(S): at 21:37

## 2020-10-14 RX ADMIN — Medication 250 MILLIGRAM(S): at 17:04

## 2020-10-14 RX ADMIN — ENOXAPARIN SODIUM 40 MILLIGRAM(S): 100 INJECTION SUBCUTANEOUS at 12:10

## 2020-10-14 RX ADMIN — AMLODIPINE BESYLATE 10 MILLIGRAM(S): 2.5 TABLET ORAL at 05:37

## 2020-10-14 RX ADMIN — LISINOPRIL 40 MILLIGRAM(S): 2.5 TABLET ORAL at 05:37

## 2020-10-14 RX ADMIN — ATORVASTATIN CALCIUM 80 MILLIGRAM(S): 80 TABLET, FILM COATED ORAL at 21:37

## 2020-10-14 RX ADMIN — METFORMIN HYDROCHLORIDE 500 MILLIGRAM(S): 850 TABLET ORAL at 08:37

## 2020-10-14 RX ADMIN — Medication 400 MILLIGRAM(S): at 05:37

## 2020-10-14 RX ADMIN — Medication 250 MILLIGRAM(S): at 05:37

## 2020-10-14 NOTE — PROGRESS NOTE ADULT - SUBJECTIVE AND OBJECTIVE BOX
CHIEF COMPLAINT: Impaired balance and gait s/p CVA. Intermittent tingling LUE.       HISTORY OF PRESENT ILLNESS   61yo Female c Pmhx DM2, HTN, CVA 2017 (s/p loop monitor, on Plavix, residual Right sided weakness) who presented to [facility] ED 10/2 with Left sided numbness with poor balance, also associated nausea, lightheadedness. CTH 10/2 showed no new CVA. MR head 10/3 showed right pontine CVA with associated cytotoxic edema and without hemorrhagic transformation. Multiple additional chronic infarcts and chronic white matter microvascular type changes. CTA H/N 10/2  showed mild right PCA stenosis otherwise unremarkable. Asprin alone for now in setting of multiple micro-hemorrhages likely related to underlying HTN.    Hospitalization complicated by + UTI, on abx therapy. Evaluated by PMR and AR recommended. Cleared for d/c on 10/6.      PAST MEDICAL & SURGICAL HISTORY:  TIA (transient ischemic attack)    CVA (cerebral vascular accident)    Hypokalemia    IBS (Irritable Bowel Syndrome)    Generalized Headaches    Diabetes Mellitus Type II    Hypertension    No Past Surgical History      VITALS  Vital Signs Last 24 Hrs  T(C): 36.7 (14 Oct 2020 07:38), Max: 36.7 (14 Oct 2020 07:38)  T(F): 98.1 (14 Oct 2020 07:38), Max: 98.1 (14 Oct 2020 07:38)  HR: 70 (14 Oct 2020 07:38) (70 - 86)  BP: 124/79 (14 Oct 2020 07:38) (124/79 - 158/82)  BP(mean): --  RR: 15 (14 Oct 2020 07:38) (15 - 16)  SpO2: 98% (14 Oct 2020 07:38) (98% - 99%)    PHYSICAL EXAM  Constitutional - NAD, Comfortable  HEENT - NCAT, EOMI  Neck - Supple, No limited ROM  Chest - CTA bilaterally, No wheeze, No rhonchi, No crackles  Cardiovascular - RRR, S1S2, No murmurs  Abdomen - BS+, Soft, NTND  Extremities - No C/C/E, No calf tenderness   Skin-no rash      Neurologic Exam - mild left sided weakness, right foot drop                     Balance - impaired     Psychiatric - anxious       FUNCTIONAL PROGRESS  Gait - 100ft CG  ADLs - CG  Transfers - CG  Functional transfer - CG      RECENT LABS                        10.9   6.44  )-----------( 214      ( 14 Oct 2020 05:20 )             34.2     10-14    140  |  104  |  26<H>  ----------------------------<  123<H>  3.8   |  25  |  1.21    Ca    9.3      14 Oct 2020 05:20    TPro  7.4  /  Alb  3.3  /  TBili  0.4  /  DBili  x   /  AST  34  /  ALT  37  /  AlkPhos  73  10-14      LIVER FUNCTIONS - ( 14 Oct 2020 05:20 )  Alb: 3.3 g/dL / Pro: 7.4 g/dL / ALK PHOS: 73 U/L / ALT: 37 U/L / AST: 34 U/L / GGT: x             Direct LDL: 168 mg/dL (10-03-20 @ 12:00)                RADIOLOGY/OTHER RESULTS      CURRENT MEDICATIONS  MEDICATIONS  (STANDING):  amLODIPine   Tablet 10 milliGRAM(s) Oral daily  aspirin enteric coated 81 milliGRAM(s) Oral daily  atorvastatin 80 milliGRAM(s) Oral at bedtime  dextrose 5%. 1000 milliLiter(s) (50 mL/Hr) IV Continuous <Continuous>  dextrose 50% Injectable 12.5 Gram(s) IV Push once  dextrose 50% Injectable 25 Gram(s) IV Push once  dextrose 50% Injectable 25 Gram(s) IV Push once  enoxaparin Injectable 40 milliGRAM(s) SubCutaneous daily  hydrALAZINE 50 milliGRAM(s) Oral <User Schedule>  hydrALAZINE 25 milliGRAM(s) Oral at bedtime  insulin glargine Injectable (LANTUS) 18 Unit(s) SubCutaneous at bedtime  insulin lispro (HumaLOG) corrective regimen sliding scale   SubCutaneous three times a day before meals  insulin lispro (HumaLOG) corrective regimen sliding scale   SubCutaneous at bedtime  labetalol 400 milliGRAM(s) Oral three times a day  lisinopril 40 milliGRAM(s) Oral daily  metFORMIN 500 milliGRAM(s) Oral two times a day with meals  saccharomyces boulardii 250 milliGRAM(s) Oral two times a day    MEDICATIONS  (PRN):  acetaminophen   Tablet .. 650 milliGRAM(s) Oral every 6 hours PRN Moderate Pain (4 - 6)  dextrose 40% Gel 15 Gram(s) Oral once PRN Blood Glucose LESS THAN 70 milliGRAM(s)/deciliter  glucagon  Injectable 1 milliGRAM(s) IntraMuscular once PRN Glucose LESS THAN 70 milligrams/deciliter      ASSESSMENT & PLAN          GI/Bowel Management - prn   Management - Toilet Q2  Skin - Turn Q2  Pain - Tylenol PRN  DVT PPX - Lovenox  Diet - reg/consistent carbs    Continue comprehensive acute rehab program consisting of 3hrs/day of OT/PT and SLP.

## 2020-10-14 NOTE — CONSULT NOTE ADULT - SUBJECTIVE AND OBJECTIVE BOX
Pt was seen for 30 min. Pt c/o anxiety. Pt was seen for 30 min. Pt c/o anxiety. Pt reported doing well since last seen. She denied pain, and admitted feeling anxious in the past few days in preparation to discharge tomorrow. Pt appeared concerned about worrying often about the near future, including readjusting to home and eventually returning to work. She expressed concern about her ability to work again and whether her health would continue to deteriorate and eventually impair her ability to work. Also, she appeared concerned about never having a conversation with her family re: plans for the future, especially those related to what to do if her health worsens to the point that she may not personally decide on health issues. First, Pt was reassured that she was recovering well, and that like with her fist CVA it would take time to heal and recover. She was encouraged not to take significant decisions yet, when she may not have all the information needed; however, discussed pros and cons of short- versus long-term disability or early half-way, and Pt agreed to explore the first possibility. Also, educated and discussed with Pt on the need to have an appointed HCP, think about DNR and having a will in the near future, and try to have the conversation with her  and children. Pt anticipated that her  would avoid the conversation, and again she was reassured that the decision didn't have to take it today, but certainly it was an important topic to discuss with her . Otherwise Pt is aware of her progress, and is confident that once back at home she will continue the rehab process along the continuum of interventions. Support and encouragement were provided.

## 2020-10-14 NOTE — PROGRESS NOTE ADULT - ASSESSMENT
OMAR CASTANO is a 59yo Female with right pontine CVA, now with decreased functional mobility, gait instability and ADL impairments.    COMORBIDITES/ACTIVE MEDICAL ISSUES     Gait Instability, ADL impairments and Functional impairments: continue Comprehensive Rehab Program of PT/OT     # Right pontine CVA  -Aspirin, Lipitor ( failed Plavix)   -continue PT/OT/SLP. SLp decreased to 30min. Increase PT. Family training plan Thu.   -fall and aspiration precautions  -neuropsychology evaluation, add recreational  therapy    #HTN  -Norvasc increased to 10mg   -Lisinopril   -Labetalol to 400mg TID as per cardiology  -hospitalist following-Hydralazine titrated based on ortho BP. Check today.      #DM2  -Lantus/Humalog, Metformin BID.  -FS ACHS  -consistent carbs diet  -Nutrition eval  -endocrinology eval for A1C 11.9 and recurrent small vessel CVA. Plan Lantus/jardiance 10mg home.     #UTI  -Monitor voiding, PVR x1-low  -Abx completed, 1 episode of incontinence this week. Resolved now.     #goiter  -endocrne in, thyroid work up entered as per endocrine request      #Costochondritis   -Left chest wall pain with palpation   -Started on 10/5 after she had TTE when pt endorsed to technician pushing too hard   -Tylenol PRN     Pain Mgmt - Tylenol PRN  GI/Bowel Mgmt - Colace, Senna,  Miralax PRN  /Bladder Mgmt - Voiding independently, PVR

## 2020-10-14 NOTE — DISCHARGE NOTE NURSING/CASE MANAGEMENT/SOCIAL WORK - NSSCTYPOFSERV_GEN_ALL_CORE
RN, OT, PT, Speech, HHA NEMESIO quintero consult: If you do not hear from them within 24 hours of discharge, please contact them directly.

## 2020-10-14 NOTE — DISCHARGE NOTE NURSING/CASE MANAGEMENT/SOCIAL WORK - NSDCFUADDAPPT_GEN_ALL_CORE_FT
[address] [address]  •	Wed 11/4 – 11 noon – Occupational therapy evaluation  •	Wed 11/4 – 12 noon – Intake  •	Thurs 11/5 – 10 am – Speech therapy evaluation  •	Mon 11/23 – 3 pm – Physical therapy evaluation   [address] [address]  •	Wed 11/4 – 11 noon – Occupational therapy evaluation  •	Wed 11/4 – 12 noon – Intake  •	Thurs 11/5 – 10 am – Speech therapy evaluation  •	Mon 11/23 – 3 pm – Physical therapy evaluation      Please follow up with Dr. Bunny Vences directly to schedule PCP appointment ([phone])  SW left message for MD and practice will try to squeeze you in sooner for post-hospitalization visit.

## 2020-10-14 NOTE — DISCHARGE NOTE NURSING/CASE MANAGEMENT/SOCIAL WORK - PATIENT PORTAL LINK FT
You can access the FollowMyHealth Patient Portal offered by [facility] by registering at the following website: [email or web address]. By joining [employer]’s FollowMyHealth portal, you will also be able to view your health information using other applications (apps) compatible with our system.

## 2020-10-14 NOTE — CONSULT NOTE ADULT - ASSESSMENT
Pt alert, attentive, anxious affect and mood, making efforts to cope by realistic mean with transition from rehab to home. Plan: Neuropsychologist remains available.

## 2020-10-14 NOTE — DISCHARGE NOTE NURSING/CASE MANAGEMENT/SOCIAL WORK - NSDCPEPTSTRK_GEN_ALL_CORE
Signs and symptoms of stroke/Call 911 for stroke/Risk factors for stroke/Stroke support groups for patients, families, and friends/Stroke warning signs and symptoms/Need for follow up after discharge/Prescribed medications/Stroke education booklet

## 2020-10-15 VITALS
TEMPERATURE: 98 F | RESPIRATION RATE: 15 BRPM | SYSTOLIC BLOOD PRESSURE: 137 MMHG | OXYGEN SATURATION: 98 % | DIASTOLIC BLOOD PRESSURE: 86 MMHG | HEART RATE: 78 BPM

## 2020-10-15 LAB
GLUCOSE BLDC GLUCOMTR-MCNC: 103 MG/DL — HIGH (ref 70–99)
GLUCOSE BLDC GLUCOMTR-MCNC: 111 MG/DL — HIGH (ref 70–99)

## 2020-10-15 PROCEDURE — 97163 PT EVAL HIGH COMPLEX 45 MIN: CPT

## 2020-10-15 PROCEDURE — 84443 ASSAY THYROID STIM HORMONE: CPT

## 2020-10-15 PROCEDURE — 97116 GAIT TRAINING THERAPY: CPT

## 2020-10-15 PROCEDURE — 97167 OT EVAL HIGH COMPLEX 60 MIN: CPT

## 2020-10-15 PROCEDURE — 84480 ASSAY TRIIODOTHYRONINE (T3): CPT

## 2020-10-15 PROCEDURE — 85027 COMPLETE CBC AUTOMATED: CPT

## 2020-10-15 PROCEDURE — 97535 SELF CARE MNGMENT TRAINING: CPT

## 2020-10-15 PROCEDURE — 86376 MICROSOMAL ANTIBODY EACH: CPT

## 2020-10-15 PROCEDURE — 99232 SBSQ HOSP IP/OBS MODERATE 35: CPT

## 2020-10-15 PROCEDURE — 97110 THERAPEUTIC EXERCISES: CPT

## 2020-10-15 PROCEDURE — 84436 ASSAY OF TOTAL THYROXINE: CPT

## 2020-10-15 PROCEDURE — 92610 EVALUATE SWALLOWING FUNCTION: CPT

## 2020-10-15 PROCEDURE — 99239 HOSP IP/OBS DSCHRG MGMT >30: CPT

## 2020-10-15 PROCEDURE — 82962 GLUCOSE BLOOD TEST: CPT

## 2020-10-15 PROCEDURE — 80053 COMPREHEN METABOLIC PANEL: CPT

## 2020-10-15 PROCEDURE — 97530 THERAPEUTIC ACTIVITIES: CPT

## 2020-10-15 PROCEDURE — 92523 SPEECH SOUND LANG COMPREHEN: CPT

## 2020-10-15 PROCEDURE — 92507 TX SP LANG VOICE COMM INDIV: CPT

## 2020-10-15 PROCEDURE — 85025 COMPLETE CBC W/AUTO DIFF WBC: CPT

## 2020-10-15 RX ORDER — AMLODIPINE BESYLATE 2.5 MG/1
1 TABLET ORAL
Qty: 30 | Refills: 0
Start: 2020-10-15 | End: 2020-11-13

## 2020-10-15 RX ORDER — ATORVASTATIN CALCIUM 80 MG/1
1 TABLET, FILM COATED ORAL
Qty: 30 | Refills: 0
Start: 2020-10-15 | End: 2020-11-13

## 2020-10-15 RX ORDER — HYDRALAZINE HCL 50 MG
1 TABLET ORAL
Qty: 30 | Refills: 0
Start: 2020-10-15 | End: 2020-11-13

## 2020-10-15 RX ORDER — HYDRALAZINE HCL 50 MG
1 TABLET ORAL
Qty: 60 | Refills: 0
Start: 2020-10-15 | End: 2020-11-13

## 2020-10-15 RX ORDER — LISINOPRIL 2.5 MG/1
1 TABLET ORAL
Qty: 30 | Refills: 0
Start: 2020-10-15 | End: 2020-11-13

## 2020-10-15 RX ORDER — ENOXAPARIN SODIUM 100 MG/ML
18 INJECTION SUBCUTANEOUS
Qty: 1 | Refills: 0
Start: 2020-10-15 | End: 2020-11-13

## 2020-10-15 RX ORDER — EMPAGLIFLOZIN 10 MG/1
1 TABLET, FILM COATED ORAL
Qty: 30 | Refills: 0
Start: 2020-10-15 | End: 2020-11-13

## 2020-10-15 RX ORDER — LABETALOL HCL 100 MG
2 TABLET ORAL
Qty: 180 | Refills: 0
Start: 2020-10-15 | End: 2020-11-13

## 2020-10-15 RX ADMIN — Medication 400 MILLIGRAM(S): at 13:02

## 2020-10-15 RX ADMIN — Medication 81 MILLIGRAM(S): at 12:12

## 2020-10-15 RX ADMIN — Medication 50 MILLIGRAM(S): at 13:03

## 2020-10-15 RX ADMIN — AMLODIPINE BESYLATE 10 MILLIGRAM(S): 2.5 TABLET ORAL at 05:23

## 2020-10-15 RX ADMIN — Medication 400 MILLIGRAM(S): at 05:23

## 2020-10-15 RX ADMIN — Medication 50 MILLIGRAM(S): at 05:23

## 2020-10-15 RX ADMIN — LISINOPRIL 40 MILLIGRAM(S): 2.5 TABLET ORAL at 05:23

## 2020-10-15 RX ADMIN — ENOXAPARIN SODIUM 40 MILLIGRAM(S): 100 INJECTION SUBCUTANEOUS at 12:12

## 2020-10-15 RX ADMIN — Medication 250 MILLIGRAM(S): at 05:23

## 2020-10-15 RX ADMIN — METFORMIN HYDROCHLORIDE 500 MILLIGRAM(S): 850 TABLET ORAL at 07:41

## 2020-10-15 NOTE — PROGRESS NOTE ADULT - SUBJECTIVE AND OBJECTIVE BOX
CHIEF COMPLAINT: Improved balance and gait s/p CVA. Night uneventful, NAD, no new complaints.      HISTORY OF PRESENT ILLNESS   61yo Female c Pmhx DM2, HTN, CVA 2017 (s/p loop monitor, on Plavix, residual Right sided weakness) who presented to [facility] ED 10/2 with Left sided numbness with poor balance, also associated nausea, lightheadedness. CTH 10/2 showed no new CVA. MR head 10/3 showed right pontine CVA with associated cytotoxic edema and without hemorrhagic transformation. Multiple additional chronic infarcts and chronic white matter microvascular type changes. CTA H/N 10/2  showed mild right PCA stenosis otherwise unremarkable. Asprin alone for now in setting of multiple micro-hemorrhages likely related to underlying HTN.    Hospitalization complicated by + UTI, on abx therapy. Evaluated by PMR and AR recommended. Cleared for d/c on 10/6.    (  PAST MEDICAL & SURGICAL HISTORY:  TIA (transient ischemic attack)    CVA (cerebral vascular accident)    Hypokalemia    IBS (Irritable Bowel Syndrome)    Generalized Headaches    Diabetes Mellitus Type II    Hypertension    No Past Surgical History      VITALS  Vital Signs Last 24 Hrs  T(C): 36.5 (15 Oct 2020 08:00), Max: 36.8 (15 Oct 2020 05:22)  T(F): 97.7 (15 Oct 2020 08:00), Max: 98.2 (15 Oct 2020 05:22)  HR: 68 (15 Oct 2020 08:00) (68 - 78)  BP: 119/80 (15 Oct 2020 08:00) (119/80 - 165/92)  BP(mean): 73 (15 Oct 2020 05:22) (73 - 73)  RR: 15 (15 Oct 2020 08:00) (15 - 15)  SpO2: 97% (15 Oct 2020 08:00) (97% - 100%)    PHYSICAL EXAM  Constitutional - NAD, Comfortable  HEENT - NCAT, EOMI  Neck - Supple, No limited ROM  Chest - CTA bilaterally, No wheeze, No rhonchi, No crackles  Cardiovascular - RRR, S1S2, No murmurs  Abdomen - BS+, Soft, NTND  Extremities - No C/C/E, No calf tenderness   Skin-no rash      Neurologic Exam - mild left sided weakness, right foot drop                     Balance - impaired     Psychiatric - anxious       FUNCTIONAL PROGRESS  Gait - 100ft CG  ADLs - CG  Transfers - CG  Functional transfer - CG      RECENT LABS                        10.9   6.44  )-----------( 214      ( 14 Oct 2020 05:20 )             34.2     10-14    140  |  104  |  26<H>  ----------------------------<  123<H>  3.8   |  25  |  1.21    Ca    9.3      14 Oct 2020 05:20    TPro  7.4  /  Alb  3.3  /  TBili  0.4  /  DBili  x   /  AST  34  /  ALT  37  /  AlkPhos  73  10-14      LIVER FUNCTIONS - ( 14 Oct 2020 05:20 )  Alb: 3.3 g/dL / Pro: 7.4 g/dL / ALK PHOS: 73 U/L / ALT: 37 U/L / AST: 34 U/L / GGT: x             Direct LDL: 168 mg/dL (10-03-20 @ 12:00)                RADIOLOGY/OTHER RESULTS      CURRENT MEDICATIONS  MEDICATIONS  (STANDING):  amLODIPine   Tablet 10 milliGRAM(s) Oral daily  aspirin enteric coated 81 milliGRAM(s) Oral daily  atorvastatin 80 milliGRAM(s) Oral at bedtime  dextrose 5%. 1000 milliLiter(s) (50 mL/Hr) IV Continuous <Continuous>  dextrose 50% Injectable 12.5 Gram(s) IV Push once  dextrose 50% Injectable 25 Gram(s) IV Push once  dextrose 50% Injectable 25 Gram(s) IV Push once  enoxaparin Injectable 40 milliGRAM(s) SubCutaneous daily  hydrALAZINE 50 milliGRAM(s) Oral <User Schedule>  hydrALAZINE 25 milliGRAM(s) Oral at bedtime  insulin glargine Injectable (LANTUS) 18 Unit(s) SubCutaneous at bedtime  insulin lispro (HumaLOG) corrective regimen sliding scale   SubCutaneous three times a day before meals  insulin lispro (HumaLOG) corrective regimen sliding scale   SubCutaneous at bedtime  labetalol 400 milliGRAM(s) Oral three times a day  lisinopril 40 milliGRAM(s) Oral daily  metFORMIN 500 milliGRAM(s) Oral two times a day with meals  saccharomyces boulardii 250 milliGRAM(s) Oral two times a day    MEDICATIONS  (PRN):  acetaminophen   Tablet .. 650 milliGRAM(s) Oral every 6 hours PRN Moderate Pain (4 - 6)  dextrose 40% Gel 15 Gram(s) Oral once PRN Blood Glucose LESS THAN 70 milliGRAM(s)/deciliter  glucagon  Injectable 1 milliGRAM(s) IntraMuscular once PRN Glucose LESS THAN 70 milligrams/deciliter      ASSESSMENT & PLAN      GI/Bowel Management - prn   Management - Toilet Q2  Skin - Turn Q2  Pain - Tylenol PRN  DVT PPX - Lovenox  Diet - reg/consistent carbs    Continue comprehensive acute rehab program consisting of 3hrs/day of OT/PT and SLP.

## 2020-10-15 NOTE — PROGRESS NOTE ADULT - ATTENDING COMMENTS
Excellent functional recovery  No complainants  Family training competed   Patient is being discharged home with home care today.  Discharge instructions were discussed with patient and family, all current medications were sent to the pharmacy. Patient and family were educated on importance of medication compliance,  continued  care with PMD and follow-up care with the specialists in the community. Safety and fall risk precautions  were discussed in detail, counseled on healthy life style modifications.  All questions were answered to their satisfaction.
No further episodes of urinary incontinence or dysuria   Completed ABx for UTI  Improving neurologically and functionally    Anticipate discharge home 10/15/20 with Home care
No new complaints   Stable neurological exam  Discharge plan discussed with SW and team
Patient medically and neurologically  stable. Making progress towards rehab goals.   Continue rehab program. Case discussed with Medicine, discharge plan discussed with patient and SW.
BP management discussed with Medicine  Endocrinology input appreciated, goal Hba1c <7  Neurologically stable   Functionally improving  Full program
No acute events overnight  Stable neurological exam  Full program
Episode of urine loss after shower and while treated for  UTI  Neurological exam stable, improving fictionally  BP management discussed with Medicine  Multidisciplinary team meeting today:  patient's functional goals and needs, functional and clinical  progress were discussed, barriers to discharge were identified. Anticipate discharge home with home care, 24/7 supervision for safety.    EDOD 10/15/20

## 2020-10-15 NOTE — PROGRESS NOTE ADULT - SUBJECTIVE AND OBJECTIVE BOX
Patient is a 60y old  Female who presents with a chief complaint of 1.3 bilateral weakness, new right pontine CVA with trace left sided weakness+old left BG CVA with right sided weakness/foot drop. (13 Oct 2020 12:48)    Interval  Patient seen and examined at bedside. No overnight events reported.  no complaints  limb weakness getting better with therapy  no pain. BM+  for DC today    ROS:  CONSTITUTIONAL:  No distress.no fever/chills/fatigue/weight loss  HEENT:  Eyes:  No diplopia or blurred vision.   CARDIOVASCULAR:  No pressure, squeezing, tightness, heaviness or aching about the chest; no palpitations.no leg swelling, no orthopnea or PND  RESPIRATORY:  no SOB. no wheezing.no cough or sputum.  No hemoptysis  GI: no abd pain,  no nausea, no vomiting, no diarrhea, no constipation. No hematochezia or melena  EXT:No joint pain or joint swelling or redness, no leg or calf apain  SKIN: no skin rash  CNS: No headaches. left sided weakness. no numbness. No depression or anxiety. No SI        ALLERGIES:  lactose (Diarrhea)  sulfa drugs (Angioedema; Swelling)  sulfa drugs (Rash)  Sulfac 10% (Unknown)  walnut (Urticaria)    MEDICATIONS  (STANDING):  amLODIPine   Tablet 10 milliGRAM(s) Oral daily  aspirin enteric coated 81 milliGRAM(s) Oral daily  atorvastatin 80 milliGRAM(s) Oral at bedtime  dextrose 5%. 1000 milliLiter(s) (50 mL/Hr) IV Continuous <Continuous>  dextrose 50% Injectable 12.5 Gram(s) IV Push once  dextrose 50% Injectable 25 Gram(s) IV Push once  dextrose 50% Injectable 25 Gram(s) IV Push once  enoxaparin Injectable 40 milliGRAM(s) SubCutaneous daily  hydrALAZINE 50 milliGRAM(s) Oral <User Schedule>  hydrALAZINE 25 milliGRAM(s) Oral at bedtime  insulin glargine Injectable (LANTUS) 18 Unit(s) SubCutaneous at bedtime  insulin lispro (HumaLOG) corrective regimen sliding scale   SubCutaneous three times a day before meals  insulin lispro (HumaLOG) corrective regimen sliding scale   SubCutaneous at bedtime  labetalol 400 milliGRAM(s) Oral three times a day  lisinopril 40 milliGRAM(s) Oral daily  metFORMIN 500 milliGRAM(s) Oral two times a day with meals  saccharomyces boulardii 250 milliGRAM(s) Oral two times a day    MEDICATIONS  (PRN):  acetaminophen   Tablet .. 650 milliGRAM(s) Oral every 6 hours PRN Moderate Pain (4 - 6)  dextrose 40% Gel 15 Gram(s) Oral once PRN Blood Glucose LESS THAN 70 milliGRAM(s)/deciliter  glucagon  Injectable 1 milliGRAM(s) IntraMuscular once PRN Glucose LESS THAN 70 milligrams/deciliter    Vital Signs Last 24 Hrs  T(C): 36.5 (15 Oct 2020 08:00), Max: 36.8 (15 Oct 2020 05:22)  T(F): 97.7 (15 Oct 2020 08:00), Max: 98.2 (15 Oct 2020 05:22)  HR: 68 (15 Oct 2020 08:00) (68 - 78)  BP: 119/80 (15 Oct 2020 08:00) (119/80 - 165/92)  BP(mean): 73 (15 Oct 2020 05:22) (73 - 73)  RR: 15 (15 Oct 2020 08:00) (15 - 15)  SpO2: 97% (15 Oct 2020 08:00) (97% - 100%)      PHYSICAL EXAM:  General: not in distress  ENT: Moist mucous membranes,   Neck: Supple,  Lungs: Clear to auscultation bilaterally, good air entry, non-labored breathing  Cardio: RRR, S1/S2, No murmur  Abdomen: Soft, Nontender, Nondistended; Bowel sounds present  Extremities: No calf tenderness, No pitting edema  Neuro: AAO*3. left sided weakness. speech fluent    LABS:                                   10.9   6.44  )-----------( 214      ( 14 Oct 2020 05:20 )             34.2       10-14    140  |  104  |  26<H>  ----------------------------<  123<H>  3.8   |  25  |  1.21    Ca    9.3      14 Oct 2020 05:20    TPro  7.4  /  Alb  3.3  /  TBili  0.4  /  DBili  x   /  AST  34  /  ALT  37  /  AlkPhos  73  10-14                10-03 Chol 241 mg/dL  mg/dL HDL 52 mg/dL Trig 104 mg/dL              POCT Blood Glucose.: 110 mg/dL (13 Oct 2020 11:29)  POCT Blood Glucose.: 151 mg/dL (13 Oct 2020 08:01)  POCT Blood Glucose.: 199 mg/dL (12 Oct 2020 21:31)  POCT Blood Glucose.: 134 mg/dL (12 Oct 2020 16:48)          RADIOLOGY & ADDITIONAL TESTS:    Care Discussed with Consultants/Other Providers:

## 2020-10-15 NOTE — PROVIDER CONTACT NOTE (OTHER) - ASSESSMENT
Pt denies any pain or discomfort upon urination. Pt also denies any recent blood tinged urine. VS as documented.

## 2020-10-15 NOTE — PROGRESS NOTE ADULT - ASSESSMENT
60F with DM2, HTN, CVA (2017) now at [facility] after being diagnosed with new right pontine CVA    #Right pontine CVA  # impaired gait, mobility,ADL  -ASA, statin  -BP control  -PT/OT/SLP as per primary team    #Essential HTN with   #Orthostatic hypotension  -overall BP improved on Norvasc hydralazine, labetalol, lisinopril  -however, patient was orthostatic episode once  -encourage PO intake  -check orthostatic q shift   - continue current regimen  - educated about postural hypotension and fall prevention    #DM2 on insulin  -A1C 11.9  -ISS, Lantus  -Sugars controlled  -c/w Metformin    #UTI - ruled out, no need for antibiotics     #multi-nodular goiter   -outpatient endocrine f/u    #DVT ppx: lovenox

## 2020-10-15 NOTE — PROGRESS NOTE ADULT - ASSESSMENT
OMAR CASTANO is a 59yo Female with right pontine CVA, now with decreased functional mobility, gait instability and ADL impairments.    COMORBIDITES/ACTIVE MEDICAL ISSUES     Gait Instability, ADL impairments and Functional impairments: continue Comprehensive Rehab Program of PT/OT     # Right pontine CVA  -Aspirin, Lipitor ( failed Plavix)   -continue PT/OT/SLP. SLp decreased to 30min. Increase PT. Family training plan Thu.   -fall and aspiration precautions  -neuropsychology evaluation, add recreational  therapy    #HTN  -Norvasc increased to 10mg   -Lisinopril   -Labetalol to 400mg TID as per cardiology  -hospitalist following-Hydralazine titrated based on ortho BP.     #DM2  -Lantus/Humalog, Metformin BID.  -FS ACHS  -consistent carbs diet  -Nutrition eval  -endocrinology eval for A1C 11.9 and recurrent small vessel CVA. Plan Lantus/jardiance 10mg home.     #UTI  -Monitor voiding, PVR x1-low  -Abx completed, 1 episode of incontinence this week. Resolved now.     #goiter  -endocrne in, thyroid work up entered as per endocrine request      #Costochondritis   -Left chest wall pain with palpation   -Started on 10/5 after she had TTE when pt endorsed to technician pushing too hard   -Tylenol PRN     Pain Mgmt - Tylenol PRN  GI/Bowel Mgmt - Colace, Senna,  Miralax PRN  /Bladder Mgmt - Voiding independently, PVR

## 2021-06-14 NOTE — ED ADULT NURSE NOTE - CAS DISCH TRANSFER METHOD
DHARMESH reviewed chart. Pt is being discharged today. Pt is transitioning home with Home Health. Pt will be followed by [facility]. DHARMESH spoke to [employer], a liaison with [facility] this morning. She is pending clearance one pt has a PCP established. Pt bedside nurse is making her appointments. Pt DME was delivered on sight today by Beth. Her DME includes the quad cane, 3 in 1 bedside commode, wheelchair, and wheelchair cushion. DHARMESH delivered DME to her bedside. DHARMESH updated pt and daughter at the bedside. SW will continue to follow.        Sherri Minor MSW, LCSW, CCM  Doctoral Candidate, Doctor of Social Work Private car

## 2021-08-15 ENCOUNTER — INPATIENT (INPATIENT)
Facility: HOSPITAL | Age: 61
LOS: 3 days | Discharge: NURSING, REHAB OR LONG-TERM CARE | DRG: 41 | End: 2021-08-19
Attending: PSYCHIATRY & NEUROLOGY | Admitting: PSYCHIATRY & NEUROLOGY
Payer: COMMERCIAL

## 2021-08-15 VITALS
HEART RATE: 115 BPM | SYSTOLIC BLOOD PRESSURE: 192 MMHG | RESPIRATION RATE: 18 BRPM | OXYGEN SATURATION: 100 % | WEIGHT: 210.1 LBS | HEIGHT: 68 IN | TEMPERATURE: 98 F | DIASTOLIC BLOOD PRESSURE: 110 MMHG

## 2021-08-15 LAB
ALBUMIN SERPL ELPH-MCNC: 4.5 G/DL — SIGNIFICANT CHANGE UP (ref 3.3–5)
ALP SERPL-CCNC: 85 U/L — SIGNIFICANT CHANGE UP (ref 40–120)
ALT FLD-CCNC: 10 U/L — SIGNIFICANT CHANGE UP (ref 10–45)
ANION GAP SERPL CALC-SCNC: 12 MMOL/L — SIGNIFICANT CHANGE UP (ref 5–17)
APPEARANCE UR: CLEAR — SIGNIFICANT CHANGE UP
APTT BLD: 31.8 SEC — SIGNIFICANT CHANGE UP (ref 27.5–35.5)
AST SERPL-CCNC: 11 U/L — SIGNIFICANT CHANGE UP (ref 10–40)
BACTERIA # UR AUTO: NEGATIVE — SIGNIFICANT CHANGE UP
BASE EXCESS BLDV CALC-SCNC: 3.2 MMOL/L — HIGH (ref -2–2)
BASOPHILS # BLD AUTO: 0.02 K/UL — SIGNIFICANT CHANGE UP (ref 0–0.2)
BASOPHILS NFR BLD AUTO: 0.3 % — SIGNIFICANT CHANGE UP (ref 0–2)
BILIRUB SERPL-MCNC: 0.5 MG/DL — SIGNIFICANT CHANGE UP (ref 0.2–1.2)
BILIRUB UR-MCNC: NEGATIVE — SIGNIFICANT CHANGE UP
BUN SERPL-MCNC: 21 MG/DL — SIGNIFICANT CHANGE UP (ref 7–23)
CALCIUM SERPL-MCNC: 9.9 MG/DL — SIGNIFICANT CHANGE UP (ref 8.4–10.5)
CHLORIDE SERPL-SCNC: 106 MMOL/L — SIGNIFICANT CHANGE UP (ref 96–108)
CO2 BLDV-SCNC: 30 MMOL/L — SIGNIFICANT CHANGE UP (ref 22–30)
CO2 SERPL-SCNC: 25 MMOL/L — SIGNIFICANT CHANGE UP (ref 22–31)
COLOR SPEC: COLORLESS — SIGNIFICANT CHANGE UP
CREAT SERPL-MCNC: 1.03 MG/DL — SIGNIFICANT CHANGE UP (ref 0.5–1.3)
DIFF PNL FLD: NEGATIVE — SIGNIFICANT CHANGE UP
EOSINOPHIL # BLD AUTO: 0.27 K/UL — SIGNIFICANT CHANGE UP (ref 0–0.5)
EOSINOPHIL NFR BLD AUTO: 4.3 % — SIGNIFICANT CHANGE UP (ref 0–6)
EPI CELLS # UR: 1 /HPF — SIGNIFICANT CHANGE UP
GAS PNL BLDV: SIGNIFICANT CHANGE UP
GLUCOSE BLDC GLUCOMTR-MCNC: 100 MG/DL — HIGH (ref 70–99)
GLUCOSE BLDC GLUCOMTR-MCNC: 86 MG/DL — SIGNIFICANT CHANGE UP (ref 70–99)
GLUCOSE SERPL-MCNC: 126 MG/DL — HIGH (ref 70–99)
GLUCOSE UR QL: ABNORMAL
HCO3 BLDV-SCNC: 29 MMOL/L — SIGNIFICANT CHANGE UP (ref 21–29)
HCT VFR BLD CALC: 36 % — SIGNIFICANT CHANGE UP (ref 34.5–45)
HGB BLD-MCNC: 11.5 G/DL — SIGNIFICANT CHANGE UP (ref 11.5–15.5)
HYALINE CASTS # UR AUTO: 0 /LPF — SIGNIFICANT CHANGE UP (ref 0–2)
IMM GRANULOCYTES NFR BLD AUTO: 0.3 % — SIGNIFICANT CHANGE UP (ref 0–1.5)
INR BLD: 0.94 RATIO — SIGNIFICANT CHANGE UP (ref 0.88–1.16)
KETONES UR-MCNC: NEGATIVE — SIGNIFICANT CHANGE UP
LEUKOCYTE ESTERASE UR-ACNC: NEGATIVE — SIGNIFICANT CHANGE UP
LYMPHOCYTES # BLD AUTO: 2.28 K/UL — SIGNIFICANT CHANGE UP (ref 1–3.3)
LYMPHOCYTES # BLD AUTO: 36 % — SIGNIFICANT CHANGE UP (ref 13–44)
MCHC RBC-ENTMCNC: 26.6 PG — LOW (ref 27–34)
MCHC RBC-ENTMCNC: 31.9 GM/DL — LOW (ref 32–36)
MCV RBC AUTO: 83.3 FL — SIGNIFICANT CHANGE UP (ref 80–100)
MONOCYTES # BLD AUTO: 0.48 K/UL — SIGNIFICANT CHANGE UP (ref 0–0.9)
MONOCYTES NFR BLD AUTO: 7.6 % — SIGNIFICANT CHANGE UP (ref 2–14)
NEUTROPHILS # BLD AUTO: 3.26 K/UL — SIGNIFICANT CHANGE UP (ref 1.8–7.4)
NEUTROPHILS NFR BLD AUTO: 51.5 % — SIGNIFICANT CHANGE UP (ref 43–77)
NITRITE UR-MCNC: NEGATIVE — SIGNIFICANT CHANGE UP
NRBC # BLD: 0 /100 WBCS — SIGNIFICANT CHANGE UP (ref 0–0)
PCO2 BLDV: 52 MMHG — HIGH (ref 35–50)
PH BLDV: 7.36 — SIGNIFICANT CHANGE UP (ref 7.35–7.45)
PH UR: 7 — SIGNIFICANT CHANGE UP (ref 5–8)
PLATELET # BLD AUTO: 229 K/UL — SIGNIFICANT CHANGE UP (ref 150–400)
PO2 BLDV: 21 MMHG — LOW (ref 25–45)
POTASSIUM SERPL-MCNC: 4.4 MMOL/L — SIGNIFICANT CHANGE UP (ref 3.5–5.3)
POTASSIUM SERPL-SCNC: 4.4 MMOL/L — SIGNIFICANT CHANGE UP (ref 3.5–5.3)
PROT SERPL-MCNC: 7.8 G/DL — SIGNIFICANT CHANGE UP (ref 6–8.3)
PROT UR-MCNC: ABNORMAL
PROTHROM AB SERPL-ACNC: 11.3 SEC — SIGNIFICANT CHANGE UP (ref 10.6–13.6)
RBC # BLD: 4.32 M/UL — SIGNIFICANT CHANGE UP (ref 3.8–5.2)
RBC # FLD: 14.6 % — HIGH (ref 10.3–14.5)
RBC CASTS # UR COMP ASSIST: 0 /HPF — SIGNIFICANT CHANGE UP (ref 0–4)
SAO2 % BLDV: 24 % — LOW (ref 67–88)
SARS-COV-2 RNA SPEC QL NAA+PROBE: SIGNIFICANT CHANGE UP
SODIUM SERPL-SCNC: 143 MMOL/L — SIGNIFICANT CHANGE UP (ref 135–145)
SP GR SPEC: 1.03 — HIGH (ref 1.01–1.02)
TROPONIN T, HIGH SENSITIVITY RESULT: 19 NG/L — SIGNIFICANT CHANGE UP (ref 0–51)
TROPONIN T, HIGH SENSITIVITY RESULT: 19 NG/L — SIGNIFICANT CHANGE UP (ref 0–51)
UROBILINOGEN FLD QL: NEGATIVE — SIGNIFICANT CHANGE UP
WBC # BLD: 6.33 K/UL — SIGNIFICANT CHANGE UP (ref 3.8–10.5)
WBC # FLD AUTO: 6.33 K/UL — SIGNIFICANT CHANGE UP (ref 3.8–10.5)
WBC UR QL: 1 /HPF — SIGNIFICANT CHANGE UP (ref 0–5)

## 2021-08-15 PROCEDURE — 70498 CT ANGIOGRAPHY NECK: CPT | Mod: 26,MA

## 2021-08-15 PROCEDURE — 0042T: CPT

## 2021-08-15 PROCEDURE — 70496 CT ANGIOGRAPHY HEAD: CPT | Mod: 26,MA

## 2021-08-15 PROCEDURE — 71045 X-RAY EXAM CHEST 1 VIEW: CPT | Mod: 26

## 2021-08-15 PROCEDURE — 70551 MRI BRAIN STEM W/O DYE: CPT | Mod: 26,MG

## 2021-08-15 PROCEDURE — G1004: CPT

## 2021-08-15 RX ORDER — LABETALOL HCL 100 MG
400 TABLET ORAL THREE TIMES A DAY
Refills: 0 | Status: DISCONTINUED | OUTPATIENT
Start: 2021-08-15 | End: 2021-08-16

## 2021-08-15 RX ORDER — DEXTROSE 50 % IN WATER 50 %
15 SYRINGE (ML) INTRAVENOUS ONCE
Refills: 0 | Status: DISCONTINUED | OUTPATIENT
Start: 2021-08-15 | End: 2021-08-19

## 2021-08-15 RX ORDER — SODIUM CHLORIDE 9 MG/ML
1000 INJECTION, SOLUTION INTRAVENOUS
Refills: 0 | Status: DISCONTINUED | OUTPATIENT
Start: 2021-08-15 | End: 2021-08-19

## 2021-08-15 RX ORDER — LEVETIRACETAM 250 MG/1
1000 TABLET, FILM COATED ORAL
Refills: 0 | Status: DISCONTINUED | OUTPATIENT
Start: 2021-08-15 | End: 2021-08-16

## 2021-08-15 RX ORDER — HYDRALAZINE HCL 50 MG
50 TABLET ORAL ONCE
Refills: 0 | Status: DISCONTINUED | OUTPATIENT
Start: 2021-08-15 | End: 2021-08-15

## 2021-08-15 RX ORDER — INSULIN LISPRO 100/ML
5 VIAL (ML) SUBCUTANEOUS
Refills: 0 | Status: DISCONTINUED | OUTPATIENT
Start: 2021-08-15 | End: 2021-08-17

## 2021-08-15 RX ORDER — ASPIRIN/CALCIUM CARB/MAGNESIUM 324 MG
81 TABLET ORAL DAILY
Refills: 0 | Status: DISCONTINUED | OUTPATIENT
Start: 2021-08-15 | End: 2021-08-19

## 2021-08-15 RX ORDER — INSULIN GLARGINE 100 [IU]/ML
15 INJECTION, SOLUTION SUBCUTANEOUS AT BEDTIME
Refills: 0 | Status: DISCONTINUED | OUTPATIENT
Start: 2021-08-15 | End: 2021-08-19

## 2021-08-15 RX ORDER — GLUCAGON INJECTION, SOLUTION 0.5 MG/.1ML
1 INJECTION, SOLUTION SUBCUTANEOUS ONCE
Refills: 0 | Status: DISCONTINUED | OUTPATIENT
Start: 2021-08-15 | End: 2021-08-19

## 2021-08-15 RX ORDER — INSULIN LISPRO 100/ML
VIAL (ML) SUBCUTANEOUS
Refills: 0 | Status: DISCONTINUED | OUTPATIENT
Start: 2021-08-15 | End: 2021-08-19

## 2021-08-15 RX ORDER — LEVETIRACETAM 250 MG/1
1500 TABLET, FILM COATED ORAL ONCE
Refills: 0 | Status: COMPLETED | OUTPATIENT
Start: 2021-08-15 | End: 2021-08-15

## 2021-08-15 RX ORDER — DEXTROSE 50 % IN WATER 50 %
12.5 SYRINGE (ML) INTRAVENOUS ONCE
Refills: 0 | Status: DISCONTINUED | OUTPATIENT
Start: 2021-08-15 | End: 2021-08-19

## 2021-08-15 RX ORDER — DEXTROSE 50 % IN WATER 50 %
25 SYRINGE (ML) INTRAVENOUS ONCE
Refills: 0 | Status: DISCONTINUED | OUTPATIENT
Start: 2021-08-15 | End: 2021-08-19

## 2021-08-15 RX ORDER — LABETALOL HCL 100 MG
200 TABLET ORAL ONCE
Refills: 0 | Status: DISCONTINUED | OUTPATIENT
Start: 2021-08-15 | End: 2021-08-15

## 2021-08-15 RX ADMIN — LEVETIRACETAM 400 MILLIGRAM(S): 250 TABLET, FILM COATED ORAL at 13:21

## 2021-08-15 RX ADMIN — INSULIN GLARGINE 15 UNIT(S): 100 INJECTION, SOLUTION SUBCUTANEOUS at 22:03

## 2021-08-15 NOTE — ED CDU PROVIDER DISPOSITION NOTE - ATTENDING CONTRIBUTION TO CARE
I , Dr Miguel A Francis has seen and evaluated the patient.  The patient reports improvement in symptoms.  The patient is stable for discharge home and will follow up with their primary physician and neurology . I , Dr Miguel A Francis has seen and evaluated the patient.  The patient reports waxing and waning symptoms with difficulty ambulating will require admission and possible pt eval

## 2021-08-15 NOTE — ED CDU PROVIDER DISPOSITION NOTE - PATIENT PORTAL LINK FT
You can access the FollowMyHealth Patient Portal offered by [facility] by registering at the following website: [email or web address]. By joining [employer]’s FollowMyHealth portal, you will also be able to view your health information using other applications (apps) compatible with our system.

## 2021-08-15 NOTE — ED PROVIDER NOTE - CLINICAL SUMMARY MEDICAL DECISION MAKING FREE TEXT BOX
Dr. Oliveira Note: elderly patient with prior strokes including microhemorrhagic strokes with R sided weakness and numbness, concern for stroke, code stroke activated, obtain metabolic workup, neuroimaging, neuro consult

## 2021-08-15 NOTE — CONSULT NOTE ADULT - ATTENDING COMMENTS
code stroke called in ED and neurology emergently assessed patient.   Briefly  62yo R handed Female mRS 1 PMHx DMT2, HTN/HLD, multiple CVAs w/ residual RUE/LE weakness w foot drop (peripheral vs central?), LLE>UE numbness s/p loop recorder and plavix  now discontinued 2017,  presenting with acute on chronic R-sided weakness LKN 8/15 07:30 symptoms noticed around 10AM after patient was trying to use her arm and was not able to "make it do what I wanted" on further clarification of RUE>LE weakness. NIHSS 5 with improvement in "weakness" showing RUE/LE dysmetria per patient new concern that resolved with improvement in uncontrolled HTN to baseline exam.   CTH w/o acute findings, CTA w worsening PCA stenosis but normal flow bL VAs.   MRI with L thalalmic infarct   severe R PCA stenosis unrelated   LDL 72      Impression:  small vessel infarct    was out of tpa window.  not MT candidate. concern for seizure on arrival.     c/w ASA 81 and lipitor 40. check TTE.  f/u Cardio.  f/u A1c. EEG in office. given keppra in ED.  first event can hold off maintenance if recurs contineu keppra  o/e R facial, RUE drift and dysmetria. RLE foot drop (old)   spoke with CDU team EITAN murphypt f/u  needs PT/OT  better risk factor control   Felipe Denton MD  Vascular Neurology  Office: [phone]

## 2021-08-15 NOTE — ED CDU PROVIDER DISPOSITION NOTE - CARE PROVIDER_API CALL
Jonah Del Angel (DO)  Neurology; Vascular Neurology  [address] [facility] [address], [address]  [place] [address]  Phone: [phone]  Fax: [phone]  Follow Up Time:    Jonah Del Angel (DO)  Neurology; Vascular Neurology  [address] [facility] [facility] [address], [address]  [place] [address]  Phone: [phone]  Fax: [phone]  Follow Up Time:     Hilton Minor (DO)  Cardiology; Internal Medicine  [address] [address] [facility] [address], [address]  [place] [address]  Phone: [phone]  Fax: [phone]  Follow Up Time:

## 2021-08-15 NOTE — ED CDU PROVIDER INITIAL DAY NOTE - MEDICAL DECISION MAKING DETAILS
Attending Sally Hernandez: 60 yo male presenting with concern for possible cva. seen by neuro in the ed with imaging. d/w neuro who recommended cdu for neuro checks and mri. monitor for seizure

## 2021-08-15 NOTE — ED CDU PROVIDER DISPOSITION NOTE - NSFOLLOWUPINSTRUCTIONS_ED_ALL_ED_FT
Follow up with your Primary Care Physician within the next 2-3 days  Bring a copy of your test results with you to your appointment  Continue your current medication regimen  Return to the Emergency Room if you experience new or worsening symptoms       Stroke Prevention  Some medical conditions and behaviors are associated with a higher chance of having a stroke. You can help prevent a stroke by making nutrition, lifestyle, and other changes, including managing any medical conditions you may have.    WHAT NUTRITION CHANGES CAN BE MADE?  Eat healthy foods. You can do this by:  · Choosing foods high in fiber, such as fresh fruits and vegetables and whole grains.  · Eating at least 5 or more servings of fruits and vegetables a day. Try to fill half of your plate at each meal with fruits and vegetables.  · Choosing lean protein foods, such as lean cuts of meat, poultry without skin, fish, tofu, beans, and nuts.  · Eating low-fat dairy products.  · Avoiding foods that are high in salt (sodium). This can help lower blood pressure.  · Avoiding foods that have saturated fat, trans fat, and cholesterol. This can help prevent high cholesterol.  · Avoiding processed and premade foods.  Follow your health care provider's specific guidelines for losing weight, controlling high blood pressure (hypertension), lowering high cholesterol, and managing diabetes. These may include:  · Reducing your daily calorie intake.  · Limiting your daily sodium intake to 1,500 milligrams (mg).  · Using only healthy fats for cooking, such as olive oil, canola oil, or sunflower oil.  · Counting your daily carbohydrate intake.    WHAT LIFESTYLE CHANGES CAN BE MADE?  Maintain a healthy weight. Talk to your health care provider about your ideal weight.  Get at least 30 minutes of moderate physical activity at least 5 days a week. Moderate activity includes brisk walking, biking, and swimming.  Do not use any products that contain nicotine or tobacco, such as cigarettes and e-cigarettes. If you need  help quitting, ask your health care provider. It may also be helpful to avoid exposure to secondhand smoke.  Limit alcohol intake to no more than 1 drink a day for nonpregnant women and 2 drinks a day for men. One  drink equals 12 oz of beer, 5 oz of wine, or 1½ oz of hard liquor.  Stop any illegal drug use.      WHAT OTHER CHANGES CAN BE MADE?  Manage your cholesterol levels.  · Eating a healthy diet is important for preventing high cholesterol. If cholesterol cannot be managed  through diet alone, you may also need to take medicines.  · Take any prescribed medicines to control your cholesterol as told by your health care provider.  Manage your diabetes.  · Eating a healthy diet and exercising regularly are important parts of managing your blood sugar. If your  blood sugar cannot be managed through diet and exercise, you may need to take medicines.  · Take any prescribed medicines to control your diabetes as told by your health care provider.  Control your hypertension.  · To reduce your risk of stroke, try to keep your blood pressure below 130/80.  · Eating a healthy diet and exercising regularly are an important part of controlling your blood pressure. If  your blood pressure cannot be managed through diet and exercise, you may need to take medicines.  · Take any prescribed medicines to control hypertension as told by your health care provider.  · Ask your health care provider if you should monitor your blood pressure at home.  · Have your blood pressure checked every year, even if your blood pressure is normal. Blood pressure  increases with age and some medical conditions.  Get evaluated for sleep disorders (sleep apnea). Talk to your health care provider about getting a sleep evaluation if you snore a lot or have excessive sleepiness.  Take over-the-counter and prescription medicines only as told by your health care provider. Aspirin or blood thinners (antiplatelets or anticoagulants) may be recommended to reduce your risk of forming blood clots that can lead to stroke.  Make sure that any other medical conditions you have, such as atrial fibrillation or atherosclerosis, are  managed.  WHAT ARE THE WARNING SIGNS OF A STROKE?  The warning signs of a stroke can be easily remembered as BEFAST.  B is for balance. Signs include:  · Dizziness.  · Loss of balance or coordination.  · Sudden trouble walking.  E is for eyes. Signs include:  · A sudden change in vision.  · Trouble seeing.  F is for face. Signs include:  · Sudden weakness or numbness of the face.  · The face or eyelid drooping to one side.  A is for arms. Signs include:  · Sudden weakness or numbness of the arm, usually on one side of the body.  S is for speech. Signs include:  · Trouble speaking (aphasia).  · Trouble understanding.  T is for time.      · These symptoms may represent a serious problem that is an emergency. Do not wait to see if the symptoms will go away. Get medical help right away. Call your local emergency services (911 in the [place] Do not drive yourself to the hospital.  Other signs of stroke may include:  · A sudden, severe headache with no known cause.  · Nausea or vomiting.  · Seizure.    WHERE TO FIND MORE INFORMATION  For more information, visit:  American Stroke Association: www.strokeassociation.org  National Stroke Association: www.stroke.org  SUMMARY  You can prevent a stroke by eating healthy, exercising, not smoking, limiting alcohol intake, and managing  any medical conditions you may have.  Do not use any products that contain nicotine or tobacco, such as cigarettes and e-cigarettes. If you need  help quitting, ask your health care provider. It may also be helpful to avoid exposure to secondhand smoke.  Remember BEFAST for warning signs of stroke. Get help right away if you or a loved one has any of these  signs.  ADDITIONAL NOTES AND INSTRUCTIONS  Please follow up with your Primary MD in 24-48 hr.  Seek immediate medical care for any new/worsening signs or symptoms. Follow up with your Primary Care Physician within the next 2-3 days  Bring a copy of your test results with you to your appointment  Return to the Emergency Room if you experience new or worsening symptoms       -Continue your current medication regimen including labetalol, insulin, lisinopril, atorvastatin, amlodipine, jardiance, hydralazine, metformin, asa 81  START TAKING: keppra 1g twice daily     -Follow up with our Vascular Neurologist:   Jonah Del Angel (DO)  Neurology; Vascular Neurology  [address] [facility], [address]  [place] [address]  Phone: [phone]  Fax: [phone]      -follow up with Your neurologist, Dr Poe with information for follow up with Neurology outpatient at   [address] [place] [address] [phone] for focal seizure        Stroke Prevention  Some medical conditions and behaviors are associated with a higher chance of having a stroke. You can help prevent a stroke by making nutrition, lifestyle, and other changes, including managing any medical conditions you may have.    WHAT NUTRITION CHANGES CAN BE MADE?  Eat healthy foods. You can do this by:  · Choosing foods high in fiber, such as fresh fruits and vegetables and whole grains.  · Eating at least 5 or more servings of fruits and vegetables a day. Try to fill half of your plate at each meal with fruits and vegetables.  · Choosing lean protein foods, such as lean cuts of meat, poultry without skin, fish, tofu, beans, and nuts.  · Eating low-fat dairy products.  · Avoiding foods that are high in salt (sodium). This can help lower blood pressure.  · Avoiding foods that have saturated fat, trans fat, and cholesterol. This can help prevent high cholesterol.  · Avoiding processed and premade foods.  Follow your health care provider's specific guidelines for losing weight, controlling high blood pressure (hypertension), lowering high cholesterol, and managing diabetes. These may include:  · Reducing your daily calorie intake.  · Limiting your daily sodium intake to 1,500 milligrams (mg).  · Using only healthy fats for cooking, such as olive oil, canola oil, or sunflower oil.  · Counting your daily carbohydrate intake.    WHAT LIFESTYLE CHANGES CAN BE MADE?  Maintain a healthy weight. Talk to your health care provider about your ideal weight.  Get at least 30 minutes of moderate physical activity at least 5 days a week. Moderate activity includes brisk walking, biking, and swimming.  Do not use any products that contain nicotine or tobacco, such as cigarettes and e-cigarettes. If you need  help quitting, ask your health care provider. It may also be helpful to avoid exposure to secondhand smoke.  Limit alcohol intake to no more than 1 drink a day for nonpregnant women and 2 drinks a day for men. One  drink equals 12 oz of beer, 5 oz of wine, or 1½ oz of hard liquor.  Stop any illegal drug use.      WHAT OTHER CHANGES CAN BE MADE?  Manage your cholesterol levels.  · Eating a healthy diet is important for preventing high cholesterol. If cholesterol cannot be managed  through diet alone, you may also need to take medicines.  · Take any prescribed medicines to control your cholesterol as told by your health care provider.  Manage your diabetes.  · Eating a healthy diet and exercising regularly are important parts of managing your blood sugar. If your  blood sugar cannot be managed through diet and exercise, you may need to take medicines.  · Take any prescribed medicines to control your diabetes as told by your health care provider.  Control your hypertension.  · To reduce your risk of stroke, try to keep your blood pressure below 130/80.  · Eating a healthy diet and exercising regularly are an important part of controlling your blood pressure. If  your blood pressure cannot be managed through diet and exercise, you may need to take medicines.  · Take any prescribed medicines to control hypertension as told by your health care provider.  · Ask your health care provider if you should monitor your blood pressure at home.  · Have your blood pressure checked every year, even if your blood pressure is normal. Blood pressure  increases with age and some medical conditions.  Get evaluated for sleep disorders (sleep apnea). Talk to your health care provider about getting a sleep evaluation if you snore a lot or have excessive sleepiness.  Take over-the-counter and prescription medicines only as told by your health care provider. Aspirin or blood thinners (antiplatelets or anticoagulants) may be recommended to reduce your risk of forming blood clots that can lead to stroke.  Make sure that any other medical conditions you have, such as atrial fibrillation or atherosclerosis, are  managed.  WHAT ARE THE WARNING SIGNS OF A STROKE?  The warning signs of a stroke can be easily remembered as BEFAST.  B is for balance. Signs include:  · Dizziness.  · Loss of balance or coordination.  · Sudden trouble walking.  E is for eyes. Signs include:  · A sudden change in vision.  · Trouble seeing.  F is for face. Signs include:  · Sudden weakness or numbness of the face.  · The face or eyelid drooping to one side.  A is for arms. Signs include:  · Sudden weakness or numbness of the arm, usually on one side of the body.  S is for speech. Signs include:  · Trouble speaking (aphasia).  · Trouble understanding.  T is for time.      · These symptoms may represent a serious problem that is an emergency. Do not wait to see if the symptoms will go away. Get medical help right away. Call your local emergency services (911 in the [place] Do not drive yourself to the hospital.  Other signs of stroke may include:  · A sudden, severe headache with no known cause.  · Nausea or vomiting.  · Seizure.    WHERE TO FIND MORE INFORMATION  For more information, visit:  American Stroke Association: www.strokeassociation.org  National Stroke Association: www.stroke.org  SUMMARY  You can prevent a stroke by eating healthy, exercising, not smoking, limiting alcohol intake, and managing  any medical conditions you may have.  Do not use any products that contain nicotine or tobacco, such as cigarettes and e-cigarettes. If you need  help quitting, ask your health care provider. It may also be helpful to avoid exposure to secondhand smoke.  Remember BEFAST for warning signs of stroke. Get help right away if you or a loved one has any of these  signs.  ADDITIONAL NOTES AND INSTRUCTIONS  Please follow up with your Primary MD in 24-48 hr.  Seek immediate medical care for any new/worsening signs or symptoms. stay hydrated. Rest. You may have had a seizure, you cannot drive, bathe by yourself, go in pool by yourself, until cleared by the Neurologist.     Follow low salt diet. monitor your blood pressures at home. monitor your fingersticks at home.     Follow up with your PCP in 1 -2 days.    -Continue your current home medications including Aspirin 81mg daily.   START TAKING: keppra 1g twice daily     -Follow up with our Vascular Neurologist within 1 week:   Jonah Del Angel (DO)  Neurology; Vascular Neurology  [address] [facility], [address]  [place] [address]  Phone: [phone]  Fax: [phone]    -follow up with Cardiologist Dr. Minor in 3-4 days.   Hilton Minor (DO)  Cardiology; Internal Medicine  [address] [facility], [address]  [place] [address]  Phone: [phone]  Fax: [phone]   Bring Printed Results to your Doctor Visits. Stroke Education given to you.    Return if symptoms worsen, fever, weakness, numbness, dizziness, vision change, slurred speech and all other concerns      Stroke Prevention  Some medical conditions and behaviors are associated with a higher chance of having a stroke. You can help prevent a stroke by making nutrition, lifestyle, and other changes, including managing any medical conditions you may have.    WHAT NUTRITION CHANGES CAN BE MADE?  Eat healthy foods. You can do this by:  · Choosing foods high in fiber, such as fresh fruits and vegetables and whole grains.  · Eating at least 5 or more servings of fruits and vegetables a day. Try to fill half of your plate at each meal with fruits and vegetables.  · Choosing lean protein foods, such as lean cuts of meat, poultry without skin, fish, tofu, beans, and nuts.  · Eating low-fat dairy products.  · Avoiding foods that are high in salt (sodium). This can help lower blood pressure.  · Avoiding foods that have saturated fat, trans fat, and cholesterol. This can help prevent high cholesterol.  · Avoiding processed and premade foods.  Follow your health care provider's specific guidelines for losing weight, controlling high blood pressure (hypertension), lowering high cholesterol, and managing diabetes. These may include:  · Reducing your daily calorie intake.  · Limiting your daily sodium intake to 1,500 milligrams (mg).  · Using only healthy fats for cooking, such as olive oil, canola oil, or sunflower oil.  · Counting your daily carbohydrate intake.    WHAT LIFESTYLE CHANGES CAN BE MADE?  Maintain a healthy weight. Talk to your health care provider about your ideal weight.  Get at least 30 minutes of moderate physical activity at least 5 days a week. Moderate activity includes brisk walking, biking, and swimming.  Do not use any products that contain nicotine or tobacco, such as cigarettes and e-cigarettes. If you need  help quitting, ask your health care provider. It may also be helpful to avoid exposure to secondhand smoke.  Limit alcohol intake to no more than 1 drink a day for nonpregnant women and 2 drinks a day for men. One  drink equals 12 oz of beer, 5 oz of wine, or 1½ oz of hard liquor.  Stop any illegal drug use.      WHAT OTHER CHANGES CAN BE MADE?  Manage your cholesterol levels.  · Eating a healthy diet is important for preventing high cholesterol. If cholesterol cannot be managed  through diet alone, you may also need to take medicines.  · Take any prescribed medicines to control your cholesterol as told by your health care provider.  Manage your diabetes.  · Eating a healthy diet and exercising regularly are important parts of managing your blood sugar. If your  blood sugar cannot be managed through diet and exercise, you may need to take medicines.  · Take any prescribed medicines to control your diabetes as told by your health care provider.  Control your hypertension.  · To reduce your risk of stroke, try to keep your blood pressure below 130/80.  · Eating a healthy diet and exercising regularly are an important part of controlling your blood pressure. If  your blood pressure cannot be managed through diet and exercise, you may need to take medicines.  · Take any prescribed medicines to control hypertension as told by your health care provider.  · Ask your health care provider if you should monitor your blood pressure at home.  · Have your blood pressure checked every year, even if your blood pressure is normal. Blood pressure  increases with age and some medical conditions.  Get evaluated for sleep disorders (sleep apnea). Talk to your health care provider about getting a sleep evaluation if you snore a lot or have excessive sleepiness.  Take over-the-counter and prescription medicines only as told by your health care provider. Aspirin or blood thinners (antiplatelets or anticoagulants) may be recommended to reduce your risk of forming blood clots that can lead to stroke.  Make sure that any other medical conditions you have, such as atrial fibrillation or atherosclerosis, are  managed.  WHAT ARE THE WARNING SIGNS OF A STROKE?  The warning signs of a stroke can be easily remembered as BEFAST.  B is for balance. Signs include:  · Dizziness.  · Loss of balance or coordination.  · Sudden trouble walking.  E is for eyes. Signs include:  · A sudden change in vision.  · Trouble seeing.  F is for face. Signs include:  · Sudden weakness or numbness of the face.  · The face or eyelid drooping to one side.  A is for arms. Signs include:  · Sudden weakness or numbness of the arm, usually on one side of the body.  S is for speech. Signs include:  · Trouble speaking (aphasia).  · Trouble understanding.  T is for time.      · These symptoms may represent a serious problem that is an emergency. Do not wait to see if the symptoms will go away. Get medical help right away. Call your local emergency services (911 in the [place] Do not drive yourself to the hospital.  Other signs of stroke may include:  · A sudden, severe headache with no known cause.  · Nausea or vomiting.  · Seizure.    WHERE TO FIND MORE INFORMATION  For more information, visit:  American Stroke Association: www.strokeassociation.org  National Stroke Association: www.stroke.org  SUMMARY  You can prevent a stroke by eating healthy, exercising, not smoking, limiting alcohol intake, and managing  any medical conditions you may have.  Do not use any products that contain nicotine or tobacco, such as cigarettes and e-cigarettes. If you need  help quitting, ask your health care provider. It may also be helpful to avoid exposure to secondhand smoke.  Remember BEFAST for warning signs of stroke. Get help right away if you or a loved one has any of these  signs.  ADDITIONAL NOTES AND INSTRUCTIONS  Please follow up with your Primary MD in 24-48 hr.  Seek immediate medical care for any new/worsening signs or symptoms.

## 2021-08-15 NOTE — CONSULT NOTE ADULT - SUBJECTIVE AND OBJECTIVE BOX
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 Pt endorses was leaning to one side, endorses RLE feels weaker than normal which was present on initial NIHSS 5. Initial BPs elevated 190s systolic, post-CTH without hemorrhage or acute findings, BP was lowered with improvement in R sided acute on chronic weakness to allow for ataxia testing in limbs which was present, which was new findings. 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 On [id number] not taken today. Before leaving bedside, /105 with resolution of acute R sided findings. Endorses mild L posterior headache, no N/V or infectious symptoms, endorses back at baseline.     ROS: A 10-system ROS was performed and is negative except for those items noted above and/or in the HPI.    PAST MEDICAL & SURGICAL HISTORY  Hypertension  Diabetes Mellitus Type II  Generalized Headaches  IBS (Irritable Bowel Syndrome)  Hypokalemia  CVA (cerebral vascular accident)  TIA (transient ischemic attack)    No Past Surgical History    FAMILY HISTORY  Family history of acute myocardial infarction (Father)  Family history of prostate cancer (Father)      SOCIAL HISTORY     Marital Status: (  )   (  ) Single  (  )   (  )      Occupation:      Lives: (  ) alone  (  ) with children   (  ) with spouse  (  ) with parents  (  ) other     Illicit Drug Use: (  ) never used  (  ) other _____     Tobacco Use:  (  ) never smoked  (  ) former smoker  (  ) current smoker  (  ) pack year  (  ) last cigarette date     Alcohol Use:      Sexual History:        HOME MEDICATIONS  Home Medications:  acetaminophen 325 mg oral tablet: 2 tab(s) orally every 6 hours, As needed, Moderate Pain (4 - 6) (13 Oct 2020 12:53)  Aspirin Low Dose 81 mg oral tablet, chewable: 1 tab(s) orally once a day (06 Oct 2020 13:09)    HOSPITAL MEDICATIONS      ALLERGIES/INTOLERANCES  Allergies  sulfa drugs (Angioedema; Swelling)  sulfa drugs (Rash)  Sulfac 10% (Unknown)  walnut (Urticaria)    Intolerances  lactose (Diarrhea)      OBJECTIVE:  VITALS   Vital Signs Last 24 Hrs  T(C): 36.7 (15 Aug 2021 11:04), Max: 36.7 (15 Aug 2021 11:04)  T(F): 98.1 (15 Aug 2021 11:04), Max: 98.1 (15 Aug 2021 11:04)  HR: 115 (15 Aug 2021 11:04) (115 - 115)  BP: 192/110 (15 Aug 2021 11:04) (192/110 - 192/110)  RR: 18 (15 Aug 2021 11:04) (18 - 18)  SpO2: 100% (15 Aug 2021 11:04) (100% - 100%)    PHYSICAL EXAM:  general: in ED Critical C w friend at bedside in good spirits with testing improving   NEUROLOGICAL EXAM  - Mental Status:  AAOx3; speech is fluent with intact naming, repetition, and comprehension  - Cranial Nerves II-XII:    II:  PERRLA; visual fields are full to confrontation  III, IV, VI:  EOMI, no nystagmus  V:  facial sensation is intact in the V1-V3 distribution bilaterally.  VII:  face is symmetric with normal eye closure and smile  VIII:  hearing is intact to finger rub  IX, X:  uvula is midline and soft palate rises symmetrically  XI:  head turning and shoulder shrug are intact bilaterally  XII:  tongue protrudes in the midline  PRIOR TO CT: initial appearance of RUE in flexion, contracture like positioning stating too weak to extend; RLE fell to bed within 5s | baseline intact strength LUE/LLE   - Motor:  REPEAT strength is 5/5 LUE and 4/5RUE baseline ; normal muscle bulk and tone throughout; R + slight upward drift now improved able to hold both arms out bL >10s  - Reflexes:  2+ and symmetric at the biceps, triceps, brachioradialis, knees, and ankles;  plantar reflexes downgoing bilaterally  - Sensory:  decreased sensation to light touch L-side at baseline   - Coordination:  finger-nose-finger and heel-knee-shin + dysmetria in RUE/RLE on 2nd testing and repeated with resolution on 3rd test with improving bp     LABORATORY:  CBC                       11.5   6.33  )-----------( 229      ( 15 Aug 2021 11:10 )             36.0     Chem 08-15    143  |  106  |  21  ----------------------------<  126<H>  4.4   |  25  |  1.03    Ca    9.9      15 Aug 2021 11:10    TPro  7.8  /  Alb  4.5  /  TBili  0.5  /  DBili  x   /  AST  11  /  ALT  10  /  AlkPhos  85  08-15    LFTs LIVER FUNCTIONS - ( 15 Aug 2021 11:10 )  Alb: 4.5 g/dL / Pro: 7.8 g/dL / ALK PHOS: 85 U/L / ALT: 10 U/L / AST: 11 U/L / GGT: x           Coagulopathy PT/INR - ( 15 Aug 2021 11:10 )   PT: 11.3 sec;   INR: 0.94 ratio         PTT - ( 15 Aug 2021 11:10 )  PTT:31.8 sec      STUDIES & IMAGING:  < from: CT Brain Stroke Protocol (08.15.21 @ 11:25) >  IMPRESSION:  HEAD CT: No acute intracranial hemorrhage or acute territorial infarction. Chronic infarctions and chronic microvascular ischemic changes as described.  CT PERFUSION demonstrated: No asymmetric or territorial perfusion abnormality.  If symptoms persist consider follow up head CT or MRI, MRA  if no contraindication.  CTA COW: No large vessel occlusion. High-grade stenosis involving the right posterior cerebral artery as described.  CTA NECK: Patent, ECAs, ICAs, no  hemodynamically significant stenosis at  ICA origins by NASCET criteria.  Bilateral vertebral arteries are patent without flow limiting stenosis.    Notification to clinician of alert:  Dr. Esparza was notified about the noncontrast head CT findings at 11:24 AM on 8/15/2021 with readback confirmation. The opportunity for questions was provided and all questions asked were answered.    --- End of Report ---  LAKISHA TANNER MD; Attending Radiologist  This document has been electronically signed. Aug 15 2021 11:55AM       NEUROLOGY House Staff Consult Team    OMAR EYAD | 61y | Female      HISTORY OF PRESENT ILLNESS:  62yo R handed Female mRS 1 PMHx DMT2, HTN/HLD, multiple CVAs w/ residual RUE/LE weakness w foot drop, LLE>UE numbness s/p loop recorder and plavix  now discontinued 2017,  presenting with acute on chronic R-sided weakness [facility] 8/15 07:30 symptoms noticed around 10AM after patient was trying to use her arm and was not able to "make it do what I wanted" on further clarification of RUE>LE weakness. Pt endorses was leaning to one side, endorses RLE feels weaker than normal which was present on initial NIHSS 5. Initial BPs elevated 190s systolic, post-CTH without hemorrhage or acute findings, BP was lowered with improvement in R sided acute on chronic weakness to allow for ataxia testing in limbs which was present, which was new findings. 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 On [id number] not taken today. Before leaving bedside, /105 with resolution of acute R sided findings. Endorses mild L posterior headache, no N/V or infectious symptoms, endorses back at baseline.     Patient was re-evaluated after improvement for weakness, concern for focal seizure with patient endorsing no prior seizure history or prior episodes w RUE. Patient without LOC, alert and oriented throughout episode was given Keppra IV load.     [facility] 8/15 07:30   mRS 1   NIHSS 5 to 1 baseline   no tpa as no sustained neurological deficit with higher suspicion for HTN, seizure > stroke   no thrombectomy as no LVO    ROS: A 10-system ROS was performed and is negative except for those items noted above and/or in the HPI.    PAST MEDICAL & SURGICAL HISTORY  Hypertension  Diabetes Mellitus Type II  Generalized Headaches  IBS (Irritable Bowel Syndrome)  Hypokalemia  CVA (cerebral vascular accident)  TIA (transient ischemic attack)    No Past Surgical History    FAMILY HISTORY  Family history of acute myocardial infarction (Father)  Family history of prostate cancer (Father)      SOCIAL HISTORY     Marital Status: (  )   (  ) Single  (  )   (  )      Occupation:      Lives: (  ) alone  (  ) with children   (  ) with spouse  (  ) with parents  (  ) other     Illicit Drug Use: (  ) never used  (  ) other _____     Tobacco Use:  (  ) never smoked  (  ) former smoker  (  ) current smoker  (  ) pack year  (  ) last cigarette date     Alcohol Use:      Sexual History:        HOME MEDICATIONS  Home Medications:  acetaminophen 325 mg oral tablet: 2 tab(s) orally every 6 hours, As needed, Moderate Pain (4 - 6) (13 Oct 2020 12:53)  Aspirin Low Dose 81 mg oral tablet, chewable: 1 tab(s) orally once a day (06 Oct 2020 13:09)    HOSPITAL MEDICATIONS      ALLERGIES/INTOLERANCES  Allergies  sulfa drugs (Angioedema; Swelling)  sulfa drugs (Rash)  Sulfac 10% (Unknown)  walnut (Urticaria)    Intolerances  lactose (Diarrhea)      OBJECTIVE:  VITALS   Vital Signs Last 24 Hrs  T(C): 36.7 (15 Aug 2021 11:04), Max: 36.7 (15 Aug 2021 11:04)  T(F): 98.1 (15 Aug 2021 11:04), Max: 98.1 (15 Aug 2021 11:04)  HR: 115 (15 Aug 2021 11:04) (115 - 115)  BP: 192/110 (15 Aug 2021 11:04) (192/110 - 192/110)  RR: 18 (15 Aug 2021 11:04) (18 - 18)  SpO2: 100% (15 Aug 2021 11:04) (100% - 100%)    PHYSICAL EXAM:  general: in ED Critical C w friend at bedside in good spirits with testing improving   NEUROLOGICAL EXAM  - Mental Status:  AAOx3; speech is fluent with intact naming, repetition, and comprehension  - Cranial Nerves II-XII:    II:  PERRLA; visual fields are full to confrontation  III, IV, VI:  EOMI, no nystagmus  V:  facial sensation is intact in the V1-V3 distribution bilaterally.  VII:  face is symmetric with normal eye closure and smile  VIII:  hearing is intact to finger rub  IX, X:  uvula is midline and soft palate rises symmetrically  XI:  head turning and shoulder shrug are intact bilaterally  XII:  tongue protrudes in the midline  PRIOR TO CT: initial appearance of RUE in flexion, contracture like positioning stating too weak to extend; RLE fell to bed within 5s | baseline intact strength LUE/LLE   - Motor:  REPEAT strength is 5/5 LUE and 4/5RUE baseline ; normal muscle bulk and tone throughout; R + slight upward drift now improved able to hold both arms out bL >10s  - Reflexes:  2+ and symmetric at the biceps, triceps, brachioradialis, knees, and ankles;  plantar reflexes downgoing bilaterally  - Sensory:  decreased sensation to light touch L-side at baseline   - Coordination:  finger-nose-finger and heel-knee-shin + dysmetria in RUE/RLE on 2nd testing and repeated with resolution on 3rd test with improving bp     SEIZURE LIKE EPISODE  returned with pt RUE flexed at elbow and wrist without visible shaking but appreciated on palpation. increased tone not able to fully extend     LABORATORY:  CBC                       11.5   6.33  )-----------( 229      ( 15 Aug 2021 11:10 )             36.0     Chem 08-15    143  |  106  |  21  ----------------------------<  126<H>  4.4   |  25  |  1.03    Ca    9.9      15 Aug 2021 11:10    TPro  7.8  /  Alb  4.5  /  TBili  0.5  /  DBili  x   /  AST  11  /  ALT  10  /  AlkPhos  85  08-15    LFTs LIVER FUNCTIONS - ( 15 Aug 2021 11:10 )  Alb: 4.5 g/dL / Pro: 7.8 g/dL / ALK PHOS: 85 U/L / ALT: 10 U/L / AST: 11 U/L / GGT: x           Coagulopathy   PT/INR - ( 15 Aug 2021 11:10 )   PT: 11.3 sec;   INR: 0.94 ratio    PTT - ( 15 Aug 2021 11:10 )  PTT:31.8 sec    STUDIES & IMAGING:  < from: CT Brain Stroke Protocol (08.15.21 @ 11:25) >  IMPRESSION:  HEAD CT: No acute intracranial hemorrhage or acute territorial infarction. Chronic infarctions and chronic microvascular ischemic changes as described.  CT PERFUSION demonstrated: No asymmetric or territorial perfusion abnormality.  If symptoms persist consider follow up head CT or MRI, MRA  if no contraindication.  CTA COW: No large vessel occlusion. High-grade stenosis involving the right posterior cerebral artery as described.  CTA NECK: Patent, ECAs, ICAs, no  hemodynamically significant stenosis at  ICA origins by NASCET criteria.  Bilateral vertebral arteries are patent without flow limiting stenosis.    Notification to clinician of alert:  Dr. Esparza was notified about the noncontrast head CT findings at 11:24 AM on 8/15/2021 with readback confirmation. The opportunity for questions was provided and all questions asked were answered.    --- End of Report ---  LAKISHA TANNER MD; Attending Radiologist  This document has been electronically signed. Aug 15 2021 11:55AM

## 2021-08-15 NOTE — ED CDU PROVIDER INITIAL DAY NOTE - PROGRESS NOTE DETAILS
Patient seen at bedside in NAD. VSS. Patient resting comfortably without complaints. No neuro deficits. S/o from day team concern for waxing and waning deficits, as per neuro suspicious for seizure like activity. MRI pending. Although presently stable, given complicated neuro hx will attempt to expedite MRI. Will continue to monitor closely.

## 2021-08-15 NOTE — ED PROVIDER NOTE - OBJECTIVE STATEMENT
60 yo F with pmhx Dm. HTN, CVA presents with weakness in RT side. States she woke up at 730am - feeling at baseline. Around 10a - came out of the bathroom - reached out for something and realized her RT hand was weak followed by weakness in RLE. Sxs have been persistent since then. Denies headache, change in vision, n/v/abd pain.

## 2021-08-15 NOTE — ED PROVIDER NOTE - PROGRESS NOTE DETAILS
Dr. Oliveira Note: discussed results and plan with stroke team, recommends observation for serial exams, bp control, and mri. Lily Haynes, DO PGY-3: pt's sxs have improved - but noted to have weakness in RUE again - extremity is stiff - suspecting focal seizure. Neuro called - recommending Keppra load of 1.5g. Pending dispo decision of admission.

## 2021-08-15 NOTE — ED PROVIDER NOTE - PHYSICAL EXAMINATION
Gen: non toxic appearing, NAD   Head: NC/NT  Eyes: PERRL, EOMI   ENT: airway patent, mmm, oral cavity and pharynx normal. No inflammation, swelling, exudate, or lesions.   CV: RRR, +S1/S2, no M/R/G  Resp: CTAB, symmetric breath sounds, no W/R/R  GI: abdomen soft non-distended, NTTP  Extremities - no edema  Skin: no rashes, colors changes or bruising  Neuro: A&Ox4, CN2-12 grossly intact,  gross sensation intact in UE and LE BL,   +drift in RUE and RLE, RUE is contracted

## 2021-08-15 NOTE — ED CDU PROVIDER INITIAL DAY NOTE - OBJECTIVE STATEMENT
62 yo F with pmhx Dm. HTN, CVA presents with weakness in RT side. States she woke up at 730am - feeling at baseline. Around 10a - came out of the bathroom - reached out for something and realized her RT hand was weak followed by weakness in RLE. Sxs have been persistent since then. Denies headache, change in vision, n/v/abd pain. on arrival to the ED the patients symptoms were improving but then briefly got worse during clinical evaluation. patient now asymptomatic.

## 2021-08-15 NOTE — ED CDU PROVIDER INITIAL DAY NOTE - ATTENDING CONTRIBUTION TO CARE
Attending MD Hernandez:  I have personally performed a face to face diagnostic evaluation on this patient.  I have reviewed the ACP note and agree with the history, exam, and plan of care, except as noted.

## 2021-08-15 NOTE — ED ADULT NURSE REASSESSMENT NOTE - NS ED NURSE REASSESS COMMENT FT1
Neuro PA at bedside, pt experiencing return of R arm weakness. Pt denies HA or changes in vision. Neuro PA at bedside, pt experiencing return of R arm weakness. Pt denies HA or changes in vision.  Per PA Jovani hold BP meds at this time.

## 2021-08-15 NOTE — ED CDU PROVIDER DISPOSITION NOTE - CLINICAL COURSE
60 yo F with pmhx Dm. HTN, CVA presents with weakness in RT side. States she woke up at 730am - feeling at baseline with symptom onset around 10 am and then improvement on arrival to ED. on arrival to the ED the patients symptoms were improving but then briefly got worse during clinical evaluation. patient now asymptomatic. patient evaluated during time of deterioration with some concern for focal seizures. BP at the time of the event 160s systolic so less concerning for HTN emergency. placed in CDU for MRI and neuro checks after receiving keppra load in ED. Exam prior to CDU acceptance without any  focal deficit.   Overnight_________  MRI revealed______________ 62 yo F with pmhx Dm. HTN, CVA presents with weakness in RT side. States she woke up at 730am - feeling at baseline with symptom onset around 10 am and then improvement on arrival to ED. on arrival to the ED the patients symptoms were improving but then briefly got worse during clinical evaluation. patient now asymptomatic. patient evaluated during time of deterioration with some concern for focal seizures. BP at the time of the event 160s systolic so less concerning for HTN emergency. placed in CDU for MRI and neuro checks after receiving keppra load in ED. Exam prior to CDU acceptance without any  focal deficit.   Overnight - no events on tele.   MRI revealed +acute thalamic stroke and microhemorrhages. pt seen by Neuro attending - ok to d/c home on her current ASA and Lipitor. pt also seen by Cards- ok to f/up outpt. TTE with bubble done- no PFO, no significant change compared to prior echo. pt had another episode of hand clumbsiness in CDU. discussed with neuro and cdu attending- will continue current keppra 1g BID started in ED and can be f/up outpt. 62 yo F with pmhx Dm. HTN, CVA presents with weakness in RT side. States she woke up at 730am - feeling at baseline with symptom onset around 10 am and then improvement on arrival to ED. on arrival to the ED the patients symptoms were improving but then briefly got worse during clinical evaluation. patient now asymptomatic. patient evaluated during time of deterioration with some concern for focal seizures. BP at the time of the event 160s systolic so less concerning for HTN emergency. placed in CDU for MRI and neuro checks after receiving keppra load in ED. Exam prior to CDU acceptance without any  focal deficit.   Overnight - no events on tele.   MRI revealed +acute thalamic stroke and microhemorrhages. pt seen by Neuro attending - ok to d/c home on her current ASA and Lipitor. pt also seen by Cards- ok to f/up outpt. TTE with bubble done- no PFO, no significant change compared to prior echo. pt had another episode of hand clumbsiness in CDU. echo- no significant changes. no pfo. had planned to send pt home however, pt states worsening R sided weakness leg and R side UE - hand clumbsy. pt reports very difficult to ambulate even with walker. neuro contacted, admitted to their service.

## 2021-08-15 NOTE — ED ADULT NURSE NOTE - OBJECTIVE STATEMENT
60 yo F w/ PMHx of CVA w R sided residual weakness, TIA, DM2, HTN presents to ED via EMS from home c/o L sided weakness. Pt reports she was at baseline when she woke this AM at 0730, reached for something at 1030 and noticed new weakness on L side. Pt denies falls or injury. Symptoms improving in ED. PERRL. Extremities strong and equal b/l after CT scan, initially slight weakness noted on L arm and leg. Denies changes in vision. Pt denies any CP, SOB, cough, N/V, fever, chills, urinary complaints, constipation, diarrhea, HA, dizziness, weakness. Pt A&Ox4, lungs CTA, +central pulses. Abdomen soft, not tender, not distended. Ambulating w/ steady gait, safety and comfort maintained, no acute distress noted at this time. Pt denies any recent travel or known sick contacts.

## 2021-08-15 NOTE — ED PROVIDER NOTE - ATTENDING CONTRIBUTION TO CARE
Pt with acute weakness of R sided noted when leaving the bathroom around 10am.  See Neuro note for full details as patient care expedited for neuroimaging.  Pt appears well, nontoxic.  Old records reviewed.

## 2021-08-15 NOTE — STROKE CODE NOTE - NSSTROKETPAEXCLREL_OTHER_FT
improved and resolved with blood pressure control to baseline neurological examination endorsed by patient and family at bedside. improvement on provider exam as well with systolic bp decreased

## 2021-08-15 NOTE — ED ADULT NURSE REASSESSMENT NOTE - NS ED NURSE REASSESS COMMENT FT1
Report received from RN Katharine. Pt received A&Ox3, IV intact and patent, VSS, bed locked and in lowest position, call bell within reach and pt instructed on use, safety and comfort measures maintained, pt strong in all extremities, pupills 3mm PERRL. Neurology MD Callie Wu at bedside assessing pt, pt hypertensive 164/94, MD Ledesma made aware, as per MD Ledesma order no RN interventions required at this time, MD Ledesma to order BP parameters SBP >140 and <180, MD Callie Marcig Spectra #[id number]

## 2021-08-15 NOTE — CONSULT NOTE ADULT - ASSESSMENT
Impression:  acute, now-resolved R-sided dysmetria in setting of uncontrolled HTN suspicious for HTNive emergency in s/o improvement w BP control, known MVD w/ prior infarcts 2/2 HTN  note CTA w worsening R PCA stenosis, would not explain presenting symptomatology    RITA 8/15 07:30   mRS 1   NIHSS 5 to 1 baseline     Plan:  [x] NIHSS 5 -> 3 -> 1 in s/o L-sided chronic sensory changes ; back at baseline   [x] CTH  [X] CTA : no further vessel imaging needed for R PCA findings     [ ] dysphagia screen   [ ] EKG, Lipid profile, A1c   [ ] c/w neuro checks at bedside / monitor for re-occurance in CDU   [ ] MR Brain noncon to evaluate for interval changes in s/o multivessel dx and CVAs ; pending signs of new infarct may adjust stroke prevention meds    [ ] c/w ASA81 STAT as did not take this AM, on at home  [x] c/w atorvastatin which pt on at home   [x] DVT ppx pending length of stay  [ ] permissive hypertension up to 180-140 SBP / 100> DBP given symptomatology in 190s, bp management per ED team   [ ] remaining management and workup per primary team    [ ] Patient will need to follow up when discharged within 2-3 days with Dr. Sarmiento, Vascular Neurology    Patient discussed with stroke fellow Dr. Christianson. Final recommendations regarding management to be confirmed upon review by stroke attending Dr. Morgan. 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 NIHSS 5 with improvement in "weakness" showing RUE/LE dysmetria per patient new concern that resolved with improvement in uncontrolled HTN to baseline exam. CTH w/o acute findings, CTA w worsening PCA stenosis but normal flow bL VAs. CDU monitoring for MR Brain to access for any new infarcts in s/o history, however course c/b reoccurance in R UE "weakness" appearing contracture-like w RUE flexion and hypertonia, intact mentation pt received 1500mg IV Keppra load given high suspicion for focal seizure activity.    Impression:  episodic RUE "weakness" presenting as RUE flexion contraction-like appearance with hypertonicity, palpable not visible myoclonus with intact mentation concerning for RUE focal seizure activity     prior acute, now-resolved R-sided dysmetria in setting of uncontrolled HTN suspicious for HTNive emergency in s/o improvement w BP control, known MVD w/ prior infarcts 2/2 HTN ...  on re-evaluation w above impression, RLE dysmetria could be in s/o of focal seizure activity not clearly appreciated as in bed during RUE episodes at this time, also need to review further history / imaging as peripheral neuropathy foot drop workup noted on quick review of past notes; appreciate CTA w worsening R PCA stenosis, would not explain presenting symptomatology    [facility] 8/15 07:30   mRS 1   NIHSS 5 to 1 baseline   no tpa as no sustained neurological deficit with higher suspicion for HTN, seizure > stroke   no thrombectomy as no LVO    Plan:  [x] NIHSS 5 -> 3 -> 1 in s/o L-sided chronic sensory changes ; back at baseline   [x] CTH  [X] CTA : no further vessel imaging needed for R PCA findings     [x] dysphagia screen   [x] EKG, Lipid profile, A1c   [x] c/w neuro checks at bedside / monitor for re-occurance in CDU     [x] c/w ASA81 STAT as did not take this AM, on at home  [x] c/w atorvastatin which pt on at home   [x] DVT ppx pending length of stay  [x] permissive hypertension up to 180-140 SBP / 100> DBP given symptomatology in 190s, bp management per ED team   [ ] remaining management and workup per primary team    [ ] MR Brain noncon to evaluate for interval changes in s/o multivessel dx and CVAs ; pending signs of new infarct may adjust stroke prevention meds  [x] IV load Keppra 1500 mg --> [ ] c/w keppra 1g PO BID while in hospital / on discharge     [ ] Please provide follow up when discharged for Dr. Sarmiento, Vascular Neurology office in case pt unable to get in for appointment with primary neurologist  [ ] Patient to follow up with her neurologist, Dr Poe with information for follow up with Neurology outpatient at [address] [place] [address] [phone] for focal seizure    Patient discussed with stroke fellow Dr. Christianson. Final recommendations regarding management to be confirmed upon review by stroke attending Dr. Morgan and general neurology Dr. Dejesus

## 2021-08-15 NOTE — ED PROVIDER NOTE - NS ED ROS FT
Gen: Denies fever, chills  CV: Denies chest pain  Skin: Denies rash, erythema  Resp: Denies SOB, cough  ENT: Denies nasal congestion  Eyes: Denies blurry vision  GI: Denies nausea, vomiting, diarrhea  Msk: Denies LE swelling  : Denies dysuria  Neuro: +weakness in R extremities   Denies headache  Psych: Denies hx of psych

## 2021-08-15 NOTE — STROKE CODE NOTE - DISPOSITION
CDU monitoring given recurrent stroke history, monitor for reoccurance / blood pressure management prn with outpt follow up if remains stable all discussed and on final recommendations from Dr Morgan Stroke attending and Dr Christianson Stroke Fellow/Other

## 2021-08-15 NOTE — ED CDU PROVIDER DISPOSITION NOTE - PROVIDER TOKENS
PROVIDER:[TOKEN:[[id number]]] PROVIDER:[TOKEN:[[id number]]],PROVIDER:[TOKEN:[[id number]]]

## 2021-08-16 DIAGNOSIS — E78.5 HYPERLIPIDEMIA, UNSPECIFIED: ICD-10-CM

## 2021-08-16 DIAGNOSIS — I10 ESSENTIAL (PRIMARY) HYPERTENSION: ICD-10-CM

## 2021-08-16 DIAGNOSIS — I63.9 CEREBRAL INFARCTION, UNSPECIFIED: ICD-10-CM

## 2021-08-16 DIAGNOSIS — I63.22 CEREBRAL INFARCTION DUE TO UNSPECIFIED OCCLUSION OR STENOSIS OF BASILAR ARTERY: ICD-10-CM

## 2021-08-16 LAB
A1C WITH ESTIMATED AVERAGE GLUCOSE RESULT: 6.8 % — HIGH (ref 4–5.6)
CHOLEST SERPL-MCNC: 134 MG/DL — SIGNIFICANT CHANGE UP
ESTIMATED AVERAGE GLUCOSE: 148 MG/DL — HIGH (ref 68–114)
GLUCOSE BLDC GLUCOMTR-MCNC: 105 MG/DL — HIGH (ref 70–99)
GLUCOSE BLDC GLUCOMTR-MCNC: 109 MG/DL — HIGH (ref 70–99)
GLUCOSE BLDC GLUCOMTR-MCNC: 117 MG/DL — HIGH (ref 70–99)
GLUCOSE BLDC GLUCOMTR-MCNC: 119 MG/DL — HIGH (ref 70–99)
GLUCOSE BLDC GLUCOMTR-MCNC: 80 MG/DL — SIGNIFICANT CHANGE UP (ref 70–99)
GLUCOSE BLDC GLUCOMTR-MCNC: 86 MG/DL — SIGNIFICANT CHANGE UP (ref 70–99)
GLUCOSE BLDC GLUCOMTR-MCNC: 86 MG/DL — SIGNIFICANT CHANGE UP (ref 70–99)
HDLC SERPL-MCNC: 49 MG/DL — LOW
LIPID PNL WITH DIRECT LDL SERPL: 72 MG/DL — SIGNIFICANT CHANGE UP
NON HDL CHOLESTEROL: 84 MG/DL — SIGNIFICANT CHANGE UP
TRIGL SERPL-MCNC: 64 MG/DL — SIGNIFICANT CHANGE UP

## 2021-08-16 PROCEDURE — 93970 EXTREMITY STUDY: CPT | Mod: 26

## 2021-08-16 RX ORDER — HYDRALAZINE HCL 50 MG
1 TABLET ORAL
Qty: 0 | Refills: 0 | DISCHARGE

## 2021-08-16 RX ORDER — ATORVASTATIN CALCIUM 80 MG/1
80 TABLET, FILM COATED ORAL AT BEDTIME
Refills: 0 | Status: DISCONTINUED | OUTPATIENT
Start: 2021-08-16 | End: 2021-08-19

## 2021-08-16 RX ORDER — ENOXAPARIN SODIUM 100 MG/ML
40 INJECTION SUBCUTANEOUS DAILY
Refills: 0 | Status: DISCONTINUED | OUTPATIENT
Start: 2021-08-16 | End: 2021-08-19

## 2021-08-16 RX ORDER — ACETAMINOPHEN 500 MG
975 TABLET ORAL EVERY 6 HOURS
Refills: 0 | Status: DISCONTINUED | OUTPATIENT
Start: 2021-08-16 | End: 2021-08-19

## 2021-08-16 RX ADMIN — Medication 975 MILLIGRAM(S): at 00:57

## 2021-08-16 RX ADMIN — ENOXAPARIN SODIUM 40 MILLIGRAM(S): 100 INJECTION SUBCUTANEOUS at 21:59

## 2021-08-16 RX ADMIN — LEVETIRACETAM 1000 MILLIGRAM(S): 250 TABLET, FILM COATED ORAL at 12:29

## 2021-08-16 RX ADMIN — Medication 975 MILLIGRAM(S): at 04:34

## 2021-08-16 RX ADMIN — INSULIN GLARGINE 15 UNIT(S): 100 INJECTION, SOLUTION SUBCUTANEOUS at 21:59

## 2021-08-16 RX ADMIN — Medication 975 MILLIGRAM(S): at 17:06

## 2021-08-16 RX ADMIN — Medication 81 MILLIGRAM(S): at 12:44

## 2021-08-16 RX ADMIN — ATORVASTATIN CALCIUM 80 MILLIGRAM(S): 80 TABLET, FILM COATED ORAL at 21:59

## 2021-08-16 RX ADMIN — Medication 5 UNIT(S): at 11:56

## 2021-08-16 RX ADMIN — LEVETIRACETAM 1000 MILLIGRAM(S): 250 TABLET, FILM COATED ORAL at 00:44

## 2021-08-16 NOTE — PHYSICAL THERAPY INITIAL EVALUATION ADULT - PRECAUTIONS/LIMITATIONS, REHAB EVAL
no known precautions/limitations CTH8/17- No acute intra cranial pathology./no known precautions/limitations CTH8/17- No acute intra cranial hemorrhage or midline shift./no known precautions/limitations

## 2021-08-16 NOTE — CONSULT NOTE ADULT - PROBLEM SELECTOR RECOMMENDATION 9
s/p previous ILR   no events recorded thus far   likely small vessel disease   ASA and statin  Check TTE

## 2021-08-16 NOTE — ED CDU PROVIDER SUBSEQUENT DAY NOTE - PROGRESS NOTE DETAILS
janiya - pt seen and eval this am - back at baseline awaiting final mri report and final neuro recs CDU NOTE [facility]: spoke with Neuro, MRI shows multiple microhemorrhages and tiny thalamic stroke, ok to d/c home with continued ASA 81mg and keppra 1g BID. spoke with Dr. Denton, he will be here to see patient in about 1 hr. CDU NOTE [facility]: pt resting comfortably, feels well without complaint. pt states she is back to her baseline. NAD VSS. CDU NOTE [facility]: pt seen by Neuro attending Dr. Denton, pt does NOT need Keppra, can d/c. agree with continuing ASA 81mg daily and pt's home dose lipitor 80mg daily. pt offered outpt echo vs inpatient, pt would prefer to have it done here. pt also seen by Cards attending- Dr. Minor, ok to f/up in his office outpt. CDU NOTE [facility]: pt reports R hand clumbsiness returned. no other complaints. NAD. VSS. neuro exam- R hand clumbsiness noted as pt uses fork with her dinner, 4/5 strength compared to LUE. otherwise neuro exam as prior. called neuro - expected that pt may have waxing and waning of symptoms in setting of new thalamic infarct. no need for repeat imaging. current plan/treatment the same.  spoke with Dr. Francis, only concern is could this be seizure activity. spoke with Neuro again, ok to send the patient home on the kera and they will f/up outpt. CDU NOTE [facility]: echo normal, no PFO. pt also seen by Cards - will f/up outpt.  as per Dr. Francis, pt stable for d/c home. CDU NOTE [facility]: had planned on sending pt home, however, pt states she is still having the R hand clumbsiness and RLE weakness. pt reports difficulty ambulating even with walker.   discussed with Dr. Francis, will speak to neuro about admitting pt.   spoke with Neuro- admit to Dr. Libman

## 2021-08-16 NOTE — PHYSICAL THERAPY INITIAL EVALUATION ADULT - ADDITIONAL COMMENTS
Based on CR, pt lives in a private home with her  and 4 of her children, 3 steps to enter with no HRs, and 14 steps inside up to bedroom/bathroom with BHRs. Pt was indep prior with ADLs, functional mobility and ambulation without AD. (-) drive Based on CR, pt lives in a private home with her  and 4 of her children, 3 steps to enter with no HRs, and 14 steps inside up to bedroom/bathroom with BHRs. Pt was indep prior with ADLs, functional mobility and ambulation without AD. (-) drive. pt. with R foot drop. uses AFO R  for community amb. However per pt. current AFO is hyper extending the knee and dr. advised not to wear. Per pt., do not use AFO for in house amb.

## 2021-08-16 NOTE — ED CDU PROVIDER SUBSEQUENT DAY NOTE - PHYSICAL EXAMINATION
GEN: Pt non-toxic in NAD, A&Ox3.  PSYCH: Affect and mood appropriate.  EYES: Sclera white w/o injection, EOMI, PERRLA.   ENT: Neck supple FROM. Airway patent.  RESP: CTA b/l, no wheezes, rales, or rhonchi.   CARDIAC: RRR, clear distinct S1, S2, no appreciable murmurs.  ABD: Abdomen soft, non-tender.  MSK: Moving all extremities.  NEURO: No focal motor or sensory deficits.  VASC: Well perfused.  SKIN: No rashes or lesions.

## 2021-08-16 NOTE — H&P ADULT - HISTORY OF PRESENT ILLNESS
60yo R handed Female mRS 1 PMHx DMT2, HTN/HLD, multiple CVAs w/ residual RUE/LE weakness w foot drop, LLE>UE numbness s/p loop recorder and plavix  now discontinued 2017,  presenting with acute on chronic R-sided weakness [facility] 8/15 07:30 symptoms noticed around 10AM after patient was trying to use her arm and was not able to "make it do what I wanted" on further clarification of RUE>LE weakness. Pt endorses was leaning to one side, endorses RLE feels weaker than normal which was present on initial NIHSS 5. Initial BPs elevated 190s systolic, post-CTH without hemorrhage or acute findings, BP was lowered with improvement in R sided acute on chronic weakness to allow for ataxia testing in limbs which was present, which was new findings. 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 On [id number] not taken today. Before leaving bedside, /105 with resolution of acute R sided findings. Endorses mild L posterior headache, no N/V or infectious symptoms, endorses back at baseline.     Patient was re-evaluated after improvement for weakness, concern for focal seizure with patient endorsing no prior seizure history or prior episodes w RUE. Patient without LOC, alert and oriented throughout episode was given Keppra IV load.     [facility] 8/15 07:30   mRS 1   NIHSS 5-->1 (baseline)  no tpa as no sustained neurological deficit with higher suspicion for HTN, seizure > stroke   no thrombectomy as no LVO    Patient was admitted to CDU for imaging. MRI head showed tiny L thalamic infarct. Vessel imaging notable for severe R PCA stenosis. Patient was seen in CDU with initial plan to DC home, however R HP progressed throughout the day and patient did not feel comfortable to go home. Admitting to stroke for PT/OT and possible rehab.

## 2021-08-16 NOTE — H&P ADULT - NSICDXFAMILYHX_GEN_ALL_CORE_FT
FAMILY HISTORY:  Father  Still living? No  Family history of acute myocardial infarction, Age at diagnosis: Age Unknown  Family history of prostate cancer, Age at diagnosis: Age Unknown    
11

## 2021-08-16 NOTE — H&P ADULT - TIME BILLING
61-year-old right-handed lady first evaluated at [facility] on 8/17/2021 with recurrent right hemiparesis.  History and exam as above, with minor changes.  ROS otherwise negative.  Exam.  Alert and attentive; mild-moderate right central facial palsy; mild dysarthria; right side: Arm-no movement; iliopsoas 4/5; anterior tibialis 0/5; coordination-unable to assess due to weakness; sensory-probably very mildly decreased light touch sensation on left; gait not tested; remainder of neurologic exam was nonfocal.  MRI brain (8/15/2021-compared to 10/20) to my eye showed the interval appearance of a probably chronic, punctate left lateral thalamic lacunar infarct; severe periventricular, subcortical and brainstem (middle cerebellar peduncles) hyperintensities of presumed vascular origin.  CTA neck and head (8/15/2021) to my eye showed mild calcified plaque at the carotid bifurcations bilaterally, right more so than left.  CTP (8/15/2021) was negative.  TTE (8/16/2021) showed LVH.  Impression.  In 2016 she had a stroke with mild residual right hemiparesis.  At that time she had an ILR implanted but the duration may have been only for a couple of months.  In 10/20 she had a recurrent stroke with left hemiparesis.  Despite these strokes, she was highly functional with very mild residual deficits.  On 8/15/2021 she developed recurrent right hemiparesis and had difficulty with her gait.  By the time she arrived at [facility], she had improved, but she then worsened again.  Her presentation was reportedly initially a right ataxic hemiparesis, and then evolved to a right pure motor hemiparesis.  Her presentation is consistent with left brain dysfunction, perhaps small-deep/subcortical versus pontine.  Etiology is most likely recurrent ischemic stroke, mechanism uncertain, but probably small vessel disease.  Highly doubt seizure.  Plan.  Repeat MRI brain; ILR per Stroke AF trial; permissive hypertension with IV hydration; ticagrelor 180 mg loading dose, then 90 mg twice daily for 4 weeks; aspirin 81 mg daily; continue atorvastatin 80 mg daily and check LDL (target <70); stop Keppra; PT/OT/speech therapy.

## 2021-08-16 NOTE — CONSULT NOTE ADULT - ASSESSMENT
62 yo R handed Female known to me from previous CVA s/p ILR, never followed up in office,  mRS 1 PMHx DMT2, HTN/HLD, multiple CVAs w/ residual RUE/LE weakness w foot drop, LLE>UE numbness,  presenting with acute on chronic R-sided weakness

## 2021-08-16 NOTE — PHYSICAL THERAPY INITIAL EVALUATION ADULT - REFERRAL TO ANOTHER SERVICE NEEDED, PT EVAL
Orthotist Orthotist. (pt. w R foot drop, using AFO PTA, however, per pt. this AFO is hyperextending the knee and her DrManoj advised not to wear it).

## 2021-08-16 NOTE — H&P ADULT - ASSESSMENT
Assessment: 60 yo RH Female with a PMHx of DMT2, HTN/HLD, multiple prior strokes (residual RUE/LE weakness w/ foot drop - peripheral vs central?, LLE>UE numbness; s/p ILR) who presented with acute on chronic R-sided weakness. [facility] 8/15 07:30, symptoms noticed around 10AM after patient was trying to use her arm. CTH w/o acute findings, CTA w worsening PCA stenosis but normal flow in b/l vertebral arteries. MRI head shows tiny L thalamic infarct.    Impression: R ataxic hemiparesis due to acute L thalamic infarct seen on imaging. Mechanism .    Plan:   [] Admit to stroke floor.  [x] MRI head: results as above.  [x] TTE: EF 65-70%.  [x] HbA1c 6.8, LDL 72.  [] BP goal: permissive HTN for another ~24 hours, then gradual normotension.  [] DC Keppra.  [] rEEG. Can be done outpatient.  [] ASA 81MG PO QD.  [] c/w home dose Atorvastatin 80MG PO QHS (titrate for goal LDL < 70).  [] PT/OT.  [] DVT PPx: Lovenox 40 SC QD.  [] Diet: Carb consistent.    Case seen by stroke attending Dr. Denton. Case to be discussed with stroke attending Dr. Libman.

## 2021-08-16 NOTE — ED CDU PROVIDER SUBSEQUENT DAY NOTE - HISTORY
No interval change since initial day note. Patient seen at bedside in NAD. VSS. Patient resting comfortably but c/o headache-will give tylenol and reassess. No neuro deficits. No events on tele. Pt went to MRI earlier tonight, awaiting read and neuro re-eval in AM. Will continue to monitor closely.

## 2021-08-16 NOTE — CONSULT NOTE ADULT - SUBJECTIVE AND OBJECTIVE BOX
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 Pt endorses was leaning to one side, endorses RLE feels weaker than normal which was present on initial NIHSS 5. Initial BPs elevated 190s systolic, post-CTH without hemorrhage or acute findings, BP was lowered with improvement in R sided acute on chronic weakness to allow for ataxia testing in limbs which was present, which was new findings. 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 On [id number] not taken today. Before leaving bedside, /105 with resolution of acute R sided findings. Endorses mild L posterior headache, no N/V or infectious symptoms, endorses back at baseline.     Patient was re-evaluated after improvement for weakness, concern for focal seizure with patient endorsing no prior seizure history or prior episodes w RUE. Patient without LOC, alert and oriented throughout episode was given Keppra IV load.     [facility] 8/15 07:30   mRS 1   NIHSS 5 to 1 baseline   no tpa as no sustained neurological deficit with higher suspicion for HTN, seizure > stroke   no thrombectomy as no LVO      PAST MEDICAL & SURGICAL HISTORY:  Hypertension    Diabetes Mellitus Type II    Generalized Headaches    IBS (Irritable Bowel Syndrome)    Hypokalemia    CVA (cerebral vascular accident)    TIA (transient ischemic attack)    No Past Surgical History            MEDICATIONS:  aspirin  chewable 81 milliGRAM(s) Oral daily  labetalol 400 milliGRAM(s) Oral three times a day        acetaminophen   Tablet .. 975 milliGRAM(s) Oral every 6 hours PRN  levETIRAcetam 1000 milliGRAM(s) Oral two times a day      dextrose 40% Gel 15 Gram(s) Oral once  dextrose 50% Injectable 25 Gram(s) IV Push once  dextrose 50% Injectable 12.5 Gram(s) IV Push once  dextrose 50% Injectable 25 Gram(s) IV Push once  glucagon  Injectable 1 milliGRAM(s) IntraMuscular once  insulin glargine Injectable (LANTUS) 15 Unit(s) SubCutaneous at bedtime  insulin lispro (ADMELOG) corrective regimen sliding scale   SubCutaneous three times a day before meals  insulin lispro Injectable (ADMELOG) 5 Unit(s) SubCutaneous three times a day before meals    dextrose 5%. 1000 milliLiter(s) IV Continuous <Continuous>  dextrose 5%. 1000 milliLiter(s) IV Continuous <Continuous>      FAMILY HISTORY:  Family history of acute myocardial infarction (Father)    Family history of prostate cancer (Father)        SOCIAL HISTORY:    [ ] Non-smoker  [ ] Smoker  [ ] Alcohol    Allergies    sulfa drugs (Angioedema; Swelling)  sulfa drugs (Rash)  Sulfac 10% (Unknown)  walnut (Urticaria)    Intolerances    lactose (Diarrhea)  	    REVIEW OF SYSTEMS:  CONSTITUTIONAL: No fever, weight loss, + fatigue  EYES: No eye pain, visual disturbances, or discharge  ENMT:  No difficulty hearing, tinnitus, vertigo; No sinus or throat pain  NECK: No pain or stiffness  RESPIRATORY: No cough, wheezing, chills or hemoptysis; No Shortness of Breath  CARDIOVASCULAR: No chest pain, palpitations, passing out, dizziness, or leg swelling  GASTROINTESTINAL: No abdominal or epigastric pain. No nausea, vomiting, or hematemesis; No diarrhea or constipation. No melena or hematochezia.  GENITOURINARY: No dysuria, frequency, hematuria, or incontinence  NEUROLOGICAL: No headaches, memory loss, loss of strength, numbness, or tremors  SKIN: No itching, burning, rashes, or lesions   LYMPH Nodes: No enlarged glands  ENDOCRINE: No heat or cold intolerance; No hair loss  MUSCULOSKELETAL: No joint pain or swelling; No muscle, back, or extremity pain  PSYCHIATRIC: No depression, anxiety, mood swings, or difficulty sleeping  HEME/LYMPH: No easy bruising, or bleeding gums  ALLERY AND IMMUNOLOGIC: No hives or eczema	    [ ] All others negative	  [ ] Unable to obtain    PHYSICAL EXAM:  T(C): 36.5 (08-16-21 @ 07:31), Max: 36.7 (08-15-21 @ 11:04)  HR: 97 (08-16-21 @ 07:31) (86 - 115)  BP: 140/96 (08-16-21 @ 07:31) (131/75 - 192/110)  RR: 16 (08-16-21 @ 07:31) (13 - 20)  SpO2: 98% (08-16-21 @ 07:31) (94% - 100%)  Wt(kg): --  I&O's Summary      Appearance: Normal	  HEENT:   Normal oral mucosa, PERRL, EOMI	  Lymphatic: No lymphadenopathy  Cardiovascular: Normal S1 S2, No JVD, No murmurs, No edema  Respiratory: Lungs clear to auscultation	  Psychiatry: A & O x 3, Mood & affect appropriate  Gastrointestinal:  Soft, Non-tender, + BS	  Skin: No rashes, No ecchymoses, No cyanosis	  Extremities: Normal range of motion, No clubbing, cyanosis or edema  Vascular: Peripheral pulses palpable 2+ bilaterally  - Cranial Nerves II-XII:    II:  PERRLA; visual fields are full to confrontation  III, IV, VI:  EOMI, no nystagmus  V:  facial sensation is intact in the V1-V3 distribution bilaterally.  VII:  face is symmetric with normal eye closure and smile  VIII:  hearing is intact to finger rub  IX, X:  uvula is midline and soft palate rises symmetrically  XI:  head turning and shoulder shrug are intact bilaterally  XII:  tongue protrudes in the midline  PRIOR TO CT: initial appearance of RUE in flexion, contracture like positioning stating too weak to extend; RLE fell to bed within 5s | baseline intact strength LUE/LLE   - Motor:  REPEAT strength is 5/5 LUE and 4/5RUE baseline ; normal muscle bulk and tone throughout; R + slight upward drift now improved able to hold both arms out bL >10s  - Reflexes:  2+ and symmetric at the biceps, triceps, brachioradialis, knees, and ankles;  plantar reflexes downgoing bilaterally  - Sensory:  decreased sensation to light touch L-side at baseline   - Coordination:  finger-nose-finger and heel-knee-shin + dysmetria in RUE/RLE on 2nd testing and repeated with resolution on 3rd test with improving bp  TELEMETRY: 	 SR   ECG:  	NSR no acute ischemic stt changes  RADIOLOGY:  < from: CT Perfusion w/ Maps w/ IV Cont (08.15.21 @ 11:30) >    EXAM:  CT PERFUSION W MAPS IC                          EXAM:  CT ANGIO BRAIN (W)AW IC                          EXAM:  CT BRAIN STROKE PROTOCOL                          EXAM:  CT ANGIO NECK (W)AW IC                            PROCEDURE DATE:  08/15/2021            INTERPRETATION:  HEAD CT, CT PERFUSION, CTA OF THE [place] OF PIRES AND NECK:    INDICATIONS: Stroke code. Right-sided weakness acute on chronic.    TECHNIQUE:    HEAD CT:    Serial axial images were obtained from the skull base to thevertex without the use of intravenous contrast. RAPID artificial intelligence was used for perfusion analysis and for preliminary evaluation of intracranial hemorrhage.    CTA [place] OF PIRES:    After the intravenous power injection of non-ionic contrast material, serial thin sections were obtained through the intracranial circulation on a multislice CT scanner.  Images were reformatted using a dedicated 3D software package and viewed on a dedicated workstation in multiple planes.    CTA NECK:    After the intravenous power injection of non-ionic contrast material, serial thin sections were obtained through the cervical circulation on a multislice CT scanner.  Images were reformatted using a dedicated 3D software package and viewed on a dedicated workstation in multiple planes.    CONTRAST: 70 mL Omnipaque 300      COMPARISON EXAMINATION: CT head 10/2/2020, MR brain 10/3/2020, CTA of the head and neck 10/2/2020    FINDINGS:    VENTRICLES AND SULCI: Ventricles and sulci are unremarkable for patient age.  INTRA-AXIAL: No intracranial mass, acute hemorrhage, or midline shift is present. There is non-specific decreased attenuation in the white matter likely microvascular disease. Chronic right pontine infarction. Chronic lacunar infarctin the left basal ganglia.  EXTRA-AXIAL: No extra-axial fluid collection is present.  INTRACRANIAL HEMORRHAGE: None.    VISUALIZED SINUSES: No air-fluid levels are identified.  VISUALIZED MASTOIDS:  Clear  CALVARIUM:  Intact  MISCELLANEOUS:  None.      CT RAPID PERFUSION:    INFARCT CORE: 0 mL    TISSUE AT RISK: 0 mL    MISMATCH RATIO: None          CTA [place] OF PIRES:    ANTERIOR CIRCULATION    ICA  CAVERNOUS, SUPRACLINOID, BIFURCATION SEGMENTS: Patent without flow limiting stenosis.    ANTERIOR CEREBRAL ARTERIES: Bilateral A1, anterior communicating and A2 anterior cerebral arteries are unremarkable in course and caliber without flow limiting stenosis.    MIDDLE CEREBRAL ARTERIES: Patent bilateral M1, M2, and distal MCA branches without flow limiting stenosis.    POSTERIOR CIRCULATION:    VERTEBRAL ARTERIES: Patent without flow limiting stenosis    BASILAR ARTERY: Patent no flow limiting stenosis.    POSTERIOR CEREBRAL ARTERIES: Patent. There is severe short segment stenosis at the P1/P2 junction of the right posterior cerebral artery.    CTA NECK:    GREAT VESSELS: Visualized segments are patent, no flow limiting stenosis.    COMMON CAROTID ARTERIES:  RIGHT: Patent without flow limiting stenosis  LEFT: Patent without flow limiting stenosis    INTERNAL CAROTID ARTERIES:  RIGHT: Patent no evidence for any hemodynamically significant stenosis at the ICA origin by NASCET criteria.  LEFT: Patent no evidence for any hemodynamically significant stenosis at the ICA origin by NASCET criteria.    VERTEBRAL ARTERIES:    RIGHT: Patent no evidence for any flow limiting stenosis.  LEFT: Patent no evidence for any flow limiting stenosis.      SOFT TISSUES: Heterogeneous multinodular thyroid gland, similar in appearance since 10/2/2020.    BONES: Degenerative changes of the cervical spine.    IMPRESSION:    HEAD CT: No acute intracranial hemorrhage or acute territorial infarction. Chronic infarctions and chronic microvascular ischemic changes as described.    CT PERFUSION demonstrated: No asymmetric or territorial perfusion abnormality.    If symptoms persist consider follow up head CT or MRI, MRA  if no contraindication.    CTA COW: No large vessel occlusion. High-grade stenosis involving the right posterior cerebral artery as described.    CTA NECK: Patent, ECAs, ICAs, no  hemodynamically significant stenosis at  ICA origins by NASCET criteria.  Bilateral vertebral arteries are patent without flow limiting stenosis.      Notification to clinician of alert:  Dr. Esparza was notified about the noncontrast head CT findings at 11:24 AM on 8/15/2021 with readback confirmation. The opportunity for questions was provided and all questions asked were answered.        --- End of Report ---                LAKISHA TANNER MD; Attending Radiologist  This document has been electronically signed. Aug 15 2021 11:55AM    < end of copied text >  < from: MR Head No Cont (08.15.21 @ 21:53) >    EXAM:  MR BRAIN                            PROCEDURE DATE:  08/15/2021            INTERPRETATION:  CLINICAL INDICATION: 61-year-old female, diabetes mellitus, hypertension, stroke, weakness, right side, at 7:30 AM, baseline, 10:00 AM right hand weakness, right lower extremity, rule out out stroke    TECHNIQUE: Noncontrast MRI of the brain was performed.    Sagittal and axial T1, axial T2, FLAIR, gradient echo, SWI, sequence in addition to diffusion weighted imaging and ADC map were obtained.    COMPARISON: CT head dated 10/4/2013.    FINDINGS:  Redemonstration volume loss with moderately enlarged ventricles and sulci. There are foci of restricted diffusion, DWI, T2, FLAIR hyperintensity  left thalamus near thalamocapsular junction, (3:16, 6,7:16), and bilateral brachium ponti, new from 10/3/2020 MRI, consistent with new foci of ischemia with SWI susceptibility likely microhemorrhage.    Redemonstration, extensive nonspecific T2 FLAIR white matter hyperintensity likely sequela of microvascular disease, increased in size and number from prior with multiple remote lacunar infarcts with encephalomalacia in centrum semiovale, lentiform nuclei, thalami, niko, midbrain, cerebral cerebral hemispheres. Redemonstration and increased susceptibility foci, likely microhemorrhages, cavernomas, calcifications also not excluded. Redemonstration tortuous extracranial vessels correlate hypertension. Unchanged partially empty sella, basal cisterns remain patent.    Intact calvarium with nonspecific decreased T1 marrow signal, sclerosis on CT may reflect marrow replacement from anemia and/or drug therapy other etiologies not excluded. Maxillary, ethmoid, costal thickening, retention cyst or polyps, mastoid tip opacification. Dental susceptibility artifact limits assessment assessment with heterogeneity along teeth and maxilla and mandible, corresponding CT with lucency suggesting ongoing dental disease, suggest dental inspection.    IMPRESSION:  Comparison MRI 10/3/2020, new foci restricteddiffusion, DWI, T2 FLAIR hyperintensity left thalamus, bilateral brachium ponti with susceptibility, likely new ischemic change with microhemorrhage.    Redemonstration volume loss. Progressive T2 FLAIR white matter hyperintensity likely increased progressive microvascular disease as well as new foci of susceptibility likely microhemorrhage with other etiologies not excluded, no new large extra-axial collection or midline shift.    Tortuous vessels, likely from hypertension, maxillary ethmoid mucosal thickening, retention cyst polyp, mastoid opacification. Heterogeneity along the teeth with lucency on CT likely dental disease.    Findings discussed with stroke neurology Dr. Quiles at immediate time of review, 8/16/2021 at 8:20 PM    --- Endof Report ---                CHARLEEN NO MD; Attending Radiologist  This document has been electronically signed. Aug 16 2021  9:01AM    < end of copied text >    OTHER: 	  	  LABS:	 	    CARDIAC MARKERS:                                  11.5   6.33  )-----------( 229      ( 15 Aug 2021 11:10 )             36.0     08-15    143  |  106  |  21  ----------------------------<  126<H>  4.4   |  25  |  1.03    Ca    9.9      15 Aug 2021 11:10    TPro  7.8  /  Alb  4.5  /  TBili  0.5  /  DBili  x   /  AST  11  /  ALT  10  /  AlkPhos  85  08-15    proBNP:   Lipid Profile:   HgA1c:   TSH:

## 2021-08-16 NOTE — PHYSICAL THERAPY INITIAL EVALUATION ADULT - RANGE OF MOTION EXAMINATION, REHAB EVAL
R ue/le weakness+./Left UE ROM was WNL (within normal limits)/Left LE ROM was WFL (within functional limits)/deficits as listed below

## 2021-08-16 NOTE — PHYSICAL THERAPY INITIAL EVALUATION ADULT - TRANSFER TRAINING, PT EVAL
GOAL: Pt will perform sit to/from stand transfers independently with appropriate AD within 2-3 weeks.

## 2021-08-16 NOTE — H&P ADULT - NSHPLABSRESULTS_GEN_ALL_CORE
LABS:                       11.5   6.33  )-----------( 229      ( 15 Aug 2021 11:10 )             36.0     08-15    143  |  106  |  21  ----------------------------<  126<H>  4.4   |  25  |  1.03    Ca    9.9      15 Aug 2021 11:10    TPro  7.8  /  Alb  4.5  /  TBili  0.5  /  DBili  x   /  AST  11  /  ALT  10  /  AlkPhos  85  08-15    PT/INR - ( 15 Aug 2021 11:10 )   PT: 11.3 sec;   INR: 0.94 ratio      PTT - ( 15 Aug 2021 11:10 )  PTT:31.8 sec    Urinalysis Basic - ( 15 Aug 2021 12:31 )    Color: Colorless / Appearance: Clear / S.031 / pH: x  Gluc: x / Ketone: Negative  / Bili: Negative / Urobili: Negative   Blood: x / Protein: 30 mg/dL / Nitrite: Negative   Leuk Esterase: Negative / RBC: 0 /hpf / WBC 1 /HPF   Sq Epi: x / Non Sq Epi: 1 /hpf / Bacteria: Negative    RADIOLOGY:  -08/15 HEAD CT: No acute intracranial hemorrhage or acute territorial infarction. Chronic infarctions and chronic microvascular ischemic changes as described.  -08/15 CT PERFUSION demonstrated: No asymmetric or territorial perfusion abnormality.  -08/15 CTA COW: No large vessel occlusion. High-grade stenosis involving the right posterior cerebral artery as described.  -08/15 CTA NECK: Patent, ECAs, ICAs, no  hemodynamically significant stenosis at  ICA origins by NASCET criteria. Bilateral vertebral arteries are patent without flow limiting stenosis.  -08/15 MRI Head: Comparison MRI 10/3/2020, new foci restricted diffusion, DWI, T2 FLAIR hyperintensity left thalamus, bilateral brachium ponti with susceptibility, likely new ischemic change with microhemorrhage. Redemonstration volume loss. Progressive T2 FLAIR white matter hyperintensity likely increased progressive microvascular disease as well as new foci of susceptibility likely microhemorrhage with other etiologies not excluded, no new large extra-axial collection or midline shift. Tortuous vessels, likely from hypertension, maxillary ethmoid mucosal thickening, retention cyst polyp, mastoid opacification. Heterogeneity along the teeth with lucency on CT likely dental disease.

## 2021-08-16 NOTE — PHYSICAL THERAPY INITIAL EVALUATION ADULT - PERTINENT HX OF CURRENT PROBLEM, REHAB EVAL
Assessment: 60 yo RH Female with a PMHx of DMT2, HTN/HLD, multiple prior strokes (residual RUE/LE weakness w/ foot drop - peripheral vs central?, LLE>UE numbness; s/p ILR) who presented with acute on chronic R-sided weakness. LKN 8/15 07:30, symptoms noticed around 10AM after patient was trying to use her arm. CTH w/o acute findings, CTA w worsening PCA stenosis but normal flow in b/l vertebral arteries. MRI head shows tiny L thalamic infarct.

## 2021-08-16 NOTE — H&P ADULT - NSHPPHYSICALEXAM_GEN_ALL_CORE
Vital Signs Last 24 Hrs  T(C): 36.4 (16 Aug 2021 16:57), Max: 36.7 (16 Aug 2021 01:00)  T(F): 97.6 (16 Aug 2021 16:57), Max: 98 (16 Aug 2021 01:00)  HR: 92 (16 Aug 2021 16:57) (86 - 97)  BP: 152/96 (16 Aug 2021 16:57) (131/75 - 160/97)  BP(mean): 91 (16 Aug 2021 04:00) (91 - 98)  RR: 17 (16 Aug 2021 16:57) (13 - 20)  SpO2: 97% (16 Aug 2021 16:57) (94% - 98%)    NEUROLOGICAL EXAM  - Mental Status:  AAOx3; speech is fluent with intact naming, repetition, and comprehension  - Cranial Nerves II-XII:    II:  PERRLA; visual fields are full to confrontation  III, IV, VI:  EOMI, no nystagmus  V:  facial sensation is intact in the V1-V3 distribution bilaterally.  VII:  face is symmetric with normal eye closure and smile  VIII:  hearing is intact to finger rub  IX, X:  uvula is midline and soft palate rises symmetrically  XI:  head turning and shoulder shrug are intact bilaterally  XII:  tongue protrudes in the midline  PRIOR TO CT: initial appearance of RUE in flexion, contracture like positioning stating too weak to extend; RLE fell to bed within 5s | baseline intact strength LUE/LLE   - Motor:  REPEAT strength is 5/5 LUE and 4/5RUE baseline ; normal muscle bulk and tone throughout; R + slight upward drift now improved able to hold both arms out bL >10s  - Reflexes:  2+ and symmetric at the biceps, triceps, brachioradialis, knees, and ankles;  plantar reflexes downgoing bilaterally  - Sensory:  decreased sensation to light touch L-side at baseline   - Coordination:  finger-nose-finger and heel-knee-shin + dysmetria in RUE/RLE on 2nd testing and repeated with resolution on 3rd test with improving bp

## 2021-08-16 NOTE — ED ADULT NURSE REASSESSMENT NOTE - NS ED NURSE REASSESS COMMENT FT1
07.00 Am Received the Pt from  [facility]  . Pt is Observed for Stroke like symptoms  for MRI . MRI is done awiatng results  . Received the Pt A&OX 4 obeys commands Maegan N/V/D fever chills cp SOB   Comfort care & safety measures continued  IV site looks clean & dry no signs of infiltration noted pt denies  pain IV site .  Pt is advised to call for help  call bell with in the reach pt verbalized the understanding . Pt states  her weakness in left side is improving. Pt has residual weakness from the previous stroke in Rt side. pending CDU  MD quintero . GCS 15/15 A&OX 4 PERRLA  size 3 Strong upper extremities  weak in Rt lower extremities steady gait with walker    No facial droop  denies numbness tingling Continue to monitor

## 2021-08-17 LAB
COVID-19 SPIKE DOMAIN AB INTERP: POSITIVE
COVID-19 SPIKE DOMAIN ANTIBODY RESULT: >250 U/ML — HIGH
GLUCOSE BLDC GLUCOMTR-MCNC: 126 MG/DL — HIGH (ref 70–99)
GLUCOSE BLDC GLUCOMTR-MCNC: 83 MG/DL — SIGNIFICANT CHANGE UP (ref 70–99)
GLUCOSE BLDC GLUCOMTR-MCNC: 90 MG/DL — SIGNIFICANT CHANGE UP (ref 70–99)
SARS-COV-2 IGG+IGM SERPL QL IA: >250 U/ML — HIGH
SARS-COV-2 IGG+IGM SERPL QL IA: POSITIVE

## 2021-08-17 PROCEDURE — 70450 CT HEAD/BRAIN W/O DYE: CPT | Mod: 26

## 2021-08-17 PROCEDURE — 99223 1ST HOSP IP/OBS HIGH 75: CPT

## 2021-08-17 PROCEDURE — 70553 MRI BRAIN STEM W/O & W/DYE: CPT | Mod: 26

## 2021-08-17 PROCEDURE — 99222 1ST HOSP IP/OBS MODERATE 55: CPT

## 2021-08-17 PROCEDURE — 70546 MR ANGIOGRAPH HEAD W/O&W/DYE: CPT | Mod: 26,59

## 2021-08-17 RX ORDER — TICAGRELOR 90 MG/1
180 TABLET ORAL ONCE
Refills: 0 | Status: DISCONTINUED | OUTPATIENT
Start: 2021-08-17 | End: 2021-08-17

## 2021-08-17 RX ORDER — SODIUM CHLORIDE 9 MG/ML
1000 INJECTION INTRAMUSCULAR; INTRAVENOUS; SUBCUTANEOUS
Refills: 0 | Status: DISCONTINUED | OUTPATIENT
Start: 2021-08-17 | End: 2021-08-18

## 2021-08-17 RX ORDER — TICAGRELOR 90 MG/1
180 TABLET ORAL ONCE
Refills: 0 | Status: COMPLETED | OUTPATIENT
Start: 2021-08-17 | End: 2021-08-17

## 2021-08-17 RX ORDER — TICAGRELOR 90 MG/1
90 TABLET ORAL
Refills: 0 | Status: DISCONTINUED | OUTPATIENT
Start: 2021-08-18 | End: 2021-08-19

## 2021-08-17 RX ADMIN — SODIUM CHLORIDE 75 MILLILITER(S): 9 INJECTION INTRAMUSCULAR; INTRAVENOUS; SUBCUTANEOUS at 16:15

## 2021-08-17 RX ADMIN — Medication 5 UNIT(S): at 08:00

## 2021-08-17 RX ADMIN — INSULIN GLARGINE 15 UNIT(S): 100 INJECTION, SOLUTION SUBCUTANEOUS at 21:48

## 2021-08-17 RX ADMIN — TICAGRELOR 180 MILLIGRAM(S): 90 TABLET ORAL at 11:14

## 2021-08-17 RX ADMIN — ATORVASTATIN CALCIUM 80 MILLIGRAM(S): 80 TABLET, FILM COATED ORAL at 21:49

## 2021-08-17 RX ADMIN — Medication 81 MILLIGRAM(S): at 11:14

## 2021-08-17 RX ADMIN — ENOXAPARIN SODIUM 40 MILLIGRAM(S): 100 INJECTION SUBCUTANEOUS at 11:14

## 2021-08-17 NOTE — OCCUPATIONAL THERAPY INITIAL EVALUATION ADULT - BALANCE TRAINING, PT EVAL
Goal: Patient will increase dynamic standing balance by 1 grade to facilitate increased safety, ability to perform ADLs and functional mobility within 4 weeks.

## 2021-08-17 NOTE — CONSULT NOTE ADULT - SUBJECTIVE AND OBJECTIVE BOX
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 Pt endorses was leaning to one side, endorses RLE feels weaker than normal which was present on initial NIHSS 5. Initial BPs elevated 190s systolic, post-CTH without hemorrhage or acute findings, BP was lowered with improvement in R sided acute on chronic weakness to allow for ataxia testing in limbs which was present, which was new findings. 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 On [id number] not taken today. Before leaving bedside, /105 with resolution of acute R sided findings. Endorses mild L posterior headache, no N/V or infectious symptoms, endorses back at baseline.     Patient was re-evaluated after improvement for weakness, concern for focal seizure with patient endorsing no prior seizure history or prior episodes w RUE. Patient without LOC, alert and oriented throughout episode was given Keppra IV load.     [facility] 8/15 07:30   mRS 1   NIHSS 5-->1 (baseline)  no tpa as no sustained neurological deficit with higher suspicion for HTN, seizure > stroke   no thrombectomy as no LVO    Patient was admitted to CDU for imaging. MRI head showed tiny L thalamic infarct. Vessel imaging notable for severe R PCA stenosis. Patient was seen in CDU with initial plan to DC home, however R HP progressed throughout the day and patient did not feel comfortable to go home. Admitting to stroke for PT/OT and possible rehab. (16 Aug 2021 18:27)    Patient was admitted on , on exam noted to have RUE drift, dysmetria. Seen today on 4 Hernandez, right UE/LE weakness, patient reports change in speech. She had prior right foot drop, was not using AD/brace.     REVIEW OF SYSTEMS  Constitutional - No fever, No weight loss, No fatigue  HEENT - No eye pain, No visual disturbances, No difficulty hearing, No tinnitus, No vertigo, No neck pain  Respiratory - No cough, No wheezing, No shortness of breath  Cardiovascular - No chest pain, No palpitations  Gastrointestinal - No abdominal pain, No nausea, No vomiting, No diarrhea, No constipation  Genitourinary - No dysuria, No frequency, No hematuria, No incontinence  Neurological - No headaches, No memory loss, + loss of strength, No numbness, No tremors  Psychiatric - No depression, No anxiety    VITALS  T(C): 36.6 (21 @ 08:54), Max: 36.7 (21 @ 20:24)  HR: 94 (21 @ 08:54) (75 - 97)  BP: 148/103 (21 @ 08:54) (141/91 - 170/109)  RR: 18 (21 @ 08:54) (17 - 19)  SpO2: 100% (21 @ 08:54) (96% - 100%)  Wt(kg): --    PAST MEDICAL & SURGICAL HISTORY  Hypertension    Diabetes Mellitus Type II    Generalized Headaches    IBS (Irritable Bowel Syndrome)    Personal History of Hypertension    Diabetes Mellitus    Hypokalemia    CVA (cerebral vascular accident)    TIA (transient ischemic attack)    No Past Surgical History    History of total knee replacement, right        SOCIAL HISTORY  Smoking - Denied  EtOH - Denied   Drugs - Denied    FUNCTIONAL HISTORY  Lives with family, private home, 3 steps to enter, one flight to bedroom, working   Independent AMB and ADLs PTA     CURRENT FUNCTIONAL STATUS  PT/OT evals pending     FAMILY HISTORY   Family history of acute myocardial infarction (Father)    Family history of prostate cancer (Father)        RECENT LABS/IMAGING          Urinalysis Basic - ( 15 Aug 2021 12:31 )    Color: Colorless / Appearance: Clear / S.031 / pH: x  Gluc: x / Ketone: Negative  / Bili: Negative / Urobili: Negative   Blood: x / Protein: 30 mg/dL / Nitrite: Negative   Leuk Esterase: Negative / RBC: 0 /hpf / WBC 1 /HPF   Sq Epi: x / Non Sq Epi: 1 /hpf / Bacteria: Negative    < from: CT Brain Stroke Protocol (08.15.21 @ 11:25) >    IMPRESSION:    HEAD CT: No acute intracranial hemorrhage or acute territorial infarction. Chronic infarctions and chronic microvascular ischemic changes as described.    CT PERFUSION demonstrated: No asymmetric or territorial perfusion abnormality.    If symptoms persist consider follow up head CT or MRI, MRA  if no contraindication.    CTA COW: No large vessel occlusion. High-grade stenosis involving the right posterior cerebral artery as described.    CTA NECK: Patent, ECAs, ICAs, no  hemodynamically significant stenosis at  ICA origins by NASCET criteria.  Bilateral vertebral arteries are patent without flow limiting stenosis.      < end of copied text >    < from: MR Head No Cont (08.15.21 @ 21:53) >    IMPRESSION:  Comparison MRI 10/3/2020, new foci restricteddiffusion, DWI, T2 FLAIR hyperintensity left thalamus, bilateral brachium ponti with susceptibility, likely new ischemic change with microhemorrhage.    Redemonstration volume loss. Progressive T2 FLAIR white matter hyperintensity likely increased progressive microvascular disease as well as new foci of susceptibility likely microhemorrhage with other etiologies not excluded, no new large extra-axial collection or midline shift.    Tortuous vessels, likely from hypertension, maxillary ethmoid mucosal thickening, retention cyst polyp, mastoid opacification. Heterogeneity along the teeth with lucency on CT likely dental disease.      < end of copied text >      ALLERGIES  lactose (Diarrhea)  sulfa drugs (Angioedema; Swelling)  sulfa drugs (Rash)  Sulfac 10% (Unknown)  walnut (Urticaria)      MEDICATIONS   acetaminophen   Tablet .. 975 milliGRAM(s) Oral every 6 hours PRN  aspirin  chewable 81 milliGRAM(s) Oral daily  atorvastatin 80 milliGRAM(s) Oral at bedtime  dextrose 40% Gel 15 Gram(s) Oral once  dextrose 5%. 1000 milliLiter(s) IV Continuous <Continuous>  dextrose 5%. 1000 milliLiter(s) IV Continuous <Continuous>  dextrose 50% Injectable 25 Gram(s) IV Push once  dextrose 50% Injectable 12.5 Gram(s) IV Push once  dextrose 50% Injectable 25 Gram(s) IV Push once  enoxaparin Injectable 40 milliGRAM(s) SubCutaneous daily  glucagon  Injectable 1 milliGRAM(s) IntraMuscular once  insulin glargine Injectable (LANTUS) 15 Unit(s) SubCutaneous at bedtime  insulin lispro (ADMELOG) corrective regimen sliding scale   SubCutaneous three times a day before meals  insulin lispro Injectable (ADMELOG) 5 Unit(s) SubCutaneous three times a day before meals  sodium chloride 0.9%. 1000 milliLiter(s) IV Continuous <Continuous>      ----------------------------------------------------------------------------------------  PHYSICAL EXAM  Constitutional - NAD, Comfortable, in bed   HEENT - NCAT, EOMI  Neck - Supple, No limited ROM  Chest - Breathing comfortably  Cardiovascular - S1S2   Abdomen - Soft   Extremities - No C/C/E, No calf tenderness   Neurologic Exam -                    Cognitive - Awake, Alert, AAO to self, place, date, year, situation     Communication - Fluent, No dysarthria     Cranial Nerves - CN 2-12 intact     Motor -                     LEFT    UE - ShAB 5/5, EF 5/5, EE 5/5, WE 5/5,  5/5                    RIGHT UE - 0/5                    LEFT    LE - HF 5/5, KE 5/5, DF 5/5, PF 5/5                    RIGHT LE - HF 3/5, KE 3/5, DF 0/5, PF 2/5        Sensory - Intact to LT    Psychiatric - Mood stable, Affect WNL  ----------------------------------------------------------------------------------------  ASSESSMENT/PLAN  61yFemale DMT2, HTN/HLD, multiple prior strokes (residual RUE/LE weakness w/ foot drop with functional deficits after left thalamic infarct, right sided weakness   keppra discontinued   Pain - Tylenol  DVT PPX - SCDs, lovenox   Rehab - Will continue to follow for ongoing rehab needs and recommendations.   continue bedside PT/OT  out of bed to chair    Recommend ACUTE inpatient rehabilitation for the functional deficits consisting of 3 hours of therapy/day & 24 hour RN/daily PMR physician for comorbid medical management. Patient will be able to tolerate 3 hours a day.

## 2021-08-17 NOTE — OCCUPATIONAL THERAPY INITIAL EVALUATION ADULT - GENERAL OBSERVATIONS, REHAB EVAL
Pt received semisupine in bed, spouse at bedside, A+Ox4, IVL, bilateral SCDs donned, R sided weakness RUE>RLE

## 2021-08-17 NOTE — PROGRESS NOTE ADULT - SUBJECTIVE AND OBJECTIVE BOX
Subjective: Patient seen and examined. No new events except as noted.     REVIEW OF SYSTEMS:    CONSTITUTIONAL: + weakness, fevers or chills  EYES/ENT: No visual changes;  No vertigo or throat pain   NECK: No pain or stiffness  RESPIRATORY: No cough, wheezing, hemoptysis; No shortness of breath  CARDIOVASCULAR: No chest pain or palpitations  GASTROINTESTINAL: No abdominal or epigastric pain. No nausea, vomiting, or hematemesis; No diarrhea or constipation. No melena or hematochezia.  GENITOURINARY: No dysuria, frequency or hematuria  NEUROLOGICAL: No numbness or weakness  SKIN: No itching, burning, rashes, or lesions   All other review of systems is negative unless indicated above.    MEDICATIONS:  MEDICATIONS  (STANDING):  aspirin  chewable 81 milliGRAM(s) Oral daily  atorvastatin 80 milliGRAM(s) Oral at bedtime  dextrose 40% Gel 15 Gram(s) Oral once  dextrose 5%. 1000 milliLiter(s) (50 mL/Hr) IV Continuous <Continuous>  dextrose 5%. 1000 milliLiter(s) (100 mL/Hr) IV Continuous <Continuous>  dextrose 50% Injectable 25 Gram(s) IV Push once  dextrose 50% Injectable 12.5 Gram(s) IV Push once  dextrose 50% Injectable 25 Gram(s) IV Push once  enoxaparin Injectable 40 milliGRAM(s) SubCutaneous daily  glucagon  Injectable 1 milliGRAM(s) IntraMuscular once  insulin glargine Injectable (LANTUS) 15 Unit(s) SubCutaneous at bedtime  insulin lispro (ADMELOG) corrective regimen sliding scale   SubCutaneous three times a day before meals  insulin lispro Injectable (ADMELOG) 5 Unit(s) SubCutaneous three times a day before meals      PHYSICAL EXAM:  T(C): 36.7 (08-17-21 @ 04:54), Max: 36.7 (08-16-21 @ 20:24)  HR: 91 (08-17-21 @ 04:54) (75 - 97)  BP: 141/91 (08-17-21 @ 04:54) (141/91 - 160/97)  RR: 18 (08-17-21 @ 04:54) (16 - 19)  SpO2: 99% (08-17-21 @ 04:54) (96% - 99%)  Wt(kg): --  I&O's Summary    16 Aug 2021 07:01  -  17 Aug 2021 07:00  --------------------------------------------------------  IN: 200 mL / OUT: 400 mL / NET: -200 mL        Appearance: NAD  HEENT:  Dry oral mucosa, PERRL, EOMI	  Lymphatic: No lymphadenopathy  Cardiovascular: Normal S1 S2, No JVD, No murmurs, No edema  Respiratory: decreased bs  Psychiatry: A & O x 3, Mood & affect appropriate  Gastrointestinal:  Soft, Non-tender, + BS	  Skin: No rashes, No ecchymoses, No cyanosis	  Extremities: Normal range of motion, No clubbing, cyanosis or edema  Vascular: Peripheral pulses palpable 2+ bilaterally  - Cranial Nerves II-XII:    II:  PERRLA; visual fields are full to confrontation  III, IV, VI:  EOMI, no nystagmus  V:  facial sensation is intact in the V1-V3 distribution bilaterally.  VII:  face is symmetric with normal eye closure and smile  VIII:  hearing is intact to finger rub  IX, X:  uvula is midline and soft palate rises symmetrically  XI:  head turning and shoulder shrug are intact bilaterally  XII:  tongue protrudes in the midline  PRIOR TO CT: initial appearance of RUE in flexion, contracture like positioning stating too weak to extend; RLE fell to bed within 5s | baseline intact strength LUE/LLE   - Motor:  REPEAT strength is 5/5 LUE and 4/5RUE baseline ; normal muscle bulk and tone throughout; R + slight upward drift now improved able to hold both arms out bL >10s  - Reflexes:  2+ and symmetric at the biceps, triceps, brachioradialis, knees, and ankles;  plantar reflexes downgoing bilaterally  - Sensory:  decreased sensation to light touch L-side at baseline   - Coordination:  finger-nose-finger and heel-knee-shin + dysmetria in RUE/RLE     LABS:    CARDIAC MARKERS:                                11.5   6.33  )-----------( 229      ( 15 Aug 2021 11:10 )             36.0     08-15    143  |  106  |  21  ----------------------------<  126<H>  4.4   |  25  |  1.03    Ca    9.9      15 Aug 2021 11:10    TPro  7.8  /  Alb  4.5  /  TBili  0.5  /  DBili  x   /  AST  11  /  ALT  10  /  AlkPhos  85  08-15    proBNP:   Lipid Profile:   HgA1c:   TSH:     0          TELEMETRY: 	    ECG:  	  RADIOLOGY:   DIAGNOSTIC TESTING:  [ ] Echocardiogram:  < from: TTE with Doppler (w/Cont) (08.16.21 @ 13:38) >    Patient name: OMAR CASTANO  YOB: 1960   Age: 61 (F)   MR#: [id number]  Study Date: 8/16/2021  Location: O/PSonographer: Godfrey Mccollum [facility]  Study quality: Technically fair  Referring Physician: Jhonny Oliveira MD  Blood Pressure: 140/96 mmHg  Height: 173 cm  Weight: 99 kg  BSA: 2.1 m2  ------------------------------------------------------------------------  PROCEDURE: Transthoracic echocardiogram with 2-D, M-Mode  and complete spectral and color flow Doppler. Patient was  injected with 10 cc's of aerosolized saline. Patient was  injected with 10 cc's of aerosolized saline.  INDICATION: Transient cerebral ischemic attack, unspecified  (G45.9)  ------------------------------------------------------------------------  Dimensions:    Normal Values:  LA:     3.3    2.0 - 4.0 cm  Ao:     3.2    2.0 - 3.8 cm  SEPTUM: 1.6    0.6 - 1.2 cm  PWT:    1.3    0.6 - 1.1 cm  LVIDd:  3.4    3.0 - 5.6 cm  LVIDs:  2.2    1.8 - 4.0 cm  Derived variables:  LVMI: 83 g/m2  RWT: 0.76  Fractional short: 35 %  EF (Visual Estimate): 65-70 %  ------------------------------------------------------------------------  Observations:  Mitral Valve: Normal mitral valve.  Aortic Valve/Aorta: Calcified trileaflet aortic valve with  normal opening.  Aortic Root: 3.2 cm.  LVOT diameter: 2.1 cm.  Left Atrium: Normal left atrium.  LA volume index = 24  cc/m2.  Left Ventricle: Normal left ventricular systolic function.  No segmental wall motion abnormalities. Increased relative  wall thickness with normal left ventricular mass index,  consistent with concentric left ventricular remodeling.  Indeterminate diastolic function.  Right Heart: Normal right atrium. Normal right ventricular  size and function. Normal tricuspid valve. Minimal  tricuspid regurgitation. Normal pulmonic valve.  Pericardium/Pleura: Normal pericardium with no pericardial  effusion.  Hemodynamic: Estimated right atrial pressure is 8 mm Hg.  Estimated right ventricular systolic pressure equals 17 mm  Hg, assuming right atrial pressure equals 8 mm Hg,  consistent with normal pulmonary pressures. Agitated saline  injection demonstrates no evidence of a patent foramen  ovale.  ------------------------------------------------------------------------  Conclusions:  1. Increased relative wall thickness with normal left  ventricular mass index, consistent with concentric left  ventricular remodeling.  2. Normal left ventricular systolic function. No segmental  wall motion abnormalities.  3. Agitated saline injection demonstrates no evidence of a  patent foramen ovale.  *** Compared with echocardiogram of 10/5/2020, no  significant changes noted.  ------------------------------------------------------------------------  Confirmed on  8/16/2021 - 15:20:08 by KIMO Mcclain  ------------------------------------------------------------------------    < end of copied text >    [ ]  Catheterization:  [ ] Stress Test:    OTHER:

## 2021-08-17 NOTE — OCCUPATIONAL THERAPY INITIAL EVALUATION ADULT - BED MOBILITY LIMITATIONS, REHAB EVAL
R side/decreased ability to use arms for pushing/pulling/decreased ability to use legs for bridging/pushing

## 2021-08-17 NOTE — OCCUPATIONAL THERAPY INITIAL EVALUATION ADULT - PRECAUTIONS/LIMITATIONS, REHAB EVAL
Pt endorses was leaning to one side, endorses RLE feels weaker than normal which was present on initial NIHSS 5. Initial BPs elevated 190s systolic, post-CTH without hemorrhage or acute findings, BP was lowered with improvement in R sided acute on chronic weakness to allow for ataxia testing in limbs which was present, which was new findings./fall precautions/seizure precautions

## 2021-08-17 NOTE — PROGRESS NOTE ADULT - ASSESSMENT
60 yo R handed Female known to me from previous CVA s/p ILR, never followed up in office,  mRS 1 PMHx DMT2, HTN/HLD, multiple CVAs w/ residual RUE/LE weakness w foot drop, LLE>UE numbness,  presenting with acute on chronic R-sided weakness

## 2021-08-17 NOTE — OCCUPATIONAL THERAPY INITIAL EVALUATION ADULT - PERTINENT HX OF CURRENT PROBLEM, REHAB EVAL
62yo RH F mRS 1 PMHx DMT2, HTN/HLD, multiple CVAs w/ residual RUE/LE weakness w foot drop, LLE>UE numbness s/p loop recorder and plavix  now discontinued 2017,  presenting with acute on chronic R-sided weakness [facility]KN 8/15 07:30 symptoms noticed around 10AM after pt was trying to use her arm and was not able to "make it do what I wanted" on further clarification of RUE>LE weakness.

## 2021-08-17 NOTE — OCCUPATIONAL THERAPY INITIAL EVALUATION ADULT - LIVES WITH, PROFILE
Pt lives with spouse, adult children and grandchild in 2 story private home 3 steps  to enter without rail, 14 steps to ascend to 2nd floor with L rail and R wall. +tub shower with shower chair, no grab bars, +handheld shower/children/other relative/spouse

## 2021-08-17 NOTE — OCCUPATIONAL THERAPY INITIAL EVALUATION ADULT - DIAGNOSIS, OT EVAL
Pt currently presents with decreased balance, R sided weakness, grasp/manipulation R hand and strength limiting independence with ADLs and functional mobility.

## 2021-08-18 ENCOUNTER — TRANSCRIPTION ENCOUNTER (OUTPATIENT)
Age: 61
End: 2021-08-18

## 2021-08-18 LAB
ANION GAP SERPL CALC-SCNC: 15 MMOL/L — SIGNIFICANT CHANGE UP (ref 5–17)
BUN SERPL-MCNC: 18 MG/DL — SIGNIFICANT CHANGE UP (ref 7–23)
CALCIUM SERPL-MCNC: 9.9 MG/DL — SIGNIFICANT CHANGE UP (ref 8.4–10.5)
CHLORIDE SERPL-SCNC: 109 MMOL/L — HIGH (ref 96–108)
CO2 SERPL-SCNC: 22 MMOL/L — SIGNIFICANT CHANGE UP (ref 22–31)
CREAT SERPL-MCNC: 1.01 MG/DL — SIGNIFICANT CHANGE UP (ref 0.5–1.3)
GLUCOSE BLDC GLUCOMTR-MCNC: 120 MG/DL — HIGH (ref 70–99)
GLUCOSE BLDC GLUCOMTR-MCNC: 141 MG/DL — HIGH (ref 70–99)
GLUCOSE BLDC GLUCOMTR-MCNC: 152 MG/DL — HIGH (ref 70–99)
GLUCOSE BLDC GLUCOMTR-MCNC: 154 MG/DL — HIGH (ref 70–99)
GLUCOSE BLDC GLUCOMTR-MCNC: 95 MG/DL — SIGNIFICANT CHANGE UP (ref 70–99)
GLUCOSE SERPL-MCNC: 124 MG/DL — HIGH (ref 70–99)
HCT VFR BLD CALC: 36.3 % — SIGNIFICANT CHANGE UP (ref 34.5–45)
HGB BLD-MCNC: 11.6 G/DL — SIGNIFICANT CHANGE UP (ref 11.5–15.5)
MCHC RBC-ENTMCNC: 26 PG — LOW (ref 27–34)
MCHC RBC-ENTMCNC: 32 GM/DL — SIGNIFICANT CHANGE UP (ref 32–36)
MCV RBC AUTO: 81.4 FL — SIGNIFICANT CHANGE UP (ref 80–100)
NRBC # BLD: 0 /100 WBCS — SIGNIFICANT CHANGE UP (ref 0–0)
PLATELET # BLD AUTO: 228 K/UL — SIGNIFICANT CHANGE UP (ref 150–400)
POTASSIUM SERPL-MCNC: 3.5 MMOL/L — SIGNIFICANT CHANGE UP (ref 3.5–5.3)
POTASSIUM SERPL-SCNC: 3.5 MMOL/L — SIGNIFICANT CHANGE UP (ref 3.5–5.3)
RBC # BLD: 4.46 M/UL — SIGNIFICANT CHANGE UP (ref 3.8–5.2)
RBC # FLD: 14.5 % — SIGNIFICANT CHANGE UP (ref 10.3–14.5)
SODIUM SERPL-SCNC: 146 MMOL/L — HIGH (ref 135–145)
WBC # BLD: 6.28 K/UL — SIGNIFICANT CHANGE UP (ref 3.8–10.5)
WBC # FLD AUTO: 6.28 K/UL — SIGNIFICANT CHANGE UP (ref 3.8–10.5)

## 2021-08-18 PROCEDURE — 99233 SBSQ HOSP IP/OBS HIGH 50: CPT

## 2021-08-18 RX ORDER — AMLODIPINE BESYLATE 2.5 MG/1
2.5 TABLET ORAL DAILY
Refills: 0 | Status: DISCONTINUED | OUTPATIENT
Start: 2021-08-18 | End: 2021-08-19

## 2021-08-18 RX ORDER — AMLODIPINE BESYLATE 2.5 MG/1
1 TABLET ORAL
Qty: 0 | Refills: 0 | DISCHARGE
Start: 2021-08-18

## 2021-08-18 RX ORDER — EMPAGLIFLOZIN 10 MG/1
1 TABLET, FILM COATED ORAL
Qty: 0 | Refills: 0 | DISCHARGE

## 2021-08-18 RX ORDER — ACETAMINOPHEN 500 MG
1000 TABLET ORAL ONCE
Refills: 0 | Status: COMPLETED | OUTPATIENT
Start: 2021-08-18 | End: 2021-08-18

## 2021-08-18 RX ORDER — LABETALOL HCL 100 MG
1 TABLET ORAL
Qty: 0 | Refills: 0 | DISCHARGE

## 2021-08-18 RX ORDER — METFORMIN HYDROCHLORIDE 850 MG/1
1 TABLET ORAL
Qty: 0 | Refills: 0 | DISCHARGE

## 2021-08-18 RX ORDER — ASPIRIN/CALCIUM CARB/MAGNESIUM 324 MG
1 TABLET ORAL
Qty: 0 | Refills: 0 | DISCHARGE
Start: 2021-08-18

## 2021-08-18 RX ORDER — ENOXAPARIN SODIUM 100 MG/ML
40 INJECTION SUBCUTANEOUS
Qty: 0 | Refills: 0 | DISCHARGE
Start: 2021-08-18

## 2021-08-18 RX ORDER — TICAGRELOR 90 MG/1
1 TABLET ORAL
Qty: 0 | Refills: 0 | DISCHARGE
Start: 2021-08-18

## 2021-08-18 RX ORDER — ATORVASTATIN CALCIUM 80 MG/1
1 TABLET, FILM COATED ORAL
Qty: 0 | Refills: 0 | DISCHARGE
Start: 2021-08-18

## 2021-08-18 RX ORDER — HYDRALAZINE HCL 50 MG
1 TABLET ORAL
Qty: 0 | Refills: 0 | DISCHARGE

## 2021-08-18 RX ADMIN — Medication 81 MILLIGRAM(S): at 12:35

## 2021-08-18 RX ADMIN — INSULIN GLARGINE 15 UNIT(S): 100 INJECTION, SOLUTION SUBCUTANEOUS at 22:07

## 2021-08-18 RX ADMIN — Medication 400 MILLIGRAM(S): at 18:53

## 2021-08-18 RX ADMIN — ATORVASTATIN CALCIUM 80 MILLIGRAM(S): 80 TABLET, FILM COATED ORAL at 22:07

## 2021-08-18 RX ADMIN — TICAGRELOR 90 MILLIGRAM(S): 90 TABLET ORAL at 18:08

## 2021-08-18 RX ADMIN — TICAGRELOR 90 MILLIGRAM(S): 90 TABLET ORAL at 05:20

## 2021-08-18 RX ADMIN — Medication 1000 MILLIGRAM(S): at 19:08

## 2021-08-18 RX ADMIN — AMLODIPINE BESYLATE 2.5 MILLIGRAM(S): 2.5 TABLET ORAL at 18:08

## 2021-08-18 RX ADMIN — ENOXAPARIN SODIUM 40 MILLIGRAM(S): 100 INJECTION SUBCUTANEOUS at 12:36

## 2021-08-18 RX ADMIN — SODIUM CHLORIDE 75 MILLILITER(S): 9 INJECTION INTRAMUSCULAR; INTRAVENOUS; SUBCUTANEOUS at 05:20

## 2021-08-18 NOTE — DISCHARGE NOTE PROVIDER - CARE PROVIDER_API CALL
Berkley Forrester (NP; RN)  NP in Family Health  [address], [address]  [place] [address]  Phone: [phone]  Fax: [phone]  Follow Up Time: 1 month    Hilton Minor ()  Cardiology; Internal Medicine  [address], [address]  [place] [address]  Phone: [phone]  Fax: [phone]  Follow Up Time: 1 month

## 2021-08-18 NOTE — CHART NOTE - NSCHARTNOTEFT_GEN_A_CORE
MRI head results discussed with Neuroradiology Dr. Fernandez, concern for left thalamic infarct and microhemorrhages likely as consequence from chronic HTN.    At this time c/w asp 81mg daily.    Can continue keppra 1g bid PO for now - can be downtitrated/weaned off as an outpatient.    Please have patient follow up with Dr. Denton     d/w Dr. Denton, who agrees with the above plan; d/w CDU staff
Obtain screening lower extremity venous ultrasound in patients who meet 1 or more of the following criteria as patient is high risk for DVT/PE on admission:   [] History of DVT/PE  []Hypercoagulable states (Factor V Leiden, Cancer, OCP, etc. )  [x]Prolonged immobility (hemiplegia/hemiparesis/post operative or any other extended immobilization)  [] Transferred from outside facility (Rehab or Long term care)  [] Age </= to 50    LE doppler pending
EP Brief Operative Note    Diagnosis: AFib unspecified  Procedure: ILR insertion  Surgeon: Rodrigue Beavers M.D.  Findings: Successful LINQ-2 implant  EBL: minimal  Specimens: none  Post-op Diagnosis: same  Assistants: none    A/P)  62yo woman with prior stroke, HTN, DM.  New embolic stroke of unknown source.    ILR implanted for long term surveillance for AFib.  Followup with Dr Minor in 2 weeks for ILR data monitoring and wound check.    Rodrigue Beavers M.D.  Cardiac Electrophysiology    office [phone]  pager [phone]

## 2021-08-18 NOTE — DISCHARGE NOTE PROVIDER - NSDCMRMEDTOKEN_GEN_ALL_CORE_FT
amLODIPine 10 mg oral tablet: 1 tab(s) orally once a day  Aspirin Low Dose 81 mg oral tablet, chewable: 1 tab(s) orally once a day  atorvastatin 80 mg oral tablet: 1 tab(s) orally once a day (at bedtime)  hydrALAZINE 50 mg oral tablet: 1 tab(s) orally once a day  Jardiance 10 mg oral tablet: 1 tab(s) orally once a day (in the morning)  labetalol 100 mg oral tablet: 1 tab(s) orally 3 times a day  Levemir FlexPen 100 units/mL subcutaneous solution: 15 unit(s) subcutaneous once a day (at bedtime)  lisinopril 40 mg oral tablet: 1 tab(s) orally once a day  MetFORMIN (Eqv-Fortamet) 500 mg oral tablet, extended release: 1 tab BID   amLODIPine 2.5 mg oral tablet: 1 tab(s) orally once a day  aspirin 81 mg oral tablet, chewable: 1 tab(s) orally once a day  atorvastatin 80 mg oral tablet: 1 tab(s) orally once a day (at bedtime)  enoxaparin: 40 milligram(s) subcutaneous once a day (at bedtime)  insulin lispro 100 units/mL subcutaneous solution: 1 Unit(s) if Glucose 151 - 200  2 Unit(s) if Glucose 201 - 250  3 Unit(s) if Glucose 251 - 300  4 Unit(s) if Glucose 301 - 350  5 Unit(s) if Glucose 351 - 400  6 Unit(s) if Glucose Greater Than 400  Three times a day before meals  Levemir FlexPen 100 units/mL subcutaneous solution: 15 unit(s) subcutaneous once a day (at bedtime)  ticagrelor 90 mg oral tablet: 1 tab(s) orally 2 times a day   aspirin 81 mg oral tablet, chewable: 1 tab(s) orally once a day  atorvastatin 80 mg oral tablet: 1 tab(s) orally once a day (at bedtime)  enoxaparin: 40 milligram(s) subcutaneous once a day (at bedtime)  insulin lispro 100 units/mL subcutaneous solution: 1 Unit(s) if Glucose 151 - 200  2 Unit(s) if Glucose 201 - 250  3 Unit(s) if Glucose 251 - 300  4 Unit(s) if Glucose 301 - 350  5 Unit(s) if Glucose 351 - 400  6 Unit(s) if Glucose Greater Than 400  Three times a day before meals  Levemir FlexPen 100 units/mL subcutaneous solution: 15 unit(s) subcutaneous once a day (at bedtime)  Norvasc 5 mg oral tablet: 1 tab(s) orally once a day  ticagrelor 90 mg oral tablet: 1 tab(s) orally 2 times a day

## 2021-08-18 NOTE — PROGRESS NOTE ADULT - SUBJECTIVE AND OBJECTIVE BOX
Subjective: Patient seen and examined. No new events except as noted.   Still unable to move right arm     REVIEW OF SYSTEMS:    CONSTITUTIONAL: + weakness, fevers or chills  EYES/ENT: No visual changes;  No vertigo or throat pain   NECK: No pain or stiffness  RESPIRATORY: No cough, wheezing, hemoptysis; No shortness of breath  CARDIOVASCULAR: No chest pain or palpitations  GASTROINTESTINAL: No abdominal or epigastric pain. No nausea, vomiting, or hematemesis; No diarrhea or constipation. No melena or hematochezia.  GENITOURINARY: No dysuria, frequency or hematuria  NEUROLOGICAL: No numbness or weakness  SKIN: No itching, burning, rashes, or lesions   All other review of systems is negative unless indicated above.    MEDICATIONS:  MEDICATIONS  (STANDING):  aspirin  chewable 81 milliGRAM(s) Oral daily  atorvastatin 80 milliGRAM(s) Oral at bedtime  dextrose 40% Gel 15 Gram(s) Oral once  dextrose 5%. 1000 milliLiter(s) (50 mL/Hr) IV Continuous <Continuous>  dextrose 5%. 1000 milliLiter(s) (100 mL/Hr) IV Continuous <Continuous>  dextrose 50% Injectable 25 Gram(s) IV Push once  dextrose 50% Injectable 12.5 Gram(s) IV Push once  dextrose 50% Injectable 25 Gram(s) IV Push once  enoxaparin Injectable 40 milliGRAM(s) SubCutaneous daily  glucagon  Injectable 1 milliGRAM(s) IntraMuscular once  insulin glargine Injectable (LANTUS) 15 Unit(s) SubCutaneous at bedtime  insulin lispro (ADMELOG) corrective regimen sliding scale   SubCutaneous three times a day before meals  sodium chloride 0.9%. 1000 milliLiter(s) (75 mL/Hr) IV Continuous <Continuous>  ticagrelor 90 milliGRAM(s) Oral two times a day      PHYSICAL EXAM:  T(C): 36.9 (08-18-21 @ 05:00), Max: 36.9 (08-18-21 @ 05:00)  HR: 87 (08-18-21 @ 05:00) (79 - 94)  BP: 147/91 (08-18-21 @ 05:00) (147/91 - 173/99)  RR: 18 (08-18-21 @ 05:00) (18 - 19)  SpO2: 97% (08-18-21 @ 05:00) (95% - 100%)  Wt(kg): --  I&O's Summary    17 Aug 2021 07:01  -  18 Aug 2021 07:00  --------------------------------------------------------  IN: 2245 mL / OUT: 1000 mL / NET: 1245 mL        Appearance: NAD  HEENT:  Dry oral mucosa, PERRL, EOMI	  Lymphatic: No lymphadenopathy  Cardiovascular: Normal S1 S2, No JVD, No murmurs, No edema  Respiratory: decreased bs  Psychiatry: A & O x 3, Mood & affect appropriate  Gastrointestinal:  Soft, Non-tender, + BS	  Skin: No rashes, No ecchymoses, No cyanosis	  Extremities: Normal range of motion, No clubbing, cyanosis or edema  Vascular: Peripheral pulses palpable 2+ bilaterally  - Cranial Nerves II-XII:    II:  PERRLA; visual fields are full to confrontation  III, IV, VI:  EOMI, no nystagmus  V:  facial sensation is intact in the V1-V3 distribution bilaterally.  VII:  face is symmetric with normal eye closure and smile  VIII:  hearing is intact to finger rub  IX, X:  uvula is midline and soft palate rises symmetrically  XI:  head turning and shoulder shrug are intact bilaterally  XII:  tongue protrudes in the midline  PRIOR TO CT: initial appearance of RUE in flexion, contracture like positioning stating too weak to extend; RLE fell to bed within 5s | baseline intact strength LUE/LLE   - Motor:  REPEAT strength is 5/5 LUE and 4/5RUE baseline ; normal muscle bulk and tone throughout; R + slight upward drift now improved able to hold both arms out bL >10s  - Reflexes:  2+ and symmetric at the biceps, triceps, brachioradialis, knees, and ankles;  plantar reflexes downgoing bilaterally  - Sensory:  decreased sensation to light touch L-side at baseline   - Coordination:  finger-nose-finger and heel-knee-shin + dysmetria in RUE/RLE         LABS:    CARDIAC MARKERS:                                11.6   6.28  )-----------( 228      ( 18 Aug 2021 06:55 )             36.3     08-18    146<H>  |  109<H>  |  18  ----------------------------<  124<H>  3.5   |  22  |  1.01    Ca    9.9      18 Aug 2021 07:02      proBNP:   Lipid Profile:   HgA1c:   TSH:   TELEMETRY: 	SR     ECG:  	  RADIOLOGY: < from: MR Head No Cont (08.15.21 @ 21:53) >    EXAM:  MR BRAIN                            PROCEDURE DATE:  08/15/2021            INTERPRETATION:  CLINICAL INDICATION: 61-year-old female, diabetes mellitus, hypertension, stroke, weakness, right side, at 7:30 AM, baseline, 10:00 AM right hand weakness, right lower extremity, rule out out stroke    TECHNIQUE: Noncontrast MRI of the brain was performed.    Sagittal and axial T1, axial T2, FLAIR, gradient echo, SWI, sequence in addition to diffusion weighted imaging and ADC map were obtained.    COMPARISON: CT head dated 10/4/2013.    FINDINGS:  Redemonstration volume loss with moderately enlarged ventricles and sulci. There are foci of restricted diffusion, DWI, T2, FLAIR hyperintensity  left thalamus near thalamocapsular junction, (3:16, 6,7:16), and bilateral brachium ponti, new from 10/3/2020 MRI, consistent with new foci of ischemia with SWI susceptibility likely microhemorrhage.    Redemonstration, extensive nonspecific T2 FLAIR white matter hyperintensity likely sequela of microvascular disease, increased in size and number from prior with multiple remote lacunar infarcts with encephalomalacia in centrum semiovale, lentiform nuclei, thalami, niko, midbrain, cerebral cerebral hemispheres. Redemonstration and increased susceptibility foci, likely microhemorrhages, cavernomas, calcifications also not excluded. Redemonstration tortuous extracranial vessels correlate hypertension. Unchanged partially empty sella, basal cisterns remain patent.    Intact calvarium with nonspecific decreased T1 marrow signal, sclerosis on CT may reflect marrow replacement from anemia and/or drug therapy other etiologies not excluded. Maxillary, ethmoid, costal thickening, retention cyst or polyps, mastoid tip opacification. Dental susceptibility artifact limits assessment assessment with heterogeneity along teeth and maxilla and mandible, corresponding CT with lucency suggesting ongoing dental disease, suggest dental inspection.    IMPRESSION:  Comparison MRI 10/3/2020, new foci restricteddiffusion, DWI, T2 FLAIR hyperintensity left thalamus, bilateral brachium ponti with susceptibility, likely new ischemic change with microhemorrhage.    Redemonstration volume loss. Progressive T2 FLAIR white matter hyperintensity likely increased progressive microvascular disease as well as new foci of susceptibility likely microhemorrhage with other etiologies not excluded, no new large extra-axial collection or midline shift.    Tortuous vessels, likely from hypertension, maxillary ethmoid mucosal thickening, retention cyst polyp, mastoid opacification. Heterogeneity along the teeth with lucency on CT likely dental disease.    Findings discussed with stroke neurology Dr. Quiles at immediate time of review, 8/16/2021 at 8:20 PM    --- Endof Report ---                CHARLEEN NO MD; Attending Radiologist  This document has been electronically signed. Aug 16 2021  9:01AM    < end of copied text >    DIAGNOSTIC TESTING:  [ ] Echocardiogram:  [ ]  Catheterization:  [ ] Stress Test:    OTHER:

## 2021-08-18 NOTE — PROGRESS NOTE ADULT - ASSESSMENT
61-year-old right-handed lady first evaluated at [facility] on 8/17/2021 with recurrent right hemiparesis. MRI brain (8/15/2021-compared to 10/20) to my eye showed the interval appearance of a probably chronic, punctate left lateral thalamic lacunar infarct; severe periventricular, subcortical and brainstem (middle cerebellar peduncles) hyperintensities of presumed vascular origin.  CTA neck and head (8/15/2021) to my eye showed mild calcified plaque at the carotid bifurcations bilaterally, right more so than left.  CTP (8/15/2021) was negative.  TTE (8/16/2021) showed LVH.      Impression.  In 2016 she had a stroke with mild residual right hemiparesis.  At that time she had an ILR implanted but the duration may have been only for a couple of months.  In 10/20 she had a recurrent stroke with left hemiparesis.  Despite these strokes, she was highly functional with very mild residual deficits.  On 8/15/2021 she developed recurrent right hemiparesis and had difficulty with her gait.  By the time she arrived at [facility], she had improved, but she then worsened again.  Her presentation was reportedly initially a right ataxic hemiparesis, and then evolved to a right pure motor hemiparesis.  Her presentation is consistent with left brain dysfunction, perhaps small-deep/subcortical versus pontine.  Etiology is most likely recurrent ischemic stroke, mechanism uncertain, but probably small vessel disease.      NEURO: Continue close monitoring for neurologic deterioration, permissive HTN to gradual normotension, LDL 72- continue home statin, A1C 6.8, MRI Brain w/o contrast noted above, repeat MRI/MRV performed- f/u official report. Keppra discontinued as highly doubt seizure, Physical therapy/OT/PMR recommended AR.     ANTITHROMBOTIC THERAPY: ticagrelor 180 mg loading dose, then 90 mg twice daily for 4 weeks; aspirin 81 mg daily per THALES trial     PULMONARY: CXR clear, protecting airway, saturating well     CARDIOVASCULAR: TTE EF 65-70% concentric left ventricular remodeling, no PFO., cardiac monitoring without reported events, plan for ILR to screen for occult arrythmia.                               SBP goal: permissive HTN to gradual normotension    GASTROINTESTINAL:  dysphagia screen passed     Diet: regular    RENAL: BUN/Cr within normal limits, good urine output      Na Goal: Greater than 135     Macario:    HEMATOLOGY: H/H without change, Platelets normal      DVT ppx: LMWH     ID: afebrile, no leukocytosis     OTHER: Plan endorsed to patient at bedside, all questions and concerns addressed.     DISPOSITION: AR once bed available    CORE MEASURES:        Admission NIHSS: 1     TPA: NO      LDL/HDL: 72/49     Depression Screen: p     Statin Therapy: yes     Dysphagia Screen: PASS     Smoking NO      Afib  NO     Stroke Education  YES     Obtain screening lower extremity venous ultrasound in patients who meet 1 or more of the following criteria as patient is high risk for DVT/PE on admission:   [] History of DVT/PE  []Hypercoagulable states (Factor V Leiden, Cancer, OCP, etc. )  []Prolonged immobility (hemiplegia/hemiparesis/post operative or any other extended immobilization)  [] Transferred from outside facility (Rehab or Long term care)  [] Age </= to 50 ASSESSMENT: 61-year-old right-handed female, first evaluated at [facility] on 8/17/2021 with recurrent right hemiparesis. MRI brain (8/15/2021-compared to 10/20) to my eye showed the interval appearance of a probably chronic, punctate left lateral thalamic lacunar infarct; severe periventricular, subcortical and brainstem (middle cerebellar peduncles) hyperintensities of presumed vascular origin.  CTA neck and head (8/15/2021) to my eye showed mild calcified plaque at the carotid bifurcations bilaterally, right more so than left.  CTP (8/15/2021) was negative.  TTE (8/16/2021) showed LVH.      Impression.  In 2016 she had a stroke with mild residual right hemiparesis.  At that time she had an ILR implanted but the duration may have been only for a couple of months.  In 10/20 she had a recurrent stroke with left hemiparesis.  Despite these strokes, she was highly functional with very mild residual deficits.  On 8/15/2021 she developed recurrent right hemiparesis and had difficulty with her gait.  By the time she arrived at [facility], she had improved, but she then worsened again.  Her presentation was reportedly initially a right ataxic hemiparesis, and then evolved to a right pure motor hemiparesis.  Her presentation is consistent with left brain dysfunction, perhaps small-deep/subcortical versus pontine.  Etiology is most likely recurrent ischemic stroke, mechanism uncertain, but probably small vessel disease.      NEURO: Neurologically improving in regards to motor function, continue close monitoring for neurologic deterioration, allow for slow titration to gradual normotension, avoid hypotension, LDL 72- continue home statin, A1C 6.8, MRI Brain w/o contrast noted above, repeat MRI/MRV performed- f/u official report. Keppra discontinued as highly doubt seizure, PT/OT/PMR recommended AR.     ANTITHROMBOTIC THERAPY: ticagrelor 180 mg loading dose, then 90 mg twice daily for 4 weeks; aspirin 81 mg daily per THALES trial     PULMONARY: CXR clear, protecting airway, saturating well     CARDIOVASCULAR: TTE EF 65-70% concentric left ventricular remodeling, no PFO., cardiac monitoring without reported events, ILR to screen for occult arrythmia placed today. Cardiology (Dr. Tubbs) consult appreciated, recommending low salt diet.                            SBP goal: slow titration to gradual normotension    GASTROINTESTINAL:  dysphagia screen passed and tolerating ordered diet     Diet: Consistent carb, no evening snack, low salt    RENAL: BUN/Cr within normal limits, good urine output      Na Goal: Greater than 135     Macario: N    HEMATOLOGY: H/H without change, Platelets normal      DVT ppx: LMWH     ID: afebrile, no leukocytosis     OTHER: Plan endorsed to patient at bedside, all questions and concerns addressed.     DISPOSITION: AR once bed available    CORE MEASURES:        Admission NIHSS: 1     TPA: NO      LDL/HDL: 72/49     Depression Screen: p     Statin Therapy: yes     Dysphagia Screen: PASS     Smoking NO      Afib  NO     Stroke Education  YES     Obtain screening lower extremity venous ultrasound in patients who meet 1 or more of the following criteria as patient is high risk for DVT/PE on admission:   [] History of DVT/PE  []Hypercoagulable states (Factor V Leiden, Cancer, OCP, etc. )  []Prolonged immobility (hemiplegia/hemiparesis/post operative or any other extended immobilization)  [] Transferred from outside facility (Rehab or Long term care)  [] Age </= to 50 ASSESSMENT: 61-year-old right-handed female, first evaluated at [facility] on 8/17/2021 with recurrent right hemiparesis. MRI brain (8/15/2021-compared to 10/20) to my eye showed the interval appearance of a probably chronic, punctate left lateral thalamic lacunar infarct; severe periventricular, subcortical and brainstem (middle cerebellar peduncles) hyperintensities of presumed vascular origin.  CTA neck and head (8/15/2021) to my eye showed mild calcified plaque at the carotid bifurcations bilaterally, right more so than left.  CTP (8/15/2021) was negative.  TTE (8/16/2021) showed LVH.      Impression.  In 2016 she had a stroke with mild residual right hemiparesis.  At that time she had an ILR implanted but the duration may have been only for a couple of months.  In 10/20 she had a recurrent stroke with left hemiparesis.  Despite these strokes, she was highly functional with very mild residual deficits.  On 8/15/2021 she developed recurrent right hemiparesis and had difficulty with her gait.  By the time she arrived at [facility], she had improved, but she then worsened again.  Her presentation was reportedly initially a right ataxic hemiparesis, and then evolved to a right pure motor hemiparesis due to left thalamic infarct likely due to small vessel disease.      NEURO: Neurologically improving in regards to motor function, continue monitoring for neurologic deterioration, allow for slow titration to gradual normotension, avoid hypotension, LDL 72- continue home statin, A1C 6.8, MRI Brain w/o contrast noted above, repeat MRI/MRV noted above. (MRV was performed to r/o VST due to concern for CT findings of bilateral thalamic infarcts which were then later not seen on MRI). Keppra discontinued as highly doubt seizure, PT/OT/PMR recommended AR.     ANTITHROMBOTIC THERAPY: ticagrelor 180 mg loading dose, then 90 mg twice daily for 4 weeks; aspirin 81 mg daily per THALES trial     PULMONARY: CXR clear, protecting airway, saturating well     CARDIOVASCULAR: TTE EF 65-70% concentric left ventricular remodeling, no PFO., cardiac monitoring without reported events, ILR to screen for occult arrythmia placed today. Cardiology (Dr. Tubbs) consult appreciated, recommending low salt diet.                            SBP goal: slow titration to gradual normotension    GASTROINTESTINAL:  dysphagia screen passed and tolerating ordered diet     Diet: Consistent carb, no evening snack, low salt    RENAL: BUN/Cr within normal limits, good urine output      Na Goal: Greater than 135     Macario: N    HEMATOLOGY: H/H without change, Platelets normal      DVT ppx: LMWH     ID: afebrile, no leukocytosis     OTHER: Plan endorsed to patient at bedside, all questions and concerns addressed.     DISPOSITION: AR once bed available    CORE MEASURES:        Admission NIHSS: 1     TPA: NO      LDL/HDL: 72/49     Depression Screen: p     Statin Therapy: yes     Dysphagia Screen: PASS     Smoking NO      Afib  NO     Stroke Education  YES     Obtain screening lower extremity venous ultrasound in patients who meet 1 or more of the following criteria as patient is high risk for DVT/PE on admission:   [] History of DVT/PE  []Hypercoagulable states (Factor V Leiden, Cancer, OCP, etc. )  []Prolonged immobility (hemiplegia/hemiparesis/post operative or any other extended immobilization)  [] Transferred from outside facility (Rehab or Long term care)  [] Age </= to 50 ASSESSMENT: 61-year-old right-handed female, first evaluated at [facility] on 8/17/2021 with recurrent right hemiparesis. MRI brain (8/15/2021-compared to 10/20) to my eye showed the interval appearance of a probably chronic, punctate left lateral thalamic lacunar infarct; severe periventricular, subcortical and brainstem (middle cerebellar peduncles) hyperintensities of presumed vascular origin.  CTA neck and head (8/15/2021) to my eye showed mild calcified plaque at the carotid bifurcations bilaterally, right more so than left.  CTP (8/15/2021) was negative.  TTE (8/16/2021) showed LVH.      Impression.  In 2016 she had a stroke with mild residual right hemiparesis.  At that time she had an ILR implanted but the duration may have been only for a couple of months.  In 10/20 she had a recurrent stroke with left hemiparesis.  Despite these strokes, she was highly functional with very mild residual deficits.  On 8/15/2021 she developed recurrent right hemiparesis and had difficulty with her gait.  By the time she arrived at [facility], she had improved, but she then worsened again.  Her presentation was reportedly initially a right ataxic hemiparesis, and then evolved to a right pure motor hemiparesis due to left thalamic infarct likely due to small vessel disease.      NEURO: Neurologically slightly  improving in regards to motor function, continue monitoring for neurologic deterioration, allow for slow titration to gradual normotension, avoid hypotension, LDL 72- continue home statin, A1C 6.8, MRI Brain w/o contrast noted above, repeat MRI/MRV noted above. (MRV was performed to r/o VST due to concern for CT findings of bilateral thalamic infarcts which were then later not seen on MRI). Keppra discontinued as highly doubt seizure, PT/OT/PMR recommended AR.     ANTITHROMBOTIC THERAPY: ticagrelor 180 mg loading dose, then 90 mg twice daily for 4 weeks; aspirin 81 mg daily per THALES trial     PULMONARY: CXR clear, protecting airway, saturating well     CARDIOVASCULAR: TTE EF 65-70% concentric left ventricular remodeling, no PFO., cardiac monitoring without reported events, ILR to screen for occult arrythmia placed today. Cardiology (Dr. Tubbs) consult appreciated, recommending low salt diet.                            SBP goal: slow titration to gradual normotension    GASTROINTESTINAL:  dysphagia screen passed and tolerating ordered diet     Diet: Consistent carb, no evening snack, low salt    RENAL: BUN/Cr within normal limits, good urine output      Na Goal: Greater than 135     Macario: N    HEMATOLOGY: H/H without change, Platelets normal      DVT ppx: LMWH     ID: afebrile, no leukocytosis     OTHER: Plan endorsed to patient at bedside, all questions and concerns addressed.     DISPOSITION: AR once bed available    CORE MEASURES:        Admission NIHSS: 1     TPA: NO      LDL/HDL: 72/49     Depression Screen: p     Statin Therapy: yes     Dysphagia Screen: PASS     Smoking NO      Afib  NO     Stroke Education  YES     Obtain screening lower extremity venous ultrasound in patients who meet 1 or more of the following criteria as patient is high risk for DVT/PE on admission:   [] History of DVT/PE  []Hypercoagulable states (Factor V Leiden, Cancer, OCP, etc. )  []Prolonged immobility (hemiplegia/hemiparesis/post operative or any other extended immobilization)  [] Transferred from outside facility (Rehab or Long term care)  [] Age </= to 50

## 2021-08-18 NOTE — DISCHARGE NOTE PROVIDER - HOSPITAL COURSE
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 Pt endorsed was leaning to one side, endorsed RLE feels weaker than normal which was present on initial NIHSS 5. Initial BPs elevated 190s systolic, post-CTH without hemorrhage or acute findings, BP was lowered with improvement in R sided acute on chronic weakness to allow for ataxia testing in limbs which was present, which was new findings. On [id number] not taken day of admission. Before leaving bedside, /105 with resolution of acute R sided findings. Endorsed mild L posterior headache, no N/V or infectious symptoms, endorses back at baseline.     Patient was re-evaluated after improvement for weakness, concern for focal seizure with patient endorsing no prior seizure history or prior episodes w RUE. Patient without LOC, alert and oriented throughout episode was given Keppra IV load.     On admission: [facility] 8/15 07:30, mRS 1   NIHSS 5-->1 (baseline). no tpa as no sustained neurological deficit with higher suspicion for HTN, seizure > stroke   no thrombectomy as no LVO    Patient was admitted to CDU for imaging. Transferred to stroke service as she was unable to be discharged because she could not walk.         CT Head No Cont (08.17.2021)  No acute hemorrhage or midline shift.  Decreased attenuation suspected in the left thalamus and also possibly the right. Bilateral thalamic changes can be seen with venous infarcts.    MR Head No Cont (08.15.2021)  Comparison MRI 10/3/2020, new foci restricted diffusion, DWI, T2 FLAIR hyperintensity left thalamus, bilateral brachium ponti with susceptibility, likely new ischemic change with microhemorrhage.  Redemonstration volume loss. Progressive T2 FLAIR white matter hyperintensity likely increased progressive microvascular disease as well as new foci of susceptibility likely microhemorrhage with other etiologies not excluded, no new large extra-axial collection or midline shift.  Tortuous vessels, likely from hypertension, maxillary ethmoid mucosal thickening, retention cyst polyp, mastoid opacification. Heterogeneity along the teeth with lucency on CT likely dental disease.    (08.15.2021)  HEAD CT: No acute intracranial hemorrhage or acute territorial infarction. Chronic infarctions and chronic microvascular ischemic changes as described.    CT PERFUSION demonstrated: No asymmetric or territorial perfusion abnormality.    CTA COW: No large vessel occlusion. High-grade stenosis involving the right posterior cerebral artery as described.    CTA NECK: Patent, ECAs, ICAs, no  hemodynamically significant stenosis at  ICA origins by NASCET criteria.  Bilateral vertebral arteries are patent without flow limiting stenosis.    Impression.  In 2016 she had a stroke with mild residual right hemiparesis.  At that time she had an ILR implanted but the duration may have been only for a couple of months.  In 10/20 she had a recurrent stroke with left hemiparesis.  Despite these strokes, she was highly functional with very mild residual deficits.  On 8/15/2021 she developed recurrent right hemiparesis and had difficulty with her gait.  By the time she arrived at [facility], she had improved, but she then worsened again.  Her presentation was reportedly initially a right ataxic hemiparesis, and then evolved to a right pure motor hemiparesis.  Her presentation is consistent with left brain dysfunction, perhaps small-deep/subcortical versus pontine.  Etiology is most likely recurrent ischemic stroke, mechanism uncertain, but probably small vessel disease.      ANTITHROMBOTIC THERAPY: ticagrelor 180 mg loading dose, then 90 mg twice daily for 4 weeks; aspirin 81 mg daily per THALES trial     TTE EF 65-70% concentric left ventricular remodeling, no PFO. ILR placed to rule out A. Fib as possible source.     Evaluated by PT/OT and was recommended AR. Patient stable for discharge. 60yo R handed Female mRS 1 PMHx DMT2, HTN/HLD, multiple CVAs w/ residual RUE/LE weakness w foot drop, LLE>UE numbness s/p loop recorder and plavix  now discontinued 2017,  presented with acute on chronic R-sided weakness [facility] 8/15 07:30 symptoms noticed around 10AM after patient was trying to use her arm and was not able to "make it do what I wanted" on further clarification of RUE>LE weakness. Pt endorsed was leaning to one side, endorsed RLE feels weaker than normal which was present on initial NIHSS 5. Initial BPs elevated 190s systolic, post-CTH without hemorrhage or acute findings, BP was lowered with improvement in R sided acute on chronic weakness to allow for ataxia testing in limbs which was present, which was new findings. On [id number] not taken day of admission. Before leaving bedside, /105 with resolution of acute R sided findings. Endorsed mild L posterior headache, no N/V or infectious symptoms, endorses back at baseline.     Patient was re-evaluated after improvement for weakness, concern for focal seizure with patient endorsing no prior seizure history or prior episodes w RUE. Patient without LOC, alert and oriented throughout episode was given Keppra IV load.     On admission: [facility] 8/15 07:30, mRS 1   NIHSS 5-->1 (baseline). no tpa as no sustained neurological deficit with higher suspicion for HTN, seizure > stroke   no thrombectomy as no LVO    Patient was admitted to CDU for imaging. Transferred to stroke service as she was unable to be discharged because she could not walk.     CT Head No Cont (08.17.2021)  No acute hemorrhage or midline shift.  Decreased attenuation suspected in the left thalamus and also possibly the right. Bilateral thalamic changes can be seen with venous infarcts.    MR Head No Cont (08.15.2021)  Comparison MRI 10/3/2020, new foci restricted diffusion, DWI, T2 FLAIR hyperintensity left thalamus, bilateral brachium ponti with susceptibility, likely new ischemic change with microhemorrhage.  Redemonstration volume loss. Progressive T2 FLAIR white matter hyperintensity likely increased progressive microvascular disease as well as new foci of susceptibility likely microhemorrhage with other etiologies not excluded, no new large extra-axial collection or midline shift.  Tortuous vessels, likely from hypertension, maxillary ethmoid mucosal thickening, retention cyst polyp, mastoid opacification. Heterogeneity along the teeth with lucency on CT likely dental disease.    (08.15.2021)  HEAD CT: No acute intracranial hemorrhage or acute territorial infarction. Chronic infarctions and chronic microvascular ischemic changes as described.    CT PERFUSION demonstrated: No asymmetric or territorial perfusion abnormality.    CTA COW: No large vessel occlusion. High-grade stenosis involving the right posterior cerebral artery as described.    CTA NECK: Patent, ECAs, ICAs, no  hemodynamically significant stenosis at  ICA origins by NASCET criteria.  Bilateral vertebral arteries are patent without flow limiting stenosis.    Impression.  In 2016 she had a stroke with mild residual right hemiparesis.  At that time she had an ILR implanted but the duration may have been only for a couple of months.  In 10/20 she had a recurrent stroke with left hemiparesis.  Despite these strokes, she was highly functional with very mild residual deficits.  On 8/15/2021 she developed recurrent right hemiparesis and had difficulty with her gait.  By the time she arrived at [facility], she had improved, but she then worsened again.  Her presentation was reportedly initially a right ataxic hemiparesis, and then evolved to a right pure motor hemiparesis.  Her presentation is consistent with left brain dysfunction, perhaps small-deep/subcortical versus pontine.  Etiology is most likely recurrent ischemic stroke, mechanism uncertain, but probably small vessel disease.      ANTITHROMBOTIC THERAPY: ticagrelor 180 mg loading dose, then 90 mg twice daily for 4 weeks; aspirin 81 mg daily per THALES trial     TTE EF 65-70% concentric left ventricular remodeling, no PFO. ILR placed to rule out A. Fib as possible source.     Evaluated by PT/OT and was recommended AR. Patient stable for discharge. 60yo R handed Female mRS 1 PMHx DMT2, HTN/HLD, multiple CVAs w/ residual RUE/LE weakness w foot drop, LLE>UE numbness s/p loop recorder and plavix  now discontinued 2017,  presented with acute on chronic R-sided weakness [facility] 8/15 07:30 symptoms noticed around 10AM after patient was trying to use her arm and was not able to "make it do what I wanted" on further clarification of RUE>LE weakness. Pt endorsed was leaning to one side, endorsed RLE feels weaker than normal which was present on initial NIHSS 5. Initial BPs elevated 190s systolic, post-CTH without hemorrhage or acute findings, BP was lowered with improvement in R sided acute on chronic weakness to allow for ataxia testing in limbs which was present, which was new findings. On [id number] not taken day of admission. Before leaving bedside, /105 with resolution of acute R sided findings. Endorsed mild L posterior headache, no N/V or infectious symptoms, endorses back at baseline.     Patient was re-evaluated after improvement for weakness, concern for focal seizure with patient endorsing no prior seizure history or prior episodes w RUE. Patient without LOC, alert and oriented throughout episode was given Keppra IV load.     On admission: [facility] 8/15 07:30, mRS 1   NIHSS 5-->1 (baseline). no tpa as no sustained neurological deficit with higher suspicion for HTN, seizure > stroke   no thrombectomy as no LVO    Patient was admitted to CDU for imaging. Transferred to stroke service as she was unable to be discharged because she could not walk.     CT Head No Cont (08.17.2021)  No acute hemorrhage or midline shift.  Decreased attenuation suspected in the left thalamus and also possibly the right. Bilateral thalamic changes can be seen with venous infarcts.    MR Head No Cont (08.15.2021)  Comparison MRI 10/3/2020, new foci restricted diffusion, DWI, T2 FLAIR hyperintensity left thalamus, bilateral brachium ponti with susceptibility, likely new ischemic change with microhemorrhage.  Redemonstration volume loss. Progressive T2 FLAIR white matter hyperintensity likely increased progressive microvascular disease as well as new foci of susceptibility likely microhemorrhage with other etiologies not excluded, no new large extra-axial collection or midline shift.  Tortuous vessels, likely from hypertension, maxillary ethmoid mucosal thickening, retention cyst polyp, mastoid opacification. Heterogeneity along the teeth with lucency on CT likely dental disease.    (08.15.2021)  HEAD CT: No acute intracranial hemorrhage or acute territorial infarction. Chronic infarctions and chronic microvascular ischemic changes as described.    CT PERFUSION demonstrated: No asymmetric or territorial perfusion abnormality.    CTA COW: No large vessel occlusion. High-grade stenosis involving the right posterior cerebral artery as described.    CTA NECK: Patent, ECAs, ICAs, no  hemodynamically significant stenosis at  ICA origins by NASCET criteria.  Bilateral vertebral arteries are patent without flow limiting stenosis.    Impression.  In 2016 she had a stroke with mild residual right hemiparesis.  At that time she had an ILR implanted but the duration may have been only for a couple of months (she was uncertain).  In 10/20 she had a recurrent stroke with left hemiparesis.  Despite these strokes, she was highly functional with very mild residual deficits.  On 8/15/2021 she developed recurrent right hemiparesis and had difficulty with her gait.  By the time she arrived at [facility], she had improved, but she then worsened again.  Her presentation was reportedly initially a right ataxic hemiparesis, and then evolved to a right pure motor hemiparesis.  Her presentation is due to a left thalamocapsular infarct, most likely due to small vessel disease.     ANTITHROMBOTIC THERAPY: ticagrelor 180 mg loading dose, then 90 mg twice daily for 4 weeks; aspirin 81 mg daily per THALES trial     TTE EF 65-70% concentric left ventricular remodeling, no PFO. ILR placed to rule out A. Fib as possible source.     Evaluated by PT/OT and was recommended AR. Patient stable for discharge. 60yo R handed Female mRS 1 PMHx DMT2, HTN/HLD, multiple CVAs w/ residual RUE/LE weakness w foot drop, LLE>UE numbness s/p loop recorder and plavix  now discontinued 2017,  presented with acute on chronic R-sided weakness [facility] 8/15 07:30 symptoms noticed around 10AM after patient was trying to use her arm and was not able to "make it do what I wanted" on further clarification of RUE>LE weakness. Pt endorsed was leaning to one side, endorsed RLE feels weaker than normal which was present on initial NIHSS 5. Initial BPs elevated 190s systolic, post-CTH without hemorrhage or acute findings, BP was lowered with improvement in R sided acute on chronic weakness to allow for ataxia testing in limbs which was present, which was new findings. On [id number] not taken day of admission. Before leaving bedside, /105 with resolution of acute R sided findings. Endorsed mild L posterior headache, no N/V or infectious symptoms, endorses back at baseline.     Patient was re-evaluated after improvement for weakness, concern for focal seizure with patient endorsing no prior seizure history or prior episodes w RUE. Patient without LOC, alert and oriented throughout episode was given Keppra IV load.      On admission: [facility] 8/15 07:30, mRS 1   NIHSS 5-->1 (baseline). no tpa as no sustained neurological deficit with higher suspicion for HTN, seizure > stroke   no thrombectomy as no LVO    Patient was admitted to CDU for imaging. Transferred to stroke service as she was unable to be discharged because she could not walk.     CT Head No Cont (08.17.2021)  No acute hemorrhage or midline shift.  Decreased attenuation suspected in the left thalamus and also possibly the right. Bilateral thalamic changes can be seen with venous infarcts.    MR Head No Cont (08.15.2021)  Comparison MRI 10/3/2020, new foci restricted diffusion, DWI, T2 FLAIR hyperintensity left thalamus, bilateral brachium ponti with susceptibility, likely new ischemic change with microhemorrhage.  Redemonstration volume loss. Progressive T2 FLAIR white matter hyperintensity likely increased progressive microvascular disease as well as new foci of susceptibility likely microhemorrhage with other etiologies not excluded, no new large extra-axial collection or midline shift.  Tortuous vessels, likely from hypertension, maxillary ethmoid mucosal thickening, retention cyst polyp, mastoid opacification. Heterogeneity along the teeth with lucency on CT likely dental disease.    (08.15.2021)  HEAD CT: No acute intracranial hemorrhage or acute territorial infarction. Chronic infarctions and chronic microvascular ischemic changes as described.    CT PERFUSION demonstrated: No asymmetric or territorial perfusion abnormality.    CTA COW: No large vessel occlusion. High-grade stenosis involving the right posterior cerebral artery as described.    CTA NECK: Patent, ECAs, ICAs, no  hemodynamically significant stenosis at  ICA origins by NASCET criteria.  Bilateral vertebral arteries are patent without flow limiting stenosis.    Impression.  In 2016 she had a stroke with mild residual right hemiparesis.  At that time she had an ILR implanted but the duration may have been only for a couple of months (she was uncertain).  In 10/20 she had a recurrent stroke with left hemiparesis.  Despite these strokes, she was highly functional with very mild residual deficits.  On 8/15/2021 she developed recurrent right hemiparesis and had difficulty with her gait.  By the time she arrived at [facility], she had improved, but she then worsened again.  Her presentation was reportedly initially a right ataxic hemiparesis, and then evolved to a right pure motor hemiparesis.  Her presentation is due to a left thalamocapsular infarct, most likely due to small vessel disease.     ANTITHROMBOTIC THERAPY: ticagrelor 180 mg loading dose, then 90 mg twice daily for 4 weeks; aspirin 81 mg daily per THALES trial     TTE EF 65-70% concentric left ventricular remodeling, no PFO. ILR placed to rule out A. Fib as possible source. Restarted on amlodipine at 2.5 mg to allow for slow titration of BP to normotension.    Keppra was discontinued after initial IV loading dose due to low suspicion for seizure with no previous seizure history and no subsequent episodes.    Passed dysphagia screening, tolerating consistent carb, low salt diet.    Evaluated by PT/OT and was recommended AR. Patient stable for discharge. 60yo R handed Female mRS 1 PMHx DMT2, HTN/HLD, multiple CVAs w/ residual RUE/LE weakness w foot drop, LLE>UE numbness s/p loop recorder and plavix  now discontinued 2017,  presented with acute on chronic R-sided weakness [facility] 8/15 07:30 symptoms noticed around 10AM after patient was trying to use her arm and was not able to "make it do what I wanted" on further clarification of RUE>LE weakness. Pt endorsed was leaning to one side, endorsed RLE feels weaker than normal which was present on initial NIHSS 5. Initial BPs elevated 190s systolic, post-CTH without hemorrhage or acute findings, BP was lowered with improvement in R sided acute on chronic weakness to allow for ataxia testing in limbs which was present, which was new findings. On [id number] not taken day of admission. Before leaving bedside, /105 with resolution of acute R sided findings. Endorsed mild L posterior headache, no N/V or infectious symptoms, endorses back at baseline.     Patient was re-evaluated after improvement for weakness, concern for focal seizure with patient endorsing no prior seizure history or prior episodes w RUE. Patient without LOC, alert and oriented throughout episode was given Keppra IV load.      On admission: [facility] 8/15 07:30, mRS 1   NIHSS 5-->1 (baseline). no tpa as no sustained neurological deficit with higher suspicion for HTN, seizure > stroke   no thrombectomy as no LVO    Patient was admitted to CDU for imaging. Transferred to stroke service as she was unable to be discharged because she could not walk.     CT Head No Cont (08.17.2021)  No acute hemorrhage or midline shift.  Decreased attenuation suspected in the left thalamus and also possibly the right. Bilateral thalamic changes can be seen with venous infarcts.    MR Head No Cont (08.15.2021)  Comparison MRI 10/3/2020, new foci restricted diffusion, DWI, T2 FLAIR hyperintensity left thalamus, bilateral brachium ponti with susceptibility, likely new ischemic change with microhemorrhage.  Redemonstration volume loss. Progressive T2 FLAIR white matter hyperintensity likely increased progressive microvascular disease as well as new foci of susceptibility likely microhemorrhage with other etiologies not excluded, no new large extra-axial collection or midline shift.  Tortuous vessels, likely from hypertension, maxillary ethmoid mucosal thickening, retention cyst polyp, mastoid opacification. Heterogeneity along the teeth with lucency on CT likely dental disease.    (08.15.2021)  HEAD CT: No acute intracranial hemorrhage or acute territorial infarction. Chronic infarctions and chronic microvascular ischemic changes as described.    CT PERFUSION demonstrated: No asymmetric or territorial perfusion abnormality.    CTA COW: No large vessel occlusion. High-grade stenosis involving the right posterior cerebral artery as described.    CTA NECK: Patent, ECAs, ICAs, no  hemodynamically significant stenosis at  ICA origins by NASCET criteria.  Bilateral vertebral arteries are patent without flow limiting stenosis.    Impression.  In 2016 she had a stroke with mild residual right hemiparesis.  At that time she had an ILR implanted but the duration may have been only for a couple of months (she was uncertain).  In 10/20 she had a recurrent stroke with left hemiparesis.  Despite these strokes, she was highly functional with very mild residual deficits.  On 8/15/2021 she developed recurrent right hemiparesis and had difficulty with her gait.  By the time she arrived at [facility], she had improved, but she then worsened again.  Her presentation was reportedly initially a right ataxic hemiparesis, and then evolved to a right pure motor hemiparesis.  Her presentation is due to a left thalamocapsular infarct, most likely due to small vessel disease.     ANTITHROMBOTIC THERAPY: ticagrelor 180 mg loading dose, then 90 mg twice daily for 4 weeks; aspirin 81 mg daily per THALES trial     TTE EF 65-70% concentric left ventricular remodeling, no PFO. ILR placed to rule out A. Fib as possible source. Restarted on amlodipine at 5 mg once daily to allow for slow titration of BP to normotension, can be uptitrated as needed.    Keppra was discontinued after initial IV loading dose due to low suspicion for seizure with no previous seizure history and no subsequent episodes.    Passed dysphagia screening, tolerating consistent carb, low salt diet.    Evaluated by PT/OT and was recommended AR. Patient stable for discharge. 62yo R handed Female mRS 1 PMHx DMT2, HTN/HLD, multiple CVAs w/ residual RUE/LE weakness w foot drop, LLE>UE numbness s/p loop recorder and plavix  now discontinued 2017,  presented with acute on chronic R-sided weakness [facility] 8/15 07:30 symptoms noticed around 10AM after patient was trying to use her arm and was not able to "make it do what I wanted" on further clarification of RUE>LE weakness. Pt endorsed was leaning to one side, endorsed RLE feels weaker than normal which was present on initial NIHSS 5. Initial BPs elevated 190s systolic, post-CTH without hemorrhage or acute findings, BP was lowered with improvement in R sided acute on chronic weakness to allow for ataxia testing in limbs which was present, which was new findings. On [id number] not taken day of admission. Before leaving bedside, /105 with resolution of acute R sided findings. Endorsed mild L posterior headache, no N/V or infectious symptoms, endorses back at baseline.     Patient was re-evaluated after improvement for weakness, concern for focal seizure with patient endorsing no prior seizure history or prior episodes w RUE. Patient without LOC, alert and oriented throughout episode was given Keppra IV load.      On admission: [facility] 8/15 07:30, mRS 1   NIHSS 5-->1 (baseline). no tpa as no sustained neurological deficit with higher suspicion for HTN, seizure > stroke   no thrombectomy as no LVO    Patient was admitted to CDU for imaging. Transferred to stroke service as she was unable to be discharged because she could not walk.     CT Head No Cont (08.17.2021)  No acute hemorrhage or midline shift.  Decreased attenuation suspected in the left thalamus and also possibly the right. Bilateral thalamic changes can be seen with venous infarcts.    MR Head No Cont (08.15.2021)  Comparison MRI 10/3/2020, new foci restricted diffusion, DWI, T2 FLAIR hyperintensity left thalamus, bilateral brachium ponti with susceptibility, likely new ischemic change with microhemorrhage.  Redemonstration volume loss. Progressive T2 FLAIR white matter hyperintensity likely increased progressive microvascular disease as well as new foci of susceptibility likely microhemorrhage with other etiologies not excluded, no new large extra-axial collection or midline shift.  Tortuous vessels, likely from hypertension, maxillary ethmoid mucosal thickening, retention cyst polyp, mastoid opacification. Heterogeneity along the teeth with lucency on CT likely dental disease.    (08.15.2021)  HEAD CT: No acute intracranial hemorrhage or acute territorial infarction. Chronic infarctions and chronic microvascular ischemic changes as described.    CT PERFUSION demonstrated: No asymmetric or territorial perfusion abnormality.    CTA COW: No large vessel occlusion. High-grade stenosis involving the right posterior cerebral artery as described.    CTA NECK: Patent, ECAs, ICAs, no  hemodynamically significant stenosis at  ICA origins by NASCET criteria.  Bilateral vertebral arteries are patent without flow limiting stenosis.    Impression.  In 2016 she had a stroke with mild residual right hemiparesis.  At that time she had an ILR implanted but the duration may have been only for a couple of months (she was uncertain).  In 10/20 she had a recurrent stroke with left hemiparesis.  Despite these strokes, she was highly functional with very mild residual deficits.  On 8/15/2021 she developed recurrent right hemiparesis and had difficulty with her gait.  By the time she arrived at [facility], she had improved, but she then worsened again.  Her presentation was reportedly initially a right ataxic hemiparesis, and then evolved to a right pure motor hemiparesis.  Her presentation is due to a left thalamocapsular infarct, most likely due to small vessel disease.     ANTITHROMBOTIC THERAPY: ticagrelor 180 mg loading dose, then 90 mg twice daily for 4 weeks; aspirin 81 mg daily per THALES trial     TTE EF 65-70% concentric left ventricular remodeling, no PFO. ILR placed to rule out A. Fib as possible source. Restarted on amlodipine at 5 mg once daily to allow for slow titration of BP to normotension, can be uptitrated as needed.     Keppra was discontinued after initial IV loading dose due to low suspicion for seizure with no previous seizure history and no subsequent episodes.    Passed dysphagia screening, tolerating consistent carb, low salt diet.    Evaluated by PT/OT and was recommended AR. Patient stable for discharge. 62yo R handed Female mRS 1 PMHx DMT2, HTN/HLD, multiple CVAs w/ residual RUE/LE weakness w foot drop, LLE>UE numbness s/p loop recorder and plavix  now discontinued 2017,  presented with acute on chronic R-sided weakness [facility] 8/15 07:30 symptoms noticed around 10AM after patient was trying to use her arm and was not able to "make it do what I wanted" on further clarification of RUE>LE weakness. Pt endorsed was leaning to one side, endorsed RLE feels weaker than normal which was present on initial NIHSS 5. Initial BPs elevated 190s systolic, post-CTH without hemorrhage or acute findings, BP was lowered with improvement in R sided acute on chronic weakness to allow for ataxia testing in limbs which was present, which was new findings. On [id number] not taken day of admission. Before leaving bedside, /105 with resolution of acute R sided findings. Endorsed mild L posterior headache, no N/V or infectious symptoms, endorses back at baseline.     Patient was re-evaluated after improvement for weakness, concern for focal seizure with patient endorsing no prior seizure history or prior episodes w RUE. Patient without LOC, alert and oriented throughout episode was given Keppra IV load.      On admission: [facility] 8/15 07:30, mRS 1   NIHSS 5-->1 (baseline). no tpa as no sustained neurological deficit with higher suspicion for HTN, seizure > stroke   no thrombectomy as no LVO    Patient was admitted to CDU for imaging. Transferred to stroke service as she was unable to be discharged because she could not walk.     CT Head No Cont (08.17.2021)  No acute hemorrhage or midline shift.  Decreased attenuation suspected in the left thalamus and also possibly the right. Bilateral thalamic changes can be seen with venous infarcts.    MR Head No Cont (08.15.2021)  Comparison MRI 10/3/2020, new foci restricted diffusion, DWI, T2 FLAIR hyperintensity left thalamus, bilateral brachium ponti with susceptibility, likely new ischemic change with microhemorrhage.  Redemonstration volume loss. Progressive T2 FLAIR white matter hyperintensity likely increased progressive microvascular disease as well as new foci of susceptibility likely microhemorrhage with other etiologies not excluded, no new large extra-axial collection or midline shift.  Tortuous vessels, likely from hypertension, maxillary ethmoid mucosal thickening, retention cyst polyp, mastoid opacification. Heterogeneity along the teeth with lucency on CT likely dental disease.    (08.15.2021)  HEAD CT: No acute intracranial hemorrhage or acute territorial infarction. Chronic infarctions and chronic microvascular ischemic changes as described.    CT PERFUSION demonstrated: No asymmetric or territorial perfusion abnormality.    CTA COW: No large vessel occlusion. High-grade stenosis involving the right posterior cerebral artery as described.    CTA NECK: Patent, ECAs, ICAs, no  hemodynamically significant stenosis at  ICA origins by NASCET criteria.  Bilateral vertebral arteries are patent without flow limiting stenosis.    Impression.  In 2016 she had a stroke with mild residual right hemiparesis.  At that time she had an ILR implanted but the duration may have been only for a couple of months (she was uncertain).  In 10/20 she had a recurrent stroke with left hemiparesis.  Despite these strokes, she was highly functional with very mild residual deficits.  On 8/15/2021 she developed recurrent right hemiparesis and had difficulty with her gait.  By the time she arrived at [facility], she had improved, but she then worsened again.  Her presentation was reportedly initially a right ataxic hemiparesis, and then evolved to a right pure motor hemiparesis.  Her presentation is due to a left thalamocapsular infarct, most likely due to small vessel disease.     ANTITHROMBOTIC THERAPY: ticagrelor 180 mg loading dose, then 90 mg twice daily for 4 weeks; aspirin 81 mg daily per THALES trial     TTE EF 65-70% concentric left ventricular remodeling, no PFO. ILR placed to rule out A. Fib as possible source. Restarted on amlodipine at 5 mg once daily to allow for slow titration of BP to normotension, can be uptitrated as needed.  Continuing home atorvastatin for secondary stroke prevention titrating to LDL <70.    Keppra was discontinued after initial IV loading dose due to low suspicion for seizure with no previous seizure history and no subsequent episodes.    Passed dysphagia screening, tolerating consistent carb, low salt diet.    Evaluated by PT/OT and was recommended AR. Patient stable for discharge.

## 2021-08-18 NOTE — CONSULT NOTE ADULT - SUBJECTIVE AND OBJECTIVE BOX
EP Attending    HISTORY OF PRESENT ILLNESS: HPI:  62yo R handed Female mRS 1 PMHx DMT2, HTN/HLD, multiple CVAs w/ residual RUE/LE weakness w foot drop, LLE>UE numbness s/p loop recorder and plavix  now discontinued 2017,  presenting with acute on chronic R-sided weakness [facility] 8/15 07:30 symptoms noticed around 10AM after patient was trying to use her arm and was not able to "make it do what I wanted" on further clarification of RUE>LE weakness. Pt endorses was leaning to one side, endorses RLE feels weaker than normal which was present on initial NIHSS 5. Initial BPs elevated 190s systolic, post-CTH without hemorrhage or acute findings, BP was lowered with improvement in R sided acute on chronic weakness to allow for ataxia testing in limbs which was present, which was new findings. 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 On [id number] not taken today. Before leaving bedside, /105 with resolution of acute R sided findings. Endorses mild L posterior headache, no N/V or infectious symptoms, endorses back at baseline.     Patient was re-evaluated after improvement for weakness, concern for focal seizure with patient endorsing no prior seizure history or prior episodes w RUE. Patient without LOC, alert and oriented throughout episode was given Keppra IV load.     [facility] 8/15 07:30   mRS 1   NIHSS 5-->1 (baseline)  no tpa as no sustained neurological deficit with higher suspicion for HTN, seizure > stroke   no thrombectomy as no LVO    Patient was admitted to CDU for imaging. MRI head showed tiny L thalamic infarct. Vessel imaging notable for severe R PCA stenosis. Patient was seen in CDU with initial plan to DC home, however R HP progressed throughout the day and patient did not feel comfortable to go home. Admitting to stroke for PT/OT and possible rehab. (16 Aug 2021 18:27)    8/18- patient seen today, recovering from worsening stroke w/ RUE weakness.  No angina, no history of palpitations, racing heartbeats, lightheadedness or fainting. Had an ILR a number of years ago which she only had in for a few months.  No prior arrhythmia diagnosis.  A 10 pt ROS is otherwise negative.    PAST MEDICAL & SURGICAL HISTORY:  Hypertension    Diabetes Mellitus Type II    Generalized Headaches    IBS (Irritable Bowel Syndrome)    Hypokalemia    CVA (cerebral vascular accident)    TIA (transient ischemic attack)    No Past Surgical History      MEDICATIONS  (STANDING):  aspirin  chewable 81 milliGRAM(s) Oral daily  atorvastatin 80 milliGRAM(s) Oral at bedtime  dextrose 40% Gel 15 Gram(s) Oral once  dextrose 5%. 1000 milliLiter(s) (50 mL/Hr) IV Continuous <Continuous>  dextrose 5%. 1000 milliLiter(s) (100 mL/Hr) IV Continuous <Continuous>  dextrose 50% Injectable 25 Gram(s) IV Push once  dextrose 50% Injectable 12.5 Gram(s) IV Push once  dextrose 50% Injectable 25 Gram(s) IV Push once  enoxaparin Injectable 40 milliGRAM(s) SubCutaneous daily  glucagon  Injectable 1 milliGRAM(s) IntraMuscular once  insulin glargine Injectable (LANTUS) 15 Unit(s) SubCutaneous at bedtime  insulin lispro (ADMELOG) corrective regimen sliding scale   SubCutaneous three times a day before meals  ticagrelor 90 milliGRAM(s) Oral two times a day    Allergies    sulfa drugs (Angioedema; Swelling)  sulfa drugs (Rash)  Sulfac 10% (Unknown)  walnut (Urticaria)    Intolerances    lactose (Diarrhea)    FAMILY HISTORY:  Family history of acute myocardial infarction (Father)    Family history of prostate cancer (Father)      Non-contributary for premature coronary disease or sudden cardiac death    SOCIAL HISTORY:    [x ] Non-smoker  [ ] Smoker  [ ] Alcohol      PHYSICAL EXAM:  T(C): 36.8 (08-18-21 @ 08:19), Max: 36.9 (08-18-21 @ 05:00)  HR: 91 (08-18-21 @ 08:19) (79 - 94)  BP: 166/104 (08-18-21 @ 08:19) (147/91 - 173/99)  RR: 18 (08-18-21 @ 08:19) (18 - 19)  SpO2: 98% (08-18-21 @ 08:19) (95% - 98%)  Wt(kg): --    General: Well nourished, no acute distress, alert and oriented x 3  Head: normocephalic, no trauma  Neck: no JVD, no bruit, supple, not enlarged  CV: S1S2, no S3, regular rate, rhythm is SINUS, no murmurs.    Lungs: clear BL, no rales or wheezes  Abdomen: bowel sounds +, soft, nontender, nondistended  Extremities: no clubbing, cyanosis or edema  Neuro: Moves all 4 extremities, sensation intact x 4 extremities  Skin: warm and moist, normal turgor  Psych: Mood and affect are appropriate for circumstances  MSK: normal range of motion and strength in all extremities except right arm.    TELEMETRY: NSR	    ECG: NSR, normal intervals.  Echo: Normal LVEF.  LVH. No PFO.	  	  LABS:	 	                          11.6   6.28  )-----------( 228      ( 18 Aug 2021 06:55 )             36.3     08-18    146<H>  |  109<H>  |  18  ----------------------------<  124<H>  3.5   |  22  |  1.01    Ca    9.9      18 Aug 2021 07:02       ASSESSMENT/PLAN: 	61y Female with embolic stroke of unknown source. Hypertension, DM, prior TIA/CVA, prior ILR with no diagnosis of AF made yet.    ILR requested for long term arrhythmia surveillance after embolic appearing (repeat) stroke.  Will proceed today.  Followup will be w/ Dr Minor.      Rodrigue Beavers M.D.  Cardiac Electrophysiology    office [phone]  pager [phone]

## 2021-08-18 NOTE — PROGRESS NOTE ADULT - SUBJECTIVE AND OBJECTIVE BOX
THE PATIENT WAS SEEN AND EXAMINED BY ME WITH THE HOUSESTAFF AND STROKE TEAM DURING MORNING ROUNDS.   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 Pt endorsed was leaning to one side, endorsed RLE feels weaker than normal which was present on initial NIHSS 5. Initial BPs elevated 190s systolic, post-CTH without hemorrhage or acute findings, BP was lowered with improvement in R sided acute on chronic weakness to allow for ataxia testing in limbs which was present, which was new findings. On [id number] not taken day of admission. Before leaving bedside, /105 with resolution of acute R sided findings. Endorsed mild L posterior headache, no N/V or infectious symptoms, endorses back at baseline.     Patient was re-evaluated after improvement for weakness, concern for focal seizure with patient endorsing no prior seizure history or prior episodes w RUE. Patient without LOC, alert and oriented throughout episode was given Keppra IV load.     On admission: [facility] 8/15 07:30, mRS 1   NIHSS 5-->1 (baseline). no tpa as no sustained neurological deficit with higher suspicion for HTN, seizure > stroke   no thrombectomy as no LVO    Patient was admitted to CDU for imaging. Transferred to stroke service as she was unable to be discharged because she could not walk.       SUBJECTIVE: No events overnight.  No new neurologic complaints.  ROS reported negative unless otherwise noted.    acetaminophen   Tablet .. 975 milliGRAM(s) Oral every 6 hours PRN  aspirin  chewable 81 milliGRAM(s) Oral daily  atorvastatin 80 milliGRAM(s) Oral at bedtime  dextrose 40% Gel 15 Gram(s) Oral once  dextrose 5%. 1000 milliLiter(s) IV Continuous <Continuous>  dextrose 5%. 1000 milliLiter(s) IV Continuous <Continuous>  dextrose 50% Injectable 25 Gram(s) IV Push once  dextrose 50% Injectable 25 Gram(s) IV Push once  dextrose 50% Injectable 12.5 Gram(s) IV Push once  enoxaparin Injectable 40 milliGRAM(s) SubCutaneous daily  glucagon  Injectable 1 milliGRAM(s) IntraMuscular once  insulin glargine Injectable (LANTUS) 15 Unit(s) SubCutaneous at bedtime  insulin lispro (ADMELOG) corrective regimen sliding scale   SubCutaneous three times a day before meals  sodium chloride 0.9%. 1000 milliLiter(s) IV Continuous <Continuous>  ticagrelor 90 milliGRAM(s) Oral two times a day    PHYSICAL EXAM:   Vital Signs Last 24 Hrs  T(C): 36.9 (18 Aug 2021 05:00), Max: 36.9 (18 Aug 2021 05:00)  T(F): 98.5 (18 Aug 2021 05:00), Max: 98.5 (18 Aug 2021 05:00)  HR: 87 (18 Aug 2021 05:00) (79 - 94)  BP: 147/91 (18 Aug 2021 05:00) (147/91 - 173/99)  RR: 18 (18 Aug 2021 05:00) (18 - 19)  SpO2: 97% (18 Aug 2021 05:00) (95% - 100%)    General: No acute distress  HEENT: EOM intact, visual fields full  Abdomen: Soft, nontender, nondistended   Extremities: No edema    NEUROLOGICAL EXAM:  Mental status: Awake, alert, oriented x3, speech fluent, follows commands  Cranial Nerves: mild-moderate right central facial palsy, no nystagmus, mild dysarthria,  tongue midline  Motor exam: right hemiparesis: Arm-no movement; iliopsoas 4/5; anterior tibialis 0/5  Sensation: Intact to light touch   Coordination/ Gait: coordination unable to be tested due to weakness     IMAGING: Reviewed by me.     CT Head No Cont (08.17.2021)  No acute hemorrhage or midline shift.  Decreased attenuation suspected in the left thalamus and also possibly the right. Bilateral thalamic changes can be seen with venous infarcts.    MR Head No Cont (08.15.2021)  Comparison MRI 10/3/2020, new foci restricted diffusion, DWI, T2 FLAIR hyperintensity left thalamus, bilateral brachium ponti with susceptibility, likely new ischemic change with microhemorrhage.  Redemonstration volume loss. Progressive T2 FLAIR white matter hyperintensity likely increased progressive microvascular disease as well as new foci of susceptibility likely microhemorrhage with other etiologies not excluded, no new large extra-axial collection or midline shift.  Tortuous vessels, likely from hypertension, maxillary ethmoid mucosal thickening, retention cyst polyp, mastoid opacification. Heterogeneity along the teeth with lucency on CT likely dental disease.    (08.15.2021)  HEAD CT: No acute intracranial hemorrhage or acute territorial infarction. Chronic infarctions and chronic microvascular ischemic changes as described.    CT PERFUSION demonstrated: No asymmetric or territorial perfusion abnormality.    CTA COW: No large vessel occlusion. High-grade stenosis involving the right posterior cerebral artery as described.    CTA NECK: Patent, ECAs, ICAs, no  hemodynamically significant stenosis at  ICA origins by NASCET criteria.  Bilateral vertebral arteries are patent without flow limiting stenosis. THE PATIENT WAS SEEN AND EXAMINED BY ME WITH THE HOUSESTAFF AND STROKE TEAM DURING MORNING ROUNDS.   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 Pt endorsed was leaning to one side, endorsed RLE feels weaker than normal which was present on initial NIHSS 5. Initial BPs elevated 190s systolic, post-CTH without hemorrhage or acute findings, BP was lowered with improvement in R sided acute on chronic weakness to allow for ataxia testing in limbs which was present, which was new findings. On [id number] not taken day of admission. Before leaving bedside, /105 with resolution of acute R sided findings. Endorsed mild L posterior headache, no N/V or infectious symptoms, endorses back at baseline.     Patient was re-evaluated after improvement for weakness, concern for focal seizure with patient endorsing no prior seizure history or prior episodes w RUE. Patient without LOC, alert and oriented throughout episode was given Keppra IV load.     On admission: [facility] 8/15 07:30, mRS 1   NIHSS 5-->1 (baseline). no tpa as no sustained neurological deficit with higher suspicion for HTN, seizure > stroke   no thrombectomy as no LVO    Patient was admitted to CDU for imaging. Transferred to stroke service as she was unable to be discharged because she could not walk.       SUBJECTIVE: No events overnight.  No new neurologic complaints.  ROS reported negative unless otherwise noted.    acetaminophen   Tablet .. 975 milliGRAM(s) Oral every 6 hours PRN  aspirin  chewable 81 milliGRAM(s) Oral daily  atorvastatin 80 milliGRAM(s) Oral at bedtime  dextrose 40% Gel 15 Gram(s) Oral once  dextrose 5%. 1000 milliLiter(s) IV Continuous <Continuous>  dextrose 5%. 1000 milliLiter(s) IV Continuous <Continuous>  dextrose 50% Injectable 25 Gram(s) IV Push once  dextrose 50% Injectable 25 Gram(s) IV Push once  dextrose 50% Injectable 12.5 Gram(s) IV Push once  enoxaparin Injectable 40 milliGRAM(s) SubCutaneous daily  glucagon  Injectable 1 milliGRAM(s) IntraMuscular once  insulin glargine Injectable (LANTUS) 15 Unit(s) SubCutaneous at bedtime  insulin lispro (ADMELOG) corrective regimen sliding scale   SubCutaneous three times a day before meals  sodium chloride 0.9%. 1000 milliLiter(s) IV Continuous <Continuous>  ticagrelor 90 milliGRAM(s) Oral two times a day    PHYSICAL EXAM:   Vital Signs Last 24 Hrs  T(C): 36.9 (18 Aug 2021 05:00), Max: 36.9 (18 Aug 2021 05:00)  T(F): 98.5 (18 Aug 2021 05:00), Max: 98.5 (18 Aug 2021 05:00)  HR: 87 (18 Aug 2021 05:00) (79 - 94)  BP: 147/91 (18 Aug 2021 05:00) (147/91 - 173/99)  RR: 18 (18 Aug 2021 05:00) (18 - 19)  SpO2: 97% (18 Aug 2021 05:00) (95% - 100%)    General: No acute distress  HEENT: EOM intact, visual fields full  Abdomen: Soft, nontender, nondistended   Extremities: No edema    NEUROLOGICAL EXAM:  Mental status: Awake, alert, oriented x3, speech fluent, follows commands  Cranial Nerves: Mild right facial palsy, no nystagmus, mild dysarthria, tongue midline  Motor exam: RUE normal tone, trace elbow flexion with gravity removed. LUE no drift. RLE drift, iliopsoas 4+/5, no movement distally. LLE no drift iliopsoas 5/5, distally 5/5.    Sensation: Intact to light touch   Coordination/ Gait: LUE no ataxia. Gait deferred    IMAGING: Reviewed by me.     CT Head No Cont (08.17.2021)  No acute hemorrhage or midline shift.  Decreased attenuation suspected in the left thalamus and also possibly the right. Bilateral thalamic changes can be seen with venous infarcts.    MR Head No Cont (08.15.2021)  Comparison MRI 10/3/2020, new foci restricted diffusion, DWI, T2 FLAIR hyperintensity left thalamus, bilateral brachium ponti with susceptibility, likely new ischemic change with microhemorrhage.  Redemonstration volume loss. Progressive T2 FLAIR white matter hyperintensity likely increased progressive microvascular disease as well as new foci of susceptibility likely microhemorrhage with other etiologies not excluded, no new large extra-axial collection or midline shift.  Tortuous vessels, likely from hypertension, maxillary ethmoid mucosal thickening, retention cyst polyp, mastoid opacification. Heterogeneity along the teeth with lucency on CT likely dental disease.    (08.15.2021)  HEAD CT: No acute intracranial hemorrhage or acute territorial infarction. Chronic infarctions and chronic microvascular ischemic changes as described.    CT PERFUSION demonstrated: No asymmetric or territorial perfusion abnormality.    CTA COW: No large vessel occlusion. High-grade stenosis involving the right posterior cerebral artery as described.    CTA NECK: Patent, ECAs, ICAs, no  hemodynamically significant stenosis at  ICA origins by NASCET criteria.  Bilateral vertebral arteries are patent without flow limiting stenosis. THE PATIENT WAS SEEN AND EXAMINED BY ME WITH THE HOUSESTAFF AND STROKE TEAM DURING MORNING ROUNDS.   HPI:  62yo R handed Female mRS 1 PMHx DMT2, HTN/HLD, multiple CVAs w/ residual RUE/LE weakness w foot drop, LLE>UE numbness s/p loop recorder and plavix  now discontinued 2017,  presented with acute on chronic R-sided weakness LK 8/15 07:30 symptoms noticed around 10AM after patient was trying to use her arm and was not able to "make it do what I wanted" on further clarification of RUE>LE weakness. Pt endorsed was leaning to one side, endorsed RLE feels weaker than normal which was present on initial NIHSS 5. Initial BPs elevated 190s systolic, post-CTH without hemorrhage or acute findings, BP was lowered with improvement in R sided acute on chronic weakness to allow for ataxia testing in limbs which was present, which was new findings. On [id number] not taken day of admission. Before leaving bedside, /105 with resolution of acute R sided findings. Endorsed mild L posterior headache, no N/V or infectious symptoms, endorses back at baseline.     Patient was re-evaluated after improvement for weakness, concern for focal seizure with patient endorsing no prior seizure history or prior episodes w RUE. Patient without LOC, alert and oriented throughout episode was given Keppra IV load.     On admission: [facility] 8/15 07:30, mRS 1   NIHSS 5-->1 (baseline). no tpa as no sustained neurological deficit with higher suspicion for HTN, seizure > stroke   no thrombectomy as no LVO    Patient was admitted to CDU for imaging. Transferred to stroke service as she was unable to be discharged because she could not walk.       SUBJECTIVE: No events overnight.  No new neurologic complaints.  ROS reported negative unless otherwise noted.    acetaminophen   Tablet .. 975 milliGRAM(s) Oral every 6 hours PRN  aspirin  chewable 81 milliGRAM(s) Oral daily  atorvastatin 80 milliGRAM(s) Oral at bedtime  dextrose 40% Gel 15 Gram(s) Oral once  dextrose 5%. 1000 milliLiter(s) IV Continuous <Continuous>  dextrose 5%. 1000 milliLiter(s) IV Continuous <Continuous>  dextrose 50% Injectable 25 Gram(s) IV Push once  dextrose 50% Injectable 25 Gram(s) IV Push once  dextrose 50% Injectable 12.5 Gram(s) IV Push once  enoxaparin Injectable 40 milliGRAM(s) SubCutaneous daily  glucagon  Injectable 1 milliGRAM(s) IntraMuscular once  insulin glargine Injectable (LANTUS) 15 Unit(s) SubCutaneous at bedtime  insulin lispro (ADMELOG) corrective regimen sliding scale   SubCutaneous three times a day before meals  sodium chloride 0.9%. 1000 milliLiter(s) IV Continuous <Continuous>  ticagrelor 90 milliGRAM(s) Oral two times a day    PHYSICAL EXAM:   Vital Signs Last 24 Hrs  T(C): 36.9 (18 Aug 2021 05:00), Max: 36.9 (18 Aug 2021 05:00)  T(F): 98.5 (18 Aug 2021 05:00), Max: 98.5 (18 Aug 2021 05:00)  HR: 87 (18 Aug 2021 05:00) (79 - 94)  BP: 147/91 (18 Aug 2021 05:00) (147/91 - 173/99)  RR: 18 (18 Aug 2021 05:00) (18 - 19)  SpO2: 97% (18 Aug 2021 05:00) (95% - 100%)    General: No acute distress  HEENT: EOM intact, visual fields full  Abdomen: Soft, nontender, nondistended   Extremities: No edema    NEUROLOGICAL EXAM:  Mental status: Awake, alert, oriented x3, speech fluent, follows commands  Cranial Nerves: Mild right facial palsy, no nystagmus, mild dysarthria, tongue midline  Motor exam: RUE normal tone, trace elbow flexion with gravity removed. LUE no drift. RLE drift, iliopsoas 4+/5, no movement distally. LLE no drift iliopsoas 5/5, distally 5/5.    Sensation: Intact to light touch   Coordination/ Gait: LUE no ataxia. Gait deferred    IMAGING: Reviewed by me.     MR Head w/wo IV Cont (08.19.2021)  Acute infarct within the left internal capsule/thalamus without evidence of hemorrhagic conversion.  Otherwise stable brain MR.  Unremarkable MR venogram.    CT Head No Cont (08.17.2021)  No acute hemorrhage or midline shift.  Decreased attenuation suspected in the left thalamus and also possibly the right. Bilateral thalamic changes can be seen with venous infarcts.    MR Head No Cont (08.15.2021)  Comparison MRI 10/3/2020, new foci restricted diffusion, DWI, T2 FLAIR hyperintensity left thalamus, bilateral brachium ponti with susceptibility, likely new ischemic change with microhemorrhage.  Redemonstration volume loss. Progressive T2 FLAIR white matter hyperintensity likely increased progressive microvascular disease as well as new foci of susceptibility likely microhemorrhage with other etiologies not excluded, no new large extra-axial collection or midline shift.  Tortuous vessels, likely from hypertension, maxillary ethmoid mucosal thickening, retention cyst polyp, mastoid opacification. Heterogeneity along the teeth with lucency on CT likely dental disease.    (08.15.2021)  HEAD CT: No acute intracranial hemorrhage or acute territorial infarction. Chronic infarctions and chronic microvascular ischemic changes as described.    CT PERFUSION demonstrated: No asymmetric or territorial perfusion abnormality.    CTA COW: No large vessel occlusion. High-grade stenosis involving the right posterior cerebral artery as described.    CTA NECK: Patent, ECAs, ICAs, no  hemodynamically significant stenosis at  ICA origins by NASCET criteria.  Bilateral vertebral arteries are patent without flow limiting stenosis. THE PATIENT WAS SEEN AND EXAMINED BY ME WITH THE HOUSESTAFF AND STROKE TEAM DURING MORNING ROUNDS.   HPI:  62yo R handed Female mRS 1 PMHx DMT2, HTN/HLD, multiple CVAs w/ residual RUE/LE weakness w foot drop, LLE>UE numbness s/p loop recorder and plavix  now discontinued 2017,  presented with acute on chronic R-sided weakness LK 8/15 07:30 symptoms noticed around 10AM after patient was trying to use her arm and was not able to "make it do what I wanted" on further clarification of RUE>LE weakness. Pt endorsed was leaning to one side, endorsed RLE feels weaker than normal which was present on initial NIHSS 5. Initial BPs elevated 190s systolic, post-CTH without hemorrhage or acute findings, BP was lowered with improvement in R sided acute on chronic weakness to allow for ataxia testing in limbs which was present, which was new findings. On [id number] not taken day of admission. Before leaving bedside, /105 with resolution of acute R sided findings. Endorsed mild L posterior headache, no N/V or infectious symptoms, endorses back at baseline.     Patient was re-evaluated after improvement for weakness, concern for focal seizure with patient endorsing no prior seizure history or prior episodes w RUE. Patient without LOC, alert and oriented throughout episode was given Keppra IV load.     On admission: [facility] 8/15 07:30, mRS 1   NIHSS 5-->1 (baseline). no tpa as no sustained neurological deficit with higher suspicion for HTN, seizure > stroke   no thrombectomy as no LVO    Patient was admitted to CDU for imaging. Transferred to stroke service as she was unable to be discharged because she could not walk.       SUBJECTIVE: No events overnight.  No new neurologic complaints.  ROS reported negative unless otherwise noted.    acetaminophen   Tablet .. 975 milliGRAM(s) Oral every 6 hours PRN  aspirin  chewable 81 milliGRAM(s) Oral daily  atorvastatin 80 milliGRAM(s) Oral at bedtime  dextrose 40% Gel 15 Gram(s) Oral once  dextrose 5%. 1000 milliLiter(s) IV Continuous <Continuous>  dextrose 5%. 1000 milliLiter(s) IV Continuous <Continuous>  dextrose 50% Injectable 25 Gram(s) IV Push once  dextrose 50% Injectable 25 Gram(s) IV Push once  dextrose 50% Injectable 12.5 Gram(s) IV Push once  enoxaparin Injectable 40 milliGRAM(s) SubCutaneous daily  glucagon  Injectable 1 milliGRAM(s) IntraMuscular once  insulin glargine Injectable (LANTUS) 15 Unit(s) SubCutaneous at bedtime  insulin lispro (ADMELOG) corrective regimen sliding scale   SubCutaneous three times a day before meals  sodium chloride 0.9%. 1000 milliLiter(s) IV Continuous <Continuous>  ticagrelor 90 milliGRAM(s) Oral two times a day    PHYSICAL EXAM:   Vital Signs Last 24 Hrs  T(C): 36.9 (18 Aug 2021 05:00), Max: 36.9 (18 Aug 2021 05:00)  T(F): 98.5 (18 Aug 2021 05:00), Max: 98.5 (18 Aug 2021 05:00)  HR: 87 (18 Aug 2021 05:00) (79 - 94)  BP: 147/91 (18 Aug 2021 05:00) (147/91 - 173/99)  RR: 18 (18 Aug 2021 05:00) (18 - 19)  SpO2: 97% (18 Aug 2021 05:00) (95% - 100%)    General: No acute distress  HEENT: EOM intact, visual fields full  Abdomen: Soft, nontender, nondistended   Extremities: No edema    NEUROLOGICAL EXAM:  Mental status: Awake, alert, oriented x3, speech fluent, follows commands  Cranial Nerves: Mild right facial palsy, no nystagmus, mild dysarthria, tongue midline  Motor exam: RUE normal tone, trace elbow flexion with gravity removed, otherwise no movement. LUE no drift. RLE drift, iliopsoas 4+/5, no movement distally. LLE no drift iliopsoas 5/5, distally 5/5.    Sensation: Intact to light touch   Coordination/ Gait: LUE no ataxia. Gait deferred    IMAGING: Reviewed by me.     MR Head w/wo IV Cont (08.19.2021)  Acute infarct within the left internal capsule/thalamus without evidence of hemorrhagic conversion.  Otherwise stable brain MR.  Unremarkable MR venogram.    CT Head No Cont (08.17.2021)  No acute hemorrhage or midline shift.  Decreased attenuation suspected in the left thalamus and also possibly the right. Bilateral thalamic changes can be seen with venous infarcts.    MR Head No Cont (08.15.2021)  Comparison MRI 10/3/2020, new foci restricted diffusion, DWI, T2 FLAIR hyperintensity left thalamus, bilateral brachium ponti with susceptibility, likely new ischemic change with microhemorrhage.  Redemonstration volume loss. Progressive T2 FLAIR white matter hyperintensity likely increased progressive microvascular disease as well as new foci of susceptibility likely microhemorrhage with other etiologies not excluded, no new large extra-axial collection or midline shift.  Tortuous vessels, likely from hypertension, maxillary ethmoid mucosal thickening, retention cyst polyp, mastoid opacification. Heterogeneity along the teeth with lucency on CT likely dental disease.    (08.15.2021)  HEAD CT: No acute intracranial hemorrhage or acute territorial infarction. Chronic infarctions and chronic microvascular ischemic changes as described.    CT PERFUSION demonstrated: No asymmetric or territorial perfusion abnormality.    CTA COW: No large vessel occlusion. High-grade stenosis involving the right posterior cerebral artery as described.    CTA NECK: Patent, ECAs, ICAs, no  hemodynamically significant stenosis at  ICA origins by NASCET criteria.  Bilateral vertebral arteries are patent without flow limiting stenosis.

## 2021-08-18 NOTE — DISCHARGE NOTE PROVIDER - PROVIDER TOKENS
PROVIDER:[TOKEN:[[id number]],FOLLOWUP:[1 month]],PROVIDER:[TOKEN:[[id number]],FOLLOWUP:[1 month]]

## 2021-08-19 ENCOUNTER — INPATIENT (INPATIENT)
Facility: HOSPITAL | Age: 61
LOS: 28 days | Discharge: HOME HEALTH | DRG: 949 | End: 2021-09-17
Attending: SPECIALIST | Admitting: SPECIALIST
Payer: COMMERCIAL

## 2021-08-19 ENCOUNTER — TRANSCRIPTION ENCOUNTER (OUTPATIENT)
Age: 61
End: 2021-08-19

## 2021-08-19 VITALS
RESPIRATION RATE: 16 BRPM | HEART RATE: 93 BPM | DIASTOLIC BLOOD PRESSURE: 108 MMHG | TEMPERATURE: 98 F | OXYGEN SATURATION: 99 % | WEIGHT: 216.49 LBS | SYSTOLIC BLOOD PRESSURE: 162 MMHG | HEIGHT: 60.8 IN

## 2021-08-19 VITALS
RESPIRATION RATE: 18 BRPM | OXYGEN SATURATION: 99 % | TEMPERATURE: 98 F | DIASTOLIC BLOOD PRESSURE: 93 MMHG | HEART RATE: 91 BPM | SYSTOLIC BLOOD PRESSURE: 146 MMHG

## 2021-08-19 DIAGNOSIS — I63.9 CEREBRAL INFARCTION, UNSPECIFIED: ICD-10-CM

## 2021-08-19 DIAGNOSIS — L29.9 PRURITUS, UNSPECIFIED: ICD-10-CM

## 2021-08-19 DIAGNOSIS — R51.9 HEADACHE, UNSPECIFIED: ICD-10-CM

## 2021-08-19 DIAGNOSIS — I69.392 FACIAL WEAKNESS FOLLOWING CEREBRAL INFARCTION: ICD-10-CM

## 2021-08-19 DIAGNOSIS — E11.22 TYPE 2 DIABETES MELLITUS WITH DIABETIC CHRONIC KIDNEY DISEASE: ICD-10-CM

## 2021-08-19 DIAGNOSIS — Z79.82 LONG TERM (CURRENT) USE OF ASPIRIN: ICD-10-CM

## 2021-08-19 DIAGNOSIS — I69.322 DYSARTHRIA FOLLOWING CEREBRAL INFARCTION: ICD-10-CM

## 2021-08-19 DIAGNOSIS — I12.9 HYPERTENSIVE CHRONIC KIDNEY DISEASE WITH STAGE 1 THROUGH STAGE 4 CHRONIC KIDNEY DISEASE, OR UNSPECIFIED CHRONIC KIDNEY DISEASE: ICD-10-CM

## 2021-08-19 DIAGNOSIS — Z29.9 ENCOUNTER FOR PROPHYLACTIC MEASURES, UNSPECIFIED: ICD-10-CM

## 2021-08-19 DIAGNOSIS — R26.9 UNSPECIFIED ABNORMALITIES OF GAIT AND MOBILITY: ICD-10-CM

## 2021-08-19 DIAGNOSIS — I69.351 HEMIPLEGIA AND HEMIPARESIS FOLLOWING CEREBRAL INFARCTION AFFECTING RIGHT DOMINANT SIDE: ICD-10-CM

## 2021-08-19 DIAGNOSIS — E87.6 HYPOKALEMIA: ICD-10-CM

## 2021-08-19 DIAGNOSIS — R00.0 TACHYCARDIA, UNSPECIFIED: ICD-10-CM

## 2021-08-19 DIAGNOSIS — R22.0 LOCALIZED SWELLING, MASS AND LUMP, HEAD: ICD-10-CM

## 2021-08-19 DIAGNOSIS — R09.82 POSTNASAL DRIP: ICD-10-CM

## 2021-08-19 DIAGNOSIS — D64.9 ANEMIA, UNSPECIFIED: ICD-10-CM

## 2021-08-19 DIAGNOSIS — J02.9 ACUTE PHARYNGITIS, UNSPECIFIED: ICD-10-CM

## 2021-08-19 DIAGNOSIS — R21 RASH AND OTHER NONSPECIFIC SKIN ERUPTION: ICD-10-CM

## 2021-08-19 DIAGNOSIS — R11.2 NAUSEA WITH VOMITING, UNSPECIFIED: ICD-10-CM

## 2021-08-19 DIAGNOSIS — I63.59 CEREBRAL INFARCTION DUE TO UNSPECIFIED OCCLUSION OR STENOSIS OF OTHER CEREBRAL ARTERY: ICD-10-CM

## 2021-08-19 DIAGNOSIS — N17.9 ACUTE KIDNEY FAILURE, UNSPECIFIED: ICD-10-CM

## 2021-08-19 DIAGNOSIS — E78.5 HYPERLIPIDEMIA, UNSPECIFIED: ICD-10-CM

## 2021-08-19 DIAGNOSIS — Z51.89 ENCOUNTER FOR OTHER SPECIFIED AFTERCARE: ICD-10-CM

## 2021-08-19 DIAGNOSIS — N18.2 CHRONIC KIDNEY DISEASE, STAGE 2 (MILD): ICD-10-CM

## 2021-08-19 DIAGNOSIS — M21.371 FOOT DROP, RIGHT FOOT: ICD-10-CM

## 2021-08-19 DIAGNOSIS — E44.0 MODERATE PROTEIN-CALORIE MALNUTRITION: ICD-10-CM

## 2021-08-19 DIAGNOSIS — E11.9 TYPE 2 DIABETES MELLITUS WITHOUT COMPLICATIONS: ICD-10-CM

## 2021-08-19 DIAGNOSIS — R20.0 ANESTHESIA OF SKIN: ICD-10-CM

## 2021-08-19 DIAGNOSIS — I95.9 HYPOTENSION, UNSPECIFIED: ICD-10-CM

## 2021-08-19 DIAGNOSIS — Z79.84 LONG TERM (CURRENT) USE OF ORAL HYPOGLYCEMIC DRUGS: ICD-10-CM

## 2021-08-19 DIAGNOSIS — R25.2 CRAMP AND SPASM: ICD-10-CM

## 2021-08-19 DIAGNOSIS — R42 DIZZINESS AND GIDDINESS: ICD-10-CM

## 2021-08-19 DIAGNOSIS — I69.398 OTHER SEQUELAE OF CEREBRAL INFARCTION: ICD-10-CM

## 2021-08-19 DIAGNOSIS — Z95.828 PRESENCE OF OTHER VASCULAR IMPLANTS AND GRAFTS: ICD-10-CM

## 2021-08-19 LAB
GLUCOSE BLDC GLUCOMTR-MCNC: 121 MG/DL — HIGH (ref 70–99)
GLUCOSE BLDC GLUCOMTR-MCNC: 121 MG/DL — HIGH (ref 70–99)
GLUCOSE BLDC GLUCOMTR-MCNC: 135 MG/DL — HIGH (ref 70–99)
GLUCOSE BLDC GLUCOMTR-MCNC: 181 MG/DL — HIGH (ref 70–99)

## 2021-08-19 PROCEDURE — C1764: CPT

## 2021-08-19 PROCEDURE — 99223 1ST HOSP IP/OBS HIGH 75: CPT | Mod: GC

## 2021-08-19 PROCEDURE — 84484 ASSAY OF TROPONIN QUANT: CPT

## 2021-08-19 PROCEDURE — 82962 GLUCOSE BLOOD TEST: CPT

## 2021-08-19 PROCEDURE — 70551 MRI BRAIN STEM W/O DYE: CPT | Mod: MG

## 2021-08-19 PROCEDURE — 86769 SARS-COV-2 COVID-19 ANTIBODY: CPT

## 2021-08-19 PROCEDURE — 97530 THERAPEUTIC ACTIVITIES: CPT

## 2021-08-19 PROCEDURE — 97110 THERAPEUTIC EXERCISES: CPT

## 2021-08-19 PROCEDURE — 97166 OT EVAL MOD COMPLEX 45 MIN: CPT

## 2021-08-19 PROCEDURE — 97760 ORTHOTIC MGMT&TRAING 1ST ENC: CPT

## 2021-08-19 PROCEDURE — 80053 COMPREHEN METABOLIC PANEL: CPT

## 2021-08-19 PROCEDURE — 96374 THER/PROPH/DIAG INJ IV PUSH: CPT

## 2021-08-19 PROCEDURE — 71045 X-RAY EXAM CHEST 1 VIEW: CPT

## 2021-08-19 PROCEDURE — 85610 PROTHROMBIN TIME: CPT

## 2021-08-19 PROCEDURE — 83036 HEMOGLOBIN GLYCOSYLATED A1C: CPT

## 2021-08-19 PROCEDURE — 85730 THROMBOPLASTIN TIME PARTIAL: CPT

## 2021-08-19 PROCEDURE — 70498 CT ANGIOGRAPHY NECK: CPT | Mod: MA

## 2021-08-19 PROCEDURE — 70496 CT ANGIOGRAPHY HEAD: CPT | Mod: MA

## 2021-08-19 PROCEDURE — G1004: CPT

## 2021-08-19 PROCEDURE — U0003: CPT

## 2021-08-19 PROCEDURE — 80061 LIPID PANEL: CPT

## 2021-08-19 PROCEDURE — 33285 INSJ SUBQ CAR RHYTHM MNTR: CPT

## 2021-08-19 PROCEDURE — 99285 EMERGENCY DEPT VISIT HI MDM: CPT

## 2021-08-19 PROCEDURE — 99232 SBSQ HOSP IP/OBS MODERATE 35: CPT

## 2021-08-19 PROCEDURE — 0042T: CPT

## 2021-08-19 PROCEDURE — 70546 MR ANGIOGRAPH HEAD W/O&W/DYE: CPT

## 2021-08-19 PROCEDURE — 97162 PT EVAL MOD COMPLEX 30 MIN: CPT

## 2021-08-19 PROCEDURE — U0005: CPT

## 2021-08-19 PROCEDURE — 70450 CT HEAD/BRAIN W/O DYE: CPT | Mod: MA

## 2021-08-19 PROCEDURE — 82803 BLOOD GASES ANY COMBINATION: CPT

## 2021-08-19 PROCEDURE — 81001 URINALYSIS AUTO W/SCOPE: CPT

## 2021-08-19 PROCEDURE — 93306 TTE W/DOPPLER COMPLETE: CPT

## 2021-08-19 PROCEDURE — A9585: CPT

## 2021-08-19 PROCEDURE — 85025 COMPLETE CBC W/AUTO DIFF WBC: CPT

## 2021-08-19 PROCEDURE — 80048 BASIC METABOLIC PNL TOTAL CA: CPT

## 2021-08-19 PROCEDURE — 70553 MRI BRAIN STEM W/O & W/DYE: CPT

## 2021-08-19 PROCEDURE — 93970 EXTREMITY STUDY: CPT

## 2021-08-19 PROCEDURE — 85027 COMPLETE CBC AUTOMATED: CPT

## 2021-08-19 RX ORDER — ASPIRIN/CALCIUM CARB/MAGNESIUM 324 MG
81 TABLET ORAL DAILY
Refills: 0 | Status: DISCONTINUED | OUTPATIENT
Start: 2021-08-19 | End: 2021-09-17

## 2021-08-19 RX ORDER — ATORVASTATIN CALCIUM 80 MG/1
80 TABLET, FILM COATED ORAL AT BEDTIME
Refills: 0 | Status: DISCONTINUED | OUTPATIENT
Start: 2021-08-19 | End: 2021-09-17

## 2021-08-19 RX ORDER — AMLODIPINE BESYLATE 2.5 MG/1
1 TABLET ORAL
Qty: 0 | Refills: 0 | DISCHARGE
Start: 2021-08-19

## 2021-08-19 RX ORDER — ACETAMINOPHEN 500 MG
975 TABLET ORAL EVERY 6 HOURS
Refills: 0 | Status: DISCONTINUED | OUTPATIENT
Start: 2021-08-19 | End: 2021-09-17

## 2021-08-19 RX ORDER — INSULIN LISPRO 100/ML
VIAL (ML) SUBCUTANEOUS
Refills: 0 | Status: DISCONTINUED | OUTPATIENT
Start: 2021-08-19 | End: 2021-08-23

## 2021-08-19 RX ORDER — INSULIN GLARGINE 100 [IU]/ML
15 INJECTION, SOLUTION SUBCUTANEOUS AT BEDTIME
Refills: 0 | Status: DISCONTINUED | OUTPATIENT
Start: 2021-08-19 | End: 2021-08-24

## 2021-08-19 RX ORDER — ENOXAPARIN SODIUM 100 MG/ML
40 INJECTION SUBCUTANEOUS DAILY
Refills: 0 | Status: DISCONTINUED | OUTPATIENT
Start: 2021-08-19 | End: 2021-09-17

## 2021-08-19 RX ORDER — AMLODIPINE BESYLATE 2.5 MG/1
5 TABLET ORAL DAILY
Refills: 0 | Status: DISCONTINUED | OUTPATIENT
Start: 2021-08-20 | End: 2021-08-19

## 2021-08-19 RX ORDER — AMLODIPINE BESYLATE 2.5 MG/1
5 TABLET ORAL DAILY
Refills: 0 | Status: DISCONTINUED | OUTPATIENT
Start: 2021-08-19 | End: 2021-08-20

## 2021-08-19 RX ORDER — TICAGRELOR 90 MG/1
90 TABLET ORAL EVERY 12 HOURS
Refills: 0 | Status: DISCONTINUED | OUTPATIENT
Start: 2021-08-19 | End: 2021-09-17

## 2021-08-19 RX ORDER — AMLODIPINE BESYLATE 2.5 MG/1
2.5 TABLET ORAL ONCE
Refills: 0 | Status: COMPLETED | OUTPATIENT
Start: 2021-08-19 | End: 2021-08-19

## 2021-08-19 RX ADMIN — AMLODIPINE BESYLATE 2.5 MILLIGRAM(S): 2.5 TABLET ORAL at 05:50

## 2021-08-19 RX ADMIN — TICAGRELOR 90 MILLIGRAM(S): 90 TABLET ORAL at 18:06

## 2021-08-19 RX ADMIN — AMLODIPINE BESYLATE 2.5 MILLIGRAM(S): 2.5 TABLET ORAL at 11:24

## 2021-08-19 RX ADMIN — Medication 81 MILLIGRAM(S): at 10:57

## 2021-08-19 RX ADMIN — TICAGRELOR 90 MILLIGRAM(S): 90 TABLET ORAL at 05:49

## 2021-08-19 RX ADMIN — ENOXAPARIN SODIUM 40 MILLIGRAM(S): 100 INJECTION SUBCUTANEOUS at 10:58

## 2021-08-19 RX ADMIN — INSULIN GLARGINE 15 UNIT(S): 100 INJECTION, SOLUTION SUBCUTANEOUS at 23:18

## 2021-08-19 RX ADMIN — ATORVASTATIN CALCIUM 80 MILLIGRAM(S): 80 TABLET, FILM COATED ORAL at 23:18

## 2021-08-19 RX ADMIN — Medication 1: at 18:05

## 2021-08-19 NOTE — PROGRESS NOTE ADULT - REASON FOR ADMISSION
Ischemic Stroke

## 2021-08-19 NOTE — H&P ADULT - HISTORY OF PRESENT ILLNESS
60 yo R handed Female with  PMHx of DMT2, HTN, HLD, multiple CVAs w/ residual RUE/LE weakness w/ foot drop, LLE>UE numbness s/p loop recorder and plavix  now discontinued 2017. She presented to [facility] on 8/15/21 with acute on chronic R-sided weakness, LKN 8/15 07:30 symptoms noticed around 10AM after patient was trying to use her arm and was not able to "make it do what I wanted" on further clarification of RUE>LE weakness. Pt endorsed was leaning to one side, endorsed RLE feels weaker than normal which was present on initial NIHSS 5. Initial BPs elevated 190s systolic, post-CTH without hemorrhage or acute findings, BP was lowered with improvement in R sided acute on chronic weakness to allow for ataxia testing in limbs which was present, which was new findings.  Endorsed mild L posterior headache, no N/V or infectious symptoms. Patient was re-evaluated after improvement for weakness, concern for focal seizure with patient endorsing no prior seizure history or prior episodes w RUE. CTH w/o acute findings, CTA w worsening PCA stenosis but normal flow bL VAs. CDU monitoring for MR Brain to access for any new infarcts, however course c/b reoccurrence in R UE "weakness" appearing contracture-like w RUE flexion and hypertonia, intact mentation.  she was seen by neurologist.  pt received 1500mg IV Keppra load given high suspicion for focal seizure activity. MRI Brain showed Left thalamic infarct. She did not receive TPA as she was out of  window. She was seen by cardiology; TTE, ASA/STATIN, and losartan started. EP was consulted, ILR was implanted (8/18), No events recorded on ILR. Of note, patient had ILR placed in the past ( 2017) no arrhythmias was recorded then. She was evaluated by PM&R and therapist and acute rehabilitation was recommended. She was admitted to [facility] on 8/19/21.        60 yo R handed Female with  PMHx of DMT2, HTN, HLD, multiple CVAs w/ residual RUE/LE weakness w/ foot drop, LLE>UE numbness s/p loop recorder and plavix  now discontinued 2017. She presented to [facility] on 8/15/21 with acute on chronic R-sided weakness, LKN 8/15 07:30 symptoms noticed around 10AM after patient was trying to use her arm and was not able to "make it do what I wanted" on further clarification of RUE>LE weakness. Pt endorsed was leaning to one side, endorsed RLE feels weaker than normal which was present on initial NIHSS 5. Initial BPs elevated 190s systolic, post-CTH without hemorrhage or acute findings, BP was lowered with improvement in R sided acute on chronic weakness to allow for ataxia testing in limbs which was present, which was new findings.  Endorsed mild L posterior headache, no N/V or infectious symptoms. Patient was re-evaluated after improvement for weakness, concern for focal seizure with patient endorsing no prior seizure history or prior episodes w RUE. CTH w/o acute findings, CTA w worsening PCA stenosis but normal flow bL VAs. CDU monitoring for MR Brain to access for any new infarcts, however course c/b reoccurrence in R UE "weakness" appearing contracture-like w RUE flexion and hypertonia, intact mentation.  she was seen by neurologist.  pt received 1500mg IV Keppra load given high suspicion for focal seizure activity. MRI Brain showed Left thalamic infarct. She did not receive TPA as she was out of  window. She was seen by cardiology; TTE, ASA/STATIN, and losartan started. EP was consulted, ILR was implanted (8/18), No events recorded on ILR. Of note, patient had ILR placed in the past ( 2017) no arrhythmias was recorded then. She was evaluated by PM&R and therapist and acute rehabilitation was recommended. She was admitted to [facility] on 8/19/21.

## 2021-08-19 NOTE — H&P ADULT - ATTENDING COMMENTS
Chart reviewed.  I have reviewed admission note and edited where appropriate  61 year old right handed female with prior h/o stroke with residual left hemiparesis, foot drop, independent without AFO ( has one, but stopped using), working admitted to [facility] with worsening gait, right weakness. Found to have a new left thalamic stroke.   ?etiology of stroke  ILR placed  Patient states that seizures were a consideration and she got a few doses of AED, but eventually ruled out  Now admitted to rehab  Begin full rehab program   See daily progress note

## 2021-08-19 NOTE — PROGRESS NOTE ADULT - PROBLEM SELECTOR PLAN 1
s/p previous ILR s/p removal 10/20  no events recorded  Stroke team recommending ILR again. Discussed with patient and shes agreeable  ASA and statin
s/p previous ILR   no events recorded thus far   likely small vessel disease   ASA and statin
s/p previous ILR s/p removal 10/20  no events recorded  s/p ILR  ASA and statin

## 2021-08-19 NOTE — CONSULT NOTE ADULT - ASSESSMENT
Patient is a 62 Y/O Female mRS 1 PMHx DMT2, HTN/HLD, multiple CVAs w/ residual RUE/LE weakness w foot drop, LLE>UE numbness s/p loop recorder and plavix  now discontinued 2017,  presenting with acute on chronic R-sided weakness LKN 8/15 07:30 symptoms noticed around 10AM after patient was trying to use her arm and was not able to "make it do what I wanted" on further clarification of RUE>LE weakness. Pt endorses was leaning to one side, endorses RLE feels weaker than normal which was present on initial NIHSS 5. Initial BPs elevated 190s systolic, post-CTH without hemorrhage or acute findings, BP was lowered with improvement in R sided acute on chronic weakness to allow for ataxia testing in limbs which was present, which was new findings. 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

## 2021-08-19 NOTE — PROGRESS NOTE ADULT - SUBJECTIVE AND OBJECTIVE BOX
no new complaints     REVIEW OF SYSTEMS  Constitutional - No fever,  No fatigue  HEENT - No vertigo, No neck pain  Neurological - No headaches, No memory loss, No loss of strength, No numbness, No tremors  Skin - No rashes, No lesions   Musculoskeletal - No joint pain, No joint swelling, No muscle pain  Psychiatric - No depression, No anxiety    FUNCTIONAL PROGRESS  8/18 PT/OT  bed mobility mod assist  transfers mod assist with hemiwalker  gait mod assist x 3-4 steps with hemiwalker        VITALS  T(C): 36.9 (08-19-21 @ 07:09), Max: 36.9 (08-19-21 @ 07:09)  HR: 86 (08-19-21 @ 07:09) (78 - 99)  BP: 155/96 (08-19-21 @ 07:09) (146/91 - 163/93)  RR: 18 (08-19-21 @ 07:09) (18 - 18)  SpO2: 96% (08-19-21 @ 07:09) (95% - 100%)  Wt(kg): --    MEDICATIONS   acetaminophen   Tablet .. 975 milliGRAM(s) every 6 hours PRN  amLODIPine   Tablet 2.5 milliGRAM(s) daily  aspirin  chewable 81 milliGRAM(s) daily  atorvastatin 80 milliGRAM(s) at bedtime  dextrose 40% Gel 15 Gram(s) once  dextrose 5%. 1000 milliLiter(s) <Continuous>  dextrose 5%. 1000 milliLiter(s) <Continuous>  dextrose 50% Injectable 25 Gram(s) once  dextrose 50% Injectable 12.5 Gram(s) once  dextrose 50% Injectable 25 Gram(s) once  enoxaparin Injectable 40 milliGRAM(s) daily  glucagon  Injectable 1 milliGRAM(s) once  insulin glargine Injectable (LANTUS) 15 Unit(s) at bedtime  insulin lispro (ADMELOG) corrective regimen sliding scale   three times a day before meals  ticagrelor 90 milliGRAM(s) two times a day      RECENT LABS - Reviewed                        11.6   6.28  )-----------( 228      ( 18 Aug 2021 06:55 )             36.3     08-18    146<H>  |  109<H>  |  18  ----------------------------<  124<H>  3.5   |  22  |  1.01    Ca    9.9      18 Aug 2021 07:02    < from: MR Head w/wo IV Cont (08.17.21 @ 18:56) >    IMPRESSION:  Acute infarct within the left internal capsule/thalamus without evidence of hemorrhagic conversion.    Otherwise stable brain MR.    Unremarkable MR venogram.      < end of copied text >    -----------------------------------------------------------------------------  PHYSICAL EXAM  Constitutional - NAD, Comfortable, in bed   Chest - Breathing comfortably  Cardiovascular - S1S2   Abdomen - Soft   Extremities - No C/C/E, No calf tenderness   Neurologic Exam -                    Cognitive - Awake, Alert, AAO to self, place, date, year, situation     Communication - Fluent, No dysarthria     Cranial Nerves - CN 2-12 intact     Motor -                     LEFT    UE - ShAB 5/5, EF 5/5, EE 5/5, WE 5/5,  5/5                    RIGHT UE - 0/5                    LEFT    LE - HF 5/5, KE 5/5, DF 5/5, PF 5/5                    RIGHT LE - HF 3/5, KE 3/5, DF 0/5, PF 2/5        Sensory - Intact to LT    Psychiatric - Mood stable, Affect WNL  ----------------------------------------------------------------------------------------  ASSESSMENT/PLAN  61yFemale DMT2, HTN/HLD, multiple prior strokes (residual RUE/LE weakness w/ foot drop with functional deficits after left thalamic infarct, right sided weakness   s/p ILR   keppra discontinued   Pain - Tylenol  DVT PPX - SCDs, lovenox   Rehab - Will continue to follow for ongoing rehab needs and recommendations.   continue bedside PT/OT  out of bed to chair    Recommend ACUTE inpatient rehabilitation for the functional deficits consisting of 3 hours of therapy/day & 24 hour RN/daily PMR physician for comorbid medical management. Patient will be able to tolerate 3 hours a day.

## 2021-08-19 NOTE — DISCHARGE NOTE NURSING/CASE MANAGEMENT/SOCIAL WORK - NSDCVIVACCINE_GEN_ALL_CORE_FT
influenza, injectable, quadrivalent, preservative free; 06-Oct-2020 15:27; Abad Trujillo (NIA); [employer]; 5D4H4 (Exp. Date: 30-Jun-2021); IntraMuscular; Deltoid Left.; 0.5 milliLiter(s); VIS (VIS Published: 15-Aug-2019, VIS Presented: 06-Oct-2020);

## 2021-08-19 NOTE — PROGRESS NOTE ADULT - TIME BILLING
Advanced care planning was discussed with patient and family.  Advanced care planning forms were reviewed and discussed as appropriate.  Differential diagnosis and plan of care discussed with patient after the evaluation.   Pain assessed and judicious use of narcotics when appropriate was discussed.  Importance of Fall prevention discussed.  Counseling on Smoking and Alcohol cessation was offered when appropriate.  Counseling on Diet, exercise, and medication compliance was done.
Advanced care planning was discussed with patient and family.  Advanced care planning forms were reviewed and discussed as appropriate.  Differential diagnosis and plan of care discussed with patient after the evaluation.   Pain assessed and judicious use of narcotics when appropriate was discussed.  Importance of Fall prevention discussed.  Counseling on Smoking and Alcohol cessation was offered when appropriate.  Counseling on Diet, exercise, and medication compliance was done.
Diagnosis and treatment plan; counselling for secondary stroke prevention  Agree with above; ROS otherwise negative
Advanced care planning was discussed with patient and family.  Advanced care planning forms were reviewed and discussed as appropriate.  Differential diagnosis and plan of care discussed with patient after the evaluation.   Pain assessed and judicious use of narcotics when appropriate was discussed.  Importance of Fall prevention discussed.  Counseling on Smoking and Alcohol cessation was offered when appropriate.  Counseling on Diet, exercise, and medication compliance was done.

## 2021-08-19 NOTE — H&P ADULT - ASSESSMENT
ASSESSMENT/PLAN  60 yo R handed Female with  PMHx of DMT2, HTN, HLD, multiple CVAs w/ residual RUE/LE weakness w/ foot drop, LLE>UE numbness s/p loop recorder and plavix  now discontinued 2017. She presented to [facility] on 8/15/21 with acute on chronic R-sided weakness found to have Left thalamic infarct and newly diagnosed seizure. Patient now with Gait Instability, ADL impairments and Functional impairments.    COMORBIDITES/ACTIVE MEDICAL ISSUES     # Multiple CVA  - Left thalamic infarcts  - acute on chronic right hemiplegia  - left lower extremity numbness  -Gait Instability, ADL impairments and Functional impairments  -start Comprehensive Rehab Program of PT/OT   - s/p ILR placement; f/u with Dr. Minor  - ASA 81 mg daily  - Brillinta 90 mg twice     #HLD  - Lipitor 80 mg daily    #HTN  - Amlodipine 5 mg daily    #DM II  - ISS and FS  - Lantus 15 U nightly  - Admelog    #Pain control  - Tylenol PRN    #GI/Bowel Mgmt   - Continent Senna,  Miralax PRN,    #Bladder management  - COntinent, PVR q 8 hours (SC if > 400)    #DVT  - Lovenox  - SCDs, TEDs     #Skin:  -No active issues at this time    FEN   - Diet - Consistent Carbohydrate Low Sodium   - Dysphagia  SLP - evaluation and treatment    Precautions / PROPHYLAXIS:   - Falls, Cardiac,  Seizure   - ortho: Weight bearing status: WBAT   - Lungs: Aspiration, Incentive Spirometer   - Pressure injury/Skin: Turn Q2hrs while in bed, OOB to Chair, PT/OT        MEDICAL PROGNOSIS: GOOD            REHAB POTENTIAL: GOOD             ESTIMATED DISPOSITION: HOME WITH HOME CARE            ELOS: 10-14 Days   EXPECTED THERAPY:     P.T. 1hr/day       O.T. 1hr/day      S.L.P. 1hr/day     P&O Unnecessary     EXP FREQUENCY: 5 days per 7 day period     PRESCREEN COMPARISION:   I have reviewed the prescreen information and I have found no relevant changes between the preadmission screening and my post admission evaluation     RATIONALE FOR INPATIENT ADMISSION - Patient demonstrates the following: (check all that apply)  [X] Medically appropriate for rehabilitation admission  [X] Has attainable rehab goals with an appropriate initial discharge plan  [X] Has rehabilitation potential (expected to make a significant improvement within a reasonable period of time)   [X] Requires close medical management by a rehab physician, rehab nursing care, Hospitalist and comprehensive interdisciplinary team (including PT, OT, & or SLP, Prosthetics and Orthotics)   ASSESSMENT/PLAN  62 yo R handed Female with  PMHx of DMT2, HTN, HLD, multiple CVAs w/ residual RUE/LE weakness w/ foot drop, LLE>UE numbness s/p loop recorder and plavix  now discontinued 2017. She presented to [facility] on 8/15/21 with acute on chronic R-sided weakness found to have Left thalamic infarct and newly diagnosed seizure. Patient now with Gait Instability, ADL impairments and Functional impairments.    COMORBIDITES/ACTIVE MEDICAL ISSUES     # Multiple CVA  - Left thalamic infarcts  - acute on chronic right hemiplegia  - left lower extremity numbness  -Gait Instability, ADL impairments and Functional impairments  -start Comprehensive Rehab Program of PT/OT   - s/p ILR placement; f/u with Dr. Minor  - ASA 81 mg daily  - Brillinta 90 mg twice     #HLD  - Lipitor 80 mg daily    #HTN  - Amlodipine 5 mg daily  - Hold for systolic BP of <140.    #DM II  - ISS and FS  - Lantus 15 U nightly  - Admelog    #Pain control  - Tylenol PRN    #GI/Bowel Mgmt   - Continent Senna,  Miralax PRN,    #Bladder management  - COntinent, PVR q 8 hours (SC if > 400)    #DVT  - Lovenox  - SCDs, TEDs     #Skin:  -No active issues at this time    FEN   - Diet - Consistent Carbohydrate Low Sodium   - Dysphagia  SLP - evaluation and treatment    Precautions / PROPHYLAXIS:   - Falls, Cardiac,  Seizure   - ortho: Weight bearing status: WBAT   - Lungs: Aspiration, Incentive Spirometer   - Pressure injury/Skin: Turn Q2hrs while in bed, OOB to Chair, PT/OT        MEDICAL PROGNOSIS: GOOD            REHAB POTENTIAL: GOOD             ESTIMATED DISPOSITION: HOME WITH HOME CARE            ELOS: 10-14 Days   EXPECTED THERAPY:     P.T. 1hr/day       O.T. 1hr/day      S.L.P. 1hr/day     P&O Unnecessary     EXP FREQUENCY: 5 days per 7 day period     PRESCREEN COMPARISION:   I have reviewed the prescreen information and I have found no relevant changes between the preadmission screening and my post admission evaluation     RATIONALE FOR INPATIENT ADMISSION - Patient demonstrates the following: (check all that apply)  [X] Medically appropriate for rehabilitation admission  [X] Has attainable rehab goals with an appropriate initial discharge plan  [X] Has rehabilitation potential (expected to make a significant improvement within a reasonable period of time)   [X] Requires close medical management by a rehab physician, rehab nursing care, Hospitalist and comprehensive interdisciplinary team (including PT, OT, & or SLP, Prosthetics and Orthotics)   ASSESSMENT/PLAN  62 yo R handed Female with  PMHx of DMT2, HTN, HLD, multiple CVAs w/ residual RUE/LE weakness w/ foot drop, LLE>UE numbness s/p loop recorder and plavix  now discontinued 2017. She presented to [facility] on 8/15/21 with acute on chronic R-sided weakness found to have Left thalamic infarct and newly diagnosed seizure. Patient now with Gait Instability, ADL impairments and Functional impairments.    COMORBIDITES/ACTIVE MEDICAL ISSUES     # Multiple CVA  - Left thalamic infarcts  - acute on chronic right hemiplegia  - left lower extremity numbness  -Gait Instability, ADL impairments and Functional impairments  -start Comprehensive Rehab Program of PT/OT   - s/p ILR placement; f/u with Dr. Minor  - ASA 81 mg daily  - Brillinta 90 mg twice     #HLD  - Lipitor 80 mg daily    #HTN  - Amlodipine 5 mg daily  - Hold for systolic BP of <140.    #DM II  - ISS and FS  - Lantus 15 U nightly  - Admelog    #Pain control  - Tylenol PRN    #GI/Bowel Mgmt   - Continent Senna,  Miralax PRN,    #Bladder management  - COntinent, PVR q 8 hours (SC if > 400)    #DVT  - Lovenox  - SCDs, TEDs     #Skin:  -No active issues at this time    FEN   - Diet - Consistent Carbohydrate Low Sodium   - Dysphagia  SLP - evaluation and treatment    Precautions / PROPHYLAXIS:   - Falls, Cardiac,   - ortho: Weight bearing status: WBAT   - Lungs: Aspiration, Incentive Spirometer   - Pressure injury/Skin: Turn Q2hrs while in bed, OOB to Chair, PT/OT        MEDICAL PROGNOSIS: GOOD            REHAB POTENTIAL: GOOD             ESTIMATED DISPOSITION: HOME WITH HOME CARE            ELOS: 10-14 Days   EXPECTED THERAPY:     P.T. 1hr/day       O.T. 1hr/day      S.L.P. 1hr/day     P&O Unnecessary     EXP FREQUENCY: 5 days per 7 day period     PRESCREEN COMPARISION:   I have reviewed the prescreen information and I have found no relevant changes between the preadmission screening and my post admission evaluation     RATIONALE FOR INPATIENT ADMISSION - Patient demonstrates the following: (check all that apply)  [X] Medically appropriate for rehabilitation admission  [X] Has attainable rehab goals with an appropriate initial discharge plan  [X] Has rehabilitation potential (expected to make a significant improvement within a reasonable period of time)   [X] Requires close medical management by a rehab physician, rehab nursing care, Hospitalist and comprehensive interdisciplinary team (including PT, OT, & or SLP, Prosthetics and Orthotics)

## 2021-08-19 NOTE — CONSULT NOTE ADULT - NSCONSULTADDITIONALINFOA_GEN_ALL_CORE
Differential diagnosis and plan of care discussed with patient after the evaluation.   Advanced care planning/advanced directives discussed with patient/family. DNR status including forceful chest compressions to attempt to restart the heart, ventilator support/artificial breathing, electric shock, artificial nutrition, health care proxy, Molst form all discussed with pt. Pt wishes to consider.   OMT on six regions for acute somatic dysfunctions done at the bedside   Importance of Fall prevention discussed.

## 2021-08-19 NOTE — PROGRESS NOTE ADULT - SUBJECTIVE AND OBJECTIVE BOX
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 Pt endorses was leaning to one side, endorses RLE feels weaker than normal which was present on initial NIHSS 5. Initial BPs elevated 190s systolic, post-CTH without hemorrhage or acute findings, BP was lowered with improvement in R sided acute on chronic weakness to allow for ataxia testing in limbs which was present, which was new findings. 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 On [id number] not taken today. Before leaving bedside, /105 with resolution of acute R sided findings. Endorses mild L posterior headache, no N/V or infectious symptoms, endorses back at baseline.     Patient was re-evaluated after improvement for weakness, concern for focal seizure with patient endorsing no prior seizure history or prior episodes w RUE. Patient without LOC, alert and oriented throughout episode was given Keppra IV load.     [facility] 8/15 07:30   mRS 1   NIHSS 5-->1 (baseline)  no tpa as no sustained neurological deficit with higher suspicion for HTN, seizure > stroke   no thrombectomy as no LVO    Patient was admitted to CDU for imaging. MRI head showed tiny L thalamic infarct. Vessel imaging notable for severe R PCA stenosis. Patient was seen in CDU with initial plan to DC home, however R HP progressed throughout the day and patient did not feel comfortable to go home. Admitting to stroke for PT/OT and possible rehab. (16 Aug 2021 18:27)    8/18- patient seen today, recovering from worsening stroke w/ RUE weakness.  No angina, no history of palpitations, racing heartbeats, lightheadedness or fainting. Had an ILR a number of years ago which she only had in for a few months.  No prior arrhythmia diagnosis.  A 10 pt ROS is otherwise negative.  8/19- uneventful overnight, no new complaints.  minimal discomfort at ILR site.    acetaminophen   Tablet .. 975 milliGRAM(s) Oral every 6 hours PRN  aspirin  chewable 81 milliGRAM(s) Oral daily  atorvastatin 80 milliGRAM(s) Oral at bedtime  dextrose 40% Gel 15 Gram(s) Oral once  dextrose 5%. 1000 milliLiter(s) IV Continuous <Continuous>  dextrose 5%. 1000 milliLiter(s) IV Continuous <Continuous>  dextrose 50% Injectable 25 Gram(s) IV Push once  dextrose 50% Injectable 12.5 Gram(s) IV Push once  dextrose 50% Injectable 25 Gram(s) IV Push once  enoxaparin Injectable 40 milliGRAM(s) SubCutaneous daily  glucagon  Injectable 1 milliGRAM(s) IntraMuscular once  insulin glargine Injectable (LANTUS) 15 Unit(s) SubCutaneous at bedtime  insulin lispro (ADMELOG) corrective regimen sliding scale   SubCutaneous three times a day before meals  ticagrelor 90 milliGRAM(s) Oral two times a day                            11.6   6.28  )-----------( 228      ( 18 Aug 2021 06:55 )             36.3       08-18    146<H>  |  109<H>  |  18  ----------------------------<  124<H>  3.5   |  22  |  1.01    Ca    9.9      18 Aug 2021 07:02    T(C): 36.7 (08-19-21 @ 11:15), Max: 36.9 (08-19-21 @ 07:09)  HR: 88 (08-19-21 @ 12:45) (85 - 99)  BP: 160/110 (08-19-21 @ 12:45) (146/91 - 178/125)  RR: 18 (08-19-21 @ 11:15) (18 - 18)  SpO2: 99% (08-19-21 @ 11:15) (95% - 100%)  Wt(kg): --    I&O's Summary    18 Aug 2021 07:01  -  19 Aug 2021 07:00  --------------------------------------------------------  IN: 360 mL / OUT: 550 mL / NET: -190 mL    General: Well nourished, no acute distress, alert and oriented x 3  Head: normocephalic, no trauma  Neck: no JVD, no bruit, supple, not enlarged  CV: S1S2, no S3, regular rate, rhythm is SINUS, no murmurs.    Lungs: clear BL, no rales or wheezes  Abdomen: bowel sounds +, soft, nontender, nondistended  Extremities: no clubbing, cyanosis or edema  Neuro: Moves all 4 extremities, sensation intact x 4 extremities  Skin: warm and moist, normal turgor  Psych: Mood and affect are appropriate for circumstances  MSK: normal range of motion and strength in all extremities except right arm.    TELEMETRY: NSR	    ECG: NSR, normal intervals.  Echo: Normal LVEF.  LVH. No PFO.	    ASSESSMENT/PLAN: 	61y Female with embolic stroke of unknown source. Hypertension, DM, prior TIA/CVA, prior ILR with no diagnosis of AF made yet.    s/p ILR  Followup will be w/ Dr Minor.  From my perspective, OK for DC planning.    Rodrigue Beavers M.D.  Cardiac Electrophysiology    office [phone]  pager [phone]

## 2021-08-19 NOTE — H&P ADULT - NSICDXPASTMEDICALHX_GEN_ALL_CORE_FT
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

## 2021-08-19 NOTE — DISCHARGE NOTE NURSING/CASE MANAGEMENT/SOCIAL WORK - NSDCPEFALRISK_GEN_ALL_CORE
For information on Fall & injury Prevention, visit [email or web address]

## 2021-08-19 NOTE — PROGRESS NOTE ADULT - SUBJECTIVE AND OBJECTIVE BOX
Subjective: Patient seen and examined. No new events except as noted.   s/p ILR     REVIEW OF SYSTEMS:    CONSTITUTIONAL: + weakness, fevers or chills  EYES/ENT: No visual changes;  No vertigo or throat pain   NECK: No pain or stiffness  RESPIRATORY: No cough, wheezing, hemoptysis; No shortness of breath  CARDIOVASCULAR: No chest pain or palpitations  GASTROINTESTINAL: No abdominal or epigastric pain. No nausea, vomiting, or hematemesis; No diarrhea or constipation. No melena or hematochezia.  GENITOURINARY: No dysuria, frequency or hematuria  NEUROLOGICAL:+ numbness or weakness  SKIN: No itching, burning, rashes, or lesions   All other review of systems is negative unless indicated above.    MEDICATIONS:  MEDICATIONS  (STANDING):  amLODIPine   Tablet 2.5 milliGRAM(s) Oral once  aspirin  chewable 81 milliGRAM(s) Oral daily  atorvastatin 80 milliGRAM(s) Oral at bedtime  dextrose 40% Gel 15 Gram(s) Oral once  dextrose 5%. 1000 milliLiter(s) (50 mL/Hr) IV Continuous <Continuous>  dextrose 5%. 1000 milliLiter(s) (100 mL/Hr) IV Continuous <Continuous>  dextrose 50% Injectable 25 Gram(s) IV Push once  dextrose 50% Injectable 12.5 Gram(s) IV Push once  dextrose 50% Injectable 25 Gram(s) IV Push once  enoxaparin Injectable 40 milliGRAM(s) SubCutaneous daily  glucagon  Injectable 1 milliGRAM(s) IntraMuscular once  insulin glargine Injectable (LANTUS) 15 Unit(s) SubCutaneous at bedtime  insulin lispro (ADMELOG) corrective regimen sliding scale   SubCutaneous three times a day before meals  ticagrelor 90 milliGRAM(s) Oral two times a day      PHYSICAL EXAM:  T(C): 36.7 (08-19-21 @ 11:15), Max: 36.9 (08-19-21 @ 07:09)  HR: 85 (08-19-21 @ 11:15) (78 - 99)  BP: 170/117 (08-19-21 @ 11:15) (146/91 - 170/117)  RR: 18 (08-19-21 @ 11:15) (18 - 18)  SpO2: 99% (08-19-21 @ 11:15) (95% - 100%)  Wt(kg): --  I&O's Summary    18 Aug 2021 07:01  -  19 Aug 2021 07:00  --------------------------------------------------------  IN: 360 mL / OUT: 550 mL / NET: -190 mL          Appearance: NAD  HEENT:  Dry oral mucosa, PERRL, EOMI	  Lymphatic: No lymphadenopathy  Cardiovascular: Normal S1 S2, No JVD, No murmurs, No edema  Respiratory: decreased bs  Psychiatry: A & O x 3, Mood & affect appropriate  Gastrointestinal:  Soft, Non-tender, + BS	  Skin: No rashes, No ecchymoses, No cyanosis	  Extremities: Normal range of motion, No clubbing, cyanosis or edema  Vascular: Peripheral pulses palpable 2+ bilaterally  - Cranial Nerves II-XII:    II:  PERRLA; visual fields are full to confrontation  III, IV, VI:  EOMI, no nystagmus  V:  facial sensation is intact in the V1-V3 distribution bilaterally.  VII:  face is symmetric with normal eye closure and smile  VIII:  hearing is intact to finger rub  IX, X:  uvula is midline and soft palate rises symmetrically  XI:  head turning and shoulder shrug are intact bilaterally  XII:  tongue protrudes in the midline  PRIOR TO CT: initial appearance of RUE in flexion, contracture like positioning stating too weak to extend; RLE fell to bed within 5s | baseline intact strength LUE/LLE   - Motor:  REPEAT strength is 5/5 LUE and 4/5RUE baseline ; normal muscle bulk and tone throughout; R + slight upward drift now improved able to hold both arms out bL >10s  - Reflexes:  2+ and symmetric at the biceps, triceps, brachioradialis, knees, and ankles;  plantar reflexes downgoing bilaterally  - Sensory:  decreased sensation to light touch L-side at baseline   - Coordination:  finger-nose-finger and heel-knee-shin + dysmetria in RUE/RLE           LABS:    CARDIAC MARKERS:                                11.6   6.28  )-----------( 228      ( 18 Aug 2021 06:55 )             36.3     08-18    146<H>  |  109<H>  |  18  ----------------------------<  124<H>  3.5   |  22  |  1.01    Ca    9.9      18 Aug 2021 07:02      proBNP:   Lipid Profile:   HgA1c:   TSH:             TELEMETRY: 	    ECG:  	  RADIOLOGY:   DIAGNOSTIC TESTING:  [ ] Echocardiogram:  [ ]  Catheterization:  [ ] Stress Test:    OTHER:

## 2021-08-19 NOTE — PROGRESS NOTE ADULT - SUBJECTIVE AND OBJECTIVE BOX
THE PATIENT WAS SEEN AND EXAMINED BY ME WITH THE HOUSESTAFF AND STROKE TEAM DURING MORNING ROUNDS.   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 Pt endorsed was leaning to one side, endorses RLE feels weaker than normal which was present on initial NIHSS 5. Initial BPs elevated 190s systolic, post-CTH without hemorrhage or acute findings, BP was lowered with improvement in R sided acute on chronic weakness to allow for ataxia testing in limbs which was present, which was new findings. 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 On [id number] not taken today. Before leaving bedside, /105 with resolution of acute R sided findings. Endorses mild L posterior headache, no N/V or infectious symptoms, endorses back at baseline.   Patient was re-evaluated after improvement for weakness, concern for focal seizure with patient endorsing no prior seizure history or prior episodes w RUE. Patient without LOC, alert and oriented throughout episode was given Keppra IV load.   [facility] 8/15 07:30  mRS 1  NIHSS 5-->1 (baseline) no tpa as no sustained neurological deficit with higher suspicion for HTN, seizure > stroke  no thrombectomy as no LVOPatient was admitted to CDU for imaging. MRI head showed small L thalamic infarct. Vessel imaging notable for severe R PCA stenosis. Patient was seen in CDU with initial plan to DC home, however R HP progressed throughout the day and patient did not feel comfortable to go home. Admitting to stroke for PT/OT and rehab evaluation .     SUBJECTIVE: No events overnight.  No new neurologic complaints.  ROS reported negative unless otherwise noted.    acetaminophen   Tablet .. 975 milliGRAM(s) Oral every 6 hours PRN  amLODIPine   Tablet 2.5 milliGRAM(s) Oral daily  aspirin  chewable 81 milliGRAM(s) Oral daily  atorvastatin 80 milliGRAM(s) Oral at bedtime  dextrose 40% Gel 15 Gram(s) Oral once  dextrose 5%. 1000 milliLiter(s) IV Continuous <Continuous>  dextrose 5%. 1000 milliLiter(s) IV Continuous <Continuous>  dextrose 50% Injectable 25 Gram(s) IV Push once  dextrose 50% Injectable 12.5 Gram(s) IV Push once  dextrose 50% Injectable 25 Gram(s) IV Push once  enoxaparin Injectable 40 milliGRAM(s) SubCutaneous daily  glucagon  Injectable 1 milliGRAM(s) IntraMuscular once  insulin glargine Injectable (LANTUS) 15 Unit(s) SubCutaneous at bedtime  insulin lispro (ADMELOG) corrective regimen sliding scale   SubCutaneous three times a day before meals  ticagrelor 90 milliGRAM(s) Oral two times a day      PHYSICAL EXAM:   Vital Signs Last 24 Hrs  T(C): 36.6 (19 Aug 2021 04:53), Max: 36.8 (18 Aug 2021 08:19)  T(F): 97.9 (19 Aug 2021 04:53), Max: 98.2 (18 Aug 2021 08:19)  HR: 93 (19 Aug 2021 04:53) (78 - 99)  BP: 146/91 (19 Aug 2021 04:53) (146/91 - 166/104)  BP(mean): --  RR: 18 (19 Aug 2021 04:53) (18 - 18)  SpO2: 99% (19 Aug 2021 04:53) (95% - 100%)    General: No acute distress  HEENT: EOM intact, visual fields full  Abdomen: Soft, nontender, nondistended   Extremities: No edema    NEUROLOGICAL EXAM:  Mental status: Awake, alert, oriented x3, speech fluent, follows commands  Cranial Nerves: Mild right facial palsy, no nystagmus, mild dysarthria, tongue midline  Motor exam: RUE normal tone, trace elbow flexion with gravity removed, otherwise no movement. LUE no drift. RLE drift, iliopsoas 4+/5, no movement distally. LLE no drift iliopsoas 5/5, distally 5/5.    Sensation: Intact to light touch   Coordination/ Gait: LUE no ataxia. Gait deferred    LABS:                        11.6   6.28  )-----------( 228      ( 18 Aug 2021 06:55 )             36.3    08-18    146<H>  |  109<H>  |  18  ----------------------------<  124<H>  3.5   |  22  |  1.01    Ca    9.9      18 Aug 2021 07:02          IMAGING: Reviewed by me.     MR Head w/wo IV Cont (08.19.2021)  Acute infarct within the left internal capsule/thalamus without evidence of hemorrhagic conversion.  Otherwise stable brain MR.  Unremarkable MR venogram.    CT Head No Cont (08.17.2021)  No acute hemorrhage or midline shift.  Decreased attenuation suspected in the left thalamus and also possibly the right. Bilateral thalamic changes can be seen with venous infarcts.    MR Head No Cont (08.15.2021)  Comparison MRI 10/3/2020, new foci restricted diffusion, DWI, T2 FLAIR hyperintensity left thalamus, bilateral brachium ponti with susceptibility, likely new ischemic change with microhemorrhage.  Redemonstration volume loss. Progressive T2 FLAIR white matter hyperintensity likely increased progressive microvascular disease as well as new foci of susceptibility likely microhemorrhage with other etiologies not excluded, no new large extra-axial collection or midline shift.  Tortuous vessels, likely from hypertension, maxillary ethmoid mucosal thickening, retention cyst polyp, mastoid opacification. Heterogeneity along the teeth with lucency on CT likely dental disease.    (08.15.2021)  HEAD CT: No acute intracranial hemorrhage or acute territorial infarction. Chronic infarctions and chronic microvascular ischemic changes as described.    CT PERFUSION demonstrated: No asymmetric or territorial perfusion abnormality.    CTA COW: No large vessel occlusion. High-grade stenosis involving the right posterior cerebral artery as described.    CTA NECK: Patent, ECAs, ICAs, no  hemodynamically significant stenosis at  ICA origins by NASCET criteria.  Bilateral vertebral arteries are patent without flow limiting stenosis.   THE PATIENT WAS SEEN AND EXAMINED BY ME WITH THE HOUSESTAFF AND STROKE TEAM DURING MORNING ROUNDS.   HPI:  62yo R handed Female mRS 1 PMHx DMT2, HTN/HLD, multiple CVAs w/ residual RUE/LE weakness w foot drop, LLE>UE numbness s/p loop recorder and plavix  now discontinued 2017,  presented with acute on chronic R-sided weakness [facility] 8/15 07:30 symptoms noticed around 10AM after patient was trying to use her arm and was not able to "make it do what I wanted" on further clarification of RUE>LE weakness. Pt endorsed was leaning to one side, endorses RLE feels weaker than normal which was present on initial NIHSS 5. Initial BPs elevated 190s systolic, post-CTH without hemorrhage or acute findings, BP was lowered with improvement in R sided acute on chronic weakness to allow for ataxia testing in limbs which was present, which was new findings. 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 On [id number] not taken today. Before leaving bedside, /105 with resolution of acute R sided findings. Endorses mild L posterior headache, no N/V or infectious symptoms, endorses back at baseline.   Patient was re-evaluated after improvement for weakness, concern for focal seizure with patient endorsing no prior seizure history or prior episodes w RUE. Patient without LOC, alert and oriented throughout episode was given Keppra IV load.   [facility] 8/15 07:30  mRS 1  NIHSS 5-->1 (baseline) no tpa as no sustained neurological deficit with higher suspicion for HTN, seizure > stroke  no thrombectomy as no LVOPatient was admitted to CDU for imaging. MRI head showed small L thalamic infarct. Vessel imaging notable for severe R PCA stenosis. Patient was seen in CDU with initial plan to DC home, however R HP progressed throughout the day and patient did not feel comfortable to go home. Admitting to stroke for PT/OT and rehab evaluation .     SUBJECTIVE: No events overnight.  No new neurologic complaints.  ROS reported negative unless otherwise noted.    acetaminophen   Tablet .. 975 milliGRAM(s) Oral every 6 hours PRN  amLODIPine   Tablet 2.5 milliGRAM(s) Oral daily  aspirin  chewable 81 milliGRAM(s) Oral daily  atorvastatin 80 milliGRAM(s) Oral at bedtime  dextrose 40% Gel 15 Gram(s) Oral once  dextrose 5%. 1000 milliLiter(s) IV Continuous <Continuous>  dextrose 5%. 1000 milliLiter(s) IV Continuous <Continuous>  dextrose 50% Injectable 25 Gram(s) IV Push once  dextrose 50% Injectable 12.5 Gram(s) IV Push once  dextrose 50% Injectable 25 Gram(s) IV Push once  enoxaparin Injectable 40 milliGRAM(s) SubCutaneous daily  glucagon  Injectable 1 milliGRAM(s) IntraMuscular once  insulin glargine Injectable (LANTUS) 15 Unit(s) SubCutaneous at bedtime  insulin lispro (ADMELOG) corrective regimen sliding scale   SubCutaneous three times a day before meals  ticagrelor 90 milliGRAM(s) Oral two times a day      PHYSICAL EXAM:   Vital Signs Last 24 Hrs  T(C): 36.6 (19 Aug 2021 04:53), Max: 36.8 (18 Aug 2021 08:19)  T(F): 97.9 (19 Aug 2021 04:53), Max: 98.2 (18 Aug 2021 08:19)  HR: 93 (19 Aug 2021 04:53) (78 - 99)  BP: 146/91 (19 Aug 2021 04:53) (146/91 - 166/104)  BP(mean): --  RR: 18 (19 Aug 2021 04:53) (18 - 18)  SpO2: 99% (19 Aug 2021 04:53) (95% - 100%)    General: No acute distress  HEENT: EOM intact, visual fields full  Abdomen: Soft, nontender, nondistended   Extremities: No edema    NEUROLOGICAL EXAM:  Mental status: Awake, alert, oriented x3, speech fluent, follows commands  Cranial Nerves: Mild right facial palsy, no nystagmus, very subtle dysarthria, tongue midline  Motor exam: Right hemiparesis: RUE normal tone, trace elbow flexion with gravity removed, otherwise no movement. LUE 5/5. RLE drift, iliopsoas 4+/5, no movement distally. LLE 5/5  Sensation: Intact to light touch   Coordination/ Gait: LUE no ataxia. Gait deferred    LABS:                        11.6   6.28  )-----------( 228      ( 18 Aug 2021 06:55 )             36.3    08-18    146<H>  |  109<H>  |  18  ----------------------------<  124<H>  3.5   |  22  |  1.01    Ca    9.9      18 Aug 2021 07:02          IMAGING: Reviewed by me.     MR Head w/wo IV Cont (08.19.2021)  Acute infarct within the left internal capsule/thalamus without evidence of hemorrhagic conversion.  Otherwise stable brain MR.  Unremarkable MR venogram.    CT Head No Cont (08.17.2021)  No acute hemorrhage or midline shift.  Decreased attenuation suspected in the left thalamus and also possibly the right. Bilateral thalamic changes can be seen with venous infarcts.    MR Head No Cont (08.15.2021)  Comparison MRI 10/3/2020, new foci restricted diffusion, DWI, T2 FLAIR hyperintensity left thalamus, bilateral brachium ponti with susceptibility, likely new ischemic change with microhemorrhage.  Redemonstration volume loss. Progressive T2 FLAIR white matter hyperintensity likely increased progressive microvascular disease as well as new foci of susceptibility likely microhemorrhage with other etiologies not excluded, no new large extra-axial collection or midline shift.  Tortuous vessels, likely from hypertension, maxillary ethmoid mucosal thickening, retention cyst polyp, mastoid opacification. Heterogeneity along the teeth with lucency on CT likely dental disease.    (08.15.2021)  HEAD CT: No acute intracranial hemorrhage or acute territorial infarction. Chronic infarctions and chronic microvascular ischemic changes as described.    CT PERFUSION demonstrated: No asymmetric or territorial perfusion abnormality.    CTA COW: No large vessel occlusion. High-grade stenosis involving the right posterior cerebral artery as described.    CTA NECK: Patent, ECAs, ICAs, no  hemodynamically significant stenosis at  ICA origins by NASCET criteria.  Bilateral vertebral arteries are patent without flow limiting stenosis.

## 2021-08-19 NOTE — H&P ADULT - NSHPLABSRESULTS_GEN_ALL_CORE
11.6   6.28  )-----------( 228      ( 18 Aug 2021 06:55 )             36.3   08-18    146<H>  |  109<H>  |  18  ----------------------------<  124<H>  3.5   |  22  |  1.01    Ca    9.9      18 Aug 2021 07:02    8/17/21: MR Head w/wo IV Cont     Acute infarct within the left internal capsule/thalamus without evidence of hemorrhagic conversion.    Otherwise stable brain MR.    8/17/21: CT Head No Cont     IMPRESSION:    HEAD CT: No acute hemorrhage or midline shift.  Decreased attenuation suspected in the left thalamus and also possibly the right. Bilateral thalamic changes can be seen with venous infarcts. Additional evaluation may be helpful, if clinically indicated. Brain MRI and MRV may provide helpful additional evaluation, if clinically indicated.    8/15/21 CT Angio Neck w/ IV Cont     IMPRESSION:    HEAD CT: No acute intracranial hemorrhage or acute territorial infarction. Chronic infarctions and chronic microvascular ischemic changes as described.    CT PERFUSION demonstrated: No asymmetric or territorial perfusion abnormality.    If symptoms persist consider follow up head CT or MRI, MRA  if no contraindication.    CTA COW: No large vessel occlusion. High-grade stenosis involving the right posterior cerebral artery as described.    CTA NECK: Patent, ECAs, ICAs, no  hemodynamically significant stenosis at  ICA origins by NASCET criteria.  Bilateral vertebral arteries are patent without flow limiting stenosis. VItals: T(C): 36.7 (08-19-21 @ 11:15), Max: 36.9 (08-19-21 @ 07:09)  HR: 88 (08-19-21 @ 12:45) (85 - 99)  BP: 160/110 (08-19-21 @ 12:45) (146/91 - 178/125)  RR: 18 (08-19-21 @ 11:15) (18 - 18)  SpO2: 99% (08-19-21 @ 11:15) (95% - 100%)    LABS     11.6   6.28  )-----------( 228      ( 18 Aug 2021 06:55 )             36.3   08-18    146<H>  |  109<H>  |  18  ----------------------------<  124<H>  3.5   |  22  |  1.01    Ca    9.9      18 Aug 2021 07:02    8/17/21: MR Head w/wo IV Cont     Acute infarct within the left internal capsule/thalamus without evidence of hemorrhagic conversion.    Otherwise stable brain MR.    8/17/21: CT Head No Cont     IMPRESSION:    HEAD CT: No acute hemorrhage or midline shift.  Decreased attenuation suspected in the left thalamus and also possibly the right. Bilateral thalamic changes can be seen with venous infarcts. Additional evaluation may be helpful, if clinically indicated. Brain MRI and MRV may provide helpful additional evaluation, if clinically indicated.    8/15/21 CT Angio Neck w/ IV Cont     IMPRESSION:    HEAD CT: No acute intracranial hemorrhage or acute territorial infarction. Chronic infarctions and chronic microvascular ischemic changes as described.    CT PERFUSION demonstrated: No asymmetric or territorial perfusion abnormality.    If symptoms persist consider follow up head CT or MRI, MRA  if no contraindication.    CTA COW: No large vessel occlusion. High-grade stenosis involving the right posterior cerebral artery as described.    CTA NECK: Patent, ECAs, ICAs, no  hemodynamically significant stenosis at  ICA origins by NASCET criteria.  Bilateral vertebral arteries are patent without flow limiting stenosis.

## 2021-08-19 NOTE — H&P ADULT - NSHPPHYSICALEXAM_GEN_ALL_CORE
PHYSICAL EXAMINATION   VItals: T(C): 36.7 (08-19-21 @ 11:15), Max: 36.9 (08-19-21 @ 07:09)  HR: 88 (08-19-21 @ 12:45) (85 - 99)  BP: 160/110 (08-19-21 @ 12:45) (146/91 - 178/125)  RR: 18 (08-19-21 @ 11:15) (18 - 18)  SpO2: 99% (08-19-21 @ 11:15) (95% - 100%)    General: NAD, Resting Comfortable,                                  HEENT: NC/AT, EOM I, PERRLA, Normal Conjunctivae  Cardio: RRR, Normal S1-S2, No M/G/R                              Pulm: No Respiratory Distress,  Lungs CTAB                        Abdomen: ND/NT, Soft, BS+                                                MSK: No joint swelling, Full ROM                                         Ext: No C/C/E, Pulses 2+ throughout, No calf tenderness    Skin:  all skin intact                                                                 Wounds: none  Decubitus Ulcers: None Present     Neurological Examination    Cognitive: AAO x 3                                                                         Attention: Intact   Judgment: Good evidence of judgement                               Memory: Recall 3 objects immediate and 3 min later      Mood/Affect: wnl                                                                           Communication:  Fluent,  No dysarthria   Swallow: intact  CN II - XII  intact  Coordination: FTN/HTS intact                                                                              Sensory: Intact to light touch, PP and Vibration                                                                                             Tone: normal Throughout   Balance: impaired    Motor    LEFT    UE: SF [5/5], EF [5/5], EE [5/5], WE [5/5],  [wnl]  RIGHT UE: SF [5/5], EF [5/5], EE [5/5], WE [5/5],  [wnl]  LEFT    LE:  HF [5/5], KE [5/5], DF [5/5], EHL [5/5],  PF [5/5]  RIGHT LE:  HF [5/5], KE [5/5], DF [5/5], EHL [5/5],  PF [5/5]      Reflex:  2 + thoroughout, Light/Babinski negative Constitutional - NAD, Comfortable  HEENT - NCAT, EOMI  Neck - Supple, No limited ROM  Chest - good chest expansion, good respiratory effort, CTAB   Cardio - warm and well perfused, RRR, no murmur  Abdomen -  Soft, NTND  Extremities - No peripheral edema, No calf tenderness   Neurologic Exam:                    Cognitive -             Orientation: Awake, Alert, AAO to self, place, date, year, situation            Attention:  Days of week, recall 3 objects without cuing            Memory: Recent/ remote memory intact            Thought: process, content appropriate     Speech - Fluent, Comprehensible, No dysarthria, No aphasia      Cranial Nerves - No facial asymmetry, Tongue midline, EOMI, Shoulder shrug intact     Motor -                      LEFT    UE - ShAB 5/5, EF 5/5, EE 5/5, WE 5/5,  WNL                    RIGHT UE - ShAB 1/5, EF 1/5, EE 1/5, WE 1/5,  0/5                    LEFT    LE - HF 5/5, KE 5/5, DF 5/5, PF 5/5                    RIGHT LE - HF 3/5, KE 3/5, DF 0/5, PF 2/5   - + foot drop      Sensory - Intact to LT bilateral     Reflexes - neg Light's b/l, neg Babinski's b/l     Coordination - FTN / HTS intact     OculoVestibular -  No nystagmus  Psychiatric - Mood stable, Affect WNL  Skin on admission: Constitutional - NAD, Comfortable  HEENT - NCAT, EOMI  Neck - Supple, No limited ROM  Chest - good chest expansion, good respiratory effort, CTAB   Cardio - warm and well perfused, RRR, no murmur  Abdomen -  Soft, NTND  Extremities - No peripheral edema, No calf tenderness   Neurologic Exam:                    Cognitive -             Orientation: Awake, Alert, AAO to self, place, date, year, situation            Attention:  Days of week, recall 3 objects without cuing            Memory: Recent/ remote memory intact            Thought: process, content appropriate     Speech - Fluent, Comprehensible, Mild dysarthria, No aphasia      Cranial Nerves - No facial asymmetry, Tongue midline, EOMI, Shoulder shrug intact     Motor -                      LEFT    UE - ShAB 5/5, EF 5/5, EE 5/5, WE 5/5,  WNL                    RIGHT UE - ShAB 2/5, EF 2/5, EE 2/5, WE 0/5,  0/5                    LEFT    LE - HF 5/5, KE 5/5, DF 5/5, PF 5/5                    RIGHT LE - HF 3/5, KE 4/5, DF 0/5, PF 1/5  + foot drop      Sensory - Intact to LT bilateral     Reflexes - neg Light's b/l, neg Babinski's b/l     Coordination - FTN / HTS intact     OculoVestibular -  No nystagmus  Psychiatric - Mood stable, Affect WNL  Skin on admission: No pressure ulcer noted.

## 2021-08-19 NOTE — PROGRESS NOTE ADULT - ASSESSMENT
ASSESSMENT: 61-year-old right-handed female, first evaluated at [facility] on 8/17/2021 with recurrent right hemiparesis. MRI brain (8/15/2021-compared to 10/20)  showed the interval appearance of a probably chronic, punctate left lateral thalamic lacunar infarct; severe periventricular, subcortical and brainstem (middle cerebellar peduncles) hyperintensities of presumed vascular origin.  CTA neck and head (8/15/2021) to my eye showed mild calcified plaque at the carotid bifurcations bilaterally, right more so than left.  CTP (8/15/2021) was negative.  TTE (8/16/2021) showed LVH.      Impression.  In 2016 she had a stroke with mild residual right hemiparesis.  At that time she had an ILR implanted but the duration may have been only for a couple of months.  In 10/20 she had a recurrent stroke with left hemiparesis.  Despite these strokes, she was highly functional with very mild residual deficits.  On 8/15/2021 she developed recurrent right hemiparesis and had difficulty with her gait.  By the time she arrived at [facility], she had improved, but she then worsened again.  Her presentation was reportedly initially a right ataxic hemiparesis, and then evolved to a right pure motor hemiparesis due to left thalamic infarct likely due to small vessel disease.      NEURO: Neurologically slightly  improving in regards to motor function, continue monitoring for neurologic deterioration, allow for slow titration to gradual normotension, avoid hypotension, LDL 72- continue home statin, A1C 6.8, MRI Brain w/o contrast noted above, repeat MRI/MRV noted above. (MRV was performed to r/o VST due to concern for CT findings of bilateral thalamic infarcts which were then later not seen on MRI). Keppra discontinued as highly doubt seizure, PT/OT/PMR recommended AR.     ANTITHROMBOTIC THERAPY: ticagrelor 180 mg loading dose, then 90 mg twice daily for 4 weeks; aspirin 81 mg daily per THALES trial     PULMONARY: CXR clear, protecting airway, saturating well     CARDIOVASCULAR: TTE EF 65-70% concentric left ventricular remodeling, no PFO., cardiac monitoring without reported events, ILR to screen for occult arrythmia placed today. Cardiology (Dr. Tubbs) consult appreciated, recommending low salt diet.                            SBP goal: slow titration to gradual normotension    GASTROINTESTINAL:  dysphagia screen passed and tolerating ordered diet     Diet: Consistent carb, no evening snack, low salt    RENAL: BUN/Cr within normal limits, good urine output , maintain adequate hydration      Na Goal: Greater than 135     Macario: N    HEMATOLOGY: H/H without change, Platelets 228, no active bleeding noted, she should have all age and risk appropriate malignancy screenings      DVT ppx: LMWH     ID: afebrile, no leukocytosis , no si/sx of infection     OTHER: Plan endorsed to patient at bedside, all questions and concerns addressed.     DISPOSITION: AR once bed available    CORE MEASURES:        Admission NIHSS: 1     TPA: NO      LDL/HDL: 72/49     Depression Screen: p     Statin Therapy: yes     Dysphagia Screen: PASS     Smoking NO      Afib  NO     Stroke Education  YES     Obtain screening lower extremity venous ultrasound in patients who meet 1 or more of the following criteria as patient is high risk for DVT/PE on admission:   [] History of DVT/PE  []Hypercoagulable states (Factor V Leiden, Cancer, OCP, etc. )  []Prolonged immobility (hemiplegia/hemiparesis/post operative or any other extended immobilization)  [] Transferred from outside facility (Rehab or Long term care)  [] Age </= to 50 ASSESSMENT: 61-year-old right-handed female, first evaluated at [facility] on 8/17/2021 with recurrent right hemiparesis. MRI brain (8/15/2021-compared to 10/20)  showed the interval appearance of a probably chronic, punctate left lateral thalamic lacunar infarct; severe periventricular, subcortical and brainstem (middle cerebellar peduncles) hyperintensities of presumed vascular origin.  CTA neck and head (8/15/2021) to my eye showed mild calcified plaque at the carotid bifurcations bilaterally, right more so than left.  CTP (8/15/2021) was negative.  TTE (8/16/2021) showed LVH.      Impression.  In 2016 she had a stroke with mild residual right hemiparesis.  At that time she had an ILR implanted but the duration may have been only for a couple of months.  In 10/20 she had a recurrent stroke with left hemiparesis.  Despite these strokes, she was highly functional with very mild residual deficits.  On 8/15/2021 she developed recurrent right hemiparesis and had difficulty with her gait.  By the time she arrived at [facility], she had improved, but she then worsened again.  Her presentation was reportedly initially a right ataxic hemiparesis, and then evolved to a right pure motor hemiparesis due to left thalamic infarct likely due to small vessel disease.      NEURO: Neurologically now without acute change, continue monitoring for neurologic deterioration, allow for slow titration to  normotension as tolerated avoiding rapid fluctuations as to avoid hypoperfusion, avoid hypotension, LDL 72- continue home statin, A1C 6.8, MRI Brain w/o contrast noted above, repeat MRI/MRV noted above. (MRV was performed to r/o VST due to concern for CT findings of bilateral thalamic infarcts which were then later not seen on MRI). Keppra discontinued as highly doubt seizure, PT/OT/PMR recommended AR.     ANTITHROMBOTIC THERAPY: ticagrelor 180 mg loading dose, then 90 mg twice daily for 4 weeks; aspirin 81 mg daily per THALES trial     PULMONARY: CXR clear, protecting airway, saturating well     CARDIOVASCULAR: TTE EF 65-70% concentric left ventricular remodeling, no PFO., cardiac monitoring without reported events, ILR to screen for occult arrythmia placed today. Cardiology (Dr. Tubbs) consult appreciated, recommending low salt diet.                            SBP goal:   neurologically tolerating 140-160mmHg for now, would titrate very gradually to long term goal of normotension    GASTROINTESTINAL:  dysphagia screen passed and tolerating  diet     Diet: Consistent carb, no evening snack, low salt    RENAL: BUN/Cr within normal limits, good urine output , maintain adequate hydration      Na Goal: Greater than 135     Macario: N    HEMATOLOGY: H/H without change, Platelets 228, no active bleeding noted, she should have all age and risk appropriate malignancy screenings      DVT ppx: LMWH     ID: afebrile, no leukocytosis , no si/sx of infection     OTHER: Plan endorsed to patient at bedside, all questions and concerns addressed. Strongly encouraged follow up with her care providers including neurology as she notes she has not followed up as much. She is upset about current condition but remains motivated to get better and for rehab. Support provided, continue to monitor.     DISPOSITION: AR once bed available    CORE MEASURES:        Admission NIHSS: 1     TPA: NO      LDL/HDL: 72/49     Depression Screen: 1     Statin Therapy: yes     Dysphagia Screen: PASS     Smoking NO      Afib  NO     Stroke Education  YES     Obtain screening lower extremity venous ultrasound in patients who meet 1 or more of the following criteria as patient is high risk for DVT/PE on admission:   [] History of DVT/PE  []Hypercoagulable states (Factor V Leiden, Cancer, OCP, etc. )  []Prolonged immobility (hemiplegia/hemiparesis/post operative or any other extended immobilization)  [] Transferred from outside facility (Rehab or Long term care)  [] Age </= to 50

## 2021-08-19 NOTE — DISCHARGE NOTE NURSING/CASE MANAGEMENT/SOCIAL WORK - PATIENT PORTAL LINK FT
You can access the FollowMyHealth Patient Portal offered by [facility] by registering at the following website: [email or web address]. By joining [facility]’s FollowMyHealth portal, you will also be able to view your health information using other applications (apps) compatible with our system.

## 2021-08-19 NOTE — CONSULT NOTE ADULT - SUBJECTIVE AND OBJECTIVE BOX
Patient is a 60 Y/O Female mRS 1 PMHx DMT2, HTN/HLD, multiple CVAs w/ residual RUE/LE weakness w foot drop, LLE>UE numbness s/p loop recorder and plavix  now discontinued 2017,  presenting with acute on chronic R-sided weakness LKN 8/15 07:30 symptoms noticed around 10AM after patient was trying to use her arm and was not able to "make it do what I wanted" on further clarification of RUE>LE weakness. Pt endorses was leaning to one side, endorses RLE feels weaker than normal which was present on initial NIHSS 5. Initial BPs elevated 190s systolic, post-CTH without hemorrhage or acute findings, BP was lowered with improvement in R sided acute on chronic weakness to allow for ataxia testing in limbs which was present, which was new findings. 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 On [id number] not taken today. Before leaving bedside, /105 with resolution of acute R sided findings. Endorses mild L posterior headache, no N/V or infectious symptoms, endorses back at baseline.       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 1000 milliLiter(s) (50 mL/Hr) IV Continuous <Continuous>  dextrose 5%. 1000 milliLiter(s) (100 mL/Hr) IV Continuous <Continuous>  dextrose 50% Injectable 25 Gram(s) IV Push once  dextrose 50% Injectable 12.5 Gram(s) IV Push once  dextrose 50% Injectable 25 Gram(s) IV Push once  enoxaparin Injectable 40 milliGRAM(s) SubCutaneous daily  glucagon  Injectable 1 milliGRAM(s) IntraMuscular once  insulin glargine Injectable (LANTUS) 15 Unit(s) SubCutaneous at bedtime  insulin lispro (ADMELOG) corrective regimen sliding scale   SubCutaneous three times a day before meals  ticagrelor 90 milliGRAM(s) Oral two times a day    MEDICATIONS  (PRN):  acetaminophen   Tablet .. 975 milliGRAM(s) Oral every 6 hours PRN Mild Pain (1 - 3), Moderate Pain (4 - 6), Severe Pain (7 - 10)      Home Medications:  aspirin 81 mg oral tablet, chewable: 1 tab(s) orally once a day (18 Aug 2021 13:50)  atorvastatin 80 mg oral tablet: 1 tab(s) orally once a day (at bedtime) (18 Aug 2021 13:50)  enoxaparin: 40 milligram(s) subcutaneous once a day (at bedtime) (18 Aug 2021 13:50)  insulin lispro 100 units/mL subcutaneous solution: 1 Unit(s) if Glucose 151 - 200  2 Unit(s) if Glucose 201 - 250  3 Unit(s) if Glucose 251 - 300  4 Unit(s) if Glucose 301 - 350  5 Unit(s) if Glucose 351 - 400  6 Unit(s) if Glucose Greater Than 400  Three times a day before meals (18 Aug 2021 13:50)  Levemir FlexPen 100 units/mL subcutaneous solution: 15 unit(s) subcutaneous once a day (at bedtime) (16 Aug 2021 18:18)  Norvasc 5 mg oral tablet: 1 tab(s) orally once a day (19 Aug 2021 11:14)  ticagrelor 90 mg oral tablet: 1 tab(s) orally 2 times a day (18 Aug 2021 13:50)    Allergies    sulfa drugs (Angioedema; Swelling)  sulfa drugs (Rash)  Sulfac 10% (Unknown)  walnut (Urticaria)    Intolerances    lactose (Diarrhea)      Vital Signs Last 24 Hrs  T(C): 36.7 (19 Aug 2021 22:15), Max: 36.9 (19 Aug 2021 07:09)  T(F): 98 (19 Aug 2021 22:15), Max: 98.4 (19 Aug 2021 07:09)  HR: 93 (19 Aug 2021 22:15) (75 - 93)  BP: 162/108 (19 Aug 2021 22:15) (146/91 - 178/125)  BP(mean): --  RR: 16 (19 Aug 2021 22:15) (16 - 18)  SpO2: 99% (19 Aug 2021 22:15) (95% - 99%)    Appearance: Normal	  HEENT:   Normal oral mucosa, PERRL, EOMI	  Lymphatic: No lymphadenopathy , no edema  Cardiovascular: Normal S1 S2, No JVD  Respiratory: Lungs clear to auscultation, normal effort 	  Gastrointestinal:  Soft, Non-tender, + BS	  Skin: No rashes, No ecchymoses, No cyanosis, warm to touch  Musculoskeletal: Normal range of motion, normal strength  Psychiatry:  Mood & affect appropriate  Ext: No edema                          11.6   6.28  )-----------( 228      ( 18 Aug 2021 06:55 )             36.3               08-18    146<H>  |  109<H>  |  18  ----------------------------<  124<H>  3.5   |  22  |  1.01    Ca    9.9      18 Aug 2021 07:02         < from: CT Head No Cont (08.17.21 @ 11:54) >  IMPRESSION:    HEAD CT: No acute hemorrhage or midline shift.  Decreased attenuation suspected in the left thalamus and also possibly the right. Bilateral thalamic changes can be seen with venous infarcts. Additional evaluation may be helpful, if clinically indicated. Brain MRI and MRV may provide helpful additional evaluation, if clinically indicated.    < from: CT Perfusion w/ Maps w/ IV Cont (08.15.21 @ 11:30) >  IMPRESSION:    HEAD CT: No acute intracranial hemorrhage or acute territorial infarction. Chronic infarctions and chronic microvascular ischemic changes as described.    CT PERFUSION demonstrated: No asymmetric or territorial perfusion abnormality.    If symptoms persist consider follow up head CT or MRI, MRA  if no contraindication.    CTA COW: No large vessel occlusion. High-grade stenosis involving the right posterior cerebral artery as described.    CTA NECK: Patent, ECAs, ICAs, no  hemodynamically significant stenosis at  ICA origins by NASCET criteria.  Bilateral vertebral arteries are patent without flow limiting stenosis.

## 2021-08-19 NOTE — H&P ADULT - NSHPSOCIALHISTORY_GEN_ALL_CORE
Smoking - Denied  EtOH - Denied   Drugs - Denied      pt lives in a private home with her  and 4 of her children, 3 [place], no Hand Rails, and 14 steps inside up to bedroom/bathroom with Bilateral Hand Rails.  PTA: Independent in ADLs and functional ambulation  without AD. she uses AFO R for community ambulation    CURRENT FUNCTIONAL STATUS 8/18  Bed Mobility: Mod A of 1  Transfers: Mod of 1   Gait: Mod of 1, 3-4 steps from bed with nirmal walker Smoking - Denied  EtOH - Denied   Drugs - Denied      pt lives in a private home with her  and 4 of her children, 3 [place], no Hand Rails, and 14 steps inside up to bedroom/bathroom with Bilateral Hand Rails.  PTA: Independent in ADLs and functional ambulation  without AD. she uses AFO R for community ambulation    CURRENT FUNCTIONAL STATUS 8/18  Bed Mobility: Mod A of 1  Transfers: Mod of 1   Gait: Mod of 1, 3-4 steps from bed with nirmal walker    8/18 PT/OT  bed mobility mod assist  transfers mod assist with hemiwalker  gait mod assist x 3-4 steps with hemiwalker

## 2021-08-19 NOTE — PROGRESS NOTE ADULT - PROBLEM SELECTOR PLAN 2
Keeping a bit hypertensive (permissive) for now   Low salt diet.
Started Norvasc  Low salt diet.
Keeping a bit hypertensive (permissive) for now   Low salt diet.

## 2021-08-19 NOTE — PATIENT PROFILE ADULT - NSPROGENOTHERPROVIDER_GEN_A_NUR
Patient here for postpartum check-up. Vaginal delivery 6 weeks ago with CNM Prisca Damon. Delivery was complicated by precipitous delivery, GBS + unable to get antibiotics.  Bottle feeding as patient was having difficulty breastfeeding and caring for her t none

## 2021-08-20 DIAGNOSIS — I10 ESSENTIAL (PRIMARY) HYPERTENSION: ICD-10-CM

## 2021-08-20 DIAGNOSIS — R26.81 UNSTEADINESS ON FEET: ICD-10-CM

## 2021-08-20 DIAGNOSIS — E11.9 TYPE 2 DIABETES MELLITUS WITHOUT COMPLICATIONS: ICD-10-CM

## 2021-08-20 DIAGNOSIS — Z86.73 PERSONAL HISTORY OF TRANSIENT ISCHEMIC ATTACK (TIA), AND CEREBRAL INFARCTION WITHOUT RESIDUAL DEFICITS: ICD-10-CM

## 2021-08-20 DIAGNOSIS — E87.6 HYPOKALEMIA: ICD-10-CM

## 2021-08-20 DIAGNOSIS — E78.5 HYPERLIPIDEMIA, UNSPECIFIED: ICD-10-CM

## 2021-08-20 DIAGNOSIS — Z29.9 ENCOUNTER FOR PROPHYLACTIC MEASURES, UNSPECIFIED: ICD-10-CM

## 2021-08-20 DIAGNOSIS — N17.9 ACUTE KIDNEY FAILURE, UNSPECIFIED: ICD-10-CM

## 2021-08-20 DIAGNOSIS — D64.9 ANEMIA, UNSPECIFIED: ICD-10-CM

## 2021-08-20 LAB
ALBUMIN SERPL ELPH-MCNC: 3.5 G/DL — SIGNIFICANT CHANGE UP (ref 3.3–5)
ALP SERPL-CCNC: 82 U/L — SIGNIFICANT CHANGE UP (ref 40–120)
ALT FLD-CCNC: 23 U/L — SIGNIFICANT CHANGE UP (ref 10–45)
ANION GAP SERPL CALC-SCNC: 9 MMOL/L — SIGNIFICANT CHANGE UP (ref 5–17)
AST SERPL-CCNC: 16 U/L — SIGNIFICANT CHANGE UP (ref 10–40)
BASOPHILS # BLD AUTO: 0.03 K/UL — SIGNIFICANT CHANGE UP (ref 0–0.2)
BASOPHILS NFR BLD AUTO: 0.4 % — SIGNIFICANT CHANGE UP (ref 0–2)
BILIRUB SERPL-MCNC: 0.8 MG/DL — SIGNIFICANT CHANGE UP (ref 0.2–1.2)
BUN SERPL-MCNC: 16 MG/DL — SIGNIFICANT CHANGE UP (ref 7–23)
CALCIUM SERPL-MCNC: 9.3 MG/DL — SIGNIFICANT CHANGE UP (ref 8.4–10.5)
CHLORIDE SERPL-SCNC: 106 MMOL/L — SIGNIFICANT CHANGE UP (ref 96–108)
CO2 SERPL-SCNC: 28 MMOL/L — SIGNIFICANT CHANGE UP (ref 22–31)
CREAT SERPL-MCNC: 1.24 MG/DL — SIGNIFICANT CHANGE UP (ref 0.5–1.3)
EOSINOPHIL # BLD AUTO: 0.26 K/UL — SIGNIFICANT CHANGE UP (ref 0–0.5)
EOSINOPHIL NFR BLD AUTO: 3.7 % — SIGNIFICANT CHANGE UP (ref 0–6)
GLUCOSE BLDC GLUCOMTR-MCNC: 111 MG/DL — HIGH (ref 70–99)
GLUCOSE BLDC GLUCOMTR-MCNC: 126 MG/DL — HIGH (ref 70–99)
GLUCOSE BLDC GLUCOMTR-MCNC: 155 MG/DL — HIGH (ref 70–99)
GLUCOSE SERPL-MCNC: 140 MG/DL — HIGH (ref 70–99)
HCT VFR BLD CALC: 34.8 % — SIGNIFICANT CHANGE UP (ref 34.5–45)
HGB BLD-MCNC: 11.4 G/DL — LOW (ref 11.5–15.5)
IMM GRANULOCYTES NFR BLD AUTO: 0.3 % — SIGNIFICANT CHANGE UP (ref 0–1.5)
LYMPHOCYTES # BLD AUTO: 1.99 K/UL — SIGNIFICANT CHANGE UP (ref 1–3.3)
LYMPHOCYTES # BLD AUTO: 28.3 % — SIGNIFICANT CHANGE UP (ref 13–44)
MCHC RBC-ENTMCNC: 26.4 PG — LOW (ref 27–34)
MCHC RBC-ENTMCNC: 32.8 GM/DL — SIGNIFICANT CHANGE UP (ref 32–36)
MCV RBC AUTO: 80.6 FL — SIGNIFICANT CHANGE UP (ref 80–100)
MONOCYTES # BLD AUTO: 0.53 K/UL — SIGNIFICANT CHANGE UP (ref 0–0.9)
MONOCYTES NFR BLD AUTO: 7.5 % — SIGNIFICANT CHANGE UP (ref 2–14)
NEUTROPHILS # BLD AUTO: 4.21 K/UL — SIGNIFICANT CHANGE UP (ref 1.8–7.4)
NEUTROPHILS NFR BLD AUTO: 59.8 % — SIGNIFICANT CHANGE UP (ref 43–77)
NRBC # BLD: 0 /100 WBCS — SIGNIFICANT CHANGE UP (ref 0–0)
PLATELET # BLD AUTO: 213 K/UL — SIGNIFICANT CHANGE UP (ref 150–400)
POTASSIUM SERPL-MCNC: 3.1 MMOL/L — LOW (ref 3.5–5.3)
POTASSIUM SERPL-SCNC: 3.1 MMOL/L — LOW (ref 3.5–5.3)
PROT SERPL-MCNC: 7.8 G/DL — SIGNIFICANT CHANGE UP (ref 6–8.3)
RBC # BLD: 4.32 M/UL — SIGNIFICANT CHANGE UP (ref 3.8–5.2)
RBC # FLD: 14.6 % — HIGH (ref 10.3–14.5)
SARS-COV-2 RNA SPEC QL NAA+PROBE: SIGNIFICANT CHANGE UP
SODIUM SERPL-SCNC: 143 MMOL/L — SIGNIFICANT CHANGE UP (ref 135–145)
WBC # BLD: 7.04 K/UL — SIGNIFICANT CHANGE UP (ref 3.8–10.5)
WBC # FLD AUTO: 7.04 K/UL — SIGNIFICANT CHANGE UP (ref 3.8–10.5)

## 2021-08-20 PROCEDURE — G0452: CPT | Mod: 26

## 2021-08-20 PROCEDURE — 99223 1ST HOSP IP/OBS HIGH 75: CPT

## 2021-08-20 RX ORDER — METFORMIN HYDROCHLORIDE 850 MG/1
500 TABLET ORAL
Refills: 0 | Status: DISCONTINUED | OUTPATIENT
Start: 2021-08-20 | End: 2021-08-24

## 2021-08-20 RX ORDER — LABETALOL HCL 100 MG
100 TABLET ORAL ONCE
Refills: 0 | Status: COMPLETED | OUTPATIENT
Start: 2021-08-20 | End: 2021-08-20

## 2021-08-20 RX ORDER — AMLODIPINE BESYLATE 2.5 MG/1
10 TABLET ORAL DAILY
Refills: 0 | Status: DISCONTINUED | OUTPATIENT
Start: 2021-08-21 | End: 2021-09-17

## 2021-08-20 RX ORDER — POTASSIUM CHLORIDE 20 MEQ
20 PACKET (EA) ORAL
Refills: 0 | Status: COMPLETED | OUTPATIENT
Start: 2021-08-20 | End: 2021-08-20

## 2021-08-20 RX ORDER — AMLODIPINE BESYLATE 2.5 MG/1
5 TABLET ORAL ONCE
Refills: 0 | Status: COMPLETED | OUTPATIENT
Start: 2021-08-20 | End: 2021-08-20

## 2021-08-20 RX ADMIN — AMLODIPINE BESYLATE 5 MILLIGRAM(S): 2.5 TABLET ORAL at 11:18

## 2021-08-20 RX ADMIN — AMLODIPINE BESYLATE 5 MILLIGRAM(S): 2.5 TABLET ORAL at 05:12

## 2021-08-20 RX ADMIN — Medication 100 MILLIGRAM(S): at 20:10

## 2021-08-20 RX ADMIN — TICAGRELOR 90 MILLIGRAM(S): 90 TABLET ORAL at 05:12

## 2021-08-20 RX ADMIN — TICAGRELOR 90 MILLIGRAM(S): 90 TABLET ORAL at 17:09

## 2021-08-20 RX ADMIN — Medication 20 MILLIEQUIVALENT(S): at 16:48

## 2021-08-20 RX ADMIN — Medication 20 MILLIEQUIVALENT(S): at 14:55

## 2021-08-20 RX ADMIN — ATORVASTATIN CALCIUM 80 MILLIGRAM(S): 80 TABLET, FILM COATED ORAL at 21:14

## 2021-08-20 RX ADMIN — Medication 81 MILLIGRAM(S): at 12:12

## 2021-08-20 RX ADMIN — Medication 20 MILLIEQUIVALENT(S): at 12:12

## 2021-08-20 RX ADMIN — METFORMIN HYDROCHLORIDE 500 MILLIGRAM(S): 850 TABLET ORAL at 17:12

## 2021-08-20 RX ADMIN — INSULIN GLARGINE 15 UNIT(S): 100 INJECTION, SOLUTION SUBCUTANEOUS at 21:14

## 2021-08-20 RX ADMIN — ENOXAPARIN SODIUM 40 MILLIGRAM(S): 100 INJECTION SUBCUTANEOUS at 12:12

## 2021-08-20 NOTE — CONSULT NOTE ADULT - PROBLEM SELECTOR RECOMMENDATION 3
Home meds: amlodipine 10mg, hydralazine 50mg, labetotol 100mg mg TID, lisinopril 40mg   - given additional amlodipine today sine was only on 5mg.  - continue amlodipine 10mg  - monitor VS

## 2021-08-20 NOTE — DIETITIAN INITIAL EVALUATION ADULT. - OTHER INFO
Initial Nutrition Assessment   61yr Old Female   Dx: CVA  Diet - Consistent Carbohydrate Low Sodium Diet w/ Thin Liquids  (Declines Nutrition Supplementation)  State [place] Food Allergy & Denies Lactose Intolerance  Tolerates Diet Well - No Chewing/Swallowing Complications (Per Patient)  Surgical Incision on Chest & No Pressure Ulcers (as Per Nursing Flow Sheets)  No Edema Noted (as Per Nursing Flow Sheets)  No Recent Nausea/Vomiting/Diarrhea/Constipation (as Per Patient)

## 2021-08-20 NOTE — DIETITIAN NUTRITION RISK NOTIFICATION - TREATMENT: THE FOLLOWING DIET HAS BEEN RECOMMENDED
Declines Nutrition Supplementation   Educated Patient on Strategies For Maximizing Caloric Intake

## 2021-08-20 NOTE — CONSULT NOTE ADULT - SUBJECTIVE AND OBJECTIVE BOX
Patient is a 61y old  Female who presents with a chief complaint of CVA (20 Aug 2021 10:16)      HPI:  62 yo R handed Female with  PMHx of DMT2, HTN, HLD, multiple CVAs w/ residual RUE/LE weakness w/ foot drop, LLE>UE numbness s/p loop recorder and plavix  now discontinued 2017. She presented to [facility] on 8/15/21 with acute on chronic R-sided weakness, LKN 8/15 07:30 symptoms noticed around 10AM after patient was trying to use her arm and was not able to "make it do what I wanted" on further clarification of RUE>LE weakness. Pt endorsed was leaning to one side, endorsed RLE feels weaker than normal which was present on initial NIHSS 5. Initial BPs elevated 190s systolic, post-CTH without hemorrhage or acute findings, BP was lowered with improvement in R sided acute on chronic weakness to allow for ataxia testing in limbs which was present, which was new findings.  Endorsed mild L posterior headache, no N/V or infectious symptoms. Patient was re-evaluated after improvement for weakness, concern for focal seizure with patient endorsing no prior seizure history or prior episodes w RUE. CTH w/o acute findings, CTA w worsening PCA stenosis but normal flow bL VAs. CDU monitoring for MR Brain to access for any new infarcts, however course c/b reoccurrence in R UE "weakness" appearing contracture-like w RUE flexion and hypertonia, intact mentation.  she was seen by neurologist.  pt received 1500mg IV Keppra load given high suspicion for focal seizure activity. MRI Brain showed Left thalamic infarct. She did not receive TPA as she was out of  window. She was seen by cardiology; TTE, ASA/STATIN, and losartan started. EP was consulted, ILR was implanted (8/18), No events recorded on ILR. Of note, patient had ILR placed in the past ( 2017) no arrhythmias was recorded then. She was evaluated by PM&R and therapist and acute rehabilitation was recommended. [facility]   (19 Aug 2021 14:38)      TODAY:  Patient seen and examined in NAD  No overnight event  Reports having difficulty speaking and feeling weak. Is feeling frustrated and a bit overwhelm      PAST MEDICAL & SURGICAL HISTORY:  Hypertension    Diabetes Mellitus Type II    Generalized Headaches    IBS (Irritable Bowel Syndrome)    Hypokalemia    CVA (cerebral vascular accident)    TIA (transient ischemic attack)    Hemiplegia of right dominant side due to infarction of brain    No Past Surgical History    FAMILY HISTORY:  Father: - at age - with history of   Mother: - at age - with history of     SOCIAL HISTORY:  Substance Use (street drugs): denies  Tobacco Usage: denies  Alcohol Usage: occasional alcohol assumption      ALLERGIES:  sulfa drugs (Angioedema; Swelling)  sulfa drugs (Rash)  Sulfac 10% (Unknown)  walnut (Urticaria)    Intolerances    lactose (Diarrhea)    MEDICATIONS:  amLODIPine   Tablet 5 milliGRAM(s) Oral once    REVIEW OF SYSTEMS:  CONSTITUTIONAL: No fever, weight loss, or fatigue  EYES: No eye pain, visual disturbances, or discharge  RESPIRATORY: No cough, wheezing, chills or hemoptysis; No shortness of breath  CARDIOVASCULAR: No chest pain, palpitations, dizziness, or leg swelling  GASTROINTESTINAL: No abdominal or epigastric pain. No nausea, vomiting, or hematemesis; No diarrhea or constipation. No melena or hematochezia.  GENITOURINARY: No dysuria, frequency, hematuria, or incontinence  NEUROLOGICAL: No headaches, memory loss, loss of strength, numbness, or tremors  SKIN: No itching, burning, rashes, or lesions   MUSCULOSKELETAL: No joint pain or swelling; No muscle, back, or extremity pain  PSYCHIATRIC: No depression, anxiety, mood swings, or difficulty sleeping    ALL OTHER SYSTEMS REVIEWED AND ARE NEGATIVE    VITAL SIGNS:  T(C): 37 (08-20-21 @ 10:32), Max: 37 (08-20-21 @ 10:32)  HR: 85 (08-20-21 @ 05:11) (75 - 93)  BP: 168/102 (08-20-21 @ 10:32) (146/93 - 178/125)  RR: 15 (08-20-21 @ 10:32) (14 - 18)  SpO2: 98% (08-20-21 @ 10:32) (98% - 99%)  Wt(kg): --Vital Signs Last 24 Hrs  T(C): 37 (20 Aug 2021 10:32), Max: 37 (20 Aug 2021 10:32)  T(F): 98.6 (20 Aug 2021 10:32), Max: 98.6 (20 Aug 2021 10:32)  HR: 85 (20 Aug 2021 05:11) (75 - 93)  BP: 168/102 (20 Aug 2021 10:32) (146/93 - 178/125)  BP(mean): --  RR: 15 (20 Aug 2021 10:32) (14 - 18)  SpO2: 98% (20 Aug 2021 10:32) (98% - 99%)    PHYSICAL EXAM:  GENERAL: NAD  HENT:  Atraumatic, Normocephalic; No tonsillar erythema, exudates, ulcers, or enlargement; Moist mucous membranes  EYES: EOMI, PERRLA, conjunctiva and sclera clear; no lid-lag  NECK: Supple, No JVD, Normal thyroid  NERVOUS SYSTEM:  Motor Strength 5/5 B/L upper and lower extremities; CN II-XII intact  CHEST/LUNG: Clear to percussion bilaterally; No rales, rhonchi, wheezing, or rubs; normal respiratory effort, no intercostal retractions  HEART: Regular rate and rhythm; No murmurs, rubs, or gallops  ABDOMEN: Soft, Nontender, Nondistended; Bowel sounds present; No HSM  EXTREMITIES:  2+ Peripheral Pulses, No clubbing, cyanosis, or peripheral edema  SKIN: No rashes or lesions; normal temperature and turgor   PSYCH: Appropriate affect; Alert & Oriented x 3    LABS:                        11.4   7.04  )-----------( 213      ( 20 Aug 2021 05:45 )             34.8     08-20    143  |  106  |  16  ----------------------------<  140<H>  3.1<L>   |  28  |  1.24    Ca    9.3      20 Aug 2021 05:45    TPro  7.8  /  Alb  3.5  /  TBili  0.8  /  DBili  x   /  AST  16  /  ALT  23  /  AlkPhos  82  08-20         CAPILLARY BLOOD GLUCOSE      POCT Blood Glucose.: 121 mg/dL (19 Aug 2021 22:50)  POCT Blood Glucose.: 181 mg/dL (19 Aug 2021 16:14)  POCT Blood Glucose.: 121 mg/dL (19 Aug 2021 11:18)              Previous Consultant(s) Notes Reviewed:  YES  Care Discussed with Consultants/Other Providers YES  Previous Imaging Personally Reviewed:  YES

## 2021-08-20 NOTE — CONSULT NOTE ADULT - ASSESSMENT
60 yo R handed woman with history of DMT2, HTN, HLD, multiple CVAs w/ residual RUE/LE weakness w/ foot drop, LLE>UE numbness s/p loop recorder and no longer on plavix admitted to  rehab after hospital course for left thalamic infarct.

## 2021-08-20 NOTE — DIETITIAN INITIAL EVALUATION ADULT. - WEIGHT FOR BMI (KG)
Post Upper Endoscopy     During your procedure today we found:     Gastritis (red streaks in stomach)  NO ulcers or tumors   Samples of tissue were taken and sent to lab      You may experience abdominal bloating or cramps due to air used to inflate the stomach during the procedure. This is common and can be relieved by walking, belching, and passing gas. Salt-water gargles and lozenges can help if you have a sore throat. If any of the following problems occur, call us at  [phone]. Severe pain/ discomfort in abdomen  Difficulty swallowing  Nausea and/or vomiting  Temperature above 101 degrees F  New/ increased bleeding from mouth or rectum, or black, tarry stools  Abdominal bloating not relieved by belching or passing gas  Chest pain or shortness of breath    1. Resume your regular diet, as tolerated  2. Clinic to follow up with pathology results and further recommendations  3. Office appt  December 14,2017 at 1040 am with Kindra Mendoza to discuss test results  4. GASTRIC EMPTYING STUDY December 13,2017 AT 11 AM  at [facility]. NOTHING TO EAT OR DRINK 6 HOURS PRIOR TO THE TEST      Due to sedation you received, you may not remember the procedure or the doctorâs explanation afterward. Our medications sometimes cause dizziness, drowsiness and impaired judgment. Therefore, please follow these recommendations for the next 24 hours. Do not drive a car or operate any machinery. Have a responsible adult drive you home. Do not make critical decisions or sign any legal documents. Do not drink alcohol  Do not return to work, or perform any tasks that require coordinated activity    Patient and patient representative have verbalized  understanding of these instructions and have  received a copy. We would like to take this opportunity to thank you for choosing [facility].  Because your confidence in your caregivers is very important to us, it is our commitment to always treat you and your family with courtesy and respect. Our goal is to always answer any questions or worries or concerns you may have about the care you received If you have any suggestions for improvement or worries or concerns please do not hesitate to let us know.                                                                          Signature of  Patient Representative:             Signature of Discharge Nurse :     Date:    Time: 98

## 2021-08-20 NOTE — PROGRESS NOTE ADULT - SUBJECTIVE AND OBJECTIVE BOX
Patient is a 61y old  Female who presents with a chief complaint of CVA (19 Aug 2021 14:38)    HPI:  62 yo R handed Female with  PMHx of DMT2, HTN, HLD, multiple CVAs w/ residual RUE/LE weakness w/ foot drop, LLE>UE numbness s/p loop recorder and plavix  now discontinued 2017. She presented to [facility] on 8/15/21 with acute on chronic R-sided weakness, LKN 8/15 07:30 symptoms noticed around 10AM after patient was trying to use her arm and was not able to "make it do what I wanted" on further clarification of RUE>LE weakness. Pt endorsed was leaning to one side, endorsed RLE feels weaker than normal which was present on initial NIHSS 5. Initial BPs elevated 190s systolic, post-CTH without hemorrhage or acute findings, BP was lowered with improvement in R sided acute on chronic weakness to allow for ataxia testing in limbs which was present, which was new findings.  Endorsed mild L posterior headache, no N/V or infectious symptoms. Patient was re-evaluated after improvement for weakness, concern for focal seizure with patient endorsing no prior seizure history or prior episodes w RUE. CTH w/o acute findings, CTA w worsening PCA stenosis but normal flow bL VAs. CDU monitoring for MR Brain to access for any new infarcts, however course c/b reoccurrence in R UE "weakness" appearing contracture-like w RUE flexion and hypertonia, intact mentation.  she was seen by neurologist.  pt received 1500mg IV Keppra load given high suspicion for focal seizure activity. MRI Brain showed Left thalamic infarct. She did not receive TPA as she was out of  window. She was seen by cardiology; TTE, ASA/STATIN, and losartan started. EP was consulted, ILR was implanted (8/18), No events recorded on ILR. Of note, patient had ILR placed in the past ( 2017) no arrhythmias was recorded then. She was evaluated by PM&R and therapist and acute rehabilitation was recommended. She was admitted to [facility] on 8/19/21.       PAST MEDICAL & SURGICAL HISTORY:  Hypertension  Diabetes Mellitus Type II  Generalized Headaches  IBS (Irritable Bowel Syndrome)  Hypokalemia  CVA (cerebral vascular accident)  TIA (transient ischemic attack)  Hemiplegia of right dominant side due to infarction of brain    No Past Surgical History    Allergies  sulfa drugs (Angioedema; Swelling)  sulfa drugs (Rash)  Sulfac 10% (Unknown)  walnut (Urticaria)    Intolerances    lactose (Diarrhea)    SUBJECTIVE & ROS:  Pt wake sitting up in bed.  Did not eat breakfast because didn't like the food.  Requesting for oatmeal.  Pt had 2 prior CVA - first one in 2016. Had ILR, but removed? and was on plavix (dc 2017).  Right foot drop since 2016.  Used AFO that doesn't fit/work because it caused her right knee to flex.  Been meaning to get it fitted again, but didn't get the chance.  She's been managing with cane/RW PTA.  Left sided weakness started 10/2020 -left foot feel like there's a thick sock on.  Right hand with intermittent decreased coordination.  Prior to this admission, she was struggling to /reach for her pills.  ILR placed again.  Reports mild left chest wall discomfort which is more muscular.  Pt is unclear if she had genetic w/u.  Otherwise, denies CP, SOB, HA, dizziness, GI/ symptoms.  LBM 8/17.  Denies urinary incontinence, but d/t urgency, she does go in disposable brief    PHYSICAL EXAM  61y  Vital Signs Last 24 Hrs  T(C): 36.7 (19 Aug 2021 22:15), Max: 36.7 (19 Aug 2021 11:15)  T(F): 98 (19 Aug 2021 22:15), Max: 98.1 (19 Aug 2021 11:15)  HR: 85 (20 Aug 2021 05:11) (75 - 93)  BP: 148/100 (20 Aug 2021 05:11) (146/93 - 178/125)  BP(mean): --  RR: 14 (20 Aug 2021 05:11) (14 - 18)  SpO2: 98% (20 Aug 2021 05:11) (98% - 99%)  Daily Height in cm: 154.43 (19 Aug 2021 22:15)    Daily     Constitutional - NAD, Comfortable  HEENT - NCAT, EOMI  Neck - Supple, No limited ROM  Chest - good chest expansion, good respiratory effort, CTAB   Cardio - warm and well perfused, RRR, no murmur  Abdomen -  Soft, NTND  Extremities - No peripheral edema, No calf tenderness   Neurologic Exam:                    Cognitive - AO x 3     Speech - Fluent, Comprehensible, Mild dysarthria, No aphasia      Motor -                      LEFT    UE - ShAB 5/5, EF 5/5, EE 5/5, WE 5/5,  WNL                    RIGHT UE - ShAB 2/5, EF 2/5, EE 2/5, WE 0/5,  0/5                    LEFT    LE - HF 5/5, KE 5/5, DF 5/5, PF 5/5                    RIGHT LE - HF 3/5, KE 4/5, DF 0/5, PF 1/5  + foot drop      Sensory - Intact to LT bilateral  Psychiatric - Mood stable, Affect WNL    RECENT LABS:                          11.4   7.04  )-----------( 213      ( 20 Aug 2021 05:45 )             34.8     08-20    143  |  106  |  16  ----------------------------<  140<H>  3.1<L>   |  28  |  1.24    Ca    9.3      20 Aug 2021 05:45    TPro  7.8  /  Alb  3.5  /  TBili  0.8  /  DBili  x   /  AST  16  /  ALT  23  /  AlkPhos  82  08-20    LIVER FUNCTIONS - ( 20 Aug 2021 05:45 )  Alb: 3.5 g/dL / Pro: 7.8 g/dL / ALK PHOS: 82 U/L / ALT: 23 U/L / AST: 16 U/L / GGT: x             CAPILLARY BLOOD GLUCOSE  POCT Blood Glucose.: 121 mg/dL (19 Aug 2021 22:50)  POCT Blood Glucose.: 181 mg/dL (19 Aug 2021 16:14)  POCT Blood Glucose.: 121 mg/dL (19 Aug 2021 11:18)      MEDICATIONS  (STANDING):  amLODIPine   Tablet 5 milliGRAM(s) Oral once  aspirin  chewable 81 milliGRAM(s) Oral daily  atorvastatin 80 milliGRAM(s) Oral at bedtime  enoxaparin Injectable 40 milliGRAM(s) SubCutaneous daily  insulin glargine Injectable (LANTUS) 15 Unit(s) SubCutaneous at bedtime  insulin lispro (ADMELOG) corrective regimen sliding scale   SubCutaneous three times a day before meals  ticagrelor 90 milliGRAM(s) Oral every 12 hours    MEDICATIONS  (PRN):  acetaminophen   Tablet .. 975 milliGRAM(s) Oral every 6 hours PRN Mild Pain (1 - 3), Moderate Pain (4 - 6), Severe Pain (7 - 10)   Patient is a 61y old  Female who presents with a chief complaint of CVA (19 Aug 2021 14:38)    HPI:  62 yo R handed Female with  PMHx of DMT2, HTN, HLD, multiple CVAs w/ residual RUE/LE weakness w/ foot drop, LLE>UE numbness s/p loop recorder and plavix  now discontinued 2017. She presented to [facility] on 8/15/21 with acute on chronic R-sided weakness, LKN 8/15 07:30 symptoms noticed around 10AM after patient was trying to use her arm and was not able to "make it do what I wanted" on further clarification of RUE>LE weakness. Pt endorsed was leaning to one side, endorsed RLE feels weaker than normal which was present on initial NIHSS 5. Initial BPs elevated 190s systolic, CTH without hemorrhage or acute findings, BP was lowered with improvement in R sided acute on chronic weakness to allow for ataxia testing in limbs which was present, which was new findings.  Endorsed mild L posterior headache, no N/V or infectious symptoms. Patient was re-evaluated after improvement for weakness, concern for focal seizure with patient endorsing no prior seizure history or prior episodes w RUE. CTH w/o acute findings, CTA w worsening PCA stenosis but normal flow bL VAs. CDU monitoring for MR Brain to assess for any new infarcts, however course c/b reoccurrence in R UE "weakness" appearing contracture-like w RUE flexion and hypertonia, intact mentation.  she was seen by neurologist.  pt received 1500mg IV Keppra load given high suspicion for focal seizure activity. MRI Brain showed Left thalamic infarct. She did not receive TPA as she was out of  window. She was seen by cardiology; TTE, ASA/STATIN, and losartan started. EP was consulted, ILR was implanted (8/18), No events recorded on ILR. Of note, patient had ILR placed in the past ( 2017) no arrhythmias was recorded then. She was evaluated by PM&R and therapist and acute rehabilitation was recommended. She was admitted to [facility] on 8/19/21.       PAST MEDICAL & SURGICAL HISTORY:  Hypertension  Diabetes Mellitus Type II  Generalized Headaches  IBS (Irritable Bowel Syndrome)  Hypokalemia  CVA (cerebral vascular accident)  TIA (transient ischemic attack)  Hemiplegia of right dominant side due to infarction of brain    No Past Surgical History    Allergies  sulfa drugs (Angioedema; Swelling)  sulfa drugs (Rash)  Sulfac 10% (Unknown)  walnut (Urticaria)    Intolerances    lactose (Diarrhea)    SUBJECTIVE & ROS:  Pt wake sitting up in bed.  Did not eat breakfast because didn't like the food.  Requesting for oatmeal.  Pt had 2 prior CVA - first one in 2016. Had ILR, but removed? and was on plavix (dc 2017).  Right foot drop since 2016.  Used AFO that doesn't fit/work because it caused her right knee to flex.  Been meaning to get it fitted again, but didn't get the chance.  She's been managing with cane/RW PTA.  Left sided weakness started 10/2020 -left foot feel like there's a thick sock on.  Right hand with intermittent decreased coordination.  Prior to this admission, she was struggling to /reach for her pills.  ILR placed again.  Reports mild left chest wall discomfort which is more muscular.  Pt is unclear if she had genetic w/u.  Otherwise, denies CP, SOB, HA, dizziness, GI/ symptoms.  LBM 8/17.  Denies urinary incontinence, but d/t urgency, she does go in disposable brief    PHYSICAL EXAM  61y  Vital Signs Last 24 Hrs  T(C): 36.7 (19 Aug 2021 22:15), Max: 36.7 (19 Aug 2021 11:15)  T(F): 98 (19 Aug 2021 22:15), Max: 98.1 (19 Aug 2021 11:15)  HR: 85 (20 Aug 2021 05:11) (75 - 93)  BP: 148/100 (20 Aug 2021 05:11) (146/93 - 178/125)  BP(mean): --  RR: 14 (20 Aug 2021 05:11) (14 - 18)  SpO2: 98% (20 Aug 2021 05:11) (98% - 99%)  Daily Height in cm: 154.43 (19 Aug 2021 22:15)    Daily     Constitutional - NAD, Comfortable  HEENT - NCAT, EOMI  Neck - Supple, No limited ROM  Chest - good chest expansion, good respiratory effort, CTAB   Cardio - warm and well perfused, RRR, no murmur  Abdomen -  Soft, NTND  Extremities - No peripheral edema, No calf tenderness   Neurologic Exam:                    Cognitive - AO x 3     Speech - Fluent, Comprehensible, Mild dysarthria, No aphasia      Motor -                      LEFT    UE - ShAB 5/5, EF 5/5, EE 5/5, WE 5/5,  WNL                    RIGHT UE - ShAB 2/5, EF 2/5, EE 2/5, WE 0/5,  0/5                    LEFT    LE - HF 5/5, KE 5/5, DF 5/5, PF 5/5                    RIGHT LE - HF 3/5, KE 4/5, DF 0/5, PF 1/5  + foot drop      Sensory - Intact to LT bilateral  Psychiatric - Mood stable, Affect WNL    RECENT LABS:                          11.4   7.04  )-----------( 213      ( 20 Aug 2021 05:45 )             34.8     08-20    143  |  106  |  16  ----------------------------<  140<H>  3.1<L>   |  28  |  1.24    Ca    9.3      20 Aug 2021 05:45    TPro  7.8  /  Alb  3.5  /  TBili  0.8  /  DBili  x   /  AST  16  /  ALT  23  /  AlkPhos  82  08-20    LIVER FUNCTIONS - ( 20 Aug 2021 05:45 )  Alb: 3.5 g/dL / Pro: 7.8 g/dL / ALK PHOS: 82 U/L / ALT: 23 U/L / AST: 16 U/L / GGT: x             CAPILLARY BLOOD GLUCOSE  POCT Blood Glucose.: 121 mg/dL (19 Aug 2021 22:50)  POCT Blood Glucose.: 181 mg/dL (19 Aug 2021 16:14)  POCT Blood Glucose.: 121 mg/dL (19 Aug 2021 11:18)      MEDICATIONS  (STANDING):  amLODIPine   Tablet 5 milliGRAM(s) Oral once  aspirin  chewable 81 milliGRAM(s) Oral daily  atorvastatin 80 milliGRAM(s) Oral at bedtime  enoxaparin Injectable 40 milliGRAM(s) SubCutaneous daily  insulin glargine Injectable (LANTUS) 15 Unit(s) SubCutaneous at bedtime  insulin lispro (ADMELOG) corrective regimen sliding scale   SubCutaneous three times a day before meals  ticagrelor 90 milliGRAM(s) Oral every 12 hours    MEDICATIONS  (PRN):  acetaminophen   Tablet .. 975 milliGRAM(s) Oral every 6 hours PRN Mild Pain (1 - 3), Moderate Pain (4 - 6), Severe Pain (7 - 10)

## 2021-08-20 NOTE — DIETITIAN INITIAL EVALUATION ADULT. - PHYSCIAL ASSESSMENT
Temporalis, Orbital Fat Pads, Buccal Fat Pads, & Gastrocnemius(Calf) Wasting Observed  (Per Nutrition Focused Physical Exam) States Weight Trending Upward Over Last Few Month Prior to Admission to Hospital

## 2021-08-20 NOTE — DIETITIAN INITIAL EVALUATION ADULT. - ADD RECOMMEND
1) Monitor Weights, Intake, Tolerance, Skin & Labwork   2) Educated Patient on Strategies For Maximizing Caloric Intake 3) Continue Nutrition Plan of Care

## 2021-08-20 NOTE — PROGRESS NOTE ADULT - ASSESSMENT
ASSESSMENT/PLAN  60 yo R handed Female with  PMHx of DMT2, HTN, HLD, multiple CVAs w/ residual RUE/LE weakness w/ foot drop, LLE>UE numbness s/p loop recorder and plavix  now discontinued 2017. Admitted on 8/15 for acute on chronic R-sided weakness found to have Left thalamic infarct. Had concerns of seizure and given loading dose of keppra, however it was ruled out. Patient now with Gait Instability, ADL impairments and Functional impairments.    COMORBIDITES/ACTIVE MEDICAL ISSUES     Gait Instability, ADL impairments and Functional impairments: start Comprehensive Rehab Program of PT/OT    # Multiple CVA  - Left thalamic infarcts  - acute on chronic right hemiplegia  - left lower extremity numbness  - s/p ILR placement; f/u with Dr. Minor  - ASA 81 mg daily  - Brillinta 90 mg twice   - Hypercoagulable workup + genetic tests    #HLD  - Lipitor 80 mg daily    #HTN  - Home meds: amlodipine, labetalol, HCTz, lisinopril  - Amlodipine 10 mg daily   - Hold for systolic BP of <140  - Allowed for permissive hypertension for perfusion    #DM II  - ISS and FS  - Lantus 15 U nightly  - Admelog    #Pain control  - Tylenol PRN    #GI/Bowel Mgmt   - Continent  - Senna,  Miralax PRN,    #Bladder management  - PVR q 8 hours (SC if > 400)    #DVT  - Lovenox  - SCDs, TEDs     #Skin:  -No active issues at this time    FEN   - Diet - Consistent Carbohydrate Low Sodium   - Dysphagia  SLP - evaluation and treatment    Precautions / PROPHYLAXIS:   - Falls  - ortho: Weight bearing status: WBAT   - Lungs: Aspiration, Incentive Spirometer   - Pressure injury/Skin: Turn Q2hrs while in bed, OOB to Chair, PT/OT                ASSESSMENT/PLAN  62 yo R handed Female with  PMHx of DMT2, HTN, HLD, multiple CVAs w/ residual RUE/LE weakness w/ foot drop, LLE>UE numbness s/p loop recorder and plavix  now discontinued 2017. Admitted on 8/15 for acute on chronic R-sided weakness found to have Left thalamic infarct. Had concerns of seizure and given loading dose of keppra, however it was ruled out. Patient now with Gait Instability, ADL impairments and Functional impairments.    COMORBIDITES/ACTIVE MEDICAL ISSUES     Gait Instability, ADL impairments and Functional impairments: start Comprehensive Rehab Program of PT/OT    # Multiple CVA  - Left thalamic infarcts  - acute on chronic right hemiplegia  - left lower extremity numbness  - s/p ILR placement; f/u with Dr. Minor  - ASA 81 mg daily  - Brillinta 90 mg twice   - Hypercoagulable workup + genetic tests    #HLD  - Lipitor 80 mg daily    #HTN  - Home meds: amlodipine, labetalol, HCTz, lisinopril  - Amlodipine 10 mg daily   - Hold for systolic BP of <140  - Allowed for permissive hypertension for perfusion    #DM II  - ISS and FS  - Lantus 15 U nightly  - Admelog    #hypokalemic  - K 3.1 (8/20) - supplemented    #Pain control  - Tylenol PRN    #GI/Bowel Mgmt   - Continent  - Senna,  Miralax PRN,    #Bladder management  - PVR q 8 hours (SC if > 400)    #DVT  - Lovenox  - SCDs, TEDs     #Skin:  -No active issues at this time    FEN   - Diet - Consistent Carbohydrate Low Sodium   - Dysphagia  SLP - evaluation and treatment    Precautions / PROPHYLAXIS:   - Falls  - ortho: Weight bearing status: WBAT   - Lungs: Aspiration, Incentive Spirometer   - Pressure injury/Skin: Turn Q2hrs while in bed, OOB to Chair, PT/OT                ASSESSMENT/PLAN  60 yo R handed Female with  PMHx of DMT2, HTN, HLD, multiple CVAs w/ residual RUE/LE weakness w/ foot drop, LLE>UE numbness s/p loop recorder and plavix  now discontinued 2017. Admitted on 8/15 for acute on chronic R-sided weakness found to have Left thalamic infarct. Had concerns of seizure and given loading dose of keppra, however it was ruled out. Patient now with Gait Instability, ADL impairments and Functional impairments.    COMORBIDITES/ACTIVE MEDICAL ISSUES     Gait Instability, ADL impairments and Functional impairments: start Comprehensive Rehab Program of PT/OT    # Multiple CVA  - Left thalamic infarcts  - acute on chronic right hemiplegia  - left lower extremity numbness  - s/p ILR placement; f/u with Dr. Minor  - ASA 81 mg daily  - Brillinta 90 mg twice   - Hypercoagulable workup + genetic tests    #HLD  - Lipitor 80 mg daily    #HTN  - Home meds: amlodipine, labetalol, HCTz, lisinopril  - Amlodipine 10 mg daily   - Hold for systolic BP of <140  - Allowed for permissive hypertension for perfusion    #DM II  - ISS and FS  - Lantus 15 U nightly  - metformin 500 BID    #hypokalemic  - K 3.1 (8/20) - supplemented 20meg x 3 dose  - Repeat BMP 8/21    #Pain control  - Tylenol PRN    #GI/Bowel Mgmt   - Continent  - Senna,  Miralax PRN,    #Bladder management  - PVR q 8 hours (SC if > 400)    #DVT  - Lovenox  - SCDs, TEDs     #Skin:  -No active issues at this time    FEN   - Diet - Consistent Carbohydrate Low Sodium   - Dysphagia  SLP - evaluation and treatment    Precautions / PROPHYLAXIS:   - Falls  - ortho: Weight bearing status: WBAT   - Lungs: Aspiration, Incentive Spirometer   - Pressure injury/Skin: Turn Q2hrs while in bed, OOB to Chair, PT/OT                ASSESSMENT/PLAN  62 yo R handed Female with  PMHx of DMT2, HTN, HLD, multiple CVAs w/ residual RUE/LE weakness w/ foot drop, LLE>UE numbness s/p loop recorder and plavix  now discontinued 2017. Admitted on 8/15 for acute on chronic R-sided weakness found to have Left thalamic infarct. Had concerns of seizure and given loading dose of keppra, however it was ruled out. Patient now with Gait Instability, ADL impairments and Functional impairments.    COMORBIDITES/ACTIVE MEDICAL ISSUES     Gait Instability, ADL impairments and Functional impairments: start Comprehensive Rehab Program of PT/OT/ST     # Multiple CVA  - Left thalamic infarcts  - acute on chronic right hemiparesis  - left lower extremity numbness  - s/p ILR placement; f/u with Dr. Minor  - ASA 81 mg daily  - Brillinta 90 mg twice   - Hypercoagulable workup to be ordered as patient with recurrent strokes    #HLD  - Lipitor 80 mg daily    #HTN  - Home meds: amlodipine, labetalol, HCTz, lisinopril  - Amlodipine 10 mg daily   - Hold for systolic BP of <140  - Allowed for permissive hypertension for perfusion    #DM II  - ISS and FS  - Lantus 15 U nightly  - metformin 500 BID    #hypokalemic  - K 3.1 (8/20) - supplemented 20meg x 3 dose  - Repeat BMP 8/21    #Pain control  - Tylenol PRN    #GI/Bowel Mgmt   - Continent  - Senna,  Miralax PRN,    #Bladder management  - PVR q 8 hours (SC if > 400)    #DVT  - Lovenox  - SCDs, TEDs     #Skin:  -No active issues at this time    FEN   - Diet - Consistent Carbohydrate Low Sodium   - Dysphagia  SLP - evaluation and treatment    Precautions / PROPHYLAXIS:   - Falls  - ortho: Weight bearing status: WBAT   - Lungs: Aspiration, Incentive Spirometer   - Pressure injury/Skin: Turn Q2hrs while in bed, OOB to Chair, PT/OT

## 2021-08-20 NOTE — PROGRESS NOTE ADULT - ATTENDING COMMENTS
Patient seen at  bedside  States that she slept ok  Denies any acute pain  Admitted to rehab with recurrent strokes- prior CVA with left hemiparesis, right CVA without deficits and now with new left thalamic infarct. Patient states that she is compliant with medications  Exam, aaox3,   mild dysarthria, right facial droop  RUE 0/5  RLE 2/5  Sensory appears intact to light touch    Today's labs with hypokalemia- replete.    Begin full rehab program    2. Hospitalist consult for management of medical comorbidities.  BP noted to be elevated.   Will slowly restart home medications.   Dose of amlodipine increased today ( [facility] emeds:Amlodipine, HCTZ/lisinopril and labetolol)

## 2021-08-20 NOTE — DIETITIAN INITIAL EVALUATION ADULT. - ORAL INTAKE PTA/DIET HISTORY
Patient Does Follow Diabetic Style Diet @Home & Also a [employer] Vegetarian (Consumes Diary But Not Eggs) & Doesn't Take Vitamin/Supplements @Home     on Consistent Carbohydrate Low Sodium Diet w/ Thin Liquids  Declines Nutrition Supplementation   Educated Patient on Strategies For Maximizing Caloric Intake

## 2021-08-20 NOTE — CONSULT NOTE ADULT - PROBLEM SELECTOR RECOMMENDATION 4
- on Jardiance, metformin, levermir 15units and Novolog 5units pre-meals  - continue lantus 15units, and metformin for now  - continue ISS  - monitor FS

## 2021-08-20 NOTE — CONSULT NOTE ADULT - PROBLEM SELECTOR RECOMMENDATION 9
Impaired gait, mobility, ADL  - acute on chronic R-sided weakness (RUE>LE weakness)  - CTA w worsening PCA stenosis but normal flow bL   - She did not receive TPA as she was out of  window.   - She was seen by cardiology; TTE, ASA/STATIN, and losartan started. EP was consulted,   - ILR was implanted (8/18)  - Comprehensive rehab program of PT/OT  - Fall precautions  - continue ASA, brilinta and statin  - Bowel regimen as per primary team  - Pain meds as per primary team

## 2021-08-21 LAB
ANION GAP SERPL CALC-SCNC: 9 MMOL/L — SIGNIFICANT CHANGE UP (ref 5–17)
BUN SERPL-MCNC: 18 MG/DL — SIGNIFICANT CHANGE UP (ref 7–23)
CALCIUM SERPL-MCNC: 9.5 MG/DL — SIGNIFICANT CHANGE UP (ref 8.4–10.5)
CHLORIDE SERPL-SCNC: 107 MMOL/L — SIGNIFICANT CHANGE UP (ref 96–108)
CO2 SERPL-SCNC: 26 MMOL/L — SIGNIFICANT CHANGE UP (ref 22–31)
CREAT SERPL-MCNC: 1.11 MG/DL — SIGNIFICANT CHANGE UP (ref 0.5–1.3)
GLUCOSE BLDC GLUCOMTR-MCNC: 124 MG/DL — HIGH (ref 70–99)
GLUCOSE BLDC GLUCOMTR-MCNC: 129 MG/DL — HIGH (ref 70–99)
GLUCOSE BLDC GLUCOMTR-MCNC: 159 MG/DL — HIGH (ref 70–99)
GLUCOSE BLDC GLUCOMTR-MCNC: 201 MG/DL — HIGH (ref 70–99)
GLUCOSE SERPL-MCNC: 147 MG/DL — HIGH (ref 70–99)
HCYS SERPL-MCNC: 12.3 UMOL/L — SIGNIFICANT CHANGE UP
POTASSIUM SERPL-MCNC: 3.6 MMOL/L — SIGNIFICANT CHANGE UP (ref 3.5–5.3)
POTASSIUM SERPL-SCNC: 3.6 MMOL/L — SIGNIFICANT CHANGE UP (ref 3.5–5.3)
SODIUM SERPL-SCNC: 142 MMOL/L — SIGNIFICANT CHANGE UP (ref 135–145)

## 2021-08-21 PROCEDURE — 99233 SBSQ HOSP IP/OBS HIGH 50: CPT

## 2021-08-21 PROCEDURE — 99231 SBSQ HOSP IP/OBS SF/LOW 25: CPT | Mod: GC

## 2021-08-21 RX ORDER — LISINOPRIL 2.5 MG/1
20 TABLET ORAL DAILY
Refills: 0 | Status: DISCONTINUED | OUTPATIENT
Start: 2021-08-21 | End: 2021-08-23

## 2021-08-21 RX ADMIN — METFORMIN HYDROCHLORIDE 500 MILLIGRAM(S): 850 TABLET ORAL at 17:11

## 2021-08-21 RX ADMIN — ENOXAPARIN SODIUM 40 MILLIGRAM(S): 100 INJECTION SUBCUTANEOUS at 12:08

## 2021-08-21 RX ADMIN — AMLODIPINE BESYLATE 10 MILLIGRAM(S): 2.5 TABLET ORAL at 05:53

## 2021-08-21 RX ADMIN — Medication 81 MILLIGRAM(S): at 12:07

## 2021-08-21 RX ADMIN — METFORMIN HYDROCHLORIDE 500 MILLIGRAM(S): 850 TABLET ORAL at 05:53

## 2021-08-21 RX ADMIN — ATORVASTATIN CALCIUM 80 MILLIGRAM(S): 80 TABLET, FILM COATED ORAL at 21:16

## 2021-08-21 RX ADMIN — TICAGRELOR 90 MILLIGRAM(S): 90 TABLET ORAL at 05:53

## 2021-08-21 RX ADMIN — Medication 1: at 08:24

## 2021-08-21 RX ADMIN — TICAGRELOR 90 MILLIGRAM(S): 90 TABLET ORAL at 17:11

## 2021-08-21 RX ADMIN — INSULIN GLARGINE 15 UNIT(S): 100 INJECTION, SOLUTION SUBCUTANEOUS at 21:16

## 2021-08-21 RX ADMIN — LISINOPRIL 20 MILLIGRAM(S): 2.5 TABLET ORAL at 12:07

## 2021-08-21 NOTE — PROGRESS NOTE ADULT - SUBJECTIVE AND OBJECTIVE BOX
Cc: Gait dysfunction    HPI: Diastolic BPS Still elevated- lisinopril added  moved bowels today, voiding  no chest pain, no N/V, no Fevers/Chills. No other new ROS  Has been tolerating rehabilitation program.    MEDICATIONS  (STANDING):  amLODIPine   Tablet 10 milliGRAM(s) Oral daily  aspirin  chewable 81 milliGRAM(s) Oral daily  atorvastatin 80 milliGRAM(s) Oral at bedtime  enoxaparin Injectable 40 milliGRAM(s) SubCutaneous daily  insulin glargine Injectable (LANTUS) 15 Unit(s) SubCutaneous at bedtime  insulin lispro (ADMELOG) corrective regimen sliding scale   SubCutaneous three times a day before meals  lisinopril 20 milliGRAM(s) Oral daily  metFORMIN 500 milliGRAM(s) Oral two times a day  ticagrelor 90 milliGRAM(s) Oral every 12 hours    MEDICATIONS  (PRN):  acetaminophen   Tablet .. 975 milliGRAM(s) Oral every 6 hours PRN Mild Pain (1 - 3), Moderate Pain (4 - 6), Severe Pain (7 - 10)      Vital Signs Last 24 Hrs  T(C): 36.3 (21 Aug 2021 08:34), Max: 36.4 (20 Aug 2021 20:00)  T(F): 97.4 (21 Aug 2021 08:34), Max: 97.6 (20 Aug 2021 20:00)  HR: 77 (21 Aug 2021 12:11) (77 - 84)  BP: 162/98 (21 Aug 2021 12:11) (153/100 - 169/98)  BP(mean): --  RR: 16 (21 Aug 2021 12:11) (14 - 16)  SpO2: 100% (21 Aug 2021 12:11) (98% - 100%)    In NAD  HEENT- EOMI  Heart- RRR, S1S2  Lungs- CTA bl.  Abd- + BS, NT  Ext- No calf pain  Neuro- Exam unchanged                          11.4   7.04  )-----------( 213      ( 20 Aug 2021 05:45 )             34.8     08-21    142  |  107  |  18  ----------------------------<  147<H>  3.6   |  26  |  1.11    Ca    9.5      21 Aug 2021 05:15    TPro  7.8  /  Alb  3.5  /  TBili  0.8  /  DBili  x   /  AST  16  /  ALT  23  /  AlkPhos  82  08-20    CAPILLARY BLOOD GLUCOSE      POCT Blood Glucose.: 124 mg/dL (21 Aug 2021 12:03)  POCT Blood Glucose.: 159 mg/dL (21 Aug 2021 07:35)  POCT Blood Glucose.: 155 mg/dL (20 Aug 2021 21:13)  POCT Blood Glucose.: 111 mg/dL (20 Aug 2021 17:08)                Imp: Patient with diagnosis of Left Thalamic infarct  admitted for comprehensive acute rehabilitation.    Plan:  - Continue therapies  - DVT prophylaxis- lovenox    follow BPs- Adjust Meds  - Skin- Turn q2h, check skin daily  - Continue current medications; patient medically stable.   - Patient is stable to continue current rehabilitation program.

## 2021-08-21 NOTE — PROGRESS NOTE ADULT - ASSESSMENT
62 yo R handed woman with history of DMT2, HTN, HLD, multiple CVAs w/ residual RUE/LE weakness w/ foot drop, LLE>UE numbness s/p loop recorder and no longer on plavix admitted to  rehab after hospital course for left thalamic infarct.

## 2021-08-21 NOTE — PROGRESS NOTE ADULT - SUBJECTIVE AND OBJECTIVE BOX
Patient is a 61y old  Female who presents with a chief complaint of CVA (20 Aug 2021 10:53)      Patient seen and examined at bedside.  No overnight events  Worried about her BP since it is elevated    ALLERGIES:  sulfa drugs (Angioedema; Swelling)  sulfa drugs (Rash)  Sulfac 10% (Unknown)  walnut (Urticaria)    MEDICATIONS  (STANDING):  amLODIPine   Tablet 10 milliGRAM(s) Oral daily  aspirin  chewable 81 milliGRAM(s) Oral daily  atorvastatin 80 milliGRAM(s) Oral at bedtime  enoxaparin Injectable 40 milliGRAM(s) SubCutaneous daily  insulin glargine Injectable (LANTUS) 15 Unit(s) SubCutaneous at bedtime  insulin lispro (ADMELOG) corrective regimen sliding scale   SubCutaneous three times a day before meals  lisinopril 20 milliGRAM(s) Oral daily  metFORMIN 500 milliGRAM(s) Oral two times a day  ticagrelor 90 milliGRAM(s) Oral every 12 hours    MEDICATIONS  (PRN):  acetaminophen   Tablet .. 975 milliGRAM(s) Oral every 6 hours PRN Mild Pain (1 - 3), Moderate Pain (4 - 6), Severe Pain (7 - 10)    Vital Signs Last 24 Hrs  T(F): 97.4 (21 Aug 2021 08:34), Max: 98.6 (20 Aug 2021 10:32)  HR: 82 (21 Aug 2021 08:34) (81 - 84)  BP: 154/96 (21 Aug 2021 08:34) (147/92 - 169/98)  RR: 16 (21 Aug 2021 08:34) (14 - 16)  SpO2: 98% (21 Aug 2021 08:34) (98% - 99%)  I&O's Summary    BMI (kg/m2): 32.9 (08-20-21 @ 12:58)    PHYSICAL EXAM:  GENERAL: NAD  HENT:  Atraumatic, Normocephalic; No tonsillar erythema, exudates, or enlargement; Moist mucous membranes;   EYES: EOMI, PERRLA, conjunctiva and sclera clear, no lid-lag  NECK: Supple, No JVD, Normal thyroid  CHEST/LUNG: Clear to percussion bilaterally; No rales, rhonchi, wheezing, or rubs; normal respiratory effort, no intercostal retractions  HEART: Regular rate and rhythm; No murmurs, rubs, or gallops; No pitting edema  ABDOMEN: Soft, Nontender, Nondistended; Bowel sounds present; No HSM  MUSCULOSKELETAL/EXTREMITIES:  2+ Peripheral Pulses, No clubbing or digital cyanosis  PSYCH: Appropriate affect, Alert & Awake    LABS:                        11.4   7.04  )-----------( 213      ( 20 Aug 2021 05:45 )             34.8       08-21    142  |  107  |  18  ----------------------------<  147  3.6   |  26  |  1.11    Ca    9.5      21 Aug 2021 05:15    TPro  7.8  /  Alb  3.5  /  TBili  0.8  /  DBili  x   /  AST  16  /  ALT  23  /  AlkPhos  82  08-20     eGFR if Non African American: 54 mL/min/1.73M2 (08-21-21 @ 05:15)  eGFR if African American: 62 mL/min/1.73M2 (08-21-21 @ 05:15)     08-16 Chol 134 mg/dL LDL -- HDL 49 mg/dL Trig 64 mg/dL    POCT Blood Glucose.: 159 mg/dL (21 Aug 2021 07:35)  POCT Blood Glucose.: 155 mg/dL (20 Aug 2021 21:13)  POCT Blood Glucose.: 111 mg/dL (20 Aug 2021 17:08)  POCT Blood Glucose.: 126 mg/dL (20 Aug 2021 12:09)          COVID-19 PCR: NotDetec (08-20-21 @ 06:30)  COVID-19 PCR: NotDetec (08-15-21 @ 12:31)      Care Discussed with Rehab Attending and Other Providers

## 2021-08-22 LAB
GLUCOSE BLDC GLUCOMTR-MCNC: 113 MG/DL — HIGH (ref 70–99)
GLUCOSE BLDC GLUCOMTR-MCNC: 131 MG/DL — HIGH (ref 70–99)
GLUCOSE BLDC GLUCOMTR-MCNC: 137 MG/DL — HIGH (ref 70–99)
GLUCOSE BLDC GLUCOMTR-MCNC: 145 MG/DL — HIGH (ref 70–99)

## 2021-08-22 PROCEDURE — 99232 SBSQ HOSP IP/OBS MODERATE 35: CPT

## 2021-08-22 PROCEDURE — 99231 SBSQ HOSP IP/OBS SF/LOW 25: CPT | Mod: GC

## 2021-08-22 RX ADMIN — METFORMIN HYDROCHLORIDE 500 MILLIGRAM(S): 850 TABLET ORAL at 17:14

## 2021-08-22 RX ADMIN — METFORMIN HYDROCHLORIDE 500 MILLIGRAM(S): 850 TABLET ORAL at 05:10

## 2021-08-22 RX ADMIN — Medication 81 MILLIGRAM(S): at 11:42

## 2021-08-22 RX ADMIN — LISINOPRIL 20 MILLIGRAM(S): 2.5 TABLET ORAL at 05:10

## 2021-08-22 RX ADMIN — AMLODIPINE BESYLATE 10 MILLIGRAM(S): 2.5 TABLET ORAL at 05:10

## 2021-08-22 RX ADMIN — TICAGRELOR 90 MILLIGRAM(S): 90 TABLET ORAL at 17:14

## 2021-08-22 RX ADMIN — ATORVASTATIN CALCIUM 80 MILLIGRAM(S): 80 TABLET, FILM COATED ORAL at 21:01

## 2021-08-22 RX ADMIN — INSULIN GLARGINE 15 UNIT(S): 100 INJECTION, SOLUTION SUBCUTANEOUS at 21:03

## 2021-08-22 RX ADMIN — ENOXAPARIN SODIUM 40 MILLIGRAM(S): 100 INJECTION SUBCUTANEOUS at 11:42

## 2021-08-22 RX ADMIN — TICAGRELOR 90 MILLIGRAM(S): 90 TABLET ORAL at 05:10

## 2021-08-22 NOTE — PROGRESS NOTE ADULT - PROBLEM SELECTOR PLAN 1
Impaired gait, mobility, ADL  - acute on chronic R-sided weakness (RUE>LE weakness)  - CTA w worsening PCA stenosis but normal flow bL   - She did not receive TPA as she was out of  window.   - She was seen by cardiology; TTE, ASA/STATIN, and losartan started. EP was consulted,   - ILR was implanted (8/18)  - Comprehensive rehab program of PT/OT  - Fall precautions  - continue ASA, brilinta and statin  - Bowel regimen as per primary team  - Pain meds as per primary team.
Impaired gait, mobility, ADL  - acute on chronic R-sided weakness (RUE>LE weakness)  - CTA w worsening PCA stenosis but normal flow bL   - She did not receive TPA as she was out of  window.   - She was seen by cardiology; TTE, ASA/STATIN, and losartan started. EP was consulted,   - ILR was implanted (8/18)  - Comprehensive rehab program of PT/OT  - Fall precautions  - continue ASA, brilinta and statin  - Bowel regimen as per primary team  - Pain meds as per primary team.

## 2021-08-22 NOTE — PROGRESS NOTE ADULT - SUBJECTIVE AND OBJECTIVE BOX
Patient is a 61y old  Female who presents with a chief complaint of CVA (21 Aug 2021 16:20)      Patient seen and examined at bedside.  No overnight events  No complaints this morning. Happy with her BP this AM    ALLERGIES:  sulfa drugs (Angioedema; Swelling)  sulfa drugs (Rash)  Sulfac 10% (Unknown)  walnut (Urticaria)    MEDICATIONS  (STANDING):  amLODIPine   Tablet 10 milliGRAM(s) Oral daily  aspirin  chewable 81 milliGRAM(s) Oral daily  atorvastatin 80 milliGRAM(s) Oral at bedtime  enoxaparin Injectable 40 milliGRAM(s) SubCutaneous daily  insulin glargine Injectable (LANTUS) 15 Unit(s) SubCutaneous at bedtime  insulin lispro (ADMELOG) corrective regimen sliding scale   SubCutaneous three times a day before meals  lisinopril 20 milliGRAM(s) Oral daily  metFORMIN 500 milliGRAM(s) Oral two times a day  ticagrelor 90 milliGRAM(s) Oral every 12 hours    MEDICATIONS  (PRN):  acetaminophen   Tablet .. 975 milliGRAM(s) Oral every 6 hours PRN Mild Pain (1 - 3), Moderate Pain (4 - 6), Severe Pain (7 - 10)    Vital Signs Last 24 Hrs  T(F): 98 (22 Aug 2021 07:58), Max: 98 (21 Aug 2021 19:57)  HR: 105 (22 Aug 2021 07:58) (77 - 105)  BP: 158/98 (22 Aug 2021 07:58) (120/79 - 169/105)  RR: 16 (22 Aug 2021 07:58) (16 - 16)  SpO2: 96% (22 Aug 2021 07:58) (96% - 100%)  I&O's Summary    21 Aug 2021 07:01  -  22 Aug 2021 07:00  --------------------------------------------------------  IN: 0 mL / OUT: 3 mL / NET: -3 mL      BMI (kg/m2): 32.9 (08-20-21 @ 12:58)    PHYSICAL EXAM:  GENERAL: NAD  HENT:  Atraumatic, Normocephalic; No tonsillar erythema, exudates, or enlargement; Moist mucous membranes;   EYES: EOMI, PERRLA, conjunctiva and sclera clear, no lid-lag  NECK: Supple, No JVD, Normal thyroid  CHEST/LUNG: Clear to percussion bilaterally; No rales, rhonchi, wheezing, or rubs; normal respiratory effort, no intercostal retractions  HEART: Regular rate and rhythm; No murmurs, rubs, or gallops; No pitting edema  ABDOMEN: Soft, Nontender, Nondistended; Bowel sounds present; No HSM  MUSCULOSKELETAL/EXTREMITIES:  2+ Peripheral Pulses, No clubbing or digital cyanosis  PSYCH: Appropriate affect, Alert & Awake  NEURO: right sided weakness    LABS:                        11.4   7.04  )-----------( 213      ( 20 Aug 2021 05:45 )             34.8       08-21    142  |  107  |  18  ----------------------------<  147  3.6   |  26  |  1.11    Ca    9.5      21 Aug 2021 05:15    TPro  7.8  /  Alb  3.5  /  TBili  0.8  /  DBili  x   /  AST  16  /  ALT  23  /  AlkPhos  82  08-20     eGFR if Non African American: 54 mL/min/1.73M2 (08-21-21 @ 05:15)  eGFR if African American: 62 mL/min/1.73M2 (08-21-21 @ 05:15)     08-16 Chol 134 mg/dL LDL -- HDL 49 mg/dL Trig 64 mg/dL      POCT Blood Glucose.: 137 mg/dL (22 Aug 2021 07:51)  POCT Blood Glucose.: 201 mg/dL (21 Aug 2021 20:52)  POCT Blood Glucose.: 129 mg/dL (21 Aug 2021 17:09)  POCT Blood Glucose.: 124 mg/dL (21 Aug 2021 12:03)      COVID-19 PCR: NotDetec (08-20-21 @ 06:30)  COVID-19 PCR: NotDetec (08-15-21 @ 12:31)      Care Discussed with Rehab Attending and Other Providers

## 2021-08-22 NOTE — CHART NOTE - NSCHARTNOTEFT_GEN_A_CORE
REHABILITATION DIAGNOSIS/IMPAIRMENT GROUP CODE:  Left thalamic stroke    COMORBIDITIES/COMPLICATING CONDITIONS IMPACTING REHABILITATION:  HEALTH ISSUES - PROBLEM Dx:  History of multiple cerebrovascular accidents (CVAs)  HLD (hyperlipidemia)  Benign essential HTN  Gait instability  Normocytic anemia      PAST MEDICAL & SURGICAL HISTORY:  Hypertension    Diabetes Mellitus Type II    Generalized Headaches    IBS (Irritable Bowel Syndrome)    Hypokalemia    CVA (cerebral vascular accident)    TIA (transient ischemic attack)    Hemiplegia of right dominant side due to infarction of brain          Based upon consideration of the patient's impairments, functional status, complicating conditions and any other contributing factors and after information garnered from the assessments of all therapy disciplines involved in treating the patient and other pertinent clinicians:    INTERDISCIPLINARY REHABILITATION INTERVENTIONS:    [  x ] Transfer Training  [ x  ] Bed Mobility  [ x  ] Therapeutic Exercise  [ x  ] Balance/Coordination Exercises  [ x  ] Locomotion retraining  [ x  ] Stairs  [ x  ] Functional Transfer Training  [ x  ] Bowel/Bladder program  [ x  ] Pain Management  [ x  ] Skin/Wound Care  [   ] Visual/Perceptual Training  [ x  ] Therapeutic Recreation Activities  [ x  ] Neuromuscular Re-education  [ x  ] Activities of Daily Living  [ x  ] Speech Exercise  [   ] Swallowing Exercises  [   ] Vital Stim  [ x  ] Dietary Supplements  [   ] Calorie Count  [ x  ] Cognitive Exercises  [  x ] Cognitive/Linguistic Treatment  [   ] Behavior Program  [   ] Neuropsych Therapy  [ x  ] Patient/Family Counseling  [x   ] Family Training  [   ] Community Re-entry  [   ] Orthotic Evaluation  [   ] Prosthetic Eval/Training    MEDICAL PROGNOSIS:  fair    REHAB POTENTIAL:  fair  EXPECTED DAILY THERAPY:         PT:1 hr/day       OT:1 hr/day       ST:1 hr/day            EXPECTED INTENSITY OF PROGRAM:  3 hrs/day    EXPECTED FREQUENCY OF PROGRAM:  5 days/week     ESTIMATED LOS:  16-18 days  ESTIMATED DISPOSITION:  home    INTERDISCIPLINARY FUNCTIONAL OUTCOMES/GOALS:         Gait/Mobility:Modified independent       Transfers:Modified independent       ADLs:Min assist       Functional Transfers:Modified independent       Medication Management:Modified independent       Communication:improve dysarthria       Cognitive:Modified independent       Dysphagia:up grade to regular        Bladder:Modified independent       Bowel:Modified independent

## 2021-08-22 NOTE — PROGRESS NOTE ADULT - PROBLEM SELECTOR PLAN 4
- on Jardiance, metformin, levermir 15units and Novolog 5units pre-meals  - continue lantus 15units, and metformin for now  - continue ISS  - monitor FS.
- on Jardiance, metformin, levermir 15units and Novolog 5units pre-meals  - continue lantus 15units, and metformin for now  - continue ISS  - monitor FS.

## 2021-08-22 NOTE — PROGRESS NOTE ADULT - SUBJECTIVE AND OBJECTIVE BOX
Cc: Gait dysfunction    HPI: Diastolic BPS beter with lisinopril addded  moved bowels today, voiding  no chest pain, no N/V, no Fevers/Chills. No other new ROS  Has been tolerating rehabilitation program.    MEDICATIONS  (STANDING):  amLODIPine   Tablet 10 milliGRAM(s) Oral daily  aspirin  chewable 81 milliGRAM(s) Oral daily  atorvastatin 80 milliGRAM(s) Oral at bedtime  enoxaparin Injectable 40 milliGRAM(s) SubCutaneous daily  insulin glargine Injectable (LANTUS) 15 Unit(s) SubCutaneous at bedtime  insulin lispro (ADMELOG) corrective regimen sliding scale   SubCutaneous three times a day before meals  lisinopril 20 milliGRAM(s) Oral daily  metFORMIN 500 milliGRAM(s) Oral two times a day  ticagrelor 90 milliGRAM(s) Oral every 12 hours    MEDICATIONS  (PRN):  acetaminophen   Tablet .. 975 milliGRAM(s) Oral every 6 hours PRN Mild Pain (1 - 3), Moderate Pain (4 - 6), Severe Pain (7 - 10)      Vital Signs Last 24 Hrs  T(C): 36.7 (22 Aug 2021 07:58), Max: 36.7 (21 Aug 2021 19:57)  T(F): 98 (22 Aug 2021 07:58), Max: 98 (21 Aug 2021 19:57)  HR: 105 (22 Aug 2021 07:58) (82 - 105)  BP: 158/98 (22 Aug 2021 07:58) (120/79 - 169/105)  BP(mean): --  RR: 16 (22 Aug 2021 07:58) (16 - 16)  SpO2: 96% (22 Aug 2021 07:58) (96% - 99%)    In NAD  HEENT- EOMI  Heart- RRR, S1S2  Lungs- CTA bl.  Abd- + BS, NT  Ext- No calf pain  Neuro- Exam unchanged                          11.4   7.04  )-----------( 213      ( 20 Aug 2021 05:45 )             34.8     08-21    142  |  107  |  18  ----------------------------<  147<H>  3.6   |  26  |  1.11    Ca    9.5      21 Aug 2021 05:15    TPro  7.8  /  Alb  3.5  /  TBili  0.8  /  DBili  x   /  AST  16  /  ALT  23  /  AlkPhos  82  08-20    CAPILLARY BLOOD GLUCOSE      POCT Blood Glucose.: 113 mg/dL (22 Aug 2021 11:41)  POCT Blood Glucose.: 137 mg/dL (22 Aug 2021 07:51)  POCT Blood Glucose.: 201 mg/dL (21 Aug 2021 20:52)  POCT Blood Glucose.: 129 mg/dL (21 Aug 2021 17:09)                Imp: Patient with diagnosis of Left Thalamic infarct  admitted for comprehensive acute rehabilitation.    Plan:  - Continue therapies  - DVT prophylaxis- lovenox    follow BPs- Adjust Meds  - Skin- Turn q2h, check skin daily  - Continue current medications; patient medically stable.   - Patient is stable to continue current rehabilitation program.

## 2021-08-22 NOTE — PROGRESS NOTE ADULT - PROBLEM SELECTOR PLAN 5
on CKD II  -eGFR in 70s 10/2020  - encourage oral intake  - avoid nephrotoxic agents  - monitor BMP.
on CKD II  -eGFR in 70s 10/2020  - encourage oral intake  - avoid nephrotoxic agents  - monitor BMP.

## 2021-08-22 NOTE — PROGRESS NOTE ADULT - PROBLEM SELECTOR PLAN 3
Home meds: amlodipine 10mg, hydralazine 50mg, labetalol 100mg mg TID, lisinopril 40mg   - continue amlodipine, lisinopril 20mg. Will add and adjust as tolerated  - monitor VS.
Home meds: amlodipine 10mg, hydralazine 50mg, labetalol 100mg mg TID, lisinopril 40mg   - continue amlodipine 10mg, started lisinopril 20mg. Will add and adjust as tolerated  - monitor VS.

## 2021-08-23 LAB
ALBUMIN SERPL ELPH-MCNC: 2.9 G/DL — LOW (ref 3.3–5)
ALP SERPL-CCNC: 83 U/L — SIGNIFICANT CHANGE UP (ref 40–120)
ALT FLD-CCNC: 24 U/L — SIGNIFICANT CHANGE UP (ref 10–45)
ANION GAP SERPL CALC-SCNC: 11 MMOL/L — SIGNIFICANT CHANGE UP (ref 5–17)
AST SERPL-CCNC: 24 U/L — SIGNIFICANT CHANGE UP (ref 10–40)
AT III ACT/NOR PPP CHRO: 110 % — SIGNIFICANT CHANGE UP (ref 85–135)
BILIRUB SERPL-MCNC: 0.8 MG/DL — SIGNIFICANT CHANGE UP (ref 0.2–1.2)
BUN SERPL-MCNC: 20 MG/DL — SIGNIFICANT CHANGE UP (ref 7–23)
CALCIUM SERPL-MCNC: 9.4 MG/DL — SIGNIFICANT CHANGE UP (ref 8.4–10.5)
CHLORIDE SERPL-SCNC: 104 MMOL/L — SIGNIFICANT CHANGE UP (ref 96–108)
CO2 SERPL-SCNC: 25 MMOL/L — SIGNIFICANT CHANGE UP (ref 22–31)
CREAT SERPL-MCNC: 1.08 MG/DL — SIGNIFICANT CHANGE UP (ref 0.5–1.3)
GLUCOSE BLDC GLUCOMTR-MCNC: 108 MG/DL — HIGH (ref 70–99)
GLUCOSE BLDC GLUCOMTR-MCNC: 113 MG/DL — HIGH (ref 70–99)
GLUCOSE BLDC GLUCOMTR-MCNC: 127 MG/DL — HIGH (ref 70–99)
GLUCOSE SERPL-MCNC: 123 MG/DL — HIGH (ref 70–99)
HCT VFR BLD CALC: 34.7 % — SIGNIFICANT CHANGE UP (ref 34.5–45)
HGB BLD-MCNC: 11.4 G/DL — LOW (ref 11.5–15.5)
MCHC RBC-ENTMCNC: 26.6 PG — LOW (ref 27–34)
MCHC RBC-ENTMCNC: 32.9 GM/DL — SIGNIFICANT CHANGE UP (ref 32–36)
MCV RBC AUTO: 80.9 FL — SIGNIFICANT CHANGE UP (ref 80–100)
NRBC # BLD: 0 /100 WBCS — SIGNIFICANT CHANGE UP (ref 0–0)
PLATELET # BLD AUTO: 199 K/UL — SIGNIFICANT CHANGE UP (ref 150–400)
POTASSIUM SERPL-MCNC: 3.8 MMOL/L — SIGNIFICANT CHANGE UP (ref 3.5–5.3)
POTASSIUM SERPL-SCNC: 3.8 MMOL/L — SIGNIFICANT CHANGE UP (ref 3.5–5.3)
PROT C ACT/NOR PPP: 158 % — HIGH (ref 74–150)
PROT S FREE PPP-ACNC: 86 % — SIGNIFICANT CHANGE UP (ref 63–140)
PROT SERPL-MCNC: 7.8 G/DL — SIGNIFICANT CHANGE UP (ref 6–8.3)
RBC # BLD: 4.29 M/UL — SIGNIFICANT CHANGE UP (ref 3.8–5.2)
RBC # FLD: 14.6 % — HIGH (ref 10.3–14.5)
SODIUM SERPL-SCNC: 140 MMOL/L — SIGNIFICANT CHANGE UP (ref 135–145)
WBC # BLD: 5.84 K/UL — SIGNIFICANT CHANGE UP (ref 3.8–10.5)
WBC # FLD AUTO: 5.84 K/UL — SIGNIFICANT CHANGE UP (ref 3.8–10.5)

## 2021-08-23 PROCEDURE — 99232 SBSQ HOSP IP/OBS MODERATE 35: CPT | Mod: GC

## 2021-08-23 PROCEDURE — 99233 SBSQ HOSP IP/OBS HIGH 50: CPT

## 2021-08-23 RX ORDER — LISINOPRIL 2.5 MG/1
30 TABLET ORAL DAILY
Refills: 0 | Status: DISCONTINUED | OUTPATIENT
Start: 2021-08-24 | End: 2021-08-25

## 2021-08-23 RX ORDER — INSULIN LISPRO 100/ML
VIAL (ML) SUBCUTANEOUS
Refills: 0 | Status: DISCONTINUED | OUTPATIENT
Start: 2021-08-23 | End: 2021-08-29

## 2021-08-23 RX ORDER — INSULIN LISPRO 100/ML
VIAL (ML) SUBCUTANEOUS AT BEDTIME
Refills: 0 | Status: DISCONTINUED | OUTPATIENT
Start: 2021-08-23 | End: 2021-08-29

## 2021-08-23 RX ADMIN — Medication 81 MILLIGRAM(S): at 11:33

## 2021-08-23 RX ADMIN — LISINOPRIL 20 MILLIGRAM(S): 2.5 TABLET ORAL at 05:05

## 2021-08-23 RX ADMIN — METFORMIN HYDROCHLORIDE 500 MILLIGRAM(S): 850 TABLET ORAL at 05:05

## 2021-08-23 RX ADMIN — TICAGRELOR 90 MILLIGRAM(S): 90 TABLET ORAL at 05:05

## 2021-08-23 RX ADMIN — TICAGRELOR 90 MILLIGRAM(S): 90 TABLET ORAL at 17:04

## 2021-08-23 RX ADMIN — ENOXAPARIN SODIUM 40 MILLIGRAM(S): 100 INJECTION SUBCUTANEOUS at 11:33

## 2021-08-23 RX ADMIN — AMLODIPINE BESYLATE 10 MILLIGRAM(S): 2.5 TABLET ORAL at 05:05

## 2021-08-23 RX ADMIN — METFORMIN HYDROCHLORIDE 500 MILLIGRAM(S): 850 TABLET ORAL at 17:04

## 2021-08-23 RX ADMIN — ATORVASTATIN CALCIUM 80 MILLIGRAM(S): 80 TABLET, FILM COATED ORAL at 22:07

## 2021-08-23 RX ADMIN — INSULIN GLARGINE 15 UNIT(S): 100 INJECTION, SOLUTION SUBCUTANEOUS at 22:07

## 2021-08-23 NOTE — PROGRESS NOTE ADULT - SUBJECTIVE AND OBJECTIVE BOX
Patient is a 61y old  Female who presents with a chief complaint of CVA (19 Aug 2021 14:38)    HPI:  62 yo R handed Female with  PMHx of DMT2, HTN, HLD, multiple CVAs w/ residual RUE/LE weakness w/ foot drop, LLE>UE numbness s/p loop recorder and plavix  now discontinued 2017. She presented to [facility] on 8/15/21 with acute on chronic R-sided weakness, LKN 8/15 07:30 symptoms noticed around 10AM after patient was trying to use her arm and was not able to "make it do what I wanted" on further clarification of RUE>LE weakness. Pt endorsed was leaning to one side, endorsed RLE feels weaker than normal which was present on initial NIHSS 5. Initial BPs elevated 190s systolic, CTH without hemorrhage or acute findings, BP was lowered with improvement in R sided acute on chronic weakness to allow for ataxia testing in limbs which was present, which was new findings.  Endorsed mild L posterior headache, no N/V or infectious symptoms. Patient was re-evaluated after improvement for weakness, concern for focal seizure with patient endorsing no prior seizure history or prior episodes w RUE. CTH w/o acute findings, CTA w worsening PCA stenosis but normal flow bL VAs. CDU monitoring for MR Brain to assess for any new infarcts, however course c/b reoccurrence in R UE "weakness" appearing contracture-like w RUE flexion and hypertonia, intact mentation.  she was seen by neurologist.  pt received 1500mg IV Keppra load given high suspicion for focal seizure activity. MRI Brain showed Left thalamic infarct. She did not receive TPA as she was out of  window. She was seen by cardiology; TTE, ASA/STATIN, and losartan started. EP was consulted, ILR was implanted (8/18), No events recorded on ILR. Of note, patient had ILR placed in the past ( 2017) no arrhythmias was recorded then. She was evaluated by PM&R and therapist and acute rehabilitation was recommended. She was admitted to [facility] on 8/19/21.       PAST MEDICAL & SURGICAL HISTORY:  Hypertension  Diabetes Mellitus Type II  Generalized Headaches  IBS (Irritable Bowel Syndrome)  Hypokalemia  CVA (cerebral vascular accident)  TIA (transient ischemic attack)  Hemiplegia of right dominant side due to infarction of brain    No Past Surgical History    Allergies  sulfa drugs (Angioedema; Swelling)  sulfa drugs (Rash)  Sulfac 10% (Unknown)  walnut (Urticaria)    Intolerances    lactose (Diarrhea)    SUBJECTIVE & ROS: Patient seen and evaluated this morning. No issues over the weekend. BP noted to be elevated in therapy but no symptoms endorsed. No acute issues overnight.     PHYSICAL EXAM  Vital Signs Last 24 Hrs  T(C): 36.6 (23 Aug 2021 08:09), Max: 36.8 (22 Aug 2021 20:41)  T(F): 97.9 (23 Aug 2021 08:09), Max: 98.3 (22 Aug 2021 20:41)  HR: 98 (23 Aug 2021 08:09) (91 - 98)  BP: 156/98 (23 Aug 2021 08:09) (144/96 - 171/98)  BP(mean): --  RR: 16 (23 Aug 2021 08:09) (16 - 16)  SpO2: 96% (23 Aug 2021 08:09) (92% - 99%)    Constitutional - NAD, Comfortable  HEENT - NCAT, EOMI  Neck - Supple, No limited ROM  Chest - good chest expansion, good respiratory effort, CTAB   Cardio - warm and well perfused, RRR, no murmur  Abdomen -  Soft, NTND  Extremities - No peripheral edema, No calf tenderness   Neurologic Exam:                    Cognitive - AAO x 3     Speech - Fluent, Comprehensible, Mild dysarthria      Motor -                       LUE and LLE 5/5                     RIGHT UE - ShAB 2/5, EF 2/5, EE 2/5, WE 0/5,  0/5                    RIGHT LE - HF 3/5, KE 4/5, DF 0/5, PF 1/5  + foot drop      Sensory - Intact to LT bilateral  Psychiatric - Mood stable, Affect WNL        RECENT LABS/IMAGING                        11.4   5.84  )-----------( 199      ( 23 Aug 2021 06:00 )             34.7     08-23    140  |  104  |  20  ----------------------------<  123<H>  3.8   |  25  |  1.08    Ca    9.4      23 Aug 2021 06:00    TPro  7.8  /  Alb  2.9<L>  /  TBili  0.8  /  DBili  x   /  AST  24  /  ALT  24  /  AlkPhos  83  08-23        CAPILLARY BLOOD GLUCOSE  POCT Blood Glucose.: 113 mg/dL (23 Aug 2021 08:06)  POCT Blood Glucose.: 145 mg/dL (22 Aug 2021 21:00)  POCT Blood Glucose.: 131 mg/dL (22 Aug 2021 16:49)  POCT Blood Glucose.: 113 mg/dL (22 Aug 2021 11:41)        MEDICATIONS  (STANDING):  amLODIPine   Tablet 10 milliGRAM(s) Oral daily  aspirin  chewable 81 milliGRAM(s) Oral daily  atorvastatin 80 milliGRAM(s) Oral at bedtime  enoxaparin Injectable 40 milliGRAM(s) SubCutaneous daily  insulin glargine Injectable (LANTUS) 15 Unit(s) SubCutaneous at bedtime  insulin lispro (ADMELOG) corrective regimen sliding scale   SubCutaneous three times a day before meals  lisinopril 20 milliGRAM(s) Oral daily  metFORMIN 500 milliGRAM(s) Oral two times a day  ticagrelor 90 milliGRAM(s) Oral every 12 hours    MEDICATIONS  (PRN):  acetaminophen   Tablet .. 975 milliGRAM(s) Oral every 6 hours PRN Mild Pain (1 - 3), Moderate Pain (4 - 6), Severe Pain (7 - 10)     Patient is a 61y old  Female who presents with a chief complaint of CVA (19 Aug 2021 14:38)    HPI:  60 yo R handed Female with  PMHx of DMT2, HTN, HLD, multiple CVAs w/ residual RUE/LE weakness w/ foot drop, LLE>UE numbness s/p loop recorder and plavix  now discontinued 2017. She presented to [facility] on 8/15/21 with acute on chronic R-sided weakness, LKN 8/15 07:30 symptoms noticed around 10AM after patient was trying to use her arm and was not able to "make it do what I wanted" on further clarification of RUE>LE weakness. Pt endorsed was leaning to one side, endorsed RLE feels weaker than normal which was present on initial NIHSS 5. Initial BPs elevated 190s systolic, CTH without hemorrhage or acute findings, BP was lowered with improvement in R sided acute on chronic weakness to allow for ataxia testing in limbs which was present, which was new findings.  Endorsed mild L posterior headache, no N/V or infectious symptoms. Patient was re-evaluated after improvement for weakness, concern for focal seizure with patient endorsing no prior seizure history or prior episodes w RUE. CTH w/o acute findings, CTA w worsening PCA stenosis but normal flow bL VAs. CDU monitoring for MR Brain to assess for any new infarcts, however course c/b reoccurrence in R UE "weakness" appearing contracture-like w RUE flexion and hypertonia, intact mentation.  she was seen by neurologist.  pt received 1500mg IV Keppra load given high suspicion for focal seizure activity. MRI Brain showed Left thalamic infarct. She did not receive TPA as she was out of  window. She was seen by cardiology; TTE, ASA/STATIN, and losartan started. EP was consulted, ILR was implanted (8/18), No events recorded on ILR. Of note, patient had ILR placed in the past ( 2017) no arrhythmias was recorded then. She was evaluated by PM&R and therapist and acute rehabilitation was recommended. She was admitted to [facility] on 8/19/21.       PAST MEDICAL & SURGICAL HISTORY:  Hypertension  Diabetes Mellitus Type II  Generalized Headaches  IBS (Irritable Bowel Syndrome)  Hypokalemia  CVA (cerebral vascular accident)  TIA (transient ischemic attack)  Hemiplegia of right dominant side due to infarction of brain    No Past Surgical History    Allergies  sulfa drugs (Angioedema; Swelling)  sulfa drugs (Rash)  Sulfac 10% (Unknown)  walnut (Urticaria)    Intolerances    lactose (Diarrhea)    SUBJECTIVE & ROS: Patient seen and evaluated this morning. No issues over the weekend. BP noted to be elevated in therapy but no symptoms endorsed. No acute issues overnight.     PHYSICAL EXAM  Vital Signs Last 24 Hrs  T(C): 36.6 (23 Aug 2021 08:09), Max: 36.8 (22 Aug 2021 20:41)  T(F): 97.9 (23 Aug 2021 08:09), Max: 98.3 (22 Aug 2021 20:41)  HR: 98 (23 Aug 2021 08:09) (91 - 98)  BP: 156/98 (23 Aug 2021 08:09) (144/96 - 171/98)  BP(mean): --  RR: 16 (23 Aug 2021 08:09) (16 - 16)  SpO2: 96% (23 Aug 2021 08:09) (92% - 99%)    Constitutional - NAD, Comfortable  HEENT - NCAT, EOMI  Neck - Supple, No limited ROM  Chest - good chest expansion, good respiratory effort, CTAB   Cardio - warm and well perfused, RRR, no murmur  Abdomen -  Soft, NTND  Extremities - No peripheral edema, No calf tenderness , increased tone on right finger flexors   Neurologic Exam:                    Cognitive - AAO x 3     Speech - Fluent, Comprehensible, Mild dysarthria      Motor -                       LUE and LLE 5/5                     RIGHT UE - ShAB 2/5, EF 2/5, EE 2/5, WE 0/5,  0/5                    RIGHT LE - HF 3/5, KE 4/5, DF 0/5, PF 1/5  + foot drop      Sensory - Intact to LT bilateral  Psychiatric - Mood stable, Affect WNL        RECENT LABS/IMAGING                        11.4   5.84  )-----------( 199      ( 23 Aug 2021 06:00 )             34.7     08-23    140  |  104  |  20  ----------------------------<  123<H>  3.8   |  25  |  1.08    Ca    9.4      23 Aug 2021 06:00    TPro  7.8  /  Alb  2.9<L>  /  TBili  0.8  /  DBili  x   /  AST  24  /  ALT  24  /  AlkPhos  83  08-23        CAPILLARY BLOOD GLUCOSE  POCT Blood Glucose.: 113 mg/dL (23 Aug 2021 08:06)  POCT Blood Glucose.: 145 mg/dL (22 Aug 2021 21:00)  POCT Blood Glucose.: 131 mg/dL (22 Aug 2021 16:49)  POCT Blood Glucose.: 113 mg/dL (22 Aug 2021 11:41)        MEDICATIONS  (STANDING):  amLODIPine   Tablet 10 milliGRAM(s) Oral daily  aspirin  chewable 81 milliGRAM(s) Oral daily  atorvastatin 80 milliGRAM(s) Oral at bedtime  enoxaparin Injectable 40 milliGRAM(s) SubCutaneous daily  insulin glargine Injectable (LANTUS) 15 Unit(s) SubCutaneous at bedtime  insulin lispro (ADMELOG) corrective regimen sliding scale   SubCutaneous three times a day before meals  lisinopril 20 milliGRAM(s) Oral daily  metFORMIN 500 milliGRAM(s) Oral two times a day  ticagrelor 90 milliGRAM(s) Oral every 12 hours    MEDICATIONS  (PRN):  acetaminophen   Tablet .. 975 milliGRAM(s) Oral every 6 hours PRN Mild Pain (1 - 3), Moderate Pain (4 - 6), Severe Pain (7 - 10)

## 2021-08-23 NOTE — PROGRESS NOTE ADULT - SUBJECTIVE AND OBJECTIVE BOX
61y old  Female who presents with a chief complaint of CVA       Patient seen and examined at bedside.  no new complaints, no n/v, no sob  No overnight events    Vital Signs Last 24 Hrs  T(C): 36.6 (23 Aug 2021 08:09), Max: 36.8 (22 Aug 2021 20:41)  T(F): 97.9 (23 Aug 2021 08:09), Max: 98.3 (22 Aug 2021 20:41)  HR: 98 (23 Aug 2021 08:09) (91 - 98)  BP: 156/98 (23 Aug 2021 08:09) (144/96 - 171/98)  BP(mean): --  RR: 16 (23 Aug 2021 08:09) (16 - 16)  SpO2: 96% (23 Aug 2021 08:09) (92% - 99%)    GENERAL- NAD  EAR/NOSE/MOUTH/THROAT - no pharyngeal exudates, no oral leisions,  MMM  EYES- RAYMOND, conjunctiva and Sclera clear  NECK- supple  RESPIRATORY-  clear to auscultation bilaterally, non laboured breathing  CARDIOVASCULAR - SIS2, RRR  GI - soft NT BS present  EXTREMITIES- no pedal edema  NEUROLOGY- right sided weakness  PSYCHIATRY- AAO X 3                  11.4                 140  | 25   | 20           5.84  >-----------< 199     ------------------------< 123                   34.7                 3.8  | 104  | 1.08                                         Ca 9.4   Mg x     Ph x          CAPILLARY BLOOD GLUCOSE      POCT Blood Glucose.: 113 mg/dL (23 Aug 2021 08:06)  POCT Blood Glucose.: 145 mg/dL (22 Aug 2021 21:00)  POCT Blood Glucose.: 131 mg/dL (22 Aug 2021 16:49)  POCT Blood Glucose.: 113 mg/dL (22 Aug 2021 11:41)      A1C with Estimated Average Glucose Result: 6.8: Method: Immunoassay       Reference Range                4.0-5.6%       High risk (prediabetic)        5.7-6.4%       Diabetic, diagnostic             >=6.5%       ADA diabetic treatment goal       <7.0%  The Hemoglobin A1c testing is NGSP-certified.Reference ranges are based  upon the 2010 recommendations of  the American Diabetes Association.  Interpretation may vary for children  and adolescents. % (08.16.21 @ 09:21)      MEDICATIONS  (STANDING):  amLODIPine   Tablet 10 milliGRAM(s) Oral daily  aspirin  chewable 81 milliGRAM(s) Oral daily  atorvastatin 80 milliGRAM(s) Oral at bedtime  enoxaparin Injectable 40 milliGRAM(s) SubCutaneous daily  insulin glargine Injectable (LANTUS) 15 Unit(s) SubCutaneous at bedtime  insulin lispro (ADMELOG) corrective regimen sliding scale   SubCutaneous three times a day before meals  lisinopril 20 milliGRAM(s) Oral daily  metFORMIN 500 milliGRAM(s) Oral two times a day  ticagrelor 90 milliGRAM(s) Oral every 12 hours    MEDICATIONS  (PRN):  acetaminophen   Tablet .. 975 milliGRAM(s) Oral every 6 hours PRN Mild Pain (1 - 3), Moderate Pain (4 - 6), Severe Pain (7 - 10)

## 2021-08-23 NOTE — PROGRESS NOTE ADULT - ASSESSMENT
AAC evaluation completed to determine appropriateness of use of device for expressing wants/needs during h-course so to allow pt to be an active participant in his care. 62 yo R handed woman with history of DMT2, HTN, HLD, multiple CVAs w/ residual RUE/LE weakness w/ foot drop, LLE>UE numbness s/p loop recorder and no longer on plavix admitted to  rehab after hospital course for left thalamic infarct, admitted for rehab- pt/ot/dvt ppx    multiple cerebrovascular accidents (CVAs).   - acute on chronic R-sided weakness (RUE>LE weakness)  - CTA w worsening PCA stenosis but normal flow bL   - no TPA as she was out of  window  -  ASA/STATIN, Brilinta and losartan   - ILR was implanted (8/18)      HLD (hyperlipidemia).    continue atorvastatin     Benign essential HTN.   mlodipine 10mg, hydralazine 50mg, labetalol 100mg mg TID, lisinopril 40mg   - continue amlodipine, increase lisinopril to 30mg.     Type 2 diabetes mellitus.   home- Jardiance, metformin, levermir 15units and Novolog 5units pre-meals  - continue lantus 15units, and metformin for now  - continue ISS  - monitor FS.      ERENDIRA (acute kidney injury) on CKD II  - encourage oral intake  - avoid nephrotoxic agents  - monitor BMP.       DVT prophylaxis.    continue Lovenox     will follow   d/w dr. wei

## 2021-08-23 NOTE — PROGRESS NOTE ADULT - ASSESSMENT
ASSESSMENT/PLAN  62 yo R handed Female with  PMHx of DMT2, HTN, HLD, multiple CVAs w/ residual RUE/LE weakness w/ foot drop, LLE>UE numbness s/p loop recorder and plavix  now discontinued 2017. Admitted on 8/15 for acute on chronic R-sided weakness found to have Left thalamic infarct. Had concerns of seizure and given loading dose of keppra, however it was ruled out. Patient now with Gait Instability, ADL impairments and Functional impairments.    COMORBIDITES/ACTIVE MEDICAL ISSUES     Gait Instability, ADL impairments and Functional impairments: start Comprehensive Rehab Program of PT/OT/ST     # Multiple CVA  - Left thalamic infarcts  - acute on chronic right hemiparesis  - left lower extremity numbness  - s/p ILR placement; f/u with Dr. Minor  - ASA 81 mg daily  - Brillinta 90 mg twice   - Hypercoagulable workup obtained -- pending results     #HLD  - Lipitor 80 mg daily    #HTN  - Home meds: amlodipine, labetalol, HCTz, lisinopril  - Amlodipine 10 mg daily   - Hold for systolic BP of <140  - Allowed for permissive hypertension for perfusion    #DM II  - ISS and FS  - Lantus 15 units qhs   - metformin 500 BID    #Hypokalemic (resolved)     #Pain control  - Tylenol PRN    #GI/Bowel Mgmt   - Continent  - Senna,  Miralax PRN,    #Bladder management  - PVR q 8 hours (SC if > 400)    #DVT  - Lovenox  - SCDs, TEDs     #Skin:  -No active issues at this time    FEN   - Diet - Consistent Carbohydrate Low Sodium     Precautions / PROPHYLAXIS:   - Falls  - ortho: Weight bearing status: WBAT   - Lungs: Aspiration, Incentive Spirometer   - Pressure injury/Skin: Turn Q2hrs while in bed, OOB to Chair, PT/OT                ASSESSMENT/PLAN  62 yo R handed Female with  PMHx of DMT2, HTN, HLD, multiple CVAs w/ residual RUE/LE weakness w/ foot drop, LLE>UE numbness s/p loop recorder and plavix  now discontinued 2017. Admitted on 8/15 for acute on chronic R-sided weakness found to have Left thalamic infarct. Had concerns of seizure and given loading dose of keppra, however it was ruled out. Patient now with Gait Instability, ADL impairments and Functional impairments.    COMORBIDITES/ACTIVE MEDICAL ISSUES     Continue Comprehensive Rehab Program of PT/OT/ST     # Multiple CVA  - Left thalamic infarcts   s/p ILR placement; f/u with Dr. Minor  - ASA 81 mg daily  - Brillinta 90 mg twice   - Hypercoagulable workup obtained -- pending results     #HLD  - Lipitor 80 mg daily    #HTN  - Home meds: amlodipine, labetalol, HCTz, lisinopril  - Amlodipine 10 mg daily   - Hold for systolic BP of <140  - Allowed for permissive hypertension for perfusion    #DM II  - ISS and FS  - Lantus 15 units qhs   - metformin 500 BID    #Hypokalemic (resolved)     #Pain control  - Tylenol PRN    #GI/Bowel Mgmt   - Continent  - Senna,  Miralax PRN,    #Bladder management: toilet schedule prn       #DVT  - Lovenox  - SCDs, TEDs     #Skin:  -No active issues at this time    FEN   - Diet - Consistent Carbohydrate Low Sodium     Precautions / PROPHYLAXIS:   - Falls  - ortho: Weight bearing status: WBAT   - Lungs: Aspiration, Incentive Spirometer   - Pressure injury/Skin: Turn Q2hrs while in bed, OOB to Chair, PT/OT

## 2021-08-23 NOTE — PROGRESS NOTE ADULT - ATTENDING COMMENTS
Patient seen at bedside  Feels well  Appears tired  Neuro exam stable with right hemiparesis    2. BP noted to be elevated. Medications being re-introduced slowly. Lisinopril dose increased. Case d/w hospitalist     3. Labs stable    4. Continue rehab program

## 2021-08-24 LAB
B2 GLYCOPROT1 AB SER QL: NEGATIVE — SIGNIFICANT CHANGE UP
CARDIOLIPIN AB SER-ACNC: NEGATIVE — SIGNIFICANT CHANGE UP
GLUCOSE BLDC GLUCOMTR-MCNC: 102 MG/DL — HIGH (ref 70–99)
GLUCOSE BLDC GLUCOMTR-MCNC: 144 MG/DL — HIGH (ref 70–99)

## 2021-08-24 PROCEDURE — 99232 SBSQ HOSP IP/OBS MODERATE 35: CPT

## 2021-08-24 PROCEDURE — 99233 SBSQ HOSP IP/OBS HIGH 50: CPT

## 2021-08-24 RX ORDER — METFORMIN HYDROCHLORIDE 850 MG/1
1000 TABLET ORAL
Refills: 0 | Status: DISCONTINUED | OUTPATIENT
Start: 2021-08-24 | End: 2021-09-17

## 2021-08-24 RX ORDER — INSULIN GLARGINE 100 [IU]/ML
7 INJECTION, SOLUTION SUBCUTANEOUS AT BEDTIME
Refills: 0 | Status: DISCONTINUED | OUTPATIENT
Start: 2021-08-24 | End: 2021-08-25

## 2021-08-24 RX ADMIN — INSULIN GLARGINE 7 UNIT(S): 100 INJECTION, SOLUTION SUBCUTANEOUS at 21:23

## 2021-08-24 RX ADMIN — METFORMIN HYDROCHLORIDE 500 MILLIGRAM(S): 850 TABLET ORAL at 05:18

## 2021-08-24 RX ADMIN — Medication 81 MILLIGRAM(S): at 12:14

## 2021-08-24 RX ADMIN — LISINOPRIL 30 MILLIGRAM(S): 2.5 TABLET ORAL at 05:23

## 2021-08-24 RX ADMIN — TICAGRELOR 90 MILLIGRAM(S): 90 TABLET ORAL at 17:48

## 2021-08-24 RX ADMIN — METFORMIN HYDROCHLORIDE 1000 MILLIGRAM(S): 850 TABLET ORAL at 17:48

## 2021-08-24 RX ADMIN — TICAGRELOR 90 MILLIGRAM(S): 90 TABLET ORAL at 05:18

## 2021-08-24 RX ADMIN — ATORVASTATIN CALCIUM 80 MILLIGRAM(S): 80 TABLET, FILM COATED ORAL at 21:21

## 2021-08-24 RX ADMIN — AMLODIPINE BESYLATE 10 MILLIGRAM(S): 2.5 TABLET ORAL at 05:17

## 2021-08-24 RX ADMIN — ENOXAPARIN SODIUM 40 MILLIGRAM(S): 100 INJECTION SUBCUTANEOUS at 12:14

## 2021-08-24 NOTE — PROGRESS NOTE ADULT - SUBJECTIVE AND OBJECTIVE BOX
Patient is a 61y old  Female who presents with a chief complaint of CVA (19 Aug 2021 14:38)    HPI:  60 yo R handed Female with  PMHx of DMT2, HTN, HLD, multiple CVAs w/ residual RUE/LE weakness w/ foot drop, LLE>UE numbness s/p loop recorder and plavix  now discontinued 2017. She presented to [facility] on 8/15/21 with acute on chronic R-sided weakness, LKN 8/15 07:30 symptoms noticed around 10AM after patient was trying to use her arm and was not able to "make it do what I wanted" on further clarification of RUE>LE weakness. Pt endorsed was leaning to one side, endorsed RLE feels weaker than normal which was present on initial NIHSS 5. Initial BPs elevated 190s systolic, CTH without hemorrhage or acute findings, BP was lowered with improvement in R sided acute on chronic weakness to allow for ataxia testing in limbs which was present, which was new findings.  Endorsed mild L posterior headache, no N/V or infectious symptoms. Patient was re-evaluated after improvement for weakness, concern for focal seizure with patient endorsing no prior seizure history or prior episodes w RUE. CTH w/o acute findings, CTA w worsening PCA stenosis but normal flow bL VAs. CDU monitoring for MR Brain to assess for any new infarcts, however course c/b reoccurrence in R UE "weakness" appearing contracture-like w RUE flexion and hypertonia, intact mentation.  she was seen by neurologist.  pt received 1500mg IV Keppra load given high suspicion for focal seizure activity. MRI Brain showed Left thalamic infarct. She did not receive TPA as she was out of  window. She was seen by cardiology; TTE, ASA/STATIN, and losartan started. EP was consulted, ILR was implanted (8/18), No events recorded on ILR. Of note, patient had ILR placed in the past ( 2017) no arrhythmias was recorded then. She was evaluated by PM&R and therapist and acute rehabilitation was recommended. She was admitted to [facility] on 8/19/21.       SUBJECTIVE & ROS: Patient seen and evaluated this morning.     No issues overnight, Slept ok  This am- states feeling un well, ? dizzy/weak  Had mild posterior HA  Denies HA/CP/palpitations/abdominal pain or nausea  + BM       PHYSICAL EXAM  Vital Signs Last 24 Hrs  T(C): 36.6 (23 Aug 2021 20:15), Max: 36.6 (23 Aug 2021 20:15)  T(F): 97.8 (23 Aug 2021 20:15), Max: 97.8 (23 Aug 2021 20:15)  HR: 91 (24 Aug 2021 05:15) (91 - 91)  BP: 150/90 (24 Aug 2021 05:15) (144/92 - 150/90)  BP(mean): --  RR: 16 (24 Aug 2021 05:15) (16 - 16)  SpO2: 99% (24 Aug 2021 05:15) (99% - 99%)    Constitutional - NAD, Comfortable  Chest - good chest expansion, good respiratory effort, CTAB   Cardio - s1s2+  no murmur  Abdomen -  Soft, NTND  Extremities - No peripheral edema, No calf tenderness , increased tone on right finger flexors   Neurologic Exam:                    Cognitive - AAO x 3     Speech - Fluent, Comprehensible, Mild dysarthria      Motor -                       LUE and LLE 5/5                     RIGHT UE - ShAB 2/5, EF 2/5, EE 2/5, WE 0/5,  0/5                    RIGHT LE - HF 3/5, KE 4/5, DF 0/5, PF 1/5  + foot drop      Sensory - Intact to LT bilateral  Psychiatric - Mood stable, Affect WNL  Skin: Loop recorder site- clean       MEDICATIONS  (STANDING):  amLODIPine   Tablet 10 milliGRAM(s) Oral daily  aspirin  chewable 81 milliGRAM(s) Oral daily  atorvastatin 80 milliGRAM(s) Oral at bedtime  enoxaparin Injectable 40 milliGRAM(s) SubCutaneous daily  insulin glargine Injectable (LANTUS) 15 Unit(s) SubCutaneous at bedtime  insulin lispro (ADMELOG) corrective regimen sliding scale   SubCutaneous before breakfast  insulin lispro (ADMELOG) corrective regimen sliding scale   SubCutaneous at bedtime  lisinopril 30 milliGRAM(s) Oral daily  metFORMIN 500 milliGRAM(s) Oral two times a day  ticagrelor 90 milliGRAM(s) Oral every 12 hours    MEDICATIONS  (PRN):  acetaminophen   Tablet .. 975 milliGRAM(s) Oral every 6 hours PRN Mild Pain (1 - 3), Moderate Pain (4 - 6), Severe Pain (7 - 10)        RECENT LABS/IMAGING                        11.4   5.84  )-----------( 199      ( 23 Aug 2021 06:00 )             34.7     08-23    140  |  104  |  20  ----------------------------<  123<H>  3.8   |  25  |  1.08    Ca    9.4      23 Aug 2021 06:00    TPro  7.8  /  Alb  2.9<L>  /  TBili  0.8  /  DBili  x   /  AST  24  /  ALT  24  /  AlkPhos  83  08-23        CAPILLARY BLOOD GLUCOSE      POCT Blood Glucose.: 102 mg/dL (24 Aug 2021 07:38)  POCT Blood Glucose.: 108 mg/dL (23 Aug 2021 22:05)  POCT Blood Glucose.: 127 mg/dL (23 Aug 2021 11:54)

## 2021-08-24 NOTE — PROGRESS NOTE ADULT - ASSESSMENT
ASSESSMENT/PLAN  62 yo R handed Female with  PMHx of DMT2, HTN, HLD, multiple CVAs w/ residual RUE/LE weakness w/ foot drop, LLE>UE numbness s/p loop recorder and plavix  now discontinued 2017. Admitted on 8/15 for acute on chronic R-sided weakness found to have Left thalamic infarct. Had concerns of seizure and given loading dose of keppra, however it was ruled out. Patient now with Gait Instability, ADL impairments and Functional impairments.    COMORBIDITES/ACTIVE MEDICAL ISSUES     Continue Comprehensive Rehab Program of PT/OT/ST - total of 3 hrs/day 5 days/week     # Multiple CVA  - Left thalamic infarcts   s/p ILR placement; f/u with Dr. Minor  - ASA 81 mg daily  - Brillinta 90 mg twice   - Hypercoagulable workup    #HLD:- Lipitor 80 mg daily    #HTN: on amlodipine 10mg daily and lisinopril 30mg daily   - Home meds: amlodipine, labetalol, HCTz, lisinopril 40  - Hold for systolic BP of <140    #DM II: blood sugars good   - ISS and FS  - Lantus 15 units qhs   - metformin 500 BID    Hypokalemic (resolved)     Pain control:- Tylenol PRN    GI/Bowel Mgmt   - Continent  - Senna,  Miralax PRN,    Bladder management: toilet schedule prn     DVT prophylaxis:- Lovenox  - SCDs, TEDs     Skin:-No active issues at this time    FEN :- Diet - Consistent Carbohydrate Low Sodium     Precautions / PROPHYLAXIS:   - Falls  - Lungs: Aspiration  - Pressure injury/Skin: Turn Q2hrs while in bed, OOB to Chair, PT/OT

## 2021-08-24 NOTE — CHART NOTE - NSCHARTNOTEFT_GEN_A_CORE
Nutrition Follow Up Note  Hospital Course   (Per Electronic Medical Record)    Source:  Patient [X]  Medical Record [X]      Diet:   Consistent Carbohydrate Low Sodium Diet w/ Thin Liquids  Tolerates Diet Consistency Well  No Chewing/Swallowing Difficulties  No Recent Nausea, Vomiting, Diarrhea or Constipation (as Per Patient)  Consumes 100% of Meals (as Per Documentation)   Obtained Food Preferences from Patient     Enteral/Parenteral Nutrition: Not Applicable    Current Weight: 216.4lb on 8/19  Obtain New Weight  Obtain Weights Weekly     Pertinent Medications: MEDICATIONS  (STANDING):  amLODIPine   Tablet 10 milliGRAM(s) Oral daily  aspirin  chewable 81 milliGRAM(s) Oral daily  atorvastatin 80 milliGRAM(s) Oral at bedtime  enoxaparin Injectable 40 milliGRAM(s) SubCutaneous daily  insulin glargine Injectable (LANTUS) 15 Unit(s) SubCutaneous at bedtime  insulin lispro (ADMELOG) corrective regimen sliding scale   SubCutaneous before breakfast  insulin lispro (ADMELOG) corrective regimen sliding scale   SubCutaneous at bedtime  lisinopril 30 milliGRAM(s) Oral daily  metFORMIN 500 milliGRAM(s) Oral two times a day  ticagrelor 90 milliGRAM(s) Oral every 12 hours    MEDICATIONS  (PRN):  acetaminophen   Tablet .. 975 milliGRAM(s) Oral every 6 hours PRN Mild Pain (1 - 3), Moderate Pain (4 - 6), Severe Pain (7 - 10)    Pertinent Labs:  08-23 Na140 mmol/L Glu 123 mg/dL<H> K+ 3.8 mmol/L Cr  1.08 mg/dL BUN 20 mg/dL 08-23 Alb 2.9 g/dL<L> 08-16 Chol 134 mg/dL LDL --    HDL 49 mg/dL<L> Trig 64 mg/dL    Skin: No Pressure Ulcers  Surgical Incision on Chest  (as Per Nursing Flow Sheet)     Edema: None Noted (as Per Documentation)     Last Bowel Movement: on 8/23    Estimated Needs:   [X] No Change Since Previous Assessment    Previous Nutrition Diagnosis:   Moderate Malnutrition     Nutrition Diagnosis is [X] Ongoing - Declines Nutrition Supplementation; Obtained Food Preferences from Patient     New Nutrition Diagnosis: [X] Not Applicable    Interventions:   1. Recommend Continue Nutrition Plan of Care     Monitoring & Evaluation:   [X] Weights   [X] PO Intake   [X] Skin Integrity   [X] Follow Up (Per Protocol)  [X] Tolerance to Diet Prescription   [X] Other: Labs & POCT    Registered Dietitian/Nutritionist Remains Available.  Ren Rosales RDN    Pager #[phone]  Phone# [phone]

## 2021-08-24 NOTE — PROGRESS NOTE ADULT - SUBJECTIVE AND OBJECTIVE BOX
61y old  Female who presents with a chief complaint of CVA     Patient seen and examined at bedside.  no new complaints, no n/v, no sob  No overnight events    Vital Signs Last 24 Hrs  T(C): 36.6 (24 Aug 2021 07:45), Max: 36.6 (23 Aug 2021 20:15)  T(F): 97.9 (24 Aug 2021 07:45), Max: 97.9 (24 Aug 2021 07:45)  HR: 100 (24 Aug 2021 07:45) (91 - 100)  BP: 154/106 (24 Aug 2021 07:45) (144/92 - 154/106)  BP(mean): --  RR: 16 (24 Aug 2021 07:45) (16 - 16)  SpO2: 98% (24 Aug 2021 07:45) (98% - 99%)    GENERAL- NAD  EAR/NOSE/MOUTH/THROAT - no pharyngeal exudates, no oral leisions,  MMM  EYES- RAYMOND, conjunctiva and Sclera clear  NECK- supple  RESPIRATORY-  clear to auscultation bilaterally, non laboured breathing  CARDIOVASCULAR - SIS2, RRR  GI - soft NT BS present  EXTREMITIES- no pedal edema  NEUROLOGY- right sided weakness  PSYCHIATRY- AAO X 3                11.4                 140  | 25   | 20           5.84  >-----------< 199     ------------------------< 123                   34.7                 3.8  | 104  | 1.08                                         Ca 9.4   Mg x     Ph x          CAPILLARY BLOOD GLUCOSE      POCT Blood Glucose.: 102 mg/dL (24 Aug 2021 07:38)  POCT Blood Glucose.: 108 mg/dL (23 Aug 2021 22:05)  POCT Blood Glucose.: 127 mg/dL (23 Aug 2021 11:54)    A1C with Estimated Average Glucose Result: 6.8 % (08.16.21 @ 09:21)      MEDICATIONS  (STANDING):  amLODIPine Tablet 10 milliGRAM(s) Oral daily  aspirin  chewable 81 milliGRAM(s) Oral daily  atorvastatin 80 milliGRAM(s) Oral at bedtime  enoxaparin Injectable 40 milliGRAM(s) SubCutaneous daily  insulin glargine Injectable (LANTUS) 7 Unit(s) SubCutaneous at bedtime  insulin lispro (ADMELOG) corrective regimen sliding scale   SubCutaneous before breakfast  insulin lispro (ADMELOG) corrective regimen sliding scale   SubCutaneous at bedtime  lisinopril 30 milliGRAM(s) Oral daily  metFORMIN 1000 milliGRAM(s) Oral two times a day  ticagrelor 90 milliGRAM(s) Oral every 12 hours    MEDICATIONS  (PRN):  acetaminophen   Tablet .. 975 milliGRAM(s) Oral every 6 hours PRN Mild Pain (1 - 3), Moderate Pain (4 - 6), Severe Pain (7 - 10)

## 2021-08-24 NOTE — PROGRESS NOTE ADULT - ASSESSMENT
62 yo R handed woman with history of DMT2, HTN, HLD, multiple CVAs w/ residual RUE/LE weakness w/ foot drop, LLE>UE numbness s/p loop recorder and no longer on plavix admitted to  rehab after hospital course for left thalamic infarct, admitted for rehab- pt/ot/dvt ppx    multiple cerebrovascular accidents (CVAs).   - acute on chronic R-sided weakness (RUE>LE weakness)  - CTA w worsening PCA stenosis but normal flow bL   - no TPA as she was out of  window  -  ASA/STATIN, Brilinta and losartan   - ILR was implanted (8/18)      HLD (hyperlipidemia).    continue atorvastatin     Benign essential HTN.   mlodipine 10mg, hydralazine 50mg, labetalol 100mg mg TID, lisinopril 40mg   - continue amlodipine, increased lisinopril to 30mg 8/23/21    Type 2 diabetes mellitus.   home- Jardiance, metformin, levermir 7 units and Novolog 5units pre-meals  - decrease Lantus to 10 units, and increase metformin to 1gm bid  - continue ISS  - monitor FS.      ERENDIRA (acute kidney injury) on CKD II  - encourage oral intake  - avoid nephrotoxic agents  - monitor BMP.       DVT prophylaxis.    continue Lovenox     will follow   d/w dr. wei

## 2021-08-25 LAB
GLUCOSE BLDC GLUCOMTR-MCNC: 102 MG/DL — HIGH (ref 70–99)
GLUCOSE BLDC GLUCOMTR-MCNC: 88 MG/DL — SIGNIFICANT CHANGE UP (ref 70–99)

## 2021-08-25 PROCEDURE — 99233 SBSQ HOSP IP/OBS HIGH 50: CPT

## 2021-08-25 PROCEDURE — 99232 SBSQ HOSP IP/OBS MODERATE 35: CPT

## 2021-08-25 RX ORDER — LISINOPRIL 2.5 MG/1
40 TABLET ORAL DAILY
Refills: 0 | Status: DISCONTINUED | OUTPATIENT
Start: 2021-08-25 | End: 2021-09-08

## 2021-08-25 RX ADMIN — LISINOPRIL 30 MILLIGRAM(S): 2.5 TABLET ORAL at 05:18

## 2021-08-25 RX ADMIN — AMLODIPINE BESYLATE 10 MILLIGRAM(S): 2.5 TABLET ORAL at 05:18

## 2021-08-25 RX ADMIN — ATORVASTATIN CALCIUM 80 MILLIGRAM(S): 80 TABLET, FILM COATED ORAL at 21:30

## 2021-08-25 RX ADMIN — ENOXAPARIN SODIUM 40 MILLIGRAM(S): 100 INJECTION SUBCUTANEOUS at 11:19

## 2021-08-25 RX ADMIN — METFORMIN HYDROCHLORIDE 1000 MILLIGRAM(S): 850 TABLET ORAL at 07:59

## 2021-08-25 RX ADMIN — Medication 81 MILLIGRAM(S): at 11:19

## 2021-08-25 RX ADMIN — METFORMIN HYDROCHLORIDE 1000 MILLIGRAM(S): 850 TABLET ORAL at 17:25

## 2021-08-25 RX ADMIN — TICAGRELOR 90 MILLIGRAM(S): 90 TABLET ORAL at 05:18

## 2021-08-25 RX ADMIN — TICAGRELOR 90 MILLIGRAM(S): 90 TABLET ORAL at 17:25

## 2021-08-25 NOTE — PROGRESS NOTE ADULT - ASSESSMENT
ASSESSMENT/PLAN  62 yo R handed Female with  PMHx of DMT2, HTN, HLD, multiple CVAs w/ residual RUE/LE weakness w/ foot drop, LLE>UE numbness s/p loop recorder and plavix  now discontinued 2017. Admitted on 8/15 for acute on chronic R-sided weakness found to have Left thalamic infarct. Had concerns of seizure and given loading dose of keppra, however it was ruled out. Patient now with Gait Instability, ADL impairments and Functional impairments.    COMORBIDITES/ACTIVE MEDICAL ISSUES     Continue Comprehensive Rehab Program of PT/OT/ST - total of 3 hrs/day 5 days/week     History of Multiple CVA: new left thalamic infarcts   s/p ILR placement; f/u with Dr. Minor  - ASA 81 mg daily, - Brillinta 90 mg twice   - Hypercoagulable workup    HLD:- Lipitor 80 mg daily    HTN: on amlodipine 10mg daily and lisinopril increased to 40 daily ( 8/25)   - Home meds: amlodipine, labetalol, HCTz, lisinopril 40  - Hold for systolic BP of <140    #DM II: blood sugars good   - ISS and FS  - Lantus 15 units qhs   - metformin 500 BID  Blood sugars good     Hypokalemic (resolved)     Pain control:- Tylenol PRN    GI/Bowel Mgmt   - Continent  - Senna,  Miralax PRN,    Bladder management: toilet schedule prn     DVT prophylaxis:- Lovenox  - SCDs, TEDs     Skin:-No active issues at this time    FEN :- Diet - Consistent Carbohydrate Low Sodium     Precautions / PROPHYLAXIS:   - Falls  - Lungs: Aspiration  - Pressure injury/Skin: Turn Q2hrs while in bed, OOB to Chair, PT/OT       Disp: team conference 8/24  TDD 9/10/21            ASSESSMENT/PLAN  62 yo R handed Female with  PMHx of DMT2, HTN, HLD, multiple CVAs w/ residual RUE/LE weakness w/ foot drop, LLE>UE numbness s/p loop recorder and plavix  now discontinued 2017. Admitted on 8/15 for acute on chronic R-sided weakness found to have Left thalamic infarct. Had concerns of seizure and given loading dose of keppra, however it was ruled out. Patient now with Gait Instability, ADL impairments and Functional impairments.    COMORBIDITES/ACTIVE MEDICAL ISSUES     Continue Comprehensive Rehab Program of PT/OT/ST - total of 3 hrs/day 5 days/week     History of Multiple CVA: new left thalamic infarcts   s/p ILR placement; f/u with Dr. Minor  - ASA 81 mg daily, - Brillinta 90 mg twice   - Hypercoagulable workup- essentially negative     HLD:- Lipitor 80 mg daily    HTN: on amlodipine 10mg daily and lisinopril increased to 40 daily ( 8/25)   - Home meds: amlodipine, labetalol, HCTz, lisinopril 40  - Hold for systolic BP of <140    #DM II: blood sugars good   - ISS and FS  - Lantus 15 units qhs   - metformin 500 BID  Blood sugars good     Hypokalemic (resolved)     Pain control:- Tylenol PRN    GI/Bowel Mgmt   - Continent  - Senna,  Miralax PRN,    Bladder management: toilet schedule prn     DVT prophylaxis:- Lovenox  - SCDs, TEDs     Skin:-No active issues at this time    FEN :- Diet - Consistent Carbohydrate Low Sodium     Precautions / PROPHYLAXIS:   - Falls  - Lungs: Aspiration  - Pressure injury/Skin: Turn Q2hrs while in bed, OOB to Chair, PT/OT       Disp: team conference 8/24  TDD 9/10/21

## 2021-08-25 NOTE — PROGRESS NOTE ADULT - ASSESSMENT
62 yo R handed woman with history of DMT2, HTN, HLD, multiple CVAs w/ residual RUE/LE weakness w/ foot drop, LLE>UE numbness s/p loop recorder and no longer on plavix admitted to  rehab after hospital course for left thalamic infarct, admitted for rehab- pt/ot/dvt ppx    multiple cerebrovascular accidents (CVAs).   - acute on chronic R-sided weakness (RUE>LE weakness)  - CTA w worsening PCA stenosis but normal flow bL   - no TPA as she was out of  window  -  ASA/STATIN, Brilinta and losartan   - ILR was implanted (8/18)      HLD (hyperlipidemia).    continue atorvastatin     Benign essential HTN.   mlodipine 10mg, hydralazine 50mg, labetalol 100mg mg TID, lisinopril 40mg   - continue amlodipine, increase lisinopril to 40mg 8/25/21    Type 2 diabetes mellitus.   home- Jardiance, metformin, levermir 7 units and Novolog 5units pre-meals  - stop Lantus, c/w metformin to 1gm bid  - continue ISS  - monitor FS.      ERENDIRA (acute kidney injury) on CKD II  - encourage oral intake  - avoid nephrotoxic agents  - monitor BMP.       DVT prophylaxis.    continue Lovenox     will follow   d/w dr. wei

## 2021-08-25 NOTE — PROGRESS NOTE ADULT - SUBJECTIVE AND OBJECTIVE BOX
Patient is a 61y old  Female who presents with a chief complaint of CVA (19 Aug 2021 14:38)    HPI:  60 yo R handed Female with  PMHx of DMT2, HTN, HLD, multiple CVAs w/ residual RUE/LE weakness w/ foot drop, LLE>UE numbness s/p loop recorder and plavix  now discontinued 2017. She presented to [facility] on 8/15/21 with acute on chronic R-sided weakness, LKN 8/15 07:30 symptoms noticed around 10AM after patient was trying to use her arm and was not able to "make it do what I wanted" on further clarification of RUE>LE weakness. Pt endorsed was leaning to one side, endorsed RLE feels weaker than normal which was present on initial NIHSS 5. Initial BPs elevated 190s systolic, CTH without hemorrhage or acute findings, BP was lowered with improvement in R sided acute on chronic weakness to allow for ataxia testing in limbs which was present, which was new findings.  Endorsed mild L posterior headache, no N/V or infectious symptoms. Patient was re-evaluated after improvement for weakness, concern for focal seizure with patient endorsing no prior seizure history or prior episodes w RUE. CTH w/o acute findings, CTA w worsening PCA stenosis but normal flow bL VAs. CDU monitoring for MR Brain to assess for any new infarcts, however course c/b reoccurrence in R UE "weakness" appearing contracture-like w RUE flexion and hypertonia, intact mentation.  she was seen by neurologist.  pt received 1500mg IV Keppra load given high suspicion for focal seizure activity. MRI Brain showed Left thalamic infarct. She did not receive TPA as she was out of  window. She was seen by cardiology; TTE, ASA/STATIN, and losartan started. EP was consulted, ILR was implanted (8/18), No events recorded on ILR. Of note, patient had ILR placed in the past ( 2017) no arrhythmias was recorded then. She was evaluated by PM&R and therapist and acute rehabilitation was recommended. She was admitted to [facility] on 8/19/21.       SUBJECTIVE & ROS: Patient seen and evaluated this morning.   No issues overnight, Slept ok  Feels much better today   Denies HA/CP/palpitations/abdominal pain or nausea  Denies dysuria   + BM       PHYSICAL EXAM    Vital Signs Last 24 Hrs  T(C): 36.7 (25 Aug 2021 08:50), Max: 36.7 (25 Aug 2021 08:50)  T(F): 98.1 (25 Aug 2021 08:50), Max: 98.1 (25 Aug 2021 08:50)  HR: 92 (25 Aug 2021 08:50) (92 - 99)  BP: 150/98 (25 Aug 2021 08:50) (148/88 - 156/84)  BP(mean): --  RR: 15 (25 Aug 2021 08:50) (15 - 16)  SpO2: 100% (25 Aug 2021 08:50) (100% - 100%)      Constitutional - NAD, Comfortable  Chest - good chest expansion, good respiratory effort,  Cardio - s1s2+   Abdomen -  Soft, NTND  Extremities - No peripheral edema, No calf tenderness , increased tone on right finger flexors   Neurologic Exam:                    Cognitive - AAO x 3     Speech - Fluent, Comprehensible, Mild dysarthria      Motor -                       LUE and LLE 5/5                     RIGHT UE - ShAB 2/5, EF 2/5, EE 2/5, WE 0/5,  0/5                    RIGHT LE - HF 3/5, KE 4/5, DF 0/5, PF 1/5  + foot drop      Sensory - Intact to LT bilateral  Psychiatric - Mood stable, Affect WNL  Skin: Loop recorder site- clean     MEDICATIONS  (STANDING):  amLODIPine   Tablet 10 milliGRAM(s) Oral daily  aspirin  chewable 81 milliGRAM(s) Oral daily  atorvastatin 80 milliGRAM(s) Oral at bedtime  enoxaparin Injectable 40 milliGRAM(s) SubCutaneous daily  insulin lispro (ADMELOG) corrective regimen sliding scale   SubCutaneous before breakfast  insulin lispro (ADMELOG) corrective regimen sliding scale   SubCutaneous at bedtime  lisinopril 40 milliGRAM(s) Oral daily  metFORMIN 1000 milliGRAM(s) Oral two times a day with meals  ticagrelor 90 milliGRAM(s) Oral every 12 hours    MEDICATIONS  (PRN):  acetaminophen   Tablet .. 975 milliGRAM(s) Oral every 6 hours PRN Mild Pain (1 - 3), Moderate Pain (4 - 6), Severe Pain (7 - 10)      RECENT LABS/IMAGING                        11.4   5.84  )-----------( 199      ( 23 Aug 2021 06:00 )             34.7     08-23    140  |  104  |  20  ----------------------------<  123<H>  3.8   |  25  |  1.08    Ca    9.4      23 Aug 2021 06:00    TPro  7.8  /  Alb  2.9<L>  /  TBili  0.8  /  DBili  x   /  AST  24  /  ALT  24  /  AlkPhos  83  08-23        CAPILLARY BLOOD GLUCOSE      POCT Blood Glucose.: 102 mg/dL (25 Aug 2021 07:57)  POCT Blood Glucose.: 144 mg/dL (24 Aug 2021 21:21)     Patient is a 61y old  Female who presents with a chief complaint of CVA (19 Aug 2021 14:38)    HPI:  62 yo R handed Female with  PMHx of DMT2, HTN, HLD, multiple CVAs w/ residual RUE/LE weakness w/ foot drop, LLE>UE numbness s/p loop recorder and plavix  now discontinued 2017. She presented to [facility] on 8/15/21 with acute on chronic R-sided weakness, LKN 8/15 07:30 symptoms noticed around 10AM after patient was trying to use her arm and was not able to "make it do what I wanted" on further clarification of RUE>LE weakness. Pt endorsed was leaning to one side, endorsed RLE feels weaker than normal which was present on initial NIHSS 5. Initial BPs elevated 190s systolic, CTH without hemorrhage or acute findings, BP was lowered with improvement in R sided acute on chronic weakness to allow for ataxia testing in limbs which was present, which was new findings.  Endorsed mild L posterior headache, no N/V or infectious symptoms. Patient was re-evaluated after improvement for weakness, concern for focal seizure with patient endorsing no prior seizure history or prior episodes w RUE. CTH w/o acute findings, CTA w worsening PCA stenosis but normal flow bL VAs. CDU monitoring for MR Brain to assess for any new infarcts, however course c/b reoccurrence in R UE "weakness" appearing contracture-like w RUE flexion and hypertonia, intact mentation.  she was seen by neurologist.  pt received 1500mg IV Keppra load given high suspicion for focal seizure activity. MRI Brain showed Left thalamic infarct. She did not receive TPA as she was out of  window. She was seen by cardiology; TTE, ASA/STATIN, and losartan started. EP was consulted, ILR was implanted (8/18), No events recorded on ILR. Of note, patient had ILR placed in the past ( 2017) no arrhythmias was recorded then. She was evaluated by PM&R and therapist and acute rehabilitation was recommended. She was admitted to [facility] on 8/19/21.       SUBJECTIVE & ROS: Patient seen and evaluated this morning.   No issues overnight, Slept ok  Feels much better today   Denies HA/CP/palpitations/abdominal pain or nausea  Denies dysuria   + BM       PHYSICAL EXAM    Vital Signs Last 24 Hrs  T(C): 36.7 (25 Aug 2021 08:50), Max: 36.7 (25 Aug 2021 08:50)  T(F): 98.1 (25 Aug 2021 08:50), Max: 98.1 (25 Aug 2021 08:50)  HR: 92 (25 Aug 2021 08:50) (92 - 99)  BP: 150/98 (25 Aug 2021 08:50) (148/88 - 156/84)  BP(mean): --  RR: 15 (25 Aug 2021 08:50) (15 - 16)  SpO2: 100% (25 Aug 2021 08:50) (100% - 100%)      Constitutional - NAD, Comfortable  Chest - good chest expansion, good respiratory effort,  Cardio - s1s2+   Abdomen -  Soft, NTND  Extremities - No peripheral edema, No calf tenderness , increased tone on right finger flexors   Neurologic Exam:                    Cognitive - AAO x 3     Speech - Fluent, Comprehensible, Mild dysarthria      Motor -                       LUE and LLE 5/5                     RIGHT UE - ShAB 2/5, EF 2/5, EE 2/5, WE 0/5,  0/5                    RIGHT LE - HF 3/5, KE 4/5, DF 0/5, PF 1/5  + foot drop      Sensory - Intact to LT bilateral  Psychiatric - Mood stable, Affect WNL  Skin: Loop recorder site- clean     MEDICATIONS  (STANDING):  amLODIPine   Tablet 10 milliGRAM(s) Oral daily  aspirin  chewable 81 milliGRAM(s) Oral daily  atorvastatin 80 milliGRAM(s) Oral at bedtime  enoxaparin Injectable 40 milliGRAM(s) SubCutaneous daily  insulin lispro (ADMELOG) corrective regimen sliding scale   SubCutaneous before breakfast  insulin lispro (ADMELOG) corrective regimen sliding scale   SubCutaneous at bedtime  lisinopril 40 milliGRAM(s) Oral daily  metFORMIN 1000 milliGRAM(s) Oral two times a day with meals  ticagrelor 90 milliGRAM(s) Oral every 12 hours    MEDICATIONS  (PRN):  acetaminophen   Tablet .. 975 milliGRAM(s) Oral every 6 hours PRN Mild Pain (1 - 3), Moderate Pain (4 - 6), Severe Pain (7 - 10)      RECENT LABS/IMAGING                        11.4   5.84  )-----------( 199      ( 23 Aug 2021 06:00 )             34.7     08-23    140  |  104  |  20  ----------------------------<  123<H>  3.8   |  25  |  1.08    Ca    9.4      23 Aug 2021 06:00    TPro  7.8  /  Alb  2.9<L>  /  TBili  0.8  /  DBili  x   /  AST  24  /  ALT  24  /  AlkPhos  83  08-23    Homocysteine, Serum (08.20.21 @ 16:45)    Homocysteine, Serum: 12.3 umol/L    Beta 2 Glycoprotein 1 Antibody Screen (08.20.21 @ 16:45)    Beta 2 Glycoprotein 1 Antibody Screen: Negative    Protein S Free Activity Assay (08.20.21 @ 16:45)    Protein S Free Activity Assay: 86:    Protein C Functional Assay with Reflex: 158: Protein C antigen will be performed only when the functional activity is  low. % (08.20.21 @ 16:45)          CAPILLARY BLOOD GLUCOSE      POCT Blood Glucose.: 102 mg/dL (25 Aug 2021 07:57)  POCT Blood Glucose.: 144 mg/dL (24 Aug 2021 21:21)

## 2021-08-25 NOTE — PROGRESS NOTE ADULT - SUBJECTIVE AND OBJECTIVE BOX
61y old  Female who presents with a chief complaint of CVA     Patient seen and examined at bedside.  no new complaints, no n/v, no sob  No overnight events    Vital Signs Last 24 Hrs  T(C): 36.7 (25 Aug 2021 08:50), Max: 36.7 (25 Aug 2021 08:50)  T(F): 98.1 (25 Aug 2021 08:50), Max: 98.1 (25 Aug 2021 08:50)  HR: 92 (25 Aug 2021 08:50) (92 - 99)  BP: 150/98 (25 Aug 2021 08:50) (148/88 - 156/84)  BP(mean): --  RR: 15 (25 Aug 2021 08:50) (15 - 16)  SpO2: 100% (25 Aug 2021 08:50) (100% - 100%)    GENERAL- NAD  EAR/NOSE/MOUTH/THROAT - no pharyngeal exudates, no oral leisions,  MMM  EYES- RAYMOND, conjunctiva and Sclera clear  NECK- supple  RESPIRATORY-  clear to auscultation bilaterally, non laboured breathing  CARDIOVASCULAR - SIS2, RRR  GI - soft NT BS present  EXTREMITIES- no pedal edema  NEUROLOGY- right sided weakness  PSYCHIATRY- AAO X 3          MEDICATIONS  (STANDING):  amLODIPine   Tablet 10 milliGRAM(s) Oral daily  aspirin  chewable 81 milliGRAM(s) Oral daily  atorvastatin 80 milliGRAM(s) Oral at bedtime  enoxaparin Injectable 40 milliGRAM(s) SubCutaneous daily  insulin lispro (ADMELOG) corrective regimen sliding scale   SubCutaneous before breakfast  insulin lispro (ADMELOG) corrective regimen sliding scale   SubCutaneous at bedtime  lisinopril 40 milliGRAM(s) Oral daily  metFORMIN 1000 milliGRAM(s) Oral two times a day with meals  ticagrelor 90 milliGRAM(s) Oral every 12 hours    MEDICATIONS  (PRN):  acetaminophen   Tablet .. 975 milliGRAM(s) Oral every 6 hours PRN Mild Pain (1 - 3), Moderate Pain (4 - 6), Severe Pain (7 - 10)

## 2021-08-26 LAB
ALBUMIN SERPL ELPH-MCNC: 3.5 G/DL — SIGNIFICANT CHANGE UP (ref 3.3–5)
ALP SERPL-CCNC: 82 U/L — SIGNIFICANT CHANGE UP (ref 40–120)
ALT FLD-CCNC: 24 U/L — SIGNIFICANT CHANGE UP (ref 10–45)
ANION GAP SERPL CALC-SCNC: 8 MMOL/L — SIGNIFICANT CHANGE UP (ref 5–17)
AST SERPL-CCNC: 22 U/L — SIGNIFICANT CHANGE UP (ref 10–40)
BILIRUB SERPL-MCNC: 0.6 MG/DL — SIGNIFICANT CHANGE UP (ref 0.2–1.2)
BUN SERPL-MCNC: 21 MG/DL — SIGNIFICANT CHANGE UP (ref 7–23)
CALCIUM SERPL-MCNC: 9.4 MG/DL — SIGNIFICANT CHANGE UP (ref 8.4–10.5)
CHLORIDE SERPL-SCNC: 107 MMOL/L — SIGNIFICANT CHANGE UP (ref 96–108)
CO2 SERPL-SCNC: 29 MMOL/L — SIGNIFICANT CHANGE UP (ref 22–31)
CREAT SERPL-MCNC: 1.03 MG/DL — SIGNIFICANT CHANGE UP (ref 0.5–1.3)
DNA PLOIDY SPEC FC-IMP: SIGNIFICANT CHANGE UP
GLUCOSE BLDC GLUCOMTR-MCNC: 124 MG/DL — HIGH (ref 70–99)
GLUCOSE BLDC GLUCOMTR-MCNC: 127 MG/DL — HIGH (ref 70–99)
GLUCOSE SERPL-MCNC: 116 MG/DL — HIGH (ref 70–99)
HCT VFR BLD CALC: 33.4 % — LOW (ref 34.5–45)
HGB BLD-MCNC: 10.8 G/DL — LOW (ref 11.5–15.5)
MCHC RBC-ENTMCNC: 26.3 PG — LOW (ref 27–34)
MCHC RBC-ENTMCNC: 32.3 GM/DL — SIGNIFICANT CHANGE UP (ref 32–36)
MCV RBC AUTO: 81.3 FL — SIGNIFICANT CHANGE UP (ref 80–100)
NRBC # BLD: 0 /100 WBCS — SIGNIFICANT CHANGE UP (ref 0–0)
PLATELET # BLD AUTO: 203 K/UL — SIGNIFICANT CHANGE UP (ref 150–400)
POTASSIUM SERPL-MCNC: 4 MMOL/L — SIGNIFICANT CHANGE UP (ref 3.5–5.3)
POTASSIUM SERPL-SCNC: 4 MMOL/L — SIGNIFICANT CHANGE UP (ref 3.5–5.3)
PROT SERPL-MCNC: 7.6 G/DL — SIGNIFICANT CHANGE UP (ref 6–8.3)
RBC # BLD: 4.11 M/UL — SIGNIFICANT CHANGE UP (ref 3.8–5.2)
RBC # FLD: 14.6 % — HIGH (ref 10.3–14.5)
SARS-COV-2 RNA SPEC QL NAA+PROBE: SIGNIFICANT CHANGE UP
SODIUM SERPL-SCNC: 144 MMOL/L — SIGNIFICANT CHANGE UP (ref 135–145)
WBC # BLD: 5.54 K/UL — SIGNIFICANT CHANGE UP (ref 3.8–10.5)
WBC # FLD AUTO: 5.54 K/UL — SIGNIFICANT CHANGE UP (ref 3.8–10.5)

## 2021-08-26 PROCEDURE — 99232 SBSQ HOSP IP/OBS MODERATE 35: CPT

## 2021-08-26 RX ORDER — FLUOXETINE HCL 10 MG
10 CAPSULE ORAL DAILY
Refills: 0 | Status: DISCONTINUED | OUTPATIENT
Start: 2021-08-26 | End: 2021-09-17

## 2021-08-26 RX ADMIN — TICAGRELOR 90 MILLIGRAM(S): 90 TABLET ORAL at 05:56

## 2021-08-26 RX ADMIN — AMLODIPINE BESYLATE 10 MILLIGRAM(S): 2.5 TABLET ORAL at 05:56

## 2021-08-26 RX ADMIN — ATORVASTATIN CALCIUM 80 MILLIGRAM(S): 80 TABLET, FILM COATED ORAL at 22:46

## 2021-08-26 RX ADMIN — TICAGRELOR 90 MILLIGRAM(S): 90 TABLET ORAL at 17:35

## 2021-08-26 RX ADMIN — ENOXAPARIN SODIUM 40 MILLIGRAM(S): 100 INJECTION SUBCUTANEOUS at 11:29

## 2021-08-26 RX ADMIN — Medication 81 MILLIGRAM(S): at 11:29

## 2021-08-26 RX ADMIN — Medication 975 MILLIGRAM(S): at 18:40

## 2021-08-26 RX ADMIN — METFORMIN HYDROCHLORIDE 1000 MILLIGRAM(S): 850 TABLET ORAL at 17:35

## 2021-08-26 RX ADMIN — METFORMIN HYDROCHLORIDE 1000 MILLIGRAM(S): 850 TABLET ORAL at 08:14

## 2021-08-26 RX ADMIN — Medication 975 MILLIGRAM(S): at 17:40

## 2021-08-26 RX ADMIN — LISINOPRIL 40 MILLIGRAM(S): 2.5 TABLET ORAL at 05:56

## 2021-08-26 RX ADMIN — Medication 10 MILLIGRAM(S): at 17:35

## 2021-08-26 NOTE — PROGRESS NOTE ADULT - ASSESSMENT
ASSESSMENT/PLAN  62 yo R handed Female with  PMHx of DMT2, HTN, HLD, multiple CVAs w/ residual RUE/LE weakness w/ foot drop, LLE>UE numbness s/p loop recorder and plavix  now discontinued 2017. Admitted on 8/15 for acute on chronic R-sided weakness found to have Left thalamic infarct. Had concerns of seizure and given loading dose of keppra, however it was ruled out. Patient now with Gait Instability, ADL impairments and Functional impairments.    COMORBIDITES/ACTIVE MEDICAL ISSUES     Continue Comprehensive Rehab Program of PT/OT/ST - total of 3 hrs/day 5 days/week     History of Multiple CVA: new left thalamic infarcts   s/p ILR placement; f/u with Dr. Minor  - ASA 81 mg daily, - Brillinta 90 mg twice   - Hypercoagulable workup- essentially negative     HLD:- Lipitor 80 mg daily    HTN: on amlodipine 10mg daily and lisinopril increased to 40 daily ( 8/25)   - Home meds: amlodipine, labetalol, HCTz, lisinopril 40  - Hold for systolic BP of <140    #DM II: blood sugars good   on - metformin 1000mg  BID, Blood sugars good     Hypokalemic (resolved)     Pain control:- Tylenol PRN    GI/Bowel Mgmt   - Continent  - Senna,  Miralax PRN,    Bladder management: toilet schedule prn     DVT prophylaxis:- Lovenox  - SCDs, TEDs     FEN :- Diet - Consistent Carbohydrate Low Sodium     Mood: start low dose prozac     Precautions / PROPHYLAXIS:   - Falls  - Lungs: Aspiration  - Pressure injury/Skin: Turn Q2hrs while in bed, OOB to Chair, PT/OT       Disp: team conference 8/24  TDD 9/10/21            ASSESSMENT/PLAN  60 yo R handed Female with  PMHx of DMT2, HTN, HLD, multiple CVAs w/ residual RUE/LE weakness w/ foot drop, LLE>UE numbness s/p loop recorder and plavix  now discontinued 2017. Admitted on 8/15 for acute on chronic R-sided weakness found to have Left thalamic infarct. Had concerns of seizure and given loading dose of keppra, however it was ruled out. Patient now with Gait Instability, ADL impairments and Functional impairments.    COMORBIDITES/ACTIVE MEDICAL ISSUES     Continue Comprehensive Rehab Program of PT/OT/ST - total of 3 hrs/day 5 days/week     History of Multiple CVA: new left thalamic infarcts   s/p ILR placement; f/u with Dr. Minor  - ASA 81 mg daily, - Brillinta 90 mg twice - Per neurology   - Hypercoagulable workup- essentially negative     HLD:- Lipitor 80 mg daily    HTN: on amlodipine 10mg daily and lisinopril 40mg daily ( 8/25)   - Home meds: (amlodipine, labetalol, HCTz, lisinopril 40)  - Hold for systolic BP of <140    #DM II: blood sugars good   on - metformin 1000mg  BID, Blood sugars good     Hypokalemic (resolved)     Pain control:- Tylenol PRN    GI/Bowel Mgmt   - Continent  - Senna,  Miralax PRN,    Bladder management: toilet schedule prn     DVT prophylaxis:- Lovenox  - SCDs, TEDs     FEN :- Diet - Consistent Carbohydrate Low Sodium     Mood: start low dose prozac.     Precautions / PROPHYLAXIS:   - Falls  - Lungs: Aspiration  - Pressure injury/Skin: Turn Q2hrs while in bed, OOB to Chair, PT/OT       Disp: team conference 8/24  TDD 9/10/21

## 2021-08-26 NOTE — PROGRESS NOTE ADULT - SUBJECTIVE AND OBJECTIVE BOX
Patient is a 61y old  Female who presents with a chief complaint of CVA (19 Aug 2021 14:38)    HPI:  62 yo R handed Female with  PMHx of DMT2, HTN, HLD, multiple CVAs w/ residual RUE/LE weakness w/ foot drop, LLE>UE numbness s/p loop recorder and plavix  now discontinued 2017. She presented to [facility] on 8/15/21 with acute on chronic R-sided weakness, LKN 8/15 07:30 symptoms noticed around 10AM after patient was trying to use her arm and was not able to "make it do what I wanted" on further clarification of RUE>LE weakness. Pt endorsed was leaning to one side, endorsed RLE feels weaker than normal which was present on initial NIHSS 5. Initial BPs elevated 190s systolic, CTH without hemorrhage or acute findings, BP was lowered with improvement in R sided acute on chronic weakness to allow for ataxia testing in limbs which was present, which was new findings.  Endorsed mild L posterior headache, no N/V or infectious symptoms. Patient was re-evaluated after improvement for weakness, concern for focal seizure with patient endorsing no prior seizure history or prior episodes w RUE. CTH w/o acute findings, CTA w worsening PCA stenosis but normal flow bL VAs. CDU monitoring for MR Brain to assess for any new infarcts, however course c/b reoccurrence in R UE "weakness" appearing contracture-like w RUE flexion and hypertonia, intact mentation.  she was seen by neurologist.  pt received 1500mg IV Keppra load given high suspicion for focal seizure activity. MRI Brain showed Left thalamic infarct. She did not receive TPA as she was out of  window. She was seen by cardiology; TTE, ASA/STATIN, and losartan started. EP was consulted, ILR was implanted (8/18), No events recorded on ILR. Of note, patient had ILR placed in the past ( 2017) no arrhythmias was recorded then. She was evaluated by PM&R and therapist and acute rehabilitation was recommended. She was admitted to [facility] on 8/19/21.       SUBJECTIVE & ROS: Patient seen and evaluated this morning.   No issues overnight, Slept ok  Feels much better today   Would like to be started on antidepressants as she was on Prozac in the past     Denies HA/CP/palpitations/abdominal pain or nausea  Denies dysuria   + BM       PHYSICAL EXAM    Vital Signs Last 24 Hrs  T(C): 36.8 (26 Aug 2021 08:45), Max: 36.8 (26 Aug 2021 08:45)  T(F): 98.3 (26 Aug 2021 08:45), Max: 98.3 (26 Aug 2021 08:45)  HR: 93 (26 Aug 2021 08:45) (86 - 93)  BP: 156/97 (26 Aug 2021 08:45) (126/85 - 156/97)  BP(mean): --  RR: 15 (26 Aug 2021 08:45) (15 - 15)  SpO2: 99% (26 Aug 2021 08:45) (99% - 99%)    Constitutional - NAD, Comfortable  Chest - good chest expansion, good respiratory effort,  Cardio - s1s2+   Abdomen -  Soft, NTND  Extremities - No peripheral edema, No calf tenderness , increased tone on right finger flexors   Neurologic Exam:                    Cognitive - AAO x 3     Speech - Fluent, Comprehensible, Mild dysarthria      Motor -                       LUE and LLE 5/5                     RIGHT UE - ShAB 2/5, EF 2/5, EE 2/5, WE 0/5,  0/5, trace finger movement                     RIGHT LE - HF 3/5, KE 4/5, DF 0/5, PF 1/5  + foot drop      Sensory - Intact to LT bilateral  Psychiatric - Mood stable, Affect WNL  Skin: Loop recorder site- clean     MEDICATIONS  (STANDING):  amLODIPine   Tablet 10 milliGRAM(s) Oral daily  aspirin  chewable 81 milliGRAM(s) Oral daily  atorvastatin 80 milliGRAM(s) Oral at bedtime  enoxaparin Injectable 40 milliGRAM(s) SubCutaneous daily  FLUoxetine 10 milliGRAM(s) Oral daily  insulin lispro (ADMELOG) corrective regimen sliding scale   SubCutaneous before breakfast  insulin lispro (ADMELOG) corrective regimen sliding scale   SubCutaneous at bedtime  lisinopril 40 milliGRAM(s) Oral daily  metFORMIN 1000 milliGRAM(s) Oral two times a day with meals  ticagrelor 90 milliGRAM(s) Oral every 12 hours    MEDICATIONS  (PRN):  acetaminophen   Tablet .. 975 milliGRAM(s) Oral every 6 hours PRN Mild Pain (1 - 3), Moderate Pain (4 - 6), Severe Pain (7 - 10)                            10.8   5.54  )-----------( 203      ( 26 Aug 2021 06:02 )             33.4       08-26    144  |  107  |  21  ----------------------------<  116<H>  4.0   |  29  |  1.03    Ca    9.4      26 Aug 2021 06:02    TPro  7.6  /  Alb  3.5  /  TBili  0.6  /  DBili  x   /  AST  22  /  ALT  24  /  AlkPhos  82  08-26        Homocysteine, Serum (08.20.21 @ 16:45)    Homocysteine, Serum: 12.3 umol/L    Beta 2 Glycoprotein 1 Antibody Screen (08.20.21 @ 16:45)    Beta 2 Glycoprotein 1 Antibody Screen: Negative    Protein S Free Activity Assay (08.20.21 @ 16:45)    Protein S Free Activity Assay: 86:    Protein C Functional Assay with Reflex: 158: Protein C antigen will be performed only when the functional activity is  low. % (08.20.21 @ 16:45)    Anticardiolipin Antibody Level, Total (08.21.21 @ 05:15)    Anticardiolipin Antibody Level, Total: Negative: Method: EIA      CAPILLARY BLOOD GLUCOSE      POCT Blood Glucose.: 124 mg/dL (26 Aug 2021 08:13)  POCT Blood Glucose.: 88 mg/dL (25 Aug 2021 21:29)

## 2021-08-27 LAB
GLUCOSE BLDC GLUCOMTR-MCNC: 119 MG/DL — HIGH (ref 70–99)
GLUCOSE BLDC GLUCOMTR-MCNC: 146 MG/DL — HIGH (ref 70–99)

## 2021-08-27 PROCEDURE — 99232 SBSQ HOSP IP/OBS MODERATE 35: CPT

## 2021-08-27 PROCEDURE — 99232 SBSQ HOSP IP/OBS MODERATE 35: CPT | Mod: GC

## 2021-08-27 RX ORDER — TIZANIDINE 4 MG/1
2 TABLET ORAL AT BEDTIME
Refills: 0 | Status: DISCONTINUED | OUTPATIENT
Start: 2021-08-27 | End: 2021-08-28

## 2021-08-27 RX ADMIN — Medication 975 MILLIGRAM(S): at 01:04

## 2021-08-27 RX ADMIN — ATORVASTATIN CALCIUM 80 MILLIGRAM(S): 80 TABLET, FILM COATED ORAL at 21:28

## 2021-08-27 RX ADMIN — AMLODIPINE BESYLATE 10 MILLIGRAM(S): 2.5 TABLET ORAL at 05:32

## 2021-08-27 RX ADMIN — TIZANIDINE 2 MILLIGRAM(S): 4 TABLET ORAL at 21:28

## 2021-08-27 RX ADMIN — Medication 975 MILLIGRAM(S): at 20:38

## 2021-08-27 RX ADMIN — LISINOPRIL 40 MILLIGRAM(S): 2.5 TABLET ORAL at 05:32

## 2021-08-27 RX ADMIN — Medication 10 MILLIGRAM(S): at 11:36

## 2021-08-27 RX ADMIN — METFORMIN HYDROCHLORIDE 1000 MILLIGRAM(S): 850 TABLET ORAL at 08:21

## 2021-08-27 RX ADMIN — ENOXAPARIN SODIUM 40 MILLIGRAM(S): 100 INJECTION SUBCUTANEOUS at 11:35

## 2021-08-27 RX ADMIN — TICAGRELOR 90 MILLIGRAM(S): 90 TABLET ORAL at 05:32

## 2021-08-27 RX ADMIN — METFORMIN HYDROCHLORIDE 1000 MILLIGRAM(S): 850 TABLET ORAL at 17:02

## 2021-08-27 RX ADMIN — Medication 81 MILLIGRAM(S): at 11:35

## 2021-08-27 RX ADMIN — TICAGRELOR 90 MILLIGRAM(S): 90 TABLET ORAL at 17:03

## 2021-08-27 NOTE — PROGRESS NOTE ADULT - ASSESSMENT
ASSESSMENT/PLAN  60 yo R handed Female with  PMHx of DMT2, HTN, HLD, multiple CVAs w/ residual RUE/LE weakness w/ foot drop, LLE>UE numbness s/p loop recorder and plavix  now discontinued 2017. Admitted on 8/15 for acute on chronic R-sided weakness found to have Left thalamic infarct. Had concerns of seizure and given loading dose of keppra, however it was ruled out. Patient now with Gait Instability, ADL impairments and Functional impairments.    COMORBIDITES/ACTIVE MEDICAL ISSUES     Continue Comprehensive Rehab Program of PT/OT/ST - total of 3 hrs/day 5 days/week     History of Multiple CVA: new left thalamic infarcts   s/p ILR placement; f/u with Dr. Minor  - ASA 81 mg daily, - Brillinta 90 mg twice - Per neurology   - Hypercoagulable workup- essentially negative     HLD:- Lipitor 80 mg daily    HTN: on amlodipine 10mg daily and lisinopril 40mg daily ( 8/25)   - Home meds: (amlodipine, labetalol, HCTz, lisinopril 40)  - Hold for systolic BP of <140    #DM II: blood sugars good   on - metformin 1000mg  BID, Blood sugars good     Hypokalemic (resolved)     Pain control:- Tylenol PRN    GI/Bowel Mgmt   - Continent  - Senna,  Miralax PRN,    Bladder management: toilet schedule prn     DVT prophylaxis:- Lovenox  - SCDs, TEDs     FEN :- Diet - Consistent Carbohydrate Low Sodium     Mood: started prozac 10mg daily     Tone: started tizanidine 2mg qhs for right toes curling as well as some increased tone noted on RUE -- monitor BP     Precautions / PROPHYLAXIS:   - Falls  - Lungs: Aspiration  - Pressure injury/Skin: Turn Q2hrs while in bed, OOB to Chair, PT/OT       Disp: team conference 8/24  TDD 9/10/21                ASSESSMENT/PLAN  62 yo R handed Female with  PMHx of DMT2, HTN, HLD, multiple CVAs w/ residual RUE/LE weakness w/ foot drop, LLE>UE numbness s/p loop recorder and plavix  now discontinued 2017. Admitted on 8/15 for acute on chronic R-sided weakness found to have Left thalamic infarct. Had concerns of seizure and given loading dose of keppra, however it was ruled out. Patient now with Gait Instability, ADL impairments and Functional impairments.    COMORBIDITES/ACTIVE MEDICAL ISSUES     Continue Comprehensive Rehab Program of PT/OT/ST - total of 3 hrs/day 5 days/week     History of Multiple CVA: new left thalamic infarcts   s/p ILR placement; f/u with Dr. Minor  - ASA 81 mg daily, - Brillinta 90 mg twice - Per neurology   - Hypercoagulable workup- essentially negative     HLD:- Lipitor 80 mg daily    HTN: on amlodipine 10mg daily and lisinopril 40mg daily ( 8/25)   - Home meds: (amlodipine, labetalol, HCTz, lisinopril 40)  - Hold for systolic BP of <140    #DM II: blood sugars good   on - metformin 1000mg  BID, Blood sugars good   FS dc'd     Hypokalemic (resolved)     Pain control:- Tylenol PRN    GI/Bowel Mgmt   - Continent  - Senna,  Miralax PRN,    Bladder management: toilet schedule prn     DVT prophylaxis:- Lovenox  - SCDs, TEDs     FEN :- Diet - Consistent Carbohydrate Low Sodium     Mood: started prozac 10mg daily     Tone: started tizanidine 2mg qhs for right toes curling as well as some increased tone noted on RUE -- monitor BP     Precautions / PROPHYLAXIS:   - Falls  - Lungs: Aspiration  - Pressure injury/Skin: Turn Q2hrs while in bed, OOB to Chair, PT/OT       Disp: team conference 8/24  TDD 9/10/21

## 2021-08-27 NOTE — PROGRESS NOTE ADULT - ASSESSMENT
62 yo R handed woman with history of DMT2, HTN, HLD, multiple CVAs w/ residual RUE/LE weakness w/ foot drop, LLE>UE numbness s/p loop recorder and no longer on plavix admitted to  rehab after hospital course for left thalamic infarct, admitted for rehab- pt/ot/dvt ppx    multiple cerebrovascular accidents (CVAs).   - acute on chronic R-sided weakness (RUE>LE weakness)  - CTA w worsening PCA stenosis but normal flow bL   - no TPA as she was out of  window  -  ASA/STATIN, Brilinta and losartan   - ILR was implanted (8/18)      HLD (hyperlipidemia).    continue atorvastatin     Benign essential HTN.   amlodipine 10mg, hydralazine 50mg, labetalol 100mg mg TID, lisinopril 40mg   - continue amlodipine, increase lisinopril to 40mg 8/25/21    Type 2 diabetes mellitus.   home- Jardiance, metformin, levermir 7 units and Novolog 5units pre-meals  - stop Lantus, c/w metformin to 1gm bid  - continue ISS  - monitor FS.      ERENDIRA (acute kidney injury) on CKD II  - encourage oral intake  - avoid nephrotoxic agents  - monitor BMP.       DVT prophylaxis.    continue Lovenox     will follow   d/w dr. wei

## 2021-08-27 NOTE — PROGRESS NOTE ADULT - SUBJECTIVE AND OBJECTIVE BOX
61y old  Female who presents with a chief complaint of CVA     Patient seen and examined at bedside.  no new complaints, no n/v, no sob  No overnight events    Vital Signs Last 24 Hrs  T(C): 36.9 (27 Aug 2021 08:54), Max: 36.9 (27 Aug 2021 08:54)  T(F): 98.4 (27 Aug 2021 08:54), Max: 98.4 (27 Aug 2021 08:54)  HR: 92 (27 Aug 2021 08:54) (80 - 95)  BP: 146/95 (27 Aug 2021 08:54) (145/83 - 150/89)  BP(mean): --  RR: 16 (27 Aug 2021 08:54) (15 - 16)  SpO2: 99% (27 Aug 2021 08:54) (98% - 99%)    GENERAL- NAD  EAR/NOSE/MOUTH/THROAT - no pharyngeal exudates, no oral leisions,  MMM  EYES- RAYMOND, conjunctiva and Sclera clear  NECK- supple  RESPIRATORY-  clear to auscultation bilaterally, non laboured breathing  CARDIOVASCULAR - SIS2, RRR  GI - soft NT BS present  EXTREMITIES- no pedal edema  NEUROLOGY- right sided weakness  PSYCHIATRY- AAO X 3                    10.8                 144  | 29   | 21           5.54  >-----------< 203     ------------------------< 116                   33.4                 4.0  | 107  | 1.03                                         Ca 9.4   Mg x     Ph x            MEDICATIONS  (STANDING):  amLODIPine   Tablet 10 milliGRAM(s) Oral daily  aspirin  chewable 81 milliGRAM(s) Oral daily  atorvastatin 80 milliGRAM(s) Oral at bedtime  enoxaparin Injectable 40 milliGRAM(s) SubCutaneous daily  FLUoxetine 10 milliGRAM(s) Oral daily  insulin lispro (ADMELOG) corrective regimen sliding scale   SubCutaneous before breakfast  insulin lispro (ADMELOG) corrective regimen sliding scale   SubCutaneous at bedtime  lisinopril 40 milliGRAM(s) Oral daily  metFORMIN 1000 milliGRAM(s) Oral two times a day with meals  ticagrelor 90 milliGRAM(s) Oral every 12 hours  tiZANidine 2 milliGRAM(s) Oral at bedtime    MEDICATIONS  (PRN):  acetaminophen   Tablet .. 975 milliGRAM(s) Oral every 6 hours PRN Mild Pain (1 - 3), Moderate Pain (4 - 6), Severe Pain (7 - 10)

## 2021-08-27 NOTE — PROGRESS NOTE ADULT - ATTENDING COMMENTS
Patient seen at bedside  States that she is feeling ok  Reports curling of toes and this impacting function.   Exam - increased tone noted in toes and also at wrist and finger flexors. Low dose tizanidine and monitor. If she tolerated will increase to 4 mg over weekend    2. Patient to be evaluated for right custom molded AFO next week as the one she has now, is not effective in setting of new CVA   Continue rehab program

## 2021-08-28 PROCEDURE — 99232 SBSQ HOSP IP/OBS MODERATE 35: CPT

## 2021-08-28 RX ADMIN — Medication 10 MILLIGRAM(S): at 11:10

## 2021-08-28 RX ADMIN — METFORMIN HYDROCHLORIDE 1000 MILLIGRAM(S): 850 TABLET ORAL at 17:27

## 2021-08-28 RX ADMIN — ENOXAPARIN SODIUM 40 MILLIGRAM(S): 100 INJECTION SUBCUTANEOUS at 11:10

## 2021-08-28 RX ADMIN — TICAGRELOR 90 MILLIGRAM(S): 90 TABLET ORAL at 17:27

## 2021-08-28 RX ADMIN — ATORVASTATIN CALCIUM 80 MILLIGRAM(S): 80 TABLET, FILM COATED ORAL at 21:58

## 2021-08-28 RX ADMIN — LISINOPRIL 40 MILLIGRAM(S): 2.5 TABLET ORAL at 05:24

## 2021-08-28 RX ADMIN — Medication 81 MILLIGRAM(S): at 11:10

## 2021-08-28 RX ADMIN — TICAGRELOR 90 MILLIGRAM(S): 90 TABLET ORAL at 05:27

## 2021-08-28 RX ADMIN — METFORMIN HYDROCHLORIDE 1000 MILLIGRAM(S): 850 TABLET ORAL at 07:49

## 2021-08-28 NOTE — PROGRESS NOTE ADULT - ASSESSMENT
60 yo R handed woman with history of DMT2, HTN, HLD, multiple CVAs w/ residual RUE/LE weakness w/ foot drop, LLE>UE numbness s/p loop recorder and no longer on plavix admitted to [facility] after hospital course for left thalamic infarct, admitted for rehab- pt/ot/dvt ppx    #multiple cerebrovascular accidents (CVAs)   -ASA/STATIN, Brilinta and losartan   -ILR was implanted (8/18)  -Continue comprehensive rehab with PT/OT/SLP    #HLD  -continue atorvastatin    #Benign essential HTN  -Continue amlodipine, lisinopril    #Type 2 diabetes mellitus  -c/w metformin 1gm bid  -continue ISS  -monitor FS    #ERENDIRA (acute kidney injury) on CKD II  -encourage oral intake  -avoid nephrotoxic agents  -monitor BMP    #DVT prophylaxis  -continue Lovenox

## 2021-08-28 NOTE — PROGRESS NOTE ADULT - SUBJECTIVE AND OBJECTIVE BOX
Patient is a 61y old  Female who presents with a chief complaint of CVA (27 Aug 2021 13:08)      SUBJECTIVE / OVERNIGHT EVENTS:  Pt seen and examined at bedside. No acute events overnight.  Pt denies cp, palpitations, sob, abd pain, N/V, fever, chills.    ROS:  All other review of systems negative    Allergies    sulfa drugs (Angioedema; Swelling)  sulfa drugs (Rash)  Sulfac 10% (Unknown)  walnut (Urticaria)    Intolerances        MEDICATIONS  (STANDING):  amLODIPine   Tablet 10 milliGRAM(s) Oral daily  aspirin  chewable 81 milliGRAM(s) Oral daily  atorvastatin 80 milliGRAM(s) Oral at bedtime  enoxaparin Injectable 40 milliGRAM(s) SubCutaneous daily  FLUoxetine 10 milliGRAM(s) Oral daily  insulin lispro (ADMELOG) corrective regimen sliding scale   SubCutaneous before breakfast  insulin lispro (ADMELOG) corrective regimen sliding scale   SubCutaneous at bedtime  lisinopril 40 milliGRAM(s) Oral daily  metFORMIN 1000 milliGRAM(s) Oral two times a day with meals  ticagrelor 90 milliGRAM(s) Oral every 12 hours  tiZANidine 2 milliGRAM(s) Oral at bedtime    MEDICATIONS  (PRN):  acetaminophen   Tablet .. 975 milliGRAM(s) Oral every 6 hours PRN Mild Pain (1 - 3), Moderate Pain (4 - 6), Severe Pain (7 - 10)      Vital Signs Last 24 Hrs  T(C): 36.7 (28 Aug 2021 08:05), Max: 36.8 (27 Aug 2021 20:40)  T(F): 98 (28 Aug 2021 08:05), Max: 98.2 (27 Aug 2021 20:40)  HR: 90 (28 Aug 2021 08:05) (90 - 98)  BP: 140/96 (28 Aug 2021 08:05) (128/87 - 150/99)  BP(mean): --  RR: 15 (28 Aug 2021 08:05) (15 - 16)  SpO2: 100% (28 Aug 2021 08:05) (98% - 100%)  CAPILLARY BLOOD GLUCOSE      POCT Blood Glucose.: 119 mg/dL (27 Aug 2021 21:28)    I&O's Summary      PHYSICAL EXAM:  GENERAL: NAD, well-developed  CHEST/LUNG: Clear to auscultation bilaterally; No wheeze, nonlabored breathing  HEART: Regular rate and rhythm; No murmurs, rubs, or gallops  ABDOMEN: Soft, Nontender, Nondistended; Bowel sounds present  EXTREMITIES:  2+ Peripheral Pulses, No clubbing, cyanosis, or edema  NEUROLOGY: AAOx3, non-focal  PSYCH: calm, appropriate mood    LABS:                    RADIOLOGY & ADDITIONAL TESTS:  Results Reviewed:   Imaging Personally Reviewed:  Electrocardiogram Personally Reviewed:    COORDINATION OF CARE:  Care Discussed with Consultants/Other Providers [Y/N]:  Prior or Outpatient Records Reviewed [Y/N]:

## 2021-08-28 NOTE — PROGRESS NOTE ADULT - SUBJECTIVE AND OBJECTIVE BOX
Cc: Gait dysfunction    HPI: Patient reports having dizziness overnight and this AM.  Patient started on tizanidine last night.  Denies vertigo, headache, visual changes, hearing changes, N/V, new weakness, numbness or paresthesia, dysarthria or dysphagia.  Pain controlled, no chest pain, no N/V, no Fevers/Chills. No other new ROS  Has been tolerating rehabilitation program.    acetaminophen   Tablet .. 975 milliGRAM(s) Oral every 6 hours PRN  amLODIPine   Tablet 10 milliGRAM(s) Oral daily  aspirin  chewable 81 milliGRAM(s) Oral daily  atorvastatin 80 milliGRAM(s) Oral at bedtime  enoxaparin Injectable 40 milliGRAM(s) SubCutaneous daily  FLUoxetine 10 milliGRAM(s) Oral daily  insulin lispro (ADMELOG) corrective regimen sliding scale   SubCutaneous before breakfast  insulin lispro (ADMELOG) corrective regimen sliding scale   SubCutaneous at bedtime  lisinopril 40 milliGRAM(s) Oral daily  metFORMIN 1000 milliGRAM(s) Oral two times a day with meals  ticagrelor 90 milliGRAM(s) Oral every 12 hours  tiZANidine 2 milliGRAM(s) Oral at bedtime      T(C): 36.7 (08-28-21 @ 08:05), Max: 36.8 (08-27-21 @ 20:40)  HR: 90 (08-28-21 @ 08:05) (90 - 98)  BP: 140/98 (08-28-21 @ 10:51) (128/87 - 150/99)  RR: 15 (08-28-21 @ 08:05) (15 - 16)  SpO2: 100% (08-28-21 @ 08:05) (98% - 100%)    In NAD  HEENT- EOMI  Heart- RRR, S1S2  Lungs- CTA bl.  Abd- + BS, NT  Ext- No calf pain  Neuro- CN II-XII intact, neurologic exam unchanged from previously documented notes.    Imp: Patient with diagnosis of CVA w/ residual right sided weakness admitted for comprehensive acute rehabilitation.    Plan:  - Continue therapies  - Dizziness: neurologically stable, will order orthostatic vital signs.  Patient started on Tizanidine overnight.  Will dc and continue to monitor.  - DVT prophylaxis  - Skin- Turn q2h, check skin daily  - Continue current medications; patient medically stable.   - Patient is stable to continue current rehabilitation program.    Diuretics as above

## 2021-08-29 PROCEDURE — 99232 SBSQ HOSP IP/OBS MODERATE 35: CPT

## 2021-08-29 PROCEDURE — 99233 SBSQ HOSP IP/OBS HIGH 50: CPT

## 2021-08-29 PROCEDURE — 70450 CT HEAD/BRAIN W/O DYE: CPT | Mod: 26

## 2021-08-29 RX ADMIN — TICAGRELOR 90 MILLIGRAM(S): 90 TABLET ORAL at 17:18

## 2021-08-29 RX ADMIN — ENOXAPARIN SODIUM 40 MILLIGRAM(S): 100 INJECTION SUBCUTANEOUS at 11:42

## 2021-08-29 RX ADMIN — AMLODIPINE BESYLATE 10 MILLIGRAM(S): 2.5 TABLET ORAL at 08:05

## 2021-08-29 RX ADMIN — LISINOPRIL 40 MILLIGRAM(S): 2.5 TABLET ORAL at 06:11

## 2021-08-29 RX ADMIN — Medication 10 MILLIGRAM(S): at 11:42

## 2021-08-29 RX ADMIN — Medication 81 MILLIGRAM(S): at 11:42

## 2021-08-29 RX ADMIN — TICAGRELOR 90 MILLIGRAM(S): 90 TABLET ORAL at 06:11

## 2021-08-29 RX ADMIN — METFORMIN HYDROCHLORIDE 1000 MILLIGRAM(S): 850 TABLET ORAL at 17:18

## 2021-08-29 RX ADMIN — METFORMIN HYDROCHLORIDE 1000 MILLIGRAM(S): 850 TABLET ORAL at 08:05

## 2021-08-29 RX ADMIN — ATORVASTATIN CALCIUM 80 MILLIGRAM(S): 80 TABLET, FILM COATED ORAL at 21:36

## 2021-08-29 NOTE — PROGRESS NOTE ADULT - ASSESSMENT
62 yo R handed woman with history of DMT2, HTN, HLD, multiple CVAs w/ residual RUE/LE weakness w/ foot drop, LLE>UE numbness s/p loop recorder and no longer on plavix admitted to [facility] after hospital course for left thalamic infarct, admitted for rehab- pt/ot/dvt ppx    #multiple cerebrovascular accidents (CVAs)   -ASA/STATIN, Brilinta and losartan   -ILR was implanted (8/18)  -Continue comprehensive rehab with PT/OT/SLP    #HLD  -continue atorvastatin    #Benign essential HTN  -Continue amlodipine, lisinopril    #Type 2 diabetes mellitus  -c/w metformin 1gm bid  -continue ISS  -monitor FS    #ERENDIRA (acute kidney injury) on CKD II  -encourage oral intake  -avoid nephrotoxic agents  -monitor BMP    #DVT prophylaxis  -continue Lovenox

## 2021-08-29 NOTE — PROGRESS NOTE ADULT - SUBJECTIVE AND OBJECTIVE BOX
Cc: Gait dysfunction    HPI: Patient continues to have significant dizziness despite dc'ing tizanidine.  Orthostatic vital signs wnl  Denies vertigo, headache, visual changes, hearing changes, N/V, new weakness, numbness or paresthesia, dysarthria or dysphagia.  Pain controlled, no chest pain, no N/V, no Fevers/Chills. No other new ROS  Has been tolerating rehabilitation program.    acetaminophen   Tablet .. 975 milliGRAM(s) Oral every 6 hours PRN  amLODIPine   Tablet 10 milliGRAM(s) Oral daily  aspirin  chewable 81 milliGRAM(s) Oral daily  atorvastatin 80 milliGRAM(s) Oral at bedtime  enoxaparin Injectable 40 milliGRAM(s) SubCutaneous daily  FLUoxetine 10 milliGRAM(s) Oral daily  insulin lispro (ADMELOG) corrective regimen sliding scale   SubCutaneous before breakfast  insulin lispro (ADMELOG) corrective regimen sliding scale   SubCutaneous at bedtime  lisinopril 40 milliGRAM(s) Oral daily  metFORMIN 1000 milliGRAM(s) Oral two times a day with meals  ticagrelor 90 milliGRAM(s) Oral every 12 hours  tiZANidine 2 milliGRAM(s) Oral at bedtime      T(C): 36.7 (08-28-21 @ 08:05), Max: 36.8 (08-27-21 @ 20:40)  HR: 90 (08-28-21 @ 08:05) (90 - 98)  BP: 140/98 (08-28-21 @ 10:51) (128/87 - 150/99)  RR: 15 (08-28-21 @ 08:05) (15 - 16)  SpO2: 100% (08-28-21 @ 08:05) (98% - 100%)    In NAD  HEENT- EOMI  Heart- RRR, S1S2  Lungs- CTA bl.  Abd- + BS, NT, ND  Ext- No calf pain  Neuro- CN II-XII intact, no visible nystagmus, EOMI, neurologic exam unchanged from previously documented notes.    Imp: Patient with diagnosis of CVA w/ residual right sided weakness admitted for comprehensive acute rehabilitation.    Plan:  - Continue therapies  - Dizziness: neurologically stable.  DC'd tizanidine.  CTH unremarkable.  - DVT prophylaxis  - Skin- Turn q2h, check skin daily  - Continue current medications; patient medically stable.   - Patient is stable to continue current rehabilitation program.

## 2021-08-29 NOTE — PROGRESS NOTE ADULT - SUBJECTIVE AND OBJECTIVE BOX
Patient is a 61y old  Female who presents with a chief complaint of CVA (28 Aug 2021 11:26)      SUBJECTIVE / OVERNIGHT EVENTS:  Pt seen and examined at bedside. No acute events overnight.  Pt denies cp, palpitations, sob, abd pain, N/V, fever, chills.    ROS:  All other review of systems negative    Allergies    sulfa drugs (Angioedema; Swelling)  sulfa drugs (Rash)  Sulfac 10% (Unknown)  walnut (Urticaria)    Intolerances        MEDICATIONS  (STANDING):  amLODIPine   Tablet 10 milliGRAM(s) Oral daily  aspirin  chewable 81 milliGRAM(s) Oral daily  atorvastatin 80 milliGRAM(s) Oral at bedtime  enoxaparin Injectable 40 milliGRAM(s) SubCutaneous daily  FLUoxetine 10 milliGRAM(s) Oral daily  lisinopril 40 milliGRAM(s) Oral daily  metFORMIN 1000 milliGRAM(s) Oral two times a day with meals  ticagrelor 90 milliGRAM(s) Oral every 12 hours    MEDICATIONS  (PRN):  acetaminophen   Tablet .. 975 milliGRAM(s) Oral every 6 hours PRN Mild Pain (1 - 3), Moderate Pain (4 - 6), Severe Pain (7 - 10)      Vital Signs Last 24 Hrs  T(C): 37.1 (29 Aug 2021 08:04), Max: 37.1 (29 Aug 2021 08:04)  T(F): 98.7 (29 Aug 2021 08:04), Max: 98.7 (29 Aug 2021 08:04)  HR: 96 (29 Aug 2021 08:04) (96 - 98)  BP: 142/98 (29 Aug 2021 08:04) (138/98 - 158/93)  BP(mean): --  RR: 16 (29 Aug 2021 08:04) (15 - 16)  SpO2: 94% (29 Aug 2021 08:04) (94% - 98%)  CAPILLARY BLOOD GLUCOSE        I&O's Summary      PHYSICAL EXAM:  GENERAL: NAD, well-developed  CHEST/LUNG: Clear to auscultation bilaterally; No wheeze, nonlabored breathing  HEART: Regular rate and rhythm; No murmurs, rubs, or gallops  ABDOMEN: Soft, Nontender, Nondistended; Bowel sounds present  EXTREMITIES:  2+ Peripheral Pulses, No clubbing, cyanosis, or edema  NEUROLOGY: AAOx3, non-focal  PSYCH: calm, appropriate mood    LABS:                    RADIOLOGY & ADDITIONAL TESTS:  Results Reviewed:   Imaging Personally Reviewed:  Electrocardiogram Personally Reviewed:    COORDINATION OF CARE:  Care Discussed with Consultants/Other Providers [Y/N]:  Prior or Outpatient Records Reviewed [Y/N]:

## 2021-08-30 LAB
ALBUMIN SERPL ELPH-MCNC: 3.5 G/DL — SIGNIFICANT CHANGE UP (ref 3.3–5)
ALP SERPL-CCNC: 73 U/L — SIGNIFICANT CHANGE UP (ref 40–120)
ALT FLD-CCNC: 36 U/L — SIGNIFICANT CHANGE UP (ref 10–45)
ANION GAP SERPL CALC-SCNC: 11 MMOL/L — SIGNIFICANT CHANGE UP (ref 5–17)
AST SERPL-CCNC: 30 U/L — SIGNIFICANT CHANGE UP (ref 10–40)
BILIRUB SERPL-MCNC: 0.6 MG/DL — SIGNIFICANT CHANGE UP (ref 0.2–1.2)
BUN SERPL-MCNC: 21 MG/DL — SIGNIFICANT CHANGE UP (ref 7–23)
CALCIUM SERPL-MCNC: 9 MG/DL — SIGNIFICANT CHANGE UP (ref 8.4–10.5)
CHLORIDE SERPL-SCNC: 103 MMOL/L — SIGNIFICANT CHANGE UP (ref 96–108)
CO2 SERPL-SCNC: 26 MMOL/L — SIGNIFICANT CHANGE UP (ref 22–31)
CREAT SERPL-MCNC: 1.09 MG/DL — SIGNIFICANT CHANGE UP (ref 0.5–1.3)
GLUCOSE SERPL-MCNC: 132 MG/DL — HIGH (ref 70–99)
HCT VFR BLD CALC: 33.4 % — LOW (ref 34.5–45)
HGB BLD-MCNC: 11 G/DL — LOW (ref 11.5–15.5)
MCHC RBC-ENTMCNC: 26.9 PG — LOW (ref 27–34)
MCHC RBC-ENTMCNC: 32.9 GM/DL — SIGNIFICANT CHANGE UP (ref 32–36)
MCV RBC AUTO: 81.7 FL — SIGNIFICANT CHANGE UP (ref 80–100)
NRBC # BLD: 0 /100 WBCS — SIGNIFICANT CHANGE UP (ref 0–0)
PLATELET # BLD AUTO: 213 K/UL — SIGNIFICANT CHANGE UP (ref 150–400)
POTASSIUM SERPL-MCNC: 3.8 MMOL/L — SIGNIFICANT CHANGE UP (ref 3.5–5.3)
POTASSIUM SERPL-SCNC: 3.8 MMOL/L — SIGNIFICANT CHANGE UP (ref 3.5–5.3)
PROT SERPL-MCNC: 7.5 G/DL — SIGNIFICANT CHANGE UP (ref 6–8.3)
RBC # BLD: 4.09 M/UL — SIGNIFICANT CHANGE UP (ref 3.8–5.2)
RBC # FLD: 14.4 % — SIGNIFICANT CHANGE UP (ref 10.3–14.5)
SODIUM SERPL-SCNC: 140 MMOL/L — SIGNIFICANT CHANGE UP (ref 135–145)
WBC # BLD: 5.75 K/UL — SIGNIFICANT CHANGE UP (ref 3.8–10.5)
WBC # FLD AUTO: 5.75 K/UL — SIGNIFICANT CHANGE UP (ref 3.8–10.5)

## 2021-08-30 PROCEDURE — 99232 SBSQ HOSP IP/OBS MODERATE 35: CPT

## 2021-08-30 PROCEDURE — 99232 SBSQ HOSP IP/OBS MODERATE 35: CPT | Mod: GC

## 2021-08-30 RX ORDER — TIZANIDINE 4 MG/1
2 TABLET ORAL AT BEDTIME
Refills: 0 | Status: DISCONTINUED | OUTPATIENT
Start: 2021-08-30 | End: 2021-08-31

## 2021-08-30 RX ADMIN — TICAGRELOR 90 MILLIGRAM(S): 90 TABLET ORAL at 05:52

## 2021-08-30 RX ADMIN — TICAGRELOR 90 MILLIGRAM(S): 90 TABLET ORAL at 17:54

## 2021-08-30 RX ADMIN — AMLODIPINE BESYLATE 10 MILLIGRAM(S): 2.5 TABLET ORAL at 05:52

## 2021-08-30 RX ADMIN — Medication 10 MILLIGRAM(S): at 12:27

## 2021-08-30 RX ADMIN — ATORVASTATIN CALCIUM 80 MILLIGRAM(S): 80 TABLET, FILM COATED ORAL at 22:27

## 2021-08-30 RX ADMIN — METFORMIN HYDROCHLORIDE 1000 MILLIGRAM(S): 850 TABLET ORAL at 08:22

## 2021-08-30 RX ADMIN — ENOXAPARIN SODIUM 40 MILLIGRAM(S): 100 INJECTION SUBCUTANEOUS at 12:27

## 2021-08-30 RX ADMIN — Medication 81 MILLIGRAM(S): at 12:27

## 2021-08-30 RX ADMIN — METFORMIN HYDROCHLORIDE 1000 MILLIGRAM(S): 850 TABLET ORAL at 17:54

## 2021-08-30 RX ADMIN — TIZANIDINE 2 MILLIGRAM(S): 4 TABLET ORAL at 22:27

## 2021-08-30 RX ADMIN — LISINOPRIL 40 MILLIGRAM(S): 2.5 TABLET ORAL at 05:52

## 2021-08-30 NOTE — PROGRESS NOTE ADULT - ASSESSMENT
ASSESSMENT/PLAN  62 yo R handed Female with  PMHx of DMT2, HTN, HLD, multiple CVAs w/ residual RUE/LE weakness w/ foot drop, LLE>UE numbness s/p loop recorder and plavix  now discontinued 2017. Admitted on 8/15 for acute on chronic R-sided weakness found to have Left thalamic infarct. Had concerns of seizure and given loading dose of keppra, however it was ruled out. Patient now with Gait Instability, ADL impairments and Functional impairments.    COMORBIDITES/ACTIVE MEDICAL ISSUES     Continue Comprehensive Rehab Program of PT/OT/ST - total of 3 hrs/day 5 days/week     History of Multiple CVA: new left thalamic infarcts   s/p ILR placement; f/u with Dr. Minor  - ASA 81 mg daily, - Brillinta 90 mg twice - Per neurology   - Hypercoagulable workup- essentially negative     HLD:- Lipitor 80 mg daily    HTN: on amlodipine 10mg daily and lisinopril 40mg daily ( 8/25)   - Home meds: (amlodipine, labetalol, HCTz, lisinopril 40)  - Hold for systolic BP of <140    #DM II: blood sugars good   on - metformin 1000mg  BID, Blood sugars good   FS dc'd     Hypokalemic (resolved)     Pain control:- Tylenol PRN    GI/Bowel Mgmt   - Continent  - Senna,  Miralax PRN,    Bladder management: toilet schedule prn     DVT prophylaxis:- Lovenox  - SCDs, TEDs     FEN :- Diet - Consistent Carbohydrate Low Sodium     Mood: started prozac 10mg daily     Dizziness: has some positional component to it. CTH done 8/29 and stable. Will request for vestibular therapy eval     Tone: started tizanidine 2mg qhs for right toes curling as well as some increased tone noted on RUE but dc'd over thee weekend due to episodes of dizziness. Dizziness likley not related to tizanidine, no episodes of hypotension noted. Will restart tizanidine 2mg qhs 8/30.    Precautions / PROPHYLAXIS:   - Falls  - Lungs: Aspiration  - Pressure injury/Skin: Turn Q2hrs while in bed, OOB to Chair, PT/OT       Disp: team conference 8/24  TDD 9/10/21

## 2021-08-30 NOTE — PROGRESS NOTE ADULT - ASSESSMENT
60 yo R handed woman with history of DMT2, HTN, HLD, multiple CVAs w/ residual RUE/LE weakness w/ foot drop, LLE>UE numbness s/p loop recorder and no longer on plavix admitted to [facility] after hospital course for left thalamic infarct, admitted for rehab- pt/ot/dvt ppx    #multiple cerebrovascular accidents (CVAs)   -ASA/STATIN, Brilinta and losartan   -ILR was implanted (8/18)  -Continue comprehensive rehab with PT/OT/SLP    #Dizziness  #BPPV  -Recommend Vestibular therapy    #HLD  -continue atorvastatin    #Benign essential HTN  -Continue amlodipine, lisinopril    #Type 2 diabetes mellitus  -c/w metformin 1gm bid  -continue ISS  -monitor FS    #ERENDIRA (acute kidney injury) on CKD II  -encourage oral intake  -avoid nephrotoxic agents  -monitor BMP    #DVT prophylaxis  -continue Lovenox

## 2021-08-30 NOTE — PROGRESS NOTE ADULT - SUBJECTIVE AND OBJECTIVE BOX
Patient is a 61y old  Female who presents with a chief complaint of CVA (29 Aug 2021 11:53)      SUBJECTIVE / OVERNIGHT EVENTS:  Pt seen and examined at bedside. Complaints of dizziness  Pt denies cp, palpitations, sob, abd pain, N/V, fever, chills.    ROS:  All other review of systems negative    Allergies    sulfa drugs (Angioedema; Swelling)  sulfa drugs (Rash)  Sulfac 10% (Unknown)  walnut (Urticaria)    Intolerances        MEDICATIONS  (STANDING):  amLODIPine   Tablet 10 milliGRAM(s) Oral daily  aspirin  chewable 81 milliGRAM(s) Oral daily  atorvastatin 80 milliGRAM(s) Oral at bedtime  enoxaparin Injectable 40 milliGRAM(s) SubCutaneous daily  FLUoxetine 10 milliGRAM(s) Oral daily  lisinopril 40 milliGRAM(s) Oral daily  metFORMIN 1000 milliGRAM(s) Oral two times a day with meals  ticagrelor 90 milliGRAM(s) Oral every 12 hours    MEDICATIONS  (PRN):  acetaminophen   Tablet .. 975 milliGRAM(s) Oral every 6 hours PRN Mild Pain (1 - 3), Moderate Pain (4 - 6), Severe Pain (7 - 10)      Vital Signs Last 24 Hrs  T(C): 36.7 (30 Aug 2021 08:26), Max: 36.8 (29 Aug 2021 20:25)  T(F): 98 (30 Aug 2021 08:26), Max: 98.3 (29 Aug 2021 20:25)  HR: 102 (30 Aug 2021 08:26) (85 - 102)  BP: 148/103 (30 Aug 2021 08:26) (130/89 - 148/103)  BP(mean): --  RR: 16 (30 Aug 2021 08:26) (15 - 16)  SpO2: 99% (30 Aug 2021 08:26) (97% - 100%)  CAPILLARY BLOOD GLUCOSE        I&O's Summary      PHYSICAL EXAM:  GENERAL: NAD, well-developed  CHEST/LUNG: Clear to auscultation bilaterally; No wheeze, nonlabored breathing  HEART: Regular rate and rhythm; No murmurs, rubs, or gallops  ABDOMEN: Soft, Nontender, Nondistended; Bowel sounds present  EXTREMITIES:  2+ Peripheral Pulses, No clubbing, cyanosis, or edema  NEUROLOGY: AAOx3, non-focal  PSYCH: calm, appropriate mood    LABS:                        11.0   5.75  )-----------( 213      ( 30 Aug 2021 06:00 )             33.4     08-30    140  |  103  |  21  ----------------------------<  132<H>  3.8   |  26  |  1.09    Ca    9.0      30 Aug 2021 06:00    TPro  7.5  /  Alb  3.5  /  TBili  0.6  /  DBili  x   /  AST  30  /  ALT  36  /  AlkPhos  73  08-30              RADIOLOGY & ADDITIONAL TESTS:  Results Reviewed:   Imaging Personally Reviewed:  Electrocardiogram Personally Reviewed:    COORDINATION OF CARE:  Care Discussed with Consultants/Other Providers [Y/N]:  Prior or Outpatient Records Reviewed [Y/N]:

## 2021-08-30 NOTE — PROGRESS NOTE ADULT - ATTENDING COMMENTS
Patient seen at bedside  Over weekend- patient reported dizziness. with normal BP. Tizanidine dc with thought that it was contributing to dizziness. CTH - negative for any acute pathology  Patient reports feeling off balance and with vertigo  Will have vestibular eval if available    2. Neuro exam stable   Patient continues to report - curling of toes- Restart low dose tizanidine   Continue rehab program

## 2021-08-30 NOTE — PROGRESS NOTE ADULT - SUBJECTIVE AND OBJECTIVE BOX
Patient is a 61y old  Female who presents with a chief complaint of CVA (19 Aug 2021 14:38)    HPI:  62 yo R handed Female with  PMHx of DMT2, HTN, HLD, multiple CVAs w/ residual RUE/LE weakness w/ foot drop, LLE>UE numbness s/p loop recorder and plavix  now discontinued 2017. She presented to [facility] on 8/15/21 with acute on chronic R-sided weakness, LKN 8/15 07:30 symptoms noticed around 10AM after patient was trying to use her arm and was not able to "make it do what I wanted" on further clarification of RUE>LE weakness. Pt endorsed was leaning to one side, endorsed RLE feels weaker than normal which was present on initial NIHSS 5. Initial BPs elevated 190s systolic, CTH without hemorrhage or acute findings, BP was lowered with improvement in R sided acute on chronic weakness to allow for ataxia testing in limbs which was present, which was new findings.  Endorsed mild L posterior headache, no N/V or infectious symptoms. Patient was re-evaluated after improvement for weakness, concern for focal seizure with patient endorsing no prior seizure history or prior episodes w RUE. CTH w/o acute findings, CTA w worsening PCA stenosis but normal flow bL VAs. CDU monitoring for MR Brain to assess for any new infarcts, however course c/b reoccurrence in R UE "weakness" appearing contracture-like w RUE flexion and hypertonia, intact mentation.  she was seen by neurologist.  pt received 1500mg IV Keppra load given high suspicion for focal seizure activity. MRI Brain showed Left thalamic infarct. She did not receive TPA as she was out of  window. She was seen by cardiology; TTE, ASA/STATIN, and losartan started. EP was consulted, ILR was implanted (8/18), No events recorded on ILR. Of note, patient had ILR placed in the past ( 2017) no arrhythmias was recorded then. She was evaluated by PM&R and therapist and acute rehabilitation was recommended. She was admitted to [facility] on 8/19/21.       SUBJECTIVE: Patient seen and evaluated this morning. Patient endorsed to dizziness over the weekend. She states that she feels off balance and that her surrounding feels unstable. Tizanidine was discontinued over the weekend due to her dizziness. CTH done on 8/29 and negative. She states that her dizziness is still the same. Denies nausea or vomiting, she was able to tolerate her breakfast this morning. No episodes of hypotension.     ROS:  Denies HA/CP/palpitations/abdominal pain or nausea  Denies dysuria   +Dizziness      PHYSICAL EXAM    Vital Signs Last 24 Hrs  T(C): 36.7 (30 Aug 2021 08:26), Max: 36.8 (29 Aug 2021 20:25)  T(F): 98 (30 Aug 2021 08:26), Max: 98.3 (29 Aug 2021 20:25)  HR: 102 (30 Aug 2021 08:26) (85 - 102)  BP: 148/103 (30 Aug 2021 08:26) (130/89 - 148/103)  BP(mean): --  RR: 16 (30 Aug 2021 08:26) (15 - 16)  SpO2: 99% (30 Aug 2021 08:26) (97% - 100%)    Constitutional - NAD, Comfortable  Chest - good chest expansion, good respiratory effort,  Cardio - s1s2+   Abdomen -  Soft, NTND  Extremities - No peripheral edema, No calf tenderness   Neurologic Exam: No nystagmus noted                     Cognitive - AAO x 3     Speech - Fluent, Comprehensible, Mild dysarthria      Motor -                      LUE and LLE 5/5                     RIGHT UE - ShAB 2/5, EF 2/5, EE 2/5, WE 0/5,  0/5, trace finger movement                     RIGHT LE - HF 3/5, KE 4/5, DF 0/5, PF 1/5  + foot drop      Sensory - Intact to LT bilateral     Tone- some increased tone noted on right elbow flexors, right finger flexors, right toes   Psychiatric - Mood stable, Affect WNL  Skin: Loop recorder site- clean       RECENT LABS/IMAGING                        11.0   5.75  )-----------( 213      ( 30 Aug 2021 06:00 )             33.4     08-30    140  |  103  |  21  ----------------------------<  132<H>  3.8   |  26  |  1.09    Ca    9.0      30 Aug 2021 06:00    TPro  7.5  /  Alb  3.5  /  TBili  0.6  /  DBili  x   /  AST  30  /  ALT  36  /  AlkPhos  73  08-30    < from: CT Head No Cont (08.29.21 @ 11:13) >    Impression:  1. Unremarkable noncontrast CT scan of the brain.    < end of copied text >          MEDICATIONS  (STANDING):  amLODIPine   Tablet 10 milliGRAM(s) Oral daily  aspirin  chewable 81 milliGRAM(s) Oral daily  atorvastatin 80 milliGRAM(s) Oral at bedtime  enoxaparin Injectable 40 milliGRAM(s) SubCutaneous daily  FLUoxetine 10 milliGRAM(s) Oral daily  lisinopril 40 milliGRAM(s) Oral daily  metFORMIN 1000 milliGRAM(s) Oral two times a day with meals  ticagrelor 90 milliGRAM(s) Oral every 12 hours    MEDICATIONS  (PRN):  acetaminophen   Tablet .. 975 milliGRAM(s) Oral every 6 hours PRN Mild Pain (1 - 3), Moderate Pain (4 - 6), Severe Pain (7 - 10)

## 2021-08-31 PROCEDURE — 99232 SBSQ HOSP IP/OBS MODERATE 35: CPT | Mod: GC

## 2021-08-31 RX ORDER — TIZANIDINE 4 MG/1
4 TABLET ORAL AT BEDTIME
Refills: 0 | Status: DISCONTINUED | OUTPATIENT
Start: 2021-08-31 | End: 2021-09-08

## 2021-08-31 RX ADMIN — LISINOPRIL 40 MILLIGRAM(S): 2.5 TABLET ORAL at 05:12

## 2021-08-31 RX ADMIN — TIZANIDINE 4 MILLIGRAM(S): 4 TABLET ORAL at 21:41

## 2021-08-31 RX ADMIN — Medication 10 MILLIGRAM(S): at 11:51

## 2021-08-31 RX ADMIN — Medication 81 MILLIGRAM(S): at 11:51

## 2021-08-31 RX ADMIN — METFORMIN HYDROCHLORIDE 1000 MILLIGRAM(S): 850 TABLET ORAL at 17:34

## 2021-08-31 RX ADMIN — ENOXAPARIN SODIUM 40 MILLIGRAM(S): 100 INJECTION SUBCUTANEOUS at 11:51

## 2021-08-31 RX ADMIN — TICAGRELOR 90 MILLIGRAM(S): 90 TABLET ORAL at 05:12

## 2021-08-31 RX ADMIN — AMLODIPINE BESYLATE 10 MILLIGRAM(S): 2.5 TABLET ORAL at 05:12

## 2021-08-31 RX ADMIN — METFORMIN HYDROCHLORIDE 1000 MILLIGRAM(S): 850 TABLET ORAL at 07:56

## 2021-08-31 RX ADMIN — ATORVASTATIN CALCIUM 80 MILLIGRAM(S): 80 TABLET, FILM COATED ORAL at 21:41

## 2021-08-31 RX ADMIN — TICAGRELOR 90 MILLIGRAM(S): 90 TABLET ORAL at 17:34

## 2021-08-31 NOTE — PROGRESS NOTE ADULT - ATTENDING COMMENTS
Patient seen at bedside this am  No acute issues overnight  Continues to report dizziness/vertigo  today. To be evaluated by vestibular therapist.     2. increase dose of tizanidine to 4mg qhs as tone appears to be increasing in RUE   To be evaluated for right AFO, as prior AFO, does not meet needs of patient for safe ambulation.     3. Progress reviewed at team conference today   TDD 9/10/21

## 2021-08-31 NOTE — PROGRESS NOTE ADULT - ASSESSMENT
ASSESSMENT/PLAN  62 yo R handed Female with  PMHx of DMT2, HTN, HLD, multiple CVAs w/ residual RUE/LE weakness w/ foot drop, LLE>UE numbness s/p loop recorder and plavix  now discontinued 2017. Admitted on 8/15 for acute on chronic R-sided weakness found to have Left thalamic infarct. Had concerns of seizure and given loading dose of keppra, however it was ruled out. Patient now with Gait Instability, ADL impairments and Functional impairments.    COMORBIDITES/ACTIVE MEDICAL ISSUES     Continue Comprehensive Rehab Program of PT/OT/ST - total of 3 hrs/day 5 days/week     History of Multiple CVA: new left thalamic infarcts   s/p ILR placement; f/u with Dr. Minor  - ASA 81 mg daily, - Brillinta 90 mg twice - Per neurology   - Hypercoagulable workup- essentially negative     HLD:- Lipitor 80 mg daily    HTN: on amlodipine 10mg daily and lisinopril 40mg daily ( 8/25)   - Home meds: (amlodipine, labetalol, HCTz, lisinopril 40)  - Hold for systolic BP of <140    #DM II: blood sugars good   on - metformin 1000mg  BID, Blood sugars good   FS dc'd     #Tachycardia:   this AM 8/31  No symptoms endorsed   Will obtain EKG if still tachy after therapy     Hypokalemic (resolved)     Pain control:- Tylenol PRN    GI/Bowel Mgmt   - Continent  - Senna,  Miralax PRN,    Bladder management: toilet schedule prn     DVT prophylaxis:- Lovenox  - SCDs, TEDs     FEN :- Diet - Consistent Carbohydrate Low Sodium     Mood: started prozac 10mg daily     Dizziness: has some positional component to it. CTH done 8/29 and stable. Vestibular therapy eval requested     Tone: started tizanidine 2mg qhs for right toes curling as well as some increased tone noted on RUE but dc'd over thee weekend due to episodes of dizziness. Dizziness likley not related to tizanidine, no episodes of hypotension noted.  -Restarted tizanidine 2mg qhs 8/30 -- will increase to 4mg tizanidine 8/31     Precautions / PROPHYLAXIS:   - Falls  - Lungs: Aspiration  - Pressure injury/Skin: Turn Q2hrs while in bed, OOB to Chair, PT/OT       Disp: team conference 8/24  TDD 9/10/21

## 2021-08-31 NOTE — CHART NOTE - NSCHARTNOTEFT_GEN_A_CORE
Nutrition Follow Up Note  Hospital Course   (Per Electronic Medical Record)    Source:  Patient [X]  Medical Record [X]      Diet:   Consistent Carbohydrate Low Sodium Diet w/ Thin Liquids  Tolerates Diet Consistency Well  No Chewing/Swallowing Difficulties  No Recent Nausea, Vomiting, Diarrhea or Constipation (as Per Patient)  Consumes % of Meals (as Per Documentation) - States Fair Intake (Per Patient)   Declines Nutrition Supplementation  Education Provided on Blood Glucose Management  Obtained Food Preferences from Patient     Enteral/Parenteral Nutrition: Not Applicable    Current Weight: 216.4lb on 8/19  Obtain New Weight  Obtain Weights Weekly     Pertinent Medications: MEDICATIONS  (STANDING):  amLODIPine   Tablet 10 milliGRAM(s) Oral daily  aspirin  chewable 81 milliGRAM(s) Oral daily  atorvastatin 80 milliGRAM(s) Oral at bedtime  enoxaparin Injectable 40 milliGRAM(s) SubCutaneous daily  FLUoxetine 10 milliGRAM(s) Oral daily  lisinopril 40 milliGRAM(s) Oral daily  metFORMIN 1000 milliGRAM(s) Oral two times a day with meals  ticagrelor 90 milliGRAM(s) Oral every 12 hours  tiZANidine 4 milliGRAM(s) Oral at bedtime    MEDICATIONS  (PRN):  acetaminophen   Tablet .. 975 milliGRAM(s) Oral every 6 hours PRN Mild Pain (1 - 3), Moderate Pain (4 - 6), Severe Pain (7 - 10)    Pertinent Labs:  08-30 Na140 mmol/L Glu 132 mg/dL<H> K+ 3.8 mmol/L Cr  1.09 mg/dL BUN 21 mg/dL 08-30 Alb 3.5 g/dL 08-16 Chol 134 mg/dL LDL --    HDL 49 mg/dL<L> Trig 64 mg/dL    Skin: No Pressure Ulcers  Surgical Incision on Chest  (as Per Nursing Flow Sheet)     Edema: None Noted (as Per Documentation)     Last Bowel Movement: on 8/29    Estimated Needs:   [X] No Change Since Previous Assessment    Previous Nutrition Diagnosis:   Moderate Malnutrition     Nutrition Diagnosis is [X] Ongoing - Declines Nutrition Supplementation & Education Provided on Blood Glucose Management     New Nutrition Diagnosis: [X] Not Applicable    Interventions:   1. Education Provided on Blood Glucose Management   2. Recommend Continue Nutrition Plan of Care     Monitoring & Evaluation:   [X] Weights   [X] PO Intake   [X] Skin Integrity   [X] Follow Up (Per Protocol)  [X] Tolerance to Diet Prescription   [X] Other: Labs & POCT    Registered Dietitian/Nutritionist Remains Available.  Ren Rosales RDN    Pager #[phone]  Phone# [phone]

## 2021-08-31 NOTE — PROGRESS NOTE ADULT - SUBJECTIVE AND OBJECTIVE BOX
Patient is a 61y old  Female who presents with a chief complaint of CVA (19 Aug 2021 14:38)    HPI:  60 yo R handed Female with  PMHx of DMT2, HTN, HLD, multiple CVAs w/ residual RUE/LE weakness w/ foot drop, LLE>UE numbness s/p loop recorder and plavix  now discontinued 2017. She presented to [facility] on 8/15/21 with acute on chronic R-sided weakness, LKN 8/15 07:30 symptoms noticed around 10AM after patient was trying to use her arm and was not able to "make it do what I wanted" on further clarification of RUE>LE weakness. Pt endorsed was leaning to one side, endorsed RLE feels weaker than normal which was present on initial NIHSS 5. Initial BPs elevated 190s systolic, CTH without hemorrhage or acute findings, BP was lowered with improvement in R sided acute on chronic weakness to allow for ataxia testing in limbs which was present, which was new findings.  Endorsed mild L posterior headache, no N/V or infectious symptoms. Patient was re-evaluated after improvement for weakness, concern for focal seizure with patient endorsing no prior seizure history or prior episodes w RUE. CTH w/o acute findings, CTA w worsening PCA stenosis but normal flow bL VAs. CDU monitoring for MR Brain to assess for any new infarcts, however course c/b reoccurrence in R UE "weakness" appearing contracture-like w RUE flexion and hypertonia, intact mentation.  she was seen by neurologist.  pt received 1500mg IV Keppra load given high suspicion for focal seizure activity. MRI Brain showed Left thalamic infarct. She did not receive TPA as she was out of  window. She was seen by cardiology; TTE, ASA/STATIN, and losartan started. EP was consulted, ILR was implanted (8/18), No events recorded on ILR. Of note, patient had ILR placed in the past ( 2017) no arrhythmias was recorded then. She was evaluated by PM&R and therapist and acute rehabilitation was recommended. [facility]       SUBJECTIVE: Patient seen and evaluated this morning. Still with dizziness and feeling of being off balanced. Still endorsing to right toes curling, she was started on tizanidine yesterday. Patient noted with some tachycardia this morning, she denies any CP, SOB, palpitations. She states that she feels fine overall.     ROS:  Denies HA/CP/palpitations/abdominal pain or nausea  Denies dysuria   +Dizziness      PHYSICAL EXAM    Vital Signs Last 24 Hrs  T(C): 36.7 (31 Aug 2021 07:50), Max: 36.7 (31 Aug 2021 07:50)  T(F): 98 (31 Aug 2021 07:50), Max: 98 (31 Aug 2021 07:50)  HR: 90 (31 Aug 2021 10:31) (84 - 105)  BP: 141/92 (31 Aug 2021 07:50) (136/89 - 144/90)  BP(mean): --  RR: 15 (31 Aug 2021 10:31) (14 - 16)  SpO2: 100% (31 Aug 2021 10:31) (98% - 100%)    Constitutional - NAD, Comfortable  Chest - good chest expansion, good respiratory effort,  Cardio - s1s2+, tachycardic   Abdomen -  Soft, NTND  Extremities - No peripheral edema, No calf tenderness   Neurologic Exam: No nystagmus noted                     Cognitive - AAO x 3     Speech - Fluent, Comprehensible, Mild dysarthria      Motor -                      LUE and LLE 5/5                     RIGHT UE - ShAB 2/5, EF 2/5, EE 2/5, WE 0/5,  0/5, trace finger movement                     RIGHT LE - HF 3/5, KE 4/5, DF 0/5, PF 1/5  + foot drop      Sensory - Intact to LT bilateral     Tone- some increased tone noted on right elbow flexors, right finger flexors, right toes   Psychiatric - Mood stable, Affect WNL  Skin: Loop recorder site- clean             MEDICATIONS  (STANDING):  amLODIPine   Tablet 10 milliGRAM(s) Oral daily  aspirin  chewable 81 milliGRAM(s) Oral daily  atorvastatin 80 milliGRAM(s) Oral at bedtime  enoxaparin Injectable 40 milliGRAM(s) SubCutaneous daily  FLUoxetine 10 milliGRAM(s) Oral daily  lisinopril 40 milliGRAM(s) Oral daily  metFORMIN 1000 milliGRAM(s) Oral two times a day with meals  ticagrelor 90 milliGRAM(s) Oral every 12 hours  tiZANidine 4 milliGRAM(s) Oral at bedtime    MEDICATIONS  (PRN):  acetaminophen   Tablet .. 975 milliGRAM(s) Oral every 6 hours PRN Mild Pain (1 - 3), Moderate Pain (4 - 6), Severe Pain (7 - 10)

## 2021-09-01 LAB — SARS-COV-2 RNA SPEC QL NAA+PROBE: SIGNIFICANT CHANGE UP

## 2021-09-01 PROCEDURE — 99232 SBSQ HOSP IP/OBS MODERATE 35: CPT

## 2021-09-01 RX ADMIN — AMLODIPINE BESYLATE 10 MILLIGRAM(S): 2.5 TABLET ORAL at 05:30

## 2021-09-01 RX ADMIN — LISINOPRIL 40 MILLIGRAM(S): 2.5 TABLET ORAL at 05:31

## 2021-09-01 RX ADMIN — Medication 10 MILLIGRAM(S): at 11:53

## 2021-09-01 RX ADMIN — TICAGRELOR 90 MILLIGRAM(S): 90 TABLET ORAL at 05:30

## 2021-09-01 RX ADMIN — METFORMIN HYDROCHLORIDE 1000 MILLIGRAM(S): 850 TABLET ORAL at 17:17

## 2021-09-01 RX ADMIN — METFORMIN HYDROCHLORIDE 1000 MILLIGRAM(S): 850 TABLET ORAL at 07:55

## 2021-09-01 RX ADMIN — ATORVASTATIN CALCIUM 80 MILLIGRAM(S): 80 TABLET, FILM COATED ORAL at 21:21

## 2021-09-01 RX ADMIN — ENOXAPARIN SODIUM 40 MILLIGRAM(S): 100 INJECTION SUBCUTANEOUS at 11:53

## 2021-09-01 RX ADMIN — TICAGRELOR 90 MILLIGRAM(S): 90 TABLET ORAL at 17:17

## 2021-09-01 RX ADMIN — Medication 81 MILLIGRAM(S): at 11:52

## 2021-09-01 RX ADMIN — TIZANIDINE 4 MILLIGRAM(S): 4 TABLET ORAL at 21:21

## 2021-09-01 NOTE — PROGRESS NOTE ADULT - ASSESSMENT
60 yo R handed woman with history of DMT2, HTN, HLD, multiple CVAs w/ residual RUE/LE weakness w/ foot drop, LLE>UE numbness s/p loop recorder and no longer on plavix admitted to  rehab after hospital course for left thalamic infarct, admitted for rehab- pt/ot/dvt ppx    #multiple cerebrovascular accidents (CVAs)   -ASA/STATIN, Brilinta and losartan   -ILR was implanted (8/18)  -Continue comprehensive rehab    #Dizziness  #BPPV  -Recommend Vestibular therapy    #HLD  -continue atorvastatin    #Benign essential HTN  -Continue amlodipine, lisinopril    #Type 2 diabetes mellitus  -c/w metformin 1gm bid  -continue ISS  -monitor FS    #ERENDIRA (acute kidney injury) on CKD II  -encourage oral intake  -avoid nephrotoxic agents  -monitor BMP    #DVT prophylaxis  -continue Lovenox

## 2021-09-01 NOTE — PROGRESS NOTE ADULT - SUBJECTIVE AND OBJECTIVE BOX
Patient is a 61y old  Female who presents with a chief complaint of CVA (31 Aug 2021 10:42)      Patient seen and examined at bedside.  Denies chest pain, dyspnea, abd pain.    ALLERGIES:  sulfa drugs (Angioedema; Swelling)  sulfa drugs (Rash)  Sulfac 10% (Unknown)  walnut (Urticaria)    MEDICATIONS  (STANDING):  amLODIPine   Tablet 10 milliGRAM(s) Oral daily  aspirin  chewable 81 milliGRAM(s) Oral daily  atorvastatin 80 milliGRAM(s) Oral at bedtime  enoxaparin Injectable 40 milliGRAM(s) SubCutaneous daily  FLUoxetine 10 milliGRAM(s) Oral daily  lisinopril 40 milliGRAM(s) Oral daily  metFORMIN 1000 milliGRAM(s) Oral two times a day with meals  ticagrelor 90 milliGRAM(s) Oral every 12 hours  tiZANidine 4 milliGRAM(s) Oral at bedtime    MEDICATIONS  (PRN):  acetaminophen   Tablet .. 975 milliGRAM(s) Oral every 6 hours PRN Mild Pain (1 - 3), Moderate Pain (4 - 6), Severe Pain (7 - 10)    Vital Signs Last 24 Hrs  T(F): 98 (01 Sep 2021 07:34), Max: 98 (01 Sep 2021 07:34)  HR: 80 (01 Sep 2021 07:34) (80 - 98)  BP: 123/91 (01 Sep 2021 07:34) (123/91 - 144/95)  RR: 14 (01 Sep 2021 07:34) (14 - 14)  SpO2: 100% (01 Sep 2021 07:34) (98% - 100%)  I&O's Summary      PHYSICAL EXAM:  General: NAD, A/O x 3  ENT: MMM  Neck: Supple, No JVD  Lungs: Clear to auscultation bilaterally  Cardio: RRR, S1/S2, No murmurs  Abdomen: Soft, Nontender, Nondistended; Bowel sounds present  Extremities: No calf tenderness, No pitting edema    LABS:                        11.0   5.75  )-----------( 213      ( 30 Aug 2021 06:00 )             33.4       08-30    140  |  103  |  21  ----------------------------<  132  3.8   |  26  |  1.09    Ca    9.0      30 Aug 2021 06:00    TPro  7.5  /  Alb  3.5  /  TBili  0.6  /  DBili  x   /  AST  30  /  ALT  36  /  AlkPhos  73  08-30     eGFR if Non African American: 55 mL/min/1.73M2 (08-30-21 @ 06:00)  eGFR if : 64 mL/min/1.73M2 (08-30-21 @ 06:00)             08-16 Chol 134 mg/dL LDL -- HDL 49 mg/dL Trig 64 mg/dL                      COVID-19 PCR: NotDetec (08-26-21 @ 07:15)  COVID-19 PCR: NotDetec (08-20-21 @ 06:30)  COVID-19 PCR: NotDetec (08-15-21 @ 12:31)      RADIOLOGY & ADDITIONAL TESTS:    Care Discussed with Consultants/Other Providers:

## 2021-09-01 NOTE — PROGRESS NOTE ADULT - ASSESSMENT
ASSESSMENT/PLAN  60 yo R handed Female with  PMHx of DMT2, HTN, HLD, multiple CVAs w/ residual RUE/LE weakness w/ foot drop, LLE>UE numbness s/p loop recorder and plavix  now discontinued 2017. Admitted on 8/15 for acute on chronic R-sided weakness found to have Left thalamic infarct. Had concerns of seizure and given loading dose of keppra, however it was ruled out. Patient now with Gait Instability, ADL impairments and Functional impairments.    COMORBIDITES/ACTIVE MEDICAL ISSUES     History of Multiple CVA: new left thalamic infarcts, right spastic hemiparesis   s/p ILR placement; f/u with Dr. Minor  - ASA 81 mg daily, - Brillinta 90 mg twice  FOR 4 WEEKS -  Per neurology   - Hypercoagulable workup- essentially negative   Continue Comprehensive Rehab Program of PT/OT/ST - total of 3 hrs/day 5 days/week   Patient to be evaluated for right AFO as she has a foot drop from recurrent stroke and prior AFO, does not meet patient's needs for safe ambulation.   Tizanidine 4mg qhs for spasticity - 8/31       HLD:- Lipitor 80 mg daily    HTN: on amlodipine 10mg daily and lisinopril 40mg daily ( 8/25) - BP good   - Home meds: (amlodipine, labetalol, HCTz, lisinopril 40)  - Hold for systolic BP of <140    #DM II: blood sugars good   on - metformin 1000mg  BID, Blood sugars good   FS dc'd     #Tachycardia:No symptoms endorsed     Hypokalemia: resolved    Pain control:- Tylenol PRN    GI/Bowel Mgmt   - Continent  - Senna,  Miralax PRN,    Bladder management: toilet schedule prn     DVT prophylaxis:- Lovenox  - SCDs, TEDs     FEN :- Diet - Consistent Carbohydrate Low Sodium     Mood: started prozac 10mg daily     Dizziness:  Vestibular  eval done 8/31- Patient reports dizziness slightly improved.     Precautions / PROPHYLAXIS:   - Falls  - Lungs: Aspiration  - Pressure injury/Skin: Turn Q2hrs while in bed, OOB to Chair, PT/OT       Disp: team conference 8/24 and 8/31   TDD 9/10/21

## 2021-09-01 NOTE — PROGRESS NOTE ADULT - SUBJECTIVE AND OBJECTIVE BOX
Patient is a 61y old  Female who presents with a chief complaint of CVA (19 Aug 2021 14:38)    HPI:  62 yo R handed Female with  PMHx of DMT2, HTN, HLD, multiple CVAs w/ residual RUE/LE weakness w/ foot drop, LLE>UE numbness s/p loop recorder and plavix  now discontinued 2017. She presented to [facility] on 8/15/21 with acute on chronic R-sided weakness, LKN 8/15 07:30 symptoms noticed around 10AM after patient was trying to use her arm and was not able to "make it do what I wanted" on further clarification of RUE>LE weakness. Pt endorsed was leaning to one side, endorsed RLE feels weaker than normal which was present on initial NIHSS 5. Initial BPs elevated 190s systolic, CTH without hemorrhage or acute findings, BP was lowered with improvement in R sided acute on chronic weakness to allow for ataxia testing in limbs which was present, which was new findings.  Endorsed mild L posterior headache, no N/V or infectious symptoms. Patient was re-evaluated after improvement for weakness, concern for focal seizure with patient endorsing no prior seizure history or prior episodes w RUE. CTH w/o acute findings, CTA w worsening PCA stenosis but normal flow bL VAs. CDU monitoring for MR Brain to assess for any new infarcts, however course c/b reoccurrence in R UE "weakness" appearing contracture-like w RUE flexion and hypertonia, intact mentation.  she was seen by neurologist.  pt received 1500mg IV Keppra load given high suspicion for focal seizure activity. MRI Brain showed Left thalamic infarct. She did not receive TPA as she was out of  window. She was seen by cardiology; TTE, ASA/STATIN, and losartan started. EP was consulted, ILR was implanted (8/18), No events recorded on ILR. Of note, patient had ILR placed in the past ( 2017) no arrhythmias was recorded then. She was evaluated by PM&R and therapist and acute rehabilitation was recommended. She was admitted to [facility] on 8/19/21.       SUBJECTIVE: Patient seen and evaluated this morning. Slept ok  She states that her dizziness has improved slightly and able to sit at EOB un supported.     ROS:  Denies HA/CP/palpitations/abdominal pain or nausea  Denies dysuria   +Dizziness- improved  + BM       PHYSICAL EXAM    Vital Signs Last 24 Hrs  T(C): 36.7 (01 Sep 2021 07:34), Max: 36.7 (01 Sep 2021 07:34)  T(F): 98 (01 Sep 2021 07:34), Max: 98 (01 Sep 2021 07:34)  HR: 80 (01 Sep 2021 07:34) (80 - 98)  BP: 123/91 (01 Sep 2021 07:34) (123/91 - 144/95)  BP(mean): --  RR: 14 (01 Sep 2021 07:34) (14 - 14)  SpO2: 100% (01 Sep 2021 07:34) (98% - 100%)    Constitutional - NAD, Comfortable  Chest - good chest expansion, good respiratory effort,  Cardio - s1s2+, tachycardic   Abdomen -  Soft, NT ND  Extremities - No peripheral edema, No calf tenderness   Neurologic Exam: No nystagmus noted                     Cognitive - AAO x 3     Speech - Fluent, Comprehensible, Mild dysarthria      Motor -                      LUE and LLE 5/5                     RIGHT UE - ShAB 2/5, EF 2/5, EE 2/5, WE 0/5,  0/5, trace finger movement                     RIGHT LE - HF 3/5, KE 4/5, DF 0/5, PF 1/5  + foot drop      Sensory - Intact to LT bilateral     Tone- some increased tone noted on right elbow flexors, right finger flexors, right toes   Psychiatric - Mood stable, Affect WNL  Skin: Loop recorder site- clean           MEDICATIONS  (STANDING):  amLODIPine   Tablet 10 milliGRAM(s) Oral daily  aspirin  chewable 81 milliGRAM(s) Oral daily  atorvastatin 80 milliGRAM(s) Oral at bedtime  enoxaparin Injectable 40 milliGRAM(s) SubCutaneous daily  FLUoxetine 10 milliGRAM(s) Oral daily  lisinopril 40 milliGRAM(s) Oral daily  metFORMIN 1000 milliGRAM(s) Oral two times a day with meals  ticagrelor 90 milliGRAM(s) Oral every 12 hours  tiZANidine 4 milliGRAM(s) Oral at bedtime    MEDICATIONS  (PRN):  acetaminophen   Tablet .. 975 milliGRAM(s) Oral every 6 hours PRN Mild Pain (1 - 3), Moderate Pain (4 - 6), Severe Pain (7 - 10)

## 2021-09-02 LAB
ALBUMIN SERPL ELPH-MCNC: 3.5 G/DL — SIGNIFICANT CHANGE UP (ref 3.3–5)
ALP SERPL-CCNC: 73 U/L — SIGNIFICANT CHANGE UP (ref 40–120)
ALT FLD-CCNC: 39 U/L — SIGNIFICANT CHANGE UP (ref 10–45)
ANION GAP SERPL CALC-SCNC: 6 MMOL/L — SIGNIFICANT CHANGE UP (ref 5–17)
AST SERPL-CCNC: 23 U/L — SIGNIFICANT CHANGE UP (ref 10–40)
BILIRUB SERPL-MCNC: 0.6 MG/DL — SIGNIFICANT CHANGE UP (ref 0.2–1.2)
BUN SERPL-MCNC: 21 MG/DL — SIGNIFICANT CHANGE UP (ref 7–23)
CALCIUM SERPL-MCNC: 9.3 MG/DL — SIGNIFICANT CHANGE UP (ref 8.4–10.5)
CHLORIDE SERPL-SCNC: 108 MMOL/L — SIGNIFICANT CHANGE UP (ref 96–108)
CO2 SERPL-SCNC: 29 MMOL/L — SIGNIFICANT CHANGE UP (ref 22–31)
CREAT SERPL-MCNC: 1.16 MG/DL — SIGNIFICANT CHANGE UP (ref 0.5–1.3)
GLUCOSE SERPL-MCNC: 132 MG/DL — HIGH (ref 70–99)
HCT VFR BLD CALC: 33 % — LOW (ref 34.5–45)
HGB BLD-MCNC: 10.8 G/DL — LOW (ref 11.5–15.5)
MCHC RBC-ENTMCNC: 26.9 PG — LOW (ref 27–34)
MCHC RBC-ENTMCNC: 32.7 GM/DL — SIGNIFICANT CHANGE UP (ref 32–36)
MCV RBC AUTO: 82.3 FL — SIGNIFICANT CHANGE UP (ref 80–100)
NRBC # BLD: 0 /100 WBCS — SIGNIFICANT CHANGE UP (ref 0–0)
PLATELET # BLD AUTO: 202 K/UL — SIGNIFICANT CHANGE UP (ref 150–400)
POTASSIUM SERPL-MCNC: 4.1 MMOL/L — SIGNIFICANT CHANGE UP (ref 3.5–5.3)
POTASSIUM SERPL-SCNC: 4.1 MMOL/L — SIGNIFICANT CHANGE UP (ref 3.5–5.3)
PROT SERPL-MCNC: 7.3 G/DL — SIGNIFICANT CHANGE UP (ref 6–8.3)
RBC # BLD: 4.01 M/UL — SIGNIFICANT CHANGE UP (ref 3.8–5.2)
RBC # FLD: 14.1 % — SIGNIFICANT CHANGE UP (ref 10.3–14.5)
SODIUM SERPL-SCNC: 143 MMOL/L — SIGNIFICANT CHANGE UP (ref 135–145)
WBC # BLD: 5.23 K/UL — SIGNIFICANT CHANGE UP (ref 3.8–10.5)
WBC # FLD AUTO: 5.23 K/UL — SIGNIFICANT CHANGE UP (ref 3.8–10.5)

## 2021-09-02 PROCEDURE — 99232 SBSQ HOSP IP/OBS MODERATE 35: CPT | Mod: GC

## 2021-09-02 RX ORDER — TIZANIDINE 4 MG/1
4 TABLET ORAL
Refills: 0 | Status: DISCONTINUED | OUTPATIENT
Start: 2021-09-02 | End: 2021-09-08

## 2021-09-02 RX ORDER — LABETALOL HCL 100 MG
100 TABLET ORAL THREE TIMES A DAY
Refills: 0 | Status: DISCONTINUED | OUTPATIENT
Start: 2021-09-02 | End: 2021-09-03

## 2021-09-02 RX ORDER — LABETALOL HCL 100 MG
100 TABLET ORAL ONCE
Refills: 0 | Status: COMPLETED | OUTPATIENT
Start: 2021-09-02 | End: 2021-09-02

## 2021-09-02 RX ADMIN — Medication 10 MILLIGRAM(S): at 12:30

## 2021-09-02 RX ADMIN — LISINOPRIL 40 MILLIGRAM(S): 2.5 TABLET ORAL at 05:09

## 2021-09-02 RX ADMIN — ENOXAPARIN SODIUM 40 MILLIGRAM(S): 100 INJECTION SUBCUTANEOUS at 12:30

## 2021-09-02 RX ADMIN — TIZANIDINE 4 MILLIGRAM(S): 4 TABLET ORAL at 21:08

## 2021-09-02 RX ADMIN — Medication 100 MILLIGRAM(S): at 11:31

## 2021-09-02 RX ADMIN — TICAGRELOR 90 MILLIGRAM(S): 90 TABLET ORAL at 05:09

## 2021-09-02 RX ADMIN — METFORMIN HYDROCHLORIDE 1000 MILLIGRAM(S): 850 TABLET ORAL at 07:58

## 2021-09-02 RX ADMIN — TICAGRELOR 90 MILLIGRAM(S): 90 TABLET ORAL at 17:33

## 2021-09-02 RX ADMIN — Medication 100 MILLIGRAM(S): at 14:08

## 2021-09-02 RX ADMIN — METFORMIN HYDROCHLORIDE 1000 MILLIGRAM(S): 850 TABLET ORAL at 17:33

## 2021-09-02 RX ADMIN — ATORVASTATIN CALCIUM 80 MILLIGRAM(S): 80 TABLET, FILM COATED ORAL at 21:07

## 2021-09-02 RX ADMIN — Medication 100 MILLIGRAM(S): at 21:08

## 2021-09-02 RX ADMIN — AMLODIPINE BESYLATE 10 MILLIGRAM(S): 2.5 TABLET ORAL at 05:09

## 2021-09-02 RX ADMIN — Medication 81 MILLIGRAM(S): at 12:30

## 2021-09-02 NOTE — PROGRESS NOTE ADULT - ASSESSMENT
ASSESSMENT/PLAN  60 yo R handed Female with  PMHx of DMT2, HTN, HLD, multiple CVAs w/ residual RUE/LE weakness w/ foot drop, LLE>UE numbness s/p loop recorder and plavix  now discontinued 2017. Admitted on 8/15 for acute on chronic R-sided weakness found to have Left thalamic infarct. Had concerns of seizure and given loading dose of keppra, however it was ruled out. Patient now with Gait Instability, ADL impairments and Functional impairments.    COMORBIDITES/ACTIVE MEDICAL ISSUES     History of Multiple CVA: new left thalamic infarcts, right spastic hemiparesis   s/p ILR placement; f/u with Dr. Minor  - ASA 81 mg daily, - Brillinta 90 mg twice  FOR 4 WEEKS -  Per neurology   - Hypercoagulable workup- essentially negative   Continue Comprehensive Rehab Program of PT/OT/ST - total of 3 hrs/day 5 days/week   Patient to be evaluated for right AFO as she has a foot drop from recurrent stroke and prior AFO, does not meet patient's needs for safe ambulation.   Tizanidine 4mg qhs for spasticity - 8/31       HLD:- Lipitor 80 mg daily    HTN: on amlodipine 10mg daily and lisinopril 40mg daily ( 8/25)   -Restarted on labetalol 100mg TID 9/2   - Home meds: (amlodipine, labetalol, HCTz, lisinopril 40)  - Hold for systolic BP of <140    #DM II: blood sugars good   on - metformin 1000mg  BID, Blood sugars good   FS dc'd     #Tachycardia :No symptoms endorsed     # Increased tone   -Tizanidine to be increased to 4mg BID 9/2     #BLE edema  -Endorses to BLE edema but not seen on exam   -Continue to monitor     Hypokalemia: resolved    Pain control:- Tylenol PRN    GI/Bowel Mgmt   - Continent  - Senna,  Miralax PRN,    Bladder management: toilet schedule prn     DVT prophylaxis:- Lovenox  - SCDs, TEDs     FEN :- Diet - Consistent Carbohydrate Low Sodium     Mood: started prozac 10mg daily     Dizziness:  Vestibular  eval done 8/31- Patient reports dizziness slightly improved.     Precautions / PROPHYLAXIS:   - Falls  - Lungs: Aspiration  - Pressure injury/Skin: Turn Q2hrs while in bed, OOB to Chair, PT/OT       Disp: team conference 8/24 and 8/31   TDD 9/10/21

## 2021-09-02 NOTE — PROGRESS NOTE ADULT - ATTENDING COMMENTS
Patient seen at bedside  Seated in bed  Feels like her ankles are swollen- Exam- no swelling noted  Reports dizziness improved somewhat  BP elevated during therapy sessions- Labetolol 100mg q8h added ( home med)     2. Labs stable    3. continue rehab program

## 2021-09-02 NOTE — PROGRESS NOTE ADULT - SUBJECTIVE AND OBJECTIVE BOX
Patient is a 61y old  Female who presents with a chief complaint of CVA (19 Aug 2021 14:38)    HPI:  60 yo R handed Female with  PMHx of DMT2, HTN, HLD, multiple CVAs w/ residual RUE/LE weakness w/ foot drop, LLE>UE numbness s/p loop recorder and plavix  now discontinued 2017. She presented to [facility] on 8/15/21 with acute on chronic R-sided weakness, LKN 8/15 07:30 symptoms noticed around 10AM after patient was trying to use her arm and was not able to "make it do what I wanted" on further clarification of RUE>LE weakness. Pt endorsed was leaning to one side, endorsed RLE feels weaker than normal which was present on initial NIHSS 5. Initial BPs elevated 190s systolic, CTH without hemorrhage or acute findings, BP was lowered with improvement in R sided acute on chronic weakness to allow for ataxia testing in limbs which was present, which was new findings.  Endorsed mild L posterior headache, no N/V or infectious symptoms. Patient was re-evaluated after improvement for weakness, concern for focal seizure with patient endorsing no prior seizure history or prior episodes w RUE. CTH w/o acute findings, CTA w worsening PCA stenosis but normal flow bL VAs. CDU monitoring for MR Brain to assess for any new infarcts, however course c/b reoccurrence in R UE "weakness" appearing contracture-like w RUE flexion and hypertonia, intact mentation.  she was seen by neurologist.  pt received 1500mg IV Keppra load given high suspicion for focal seizure activity. MRI Brain showed Left thalamic infarct. She did not receive TPA as she was out of  window. She was seen by cardiology; TTE, ASA/STATIN, and losartan started. EP was consulted, ILR was implanted (8/18), No events recorded on ILR. Of note, patient had ILR placed in the past ( 2017) no arrhythmias was recorded then. She was evaluated by PM&R and therapist and acute rehabilitation was recommended. She was admitted to [facility] on 8/19/21.       SUBJECTIVE: Patient seen and evaluated this morning. BP noted to be elevated but asymptomatic. Dizziness is still the same. Endorses to BLE swelling. No acute issues noted overnight.     ROS:  Denies HA/CP/palpitations/abdominal pain or nausea  Denies dysuria   +Dizziness   + BM       PHYSICAL EXAM    Vital Signs Last 24 Hrs  T(C): 36.7 (02 Sep 2021 08:07), Max: 36.7 (02 Sep 2021 08:07)  T(F): 98 (02 Sep 2021 08:07), Max: 98 (02 Sep 2021 08:07)  HR: 105 (02 Sep 2021 08:07) (88 - 105)  BP: 147/102 (02 Sep 2021 08:07) (138/83 - 147/102)  BP(mean): --  RR: 14 (02 Sep 2021 08:07) (14 - 14)  SpO2: 100% (02 Sep 2021 08:07) (99% - 100%)    Constitutional - NAD, Comfortable  Chest - good chest expansion, good respiratory effort,  Cardio - s1s2+, tachycardic   Abdomen -  Soft, NT ND  Extremities - No peripheral edema, No calf tenderness   Neurologic Exam: No nystagmus noted                     Cognitive - AAO x 3     Speech - Fluent, Comprehensible, Mild dysarthria      Motor -                      LUE and LLE 5/5                     RIGHT UE - ShAB 2/5, EF 2/5, EE 2/5, WE 0/5,  0/5, trace finger movement                     RIGHT LE - HF 3/5, KE 4/5, DF 0/5, PF 1/5  + foot drop      Sensory - Intact to LT bilateral     Tone- some increased tone noted on right elbow flexors, right finger flexors, right toes   Psychiatric - Mood stable, Affect WNL  Skin: Loop recorder site- clean         RECENT LABS/IMAGING                        10.8   5.23  )-----------( 202      ( 02 Sep 2021 06:00 )             33.0     09-02    143  |  108  |  21  ----------------------------<  132<H>  4.1   |  29  |  1.16    Ca    9.3      02 Sep 2021 06:00    TPro  7.3  /  Alb  3.5  /  TBili  0.6  /  DBili  x   /  AST  23  /  ALT  39  /  AlkPhos  73  09-02      MEDICATIONS  (STANDING):  amLODIPine   Tablet 10 milliGRAM(s) Oral daily  aspirin  chewable 81 milliGRAM(s) Oral daily  atorvastatin 80 milliGRAM(s) Oral at bedtime  enoxaparin Injectable 40 milliGRAM(s) SubCutaneous daily  FLUoxetine 10 milliGRAM(s) Oral daily  labetalol 100 milliGRAM(s) Oral three times a day  labetalol 100 milliGRAM(s) Oral once  lisinopril 40 milliGRAM(s) Oral daily  metFORMIN 1000 milliGRAM(s) Oral two times a day with meals  ticagrelor 90 milliGRAM(s) Oral every 12 hours  tiZANidine 4 milliGRAM(s) Oral at bedtime    MEDICATIONS  (PRN):  acetaminophen   Tablet .. 975 milliGRAM(s) Oral every 6 hours PRN Mild Pain (1 - 3), Moderate Pain (4 - 6), Severe Pain (7 - 10)

## 2021-09-03 PROCEDURE — 99232 SBSQ HOSP IP/OBS MODERATE 35: CPT | Mod: GC

## 2021-09-03 RX ORDER — LABETALOL HCL 100 MG
100 TABLET ORAL
Refills: 0 | Status: DISCONTINUED | OUTPATIENT
Start: 2021-09-03 | End: 2021-09-03

## 2021-09-03 RX ORDER — LABETALOL HCL 100 MG
100 TABLET ORAL
Refills: 0 | Status: DISCONTINUED | OUTPATIENT
Start: 2021-09-03 | End: 2021-09-10

## 2021-09-03 RX ADMIN — Medication 100 MILLIGRAM(S): at 21:45

## 2021-09-03 RX ADMIN — TICAGRELOR 90 MILLIGRAM(S): 90 TABLET ORAL at 05:28

## 2021-09-03 RX ADMIN — ATORVASTATIN CALCIUM 80 MILLIGRAM(S): 80 TABLET, FILM COATED ORAL at 21:45

## 2021-09-03 RX ADMIN — Medication 100 MILLIGRAM(S): at 05:28

## 2021-09-03 RX ADMIN — Medication 10 MILLIGRAM(S): at 12:34

## 2021-09-03 RX ADMIN — TIZANIDINE 4 MILLIGRAM(S): 4 TABLET ORAL at 21:45

## 2021-09-03 RX ADMIN — TICAGRELOR 90 MILLIGRAM(S): 90 TABLET ORAL at 17:27

## 2021-09-03 RX ADMIN — AMLODIPINE BESYLATE 10 MILLIGRAM(S): 2.5 TABLET ORAL at 05:28

## 2021-09-03 RX ADMIN — ENOXAPARIN SODIUM 40 MILLIGRAM(S): 100 INJECTION SUBCUTANEOUS at 12:34

## 2021-09-03 RX ADMIN — LISINOPRIL 40 MILLIGRAM(S): 2.5 TABLET ORAL at 05:28

## 2021-09-03 RX ADMIN — TIZANIDINE 4 MILLIGRAM(S): 4 TABLET ORAL at 08:41

## 2021-09-03 RX ADMIN — METFORMIN HYDROCHLORIDE 1000 MILLIGRAM(S): 850 TABLET ORAL at 17:28

## 2021-09-03 RX ADMIN — Medication 81 MILLIGRAM(S): at 12:34

## 2021-09-03 RX ADMIN — METFORMIN HYDROCHLORIDE 1000 MILLIGRAM(S): 850 TABLET ORAL at 08:41

## 2021-09-03 NOTE — PROGRESS NOTE ADULT - ASSESSMENT
ASSESSMENT/PLAN  60 yo R handed Female with  PMHx of DMT2, HTN, HLD, multiple CVAs w/ residual RUE/LE weakness w/ foot drop, LLE>UE numbness s/p loop recorder and plavix  now discontinued 2017. Admitted on 8/15 for acute on chronic R-sided weakness found to have Left thalamic infarct. Had concerns of seizure and given loading dose of keppra, however it was ruled out. Patient now with Gait Instability, ADL impairments and Functional impairments.    COMORBIDITES/ACTIVE MEDICAL ISSUES     History of Multiple CVA: new left thalamic infarcts, right spastic hemiparesis   s/p ILR placement; f/u with Dr. Minor  - ASA 81 mg daily, - Brillinta 90 mg twice  FOR 4 WEEKS -  Per neurology   - Hypercoagulable workup- essentially negative   Continue Comprehensive Rehab Program of PT/OT/ST - total of 3 hrs/day 5 days/week   Patient to be evaluated for right AFO as she has a foot drop from recurrent stroke and prior AFO, does not meet patient's needs for safe ambulation.   Tizanidine 4mg BID for spasticity       HLD:- Lipitor 80 mg daily    HTN: on amlodipine 10mg daily and lisinopril 40mg daily ( 8/25)   -Restarted on labetalol 100mg TID 9/2 -- will reduced to BID (10Am and 10pm) due to BP being on the lower side since starting   - Home meds: (amlodipine, labetalol, HCTz, lisinopril 40)  - Hold for systolic BP of <140    #DM II: blood sugars good   on - metformin 1000mg  BID, Blood sugars good   FS dc'd     #Tachycardia :No symptoms endorsed     # Increased tone   -Tizanidine to be increased to 4mg BID 9/2     #BLE edema  -Endorses to BLE edema but not seen on exam   -Continue to monitor     Hypokalemia: resolved    Pain control:- Tylenol PRN    GI/Bowel Mgmt   - Continent  - Senna,  Miralax PRN,    Bladder management: toilet schedule prn     DVT prophylaxis:- Lovenox  - SCDs, TEDs     FEN :- Diet - Consistent Carbohydrate Low Sodium     Mood: started prozac 10mg daily     Dizziness:  Vestibular  eval done 8/31- Patient reports dizziness slightly improved.     Precautions / PROPHYLAXIS:   - Falls  - Lungs: Aspiration  - Pressure injury/Skin: Turn Q2hrs while in bed, OOB to Chair, PT/OT       Disp: team conference 8/24 and 8/31   TDD 9/10/21

## 2021-09-03 NOTE — PROGRESS NOTE ADULT - ATTENDING COMMENTS
Patient seen at bedside. This am states left hand medial three fingers numbness that started this am - Strength and sensation intact.     2. Also noted to have decrease in BP - ? due to labetolol, will reduce dose to 100mg bid and adjust timing.     3. Patient also to be evaluated for right AFO for foot drop worsened by recent stroke     4. Continue rehab program.

## 2021-09-03 NOTE — PROGRESS NOTE ADULT - SUBJECTIVE AND OBJECTIVE BOX
Patient is a 61y old  Female who presents with a chief complaint of CVA (19 Aug 2021 14:38)    HPI:  62 yo R handed Female with  PMHx of DMT2, HTN, HLD, multiple CVAs w/ residual RUE/LE weakness w/ foot drop, LLE>UE numbness s/p loop recorder and plavix  now discontinued 2017. She presented to [facility] on 8/15/21 with acute on chronic R-sided weakness, LKN 8/15 07:30 symptoms noticed around 10AM after patient was trying to use her arm and was not able to "make it do what I wanted" on further clarification of RUE>LE weakness. Pt endorsed was leaning to one side, endorsed RLE feels weaker than normal which was present on initial NIHSS 5. Initial BPs elevated 190s systolic, CTH without hemorrhage or acute findings, BP was lowered with improvement in R sided acute on chronic weakness to allow for ataxia testing in limbs which was present, which was new findings.  Endorsed mild L posterior headache, no N/V or infectious symptoms. Patient was re-evaluated after improvement for weakness, concern for focal seizure with patient endorsing no prior seizure history or prior episodes w RUE. CTH w/o acute findings, CTA w worsening PCA stenosis but normal flow bL VAs. CDU monitoring for MR Brain to assess for any new infarcts, however course c/b reoccurrence in R UE "weakness" appearing contracture-like w RUE flexion and hypertonia, intact mentation.  she was seen by neurologist.  pt received 1500mg IV Keppra load given high suspicion for focal seizure activity. MRI Brain showed Left thalamic infarct. She did not receive TPA as she was out of  window. She was seen by cardiology; TTE, ASA/STATIN, and losartan started. EP was consulted, ILR was implanted (8/18), No events recorded on ILR. Of note, patient had ILR placed in the past ( 2017) no arrhythmias was recorded then. She was evaluated by PM&R and therapist and acute rehabilitation was recommended. She was admitted to [facility] on 8/19/21.       SUBJECTIVE: Patient seen and evaluated this morning. BP noted to be slightly on the lower side this AM. She continues to endorsed to dizziness but stable. Endorsing to numbness and tingling on the left hand likely positional. Endorsed to some improve in regards to her right toes curling up. No other issues noted.     ROS:  Denies HA/CP/palpitations/abdominal pain or nausea  Denies dysuria   +Dizziness       PHYSICAL EXAM    Vital Signs Last 24 Hrs  T(C): 36.7 (03 Sep 2021 08:37), Max: 36.7 (03 Sep 2021 05:25)  T(F): 98 (03 Sep 2021 08:37), Max: 98 (03 Sep 2021 05:25)  HR: 78 (03 Sep 2021 10:27) (63 - 96)  BP: 108/77 (03 Sep 2021 10:27) (108/77 - 146/92)  BP(mean): --  RR: 16 (03 Sep 2021 10:27) (16 - 16)  SpO2: 95% (03 Sep 2021 10:27) (95% - 100%)    Constitutional - NAD, Comfortable  Chest - good chest expansion, good respiratory effort,  Cardio - s1s2+, tachycardic   Abdomen -  Soft, NT ND  Extremities - No peripheral edema, No calf tenderness   Neurologic Exam: No nystagmus noted                     Cognitive - AAO x 3     Speech - Fluent, Comprehensible, Mild dysarthria      Motor -                      LUE and LLE 5/5                     RIGHT UE - ShAB 2/5, EF 2/5, EE 2/5, WE 0/5,  0/5, trace finger movement                     RIGHT LE - HF 3/5, KE 4/5, DF 0/5, PF 1/5  + foot drop      Sensory - Intact to LT bilateral     Tone- some increased tone noted on right elbow flexors, right finger flexors, right toes   Psychiatric - Mood stable, Affect WNL  Skin: Loop recorder site- clean       MEDICATIONS  (STANDING):  amLODIPine   Tablet 10 milliGRAM(s) Oral daily  aspirin  chewable 81 milliGRAM(s) Oral daily  atorvastatin 80 milliGRAM(s) Oral at bedtime  enoxaparin Injectable 40 milliGRAM(s) SubCutaneous daily  FLUoxetine 10 milliGRAM(s) Oral daily  labetalol 100 milliGRAM(s) Oral <User Schedule>  lisinopril 40 milliGRAM(s) Oral daily  metFORMIN 1000 milliGRAM(s) Oral two times a day with meals  ticagrelor 90 milliGRAM(s) Oral every 12 hours  tiZANidine 4 milliGRAM(s) Oral <User Schedule>  tiZANidine 4 milliGRAM(s) Oral at bedtime    MEDICATIONS  (PRN):  acetaminophen   Tablet .. 975 milliGRAM(s) Oral every 6 hours PRN Mild Pain (1 - 3), Moderate Pain (4 - 6), Severe Pain (7 - 10)

## 2021-09-04 PROCEDURE — 99232 SBSQ HOSP IP/OBS MODERATE 35: CPT

## 2021-09-04 RX ORDER — BENZOCAINE AND MENTHOL 5; 1 G/100ML; G/100ML
1 LIQUID ORAL
Refills: 0 | Status: DISCONTINUED | OUTPATIENT
Start: 2021-09-04 | End: 2021-09-17

## 2021-09-04 RX ORDER — BENZOCAINE AND MENTHOL 5; 1 G/100ML; G/100ML
1 LIQUID ORAL
Refills: 0 | Status: DISCONTINUED | OUTPATIENT
Start: 2021-09-04 | End: 2021-09-04

## 2021-09-04 RX ADMIN — Medication 10 MILLIGRAM(S): at 11:47

## 2021-09-04 RX ADMIN — ENOXAPARIN SODIUM 40 MILLIGRAM(S): 100 INJECTION SUBCUTANEOUS at 11:48

## 2021-09-04 RX ADMIN — AMLODIPINE BESYLATE 10 MILLIGRAM(S): 2.5 TABLET ORAL at 08:13

## 2021-09-04 RX ADMIN — TICAGRELOR 90 MILLIGRAM(S): 90 TABLET ORAL at 06:22

## 2021-09-04 RX ADMIN — TIZANIDINE 4 MILLIGRAM(S): 4 TABLET ORAL at 08:13

## 2021-09-04 RX ADMIN — Medication 81 MILLIGRAM(S): at 11:47

## 2021-09-04 RX ADMIN — Medication 100 MILLIGRAM(S): at 21:12

## 2021-09-04 RX ADMIN — ATORVASTATIN CALCIUM 80 MILLIGRAM(S): 80 TABLET, FILM COATED ORAL at 21:12

## 2021-09-04 RX ADMIN — BENZOCAINE AND MENTHOL 1 LOZENGE: 5; 1 LIQUID ORAL at 18:10

## 2021-09-04 RX ADMIN — METFORMIN HYDROCHLORIDE 1000 MILLIGRAM(S): 850 TABLET ORAL at 08:13

## 2021-09-04 RX ADMIN — METFORMIN HYDROCHLORIDE 1000 MILLIGRAM(S): 850 TABLET ORAL at 17:49

## 2021-09-04 RX ADMIN — Medication 100 MILLIGRAM(S): at 11:48

## 2021-09-04 RX ADMIN — TIZANIDINE 4 MILLIGRAM(S): 4 TABLET ORAL at 21:11

## 2021-09-04 RX ADMIN — TICAGRELOR 90 MILLIGRAM(S): 90 TABLET ORAL at 17:49

## 2021-09-04 RX ADMIN — LISINOPRIL 40 MILLIGRAM(S): 2.5 TABLET ORAL at 06:22

## 2021-09-04 NOTE — PROGRESS NOTE ADULT - SUBJECTIVE AND OBJECTIVE BOX
Patient is a 61y old  Female who presents with a chief complaint of CVA (03 Sep 2021 10:59)      History, interim events and clinically pertinent issues reviewed; patient interviewed and examined. She c/o mild sore throat this AM, no fever chills, cough, SOB, myalgias. She does have hx postnasal drip which she admits to having at present     ALLERGIES:  sulfa drugs (Angioedema; Swelling)  sulfa drugs (Rash)  Sulfac 10% (Unknown)  walnut (Urticaria)    MEDICATIONS  (STANDING):  amLODIPine   Tablet 10 milliGRAM(s) Oral daily  aspirin  chewable 81 milliGRAM(s) Oral daily  atorvastatin 80 milliGRAM(s) Oral at bedtime  enoxaparin Injectable 40 milliGRAM(s) SubCutaneous daily  FLUoxetine 10 milliGRAM(s) Oral daily  labetalol 100 milliGRAM(s) Oral <User Schedule>  lisinopril 40 milliGRAM(s) Oral daily  metFORMIN 1000 milliGRAM(s) Oral two times a day with meals  ticagrelor 90 milliGRAM(s) Oral every 12 hours  tiZANidine 4 milliGRAM(s) Oral at bedtime  tiZANidine 4 milliGRAM(s) Oral <User Schedule>    MEDICATIONS  (PRN):  acetaminophen   Tablet .. 975 milliGRAM(s) Oral every 6 hours PRN Mild Pain (1 - 3), Moderate Pain (4 - 6), Severe Pain (7 - 10)    Vital Signs Last 24 Hrs  T(F): 98.7 (04 Sep 2021 08:11), Max: 98.7 (04 Sep 2021 08:11)  HR: 97 (04 Sep 2021 08:11) (94 - 97)  BP: 123/82 (04 Sep 2021 10:02) (102/71 - 158/94)  RR: 16 (04 Sep 2021 08:11) (16 - 16)  SpO2: 97% (04 Sep 2021 08:11) (97% - 100%)  I&O's Summary    PHYSICAL EXAM:  General: NAD, A/O x 3  ENT: MMM. Visible oropharynx without exudates  Neck: Supple, No JVD  Lungs: Clear to auscultation bilaterally  Cardio: RRR, S1/S2, No murmurs  Abdomen: Soft, Nontender, Nondistended; Bowel sounds present  Extremities: No calf tenderness, No pitting edema      LABS:                        10.8   5.23  )-----------( 202      ( 02 Sep 2021 06:00 )             33.0       09-02    143  |  108  |  21  ----------------------------<  132  4.1   |  29  |  1.16    Ca    9.3      02 Sep 2021 06:00    TPro  7.3  /  Alb  3.5  /  TBili  0.6  /  DBili  x   /  AST  23  /  ALT  39  /  AlkPhos  73  09-02     eGFR if Non African American: 51 mL/min/1.73M2 (09-02-21 @ 06:00)  eGFR if : 59 mL/min/1.73M2 (09-02-21 @ 06:00)    A1C with Estimated Average Glucose (08.16.21 @ 09:21)   A1C with Estimated Average Glucose Result: 6.8: Method: Immunoassay              08-16 Chol 134 mg/dL LDL -- HDL 49 mg/dL Trig 64 mg/dL                COVID-19 PCR: NotDetec (09-01-21 @ 06:55)  COVID-19 PCR: NotDetec (08-26-21 @ 07:15)  COVID-19 PCR: NotDetec (08-20-21 @ 06:30)  COVID-19 PCR: NotDetec (08-15-21 @ 12:31)

## 2021-09-04 NOTE — PROGRESS NOTE ADULT - ASSESSMENT
62 yo R handed woman with history of DMT2, HTN, HLD, multiple CVAs w/ residual RUE/LE weakness w/ foot drop, LLE>UE numbness s/p loop recorder and no longer on plavix admitted to [facility] after hospital course for left thalamic infarct, admitted for rehab    multiple cerebrovascular accidents (CVAs)   -continue ASA/STATIN, Brilinta and losartan   -ILR was implanted (8/18)  -Continue comprehensive rehab    Postnasal drip/sore throat  -pt declined fluticasone  -add cepacol lozenges    Dizziness/BPPV  -Recommend Vestibular therapy if recurs but did not c/o today  -consider addition of meclizine if recurs    HLD  -continue atorvastatin    Benign essential HTN  -Continue amlodipine, lisinopril    Type 2 diabetes mellitus  - continue metformin 1gm bid  -continue ISS  -monitor FS  -will d/w pt this weekend addition of SGLT2 inhibitor as has outcome benefits w/ regard to cardiac/ renal comorbidities    ERENDIRA (acute kidney injury) on CKD II  -encourage oral intake  -avoid nephrotoxic agents  -monitor BMP    VTE prophylaxis  -continue Lovenox

## 2021-09-04 NOTE — PROGRESS NOTE ADULT - SUBJECTIVE AND OBJECTIVE BOX
Cc: Gait dysfunction    HPI: Patient with no new medical issues today.  Pain controlled, no chest pain, no N/V, no Fevers/Chills. No other new ROS  Has been tolerating rehabilitation program.    acetaminophen   Tablet .. 975 milliGRAM(s) Oral every 6 hours PRN  amLODIPine   Tablet 10 milliGRAM(s) Oral daily  aspirin  chewable 81 milliGRAM(s) Oral daily  atorvastatin 80 milliGRAM(s) Oral at bedtime  benzocaine 15 mG/menthol 3.6 mG (Sugar-Free) Lozenge 1 Lozenge Oral four times a day  enoxaparin Injectable 40 milliGRAM(s) SubCutaneous daily  FLUoxetine 10 milliGRAM(s) Oral daily  labetalol 100 milliGRAM(s) Oral <User Schedule>  lisinopril 40 milliGRAM(s) Oral daily  metFORMIN 1000 milliGRAM(s) Oral two times a day with meals  ticagrelor 90 milliGRAM(s) Oral every 12 hours  tiZANidine 4 milliGRAM(s) Oral at bedtime  tiZANidine 4 milliGRAM(s) Oral <User Schedule>      T(C): 37.1 (09-04-21 @ 08:11), Max: 37.1 (09-04-21 @ 08:11)  HR: 86 (09-04-21 @ 12:08) (86 - 97)  BP: 118/78 (09-04-21 @ 12:08) (102/71 - 158/94)  RR: 16 (09-04-21 @ 08:11) (16 - 16)  SpO2: 97% (09-04-21 @ 08:11) (97% - 100%)    In NAD  HEENT- EOMI  Heart- RRR, S1S2  Lungs- CTA bl.  Abd- + BS, NT  Ext- No calf pain  Neuro- Exam unchanged          Imp: Patient with diagnosis of L thalamic infarct/seizures admitted for comprehensive acute rehabilitation.    Plan:  - Continue PT/OT/SLP  - DVT prophylaxis  - Skin- Turn q2h, check skin daily  - Continue current medications; patient medically stable.   -Active issues- HTN - will continue to monitor and adjust meds as needed, DM2 - continue metformin  - Patient is stable to continue current rehabilitation program.

## 2021-09-05 ENCOUNTER — TRANSCRIPTION ENCOUNTER (OUTPATIENT)
Age: 61
End: 2021-09-05

## 2021-09-05 PROCEDURE — 99232 SBSQ HOSP IP/OBS MODERATE 35: CPT

## 2021-09-05 RX ADMIN — TIZANIDINE 4 MILLIGRAM(S): 4 TABLET ORAL at 21:13

## 2021-09-05 RX ADMIN — Medication 10 MILLIGRAM(S): at 12:13

## 2021-09-05 RX ADMIN — AMLODIPINE BESYLATE 10 MILLIGRAM(S): 2.5 TABLET ORAL at 05:41

## 2021-09-05 RX ADMIN — ENOXAPARIN SODIUM 40 MILLIGRAM(S): 100 INJECTION SUBCUTANEOUS at 12:13

## 2021-09-05 RX ADMIN — Medication 100 MILLIGRAM(S): at 21:13

## 2021-09-05 RX ADMIN — METFORMIN HYDROCHLORIDE 1000 MILLIGRAM(S): 850 TABLET ORAL at 07:51

## 2021-09-05 RX ADMIN — TIZANIDINE 4 MILLIGRAM(S): 4 TABLET ORAL at 07:51

## 2021-09-05 RX ADMIN — BENZOCAINE AND MENTHOL 1 LOZENGE: 5; 1 LIQUID ORAL at 12:13

## 2021-09-05 RX ADMIN — BENZOCAINE AND MENTHOL 1 LOZENGE: 5; 1 LIQUID ORAL at 17:16

## 2021-09-05 RX ADMIN — Medication 81 MILLIGRAM(S): at 12:13

## 2021-09-05 RX ADMIN — METFORMIN HYDROCHLORIDE 1000 MILLIGRAM(S): 850 TABLET ORAL at 17:15

## 2021-09-05 RX ADMIN — BENZOCAINE AND MENTHOL 1 LOZENGE: 5; 1 LIQUID ORAL at 07:23

## 2021-09-05 RX ADMIN — ATORVASTATIN CALCIUM 80 MILLIGRAM(S): 80 TABLET, FILM COATED ORAL at 21:13

## 2021-09-05 RX ADMIN — TICAGRELOR 90 MILLIGRAM(S): 90 TABLET ORAL at 17:16

## 2021-09-05 RX ADMIN — LISINOPRIL 40 MILLIGRAM(S): 2.5 TABLET ORAL at 05:41

## 2021-09-05 RX ADMIN — TICAGRELOR 90 MILLIGRAM(S): 90 TABLET ORAL at 05:41

## 2021-09-05 NOTE — PROGRESS NOTE ADULT - SUBJECTIVE AND OBJECTIVE BOX
Cc: Gait dysfunction    HPI: Patient with no new medical issues today.  Pain controlled, no chest pain, no N/V, no Fevers/Chills. No other new ROS  Has been tolerating rehabilitation program.    acetaminophen   Tablet .. 975 milliGRAM(s) Oral every 6 hours PRN  amLODIPine   Tablet 10 milliGRAM(s) Oral daily  aspirin  chewable 81 milliGRAM(s) Oral daily  atorvastatin 80 milliGRAM(s) Oral at bedtime  benzocaine 15 mG/menthol 3.6 mG (Sugar-Free) Lozenge 1 Lozenge Oral four times a day  enoxaparin Injectable 40 milliGRAM(s) SubCutaneous daily  FLUoxetine 10 milliGRAM(s) Oral daily  labetalol 100 milliGRAM(s) Oral <User Schedule>  lisinopril 40 milliGRAM(s) Oral daily  metFORMIN 1000 milliGRAM(s) Oral two times a day with meals  ticagrelor 90 milliGRAM(s) Oral every 12 hours  tiZANidine 4 milliGRAM(s) Oral at bedtime  tiZANidine 4 milliGRAM(s) Oral <User Schedule>      T(C): 36.9 (09-05-21 @ 08:42), Max: 36.9 (09-05-21 @ 08:42)  HR: 81 (09-05-21 @ 08:42) (81 - 92)  BP: 160/80 (09-05-21 @ 08:42) (146/73 - 160/80)  RR: 16 (09-05-21 @ 08:42) (16 - 16)  SpO2: 99% (09-05-21 @ 08:42) (99% - 100%)    In NAD  HEENT- EOMI  Heart- RRR, S1S2  Lungs- CTA bl.  Abd- + BS, NT  Ext- No calf pain  Neuro- Exam unchanged        Imp: Patient with diagnosis of L thalamic infarct/seizures admitted for comprehensive acute rehabilitation.    Plan:  - Continue PT/OT/SLP  - DVT prophylaxis  - Skin- Turn q2h, check skin daily  - Continue current medications; patient medically stable.   -Active issues- HTN - mostly controlled, will continue to monitor and adjust meds as needed, DM2 - continue metformin  - Patient is stable to continue current rehabilitation program.

## 2021-09-05 NOTE — PROGRESS NOTE ADULT - SUBJECTIVE AND OBJECTIVE BOX
Patient is a 61y old  Female who presents with a chief complaint of CVA (04 Sep 2021 15:12)    Pt interviewed and examined and has no new complaints or issues to report this AM. She slept well and her review of systems was negative (  patient denies HA's, CP, palpitations, shortness of breath or upper respiratory symptoms, nausea, vomitting, diarrhea, constipation, dysuria, bruising/bleeding and all other systems were reviewed as negative)      ALLERGIES:  sulfa drugs (Angioedema; Swelling)  sulfa drugs (Rash)  Sulfac 10% (Unknown)  walnut (Urticaria)    MEDICATIONS  (STANDING):  amLODIPine   Tablet 10 milliGRAM(s) Oral daily  aspirin  chewable 81 milliGRAM(s) Oral daily  atorvastatin 80 milliGRAM(s) Oral at bedtime  benzocaine 15 mG/menthol 3.6 mG (Sugar-Free) Lozenge 1 Lozenge Oral four times a day  enoxaparin Injectable 40 milliGRAM(s) SubCutaneous daily  FLUoxetine 10 milliGRAM(s) Oral daily  labetalol 100 milliGRAM(s) Oral <User Schedule>  lisinopril 40 milliGRAM(s) Oral daily  metFORMIN 1000 milliGRAM(s) Oral two times a day with meals  ticagrelor 90 milliGRAM(s) Oral every 12 hours  tiZANidine 4 milliGRAM(s) Oral at bedtime  tiZANidine 4 milliGRAM(s) Oral <User Schedule>    MEDICATIONS  (PRN):  acetaminophen   Tablet .. 975 milliGRAM(s) Oral every 6 hours PRN Mild Pain (1 - 3), Moderate Pain (4 - 6), Severe Pain (7 - 10)    Vital Signs Last 24 Hrs  T(F): 98.5 (05 Sep 2021 08:42), Max: 98.5 (05 Sep 2021 08:42)  HR: 81 (05 Sep 2021 08:42) (81 - 92)  BP: 160/80 (05 Sep 2021 08:42) (118/78 - 160/80)  RR: 16 (05 Sep 2021 08:42) (16 - 16)  SpO2: 99% (05 Sep 2021 08:42) (99% - 100%)  I&O's Summary      PHYSICAL EXAM:  General: NAD, A/O x 3  ENT: MMM  Neck: Supple, No JVD  Lungs: Clear to auscultation bilaterally  Cardio: RRR, S1/S2, No murmurs  Abdomen: Soft, Nontender, Nondistended; Bowel sounds present  Extremities: No calf tenderness, No pitting edema  Neuro: no new deficits    LABS:  There are no new laboratory or radiologic studies resulted at the time this progress note was authored    08-16 Chol 134 mg/dL LDL -- HDL 49 mg/dL Trig 64 mg/dL    COVID-19 PCR: NotDetec (09-01-21 @ 06:55)  COVID-19 PCR: NotDetec (08-26-21 @ 07:15)  COVID-19 PCR: NotDetec (08-20-21 @ 06:30)  COVID-19 PCR: NotDetec (08-15-21 @ 12:31)

## 2021-09-05 NOTE — DISCHARGE NOTE PROVIDER - CARE PROVIDERS DIRECT ADDRESSES
,DirectAddress_Unknown,DirectAddress_Unknown,[email or web address] ,DirectAddress_Unknown,DirectAddress_Unknown,[email or web address]

## 2021-09-05 NOTE — PROGRESS NOTE ADULT - ASSESSMENT
60 yo R handed woman with history of DMT2, HTN, HLD, multiple CVAs w/ residual RUE/LE weakness w/ foot drop, LLE>UE numbness s/p loop recorder and no longer on plavix admitted to [facility] after hospital course for left thalamic infarct, admitted for rehab    multiple cerebrovascular accidents (CVAs)   -continue ASA/STATIN, Brilinta and losartan   -ILR was implanted (8/18)  -Continue comprehensive rehab    Postnasal drip/sore throat  -pt declined fluticasone  -added cepacol lozenges    HLD  -continue atorvastatin    Benign essential HTN  -Continue amlodipine, lisinopril; although BP high today has mostly been acceptable and at times low    Type 2 diabetes mellitus  - continue metformin 1gm bid  -continue ISS  -monitor FS  -will d/w pt this weekend addition of SGLT2 inhibitor as has outcome benefits w/ regard to cardiac/ renal comorbidities    ERENDIRA (acute kidney injury) on CKD II  -encourage oral intake  -avoid nephrotoxic agents  -monitor BMP    VTE prophylaxis  -continue Lovenox

## 2021-09-05 NOTE — DISCHARGE NOTE PROVIDER - CARE PROVIDER_API CALL
Felipe Denton)  Neurology; Vascular Neurology  [address]  [place] [address]  Phone: [phone]  Fax: [phone]  Follow Up Time:     Hilton Minor ()  Cardiology; Internal Medicine  [address], [address]  [place] [address]  Phone: [phone]  Fax: [phone]  Follow Up Time:     Juliana Schwab)  Brain Injury Medicine; PhysicalRehab Medicine  [address] [place] [address]  Phone: [phone]  Fax: [phone]  Follow Up Time: 1 month   Felipe Denton)  Neurology; Vascular Neurology  [address], [address]  [place] [address]  Phone: [phone]  Fax: [phone]  Follow Up Time:     Hilton Minor ()  Cardiology; Internal Medicine  [address], [address]  [place] [address]  Phone: [phone]  Fax: [phone]  Follow Up Time:     Juliana Schwab)  Brain Injury Medicine; PhysicalRehab Medicine  [address] [place] [address]  Phone: [phone]  Fax: [phone]  Follow Up Time: 1 month    Bunny Vences  [facility]  [address] [place] [address]  Phone: [phone]  Fax: [phone]  Follow Up Time: 1 week

## 2021-09-05 NOTE — DISCHARGE NOTE PROVIDER - NSDCFUADDINST_GEN_ALL_CORE_FT
Please make appointments w/ your primary care doctor, cardiologist, neurologist, and rehab doctor for follow-up after your stay. Continue to take your medications as prescribed. If you have worsening symptoms, or new facial drooping, slurring, limb weakness then return to the ED immediately.  Please make appointments w/ your primary care doctor, cardiologist, neurologist, and rehab doctor for follow-up after your stay. In addition, see a vestibular therapist for your vertigo. Continue to take your medications as prescribed. If you have worsening symptoms, or new facial drooping, slurring, limb weakness then return to the ED immediately.  Please make appointments w/ your primary care doctor, cardiologist, neurologist, and rehab doctor for follow-up after your stay. We recommend making an appointment with a nephrologist (kidney specialist) to further manage high blood pressure and evaluate for other causes of high blood pressure. In addition, see a vestibular therapist for your vertigo. Continue to take your medications as prescribed. If you have worsening symptoms, or new facial drooping, slurring, limb weakness then return to the ED immediately.

## 2021-09-05 NOTE — DISCHARGE NOTE PROVIDER - NSDCMRMEDTOKEN_GEN_ALL_CORE_FT
aspirin 81 mg oral tablet, chewable: 1 tab(s) orally once a day  atorvastatin 80 mg oral tablet: 1 tab(s) orally once a day (at bedtime)  enoxaparin: 40 milligram(s) subcutaneous once a day (at bedtime)  insulin lispro 100 units/mL subcutaneous solution: 1 Unit(s) if Glucose 151 - 200  2 Unit(s) if Glucose 201 - 250  3 Unit(s) if Glucose 251 - 300  4 Unit(s) if Glucose 301 - 350  5 Unit(s) if Glucose 351 - 400  6 Unit(s) if Glucose Greater Than 400  Three times a day before meals  Levemir FlexPen 100 units/mL subcutaneous solution: 15 unit(s) subcutaneous once a day (at bedtime)  Norvasc 5 mg oral tablet: 1 tab(s) orally once a day  Right AFO :   ticagrelor 90 mg oral tablet: 1 tab(s) orally 2 times a day   acetaminophen 325 mg oral tablet: 3 tab(s) orally every 6 hours, As needed, Mild Pain (1 - 3), Moderate Pain (4 - 6), Severe Pain (7 - 10)  Right AFO :   ticagrelor 90 mg oral tablet: 1 tab(s) orally 2 times a day   acetaminophen 325 mg oral tablet: 3 tab(s) orally every 6 hours, As needed, Mild Pain (1 - 3), Moderate Pain (4 - 6), Severe Pain (7 - 10)  Right AFO :    acetaminophen 325 mg oral tablet: 3 tab(s) orally every 6 hours, As needed, Mild Pain (1 - 3), Moderate Pain (4 - 6), Severe Pain (7 - 10)  amLODIPine 10 mg oral tablet: 1 tab(s) orally once a day  aspirin 81 mg oral tablet, chewable: 1 tab(s) orally once a day  atorvastatin 80 mg oral tablet: 1 tab(s) orally once a day (at bedtime)  baclofen 10 mg oral tablet: 1 tab(s) orally 2 times a day  FLUoxetine 10 mg oral capsule: 1 cap(s) orally once a day  labetalol 100 mg oral tablet: 1 tab(s) orally 2 times a day   lisinopril 40 mg oral tablet: 1 tab(s) orally once a day (at bedtime)   metFORMIN 1000 mg oral tablet: 1 tab(s) orally 2 times a day (with meals)  Right AFO :

## 2021-09-05 NOTE — DISCHARGE NOTE PROVIDER - HOSPITAL COURSE
60 yo R handed Female with  PMHx of DMT2, HTN, HLD, multiple CVAs w/ residual RUE/LE weakness w/ foot drop, LLE>UE numbness s/p loop recorder and plavix  now discontinued 2017. She presented to [facility] on 8/15/21 with acute on chronic R-sided weakness, LKN 8/15 07:30 symptoms noticed around 10AM after patient was trying to use her arm and was not able to "make it do what I wanted" on further clarification of RUE>LE weakness. Pt endorsed was leaning to one side, endorsed RLE feels weaker than normal which was present on initial NIHSS 5. Initial BPs elevated 190s systolic, CTH without hemorrhage or acute findings, BP was lowered with improvement in R sided acute on chronic weakness to allow for ataxia testing in limbs which was present, which was new findings.  Endorsed mild L posterior headache, no N/V or infectious symptoms. Patient was re-evaluated after improvement for weakness, concern for focal seizure with patient endorsing no prior seizure history or prior episodes w RUE. CTH w/o acute findings, CTA w worsening PCA stenosis but normal flow bL VAs. CDU monitoring for MR Brain to assess for any new infarcts, however course c/b reoccurrence in R UE "weakness" appearing contracture-like w RUE flexion and hypertonia, intact mentation.  she was seen by neurologist.  pt received 1500mg IV Keppra load given high suspicion for focal seizure activity. MRI Brain showed Left thalamic infarct. She did not receive TPA as she was out of  window. She was seen by cardiology; TTE, ASA/STATIN, and losartan started. EP was consulted, ILR was implanted (8/18), No events recorded on ILR. Of note, patient had ILR placed in the past ( 2017) no arrhythmias was recorded then. She was evaluated by PM&R and therapist and acute rehabilitation was recommended. She was admitted to [facility] on 8/19/21.    While in the inpatient rehab facility, patient's course significant for some increased tone of the RUE and right toes curling. She was started on tizanidine with dose currently at 4mg BID with some improvement endorsed. Patient also with dizziness and endorsed to issues with her balance. Vestibular evaluation was done, symptoms have been stable. Her blood pressure was also noted to be elevated. Labetalol was added to her BP regimen. She was also started on prozac and has been tolerating it well. She has been evaluated for right AFO while in rehab due to her right drop foot. She has tolerated her therapy sessions and has met her goals for rehab. She is cleared for discharge home on 9/10. She will follow up with neurology and cardiology after her discharge.    60 yo R handed Female with  PMHx of DMT2, HTN, HLD, multiple CVAs w/ residual RUE/LE weakness w/ foot drop, LLE>UE numbness s/p loop recorder and plavix  now discontinued 2017. She presented to [facility] on 8/15/21 with acute on chronic R-sided weakness, LKN 8/15 07:30 symptoms noticed around 10AM after patient was trying to use her arm and was not able to "make it do what I wanted" on further clarification of RUE>LE weakness. Pt endorsed was leaning to one side, endorsed RLE feels weaker than normal which was present on initial NIHSS 5. Initial BPs elevated 190s systolic, CTH without hemorrhage or acute findings, BP was lowered with improvement in R sided acute on chronic weakness to allow for ataxia testing in limbs which was present, which was new findings.  Endorsed mild L posterior headache, no N/V or infectious symptoms. Patient was re-evaluated after improvement for weakness, concern for focal seizure with patient endorsing no prior seizure history or prior episodes w RUE. CTH w/o acute findings, CTA w worsening PCA stenosis but normal flow bL VAs. CDU monitoring for MR Brain to assess for any new infarcts, however course c/b reoccurrence in R UE "weakness" appearing contracture-like w RUE flexion and hypertonia, intact mentation.  she was seen by neurologist.  pt received 1500mg IV Keppra load given high suspicion for focal seizure activity. MRI Brain showed Left thalamic infarct. She did not receive TPA as she was out of  window. She was seen by cardiology; TTE, ASA/STATIN, and losartan started. EP was consulted, ILR was implanted (8/18), No events recorded on ILR. Of note, patient had ILR placed in the past ( 2017) no arrhythmias was recorded then. She was evaluated by PM&R and therapist and acute rehabilitation was recommended. She was admitted to [facility] on 8/19/21.    While in the inpatient rehab facility, patient's course significant for some increased tone of the RUE and right toes curling. She was initially started on tizanidine which had minimal improvement which was then replaced w/ baclofen which was titrated up throughout the stay. Patient also with dizziness and endorsed to issues with her balance. Vestibular evaluation was done, symptoms have been stable. Her blood pressure was also noted to be elevated. Labetalol was added to her BP regimen. She was also started on prozac and has been tolerating it well. She has been evaluated for right AFO while in rehab due to her right drop foot. She has tolerated her therapy sessions and has met her goals for rehab. She is cleared for discharge home on 9/17. She will follow up with neurology and cardiology after her discharge.     Functional status improved to:   OT: min assist with bathing  CG with toileting, transfers  Bed mobility: modified independent  Ambulation: 75' with WBQC - CGA/min assist  8 steps : min assist      Patient seen and examined on day of discharge.  Medications, medication side effects, and discharge instructions were reviewed with the patient, who expressed understanding of all information.  Patient was medically and functionally optimized and cleared for discharge. 62 yo R handed Female with  PMHx of DMT2, HTN, HLD, multiple CVAs w/ residual RUE/LE weakness w/ foot drop, LLE>UE numbness s/p loop recorder and plavix  now discontinued 2017. She presented to [facility] on 8/15/21 with acute on chronic R-sided weakness, LKN 8/15 07:30 symptoms noticed around 10AM after patient was trying to use her arm and was not able to "make it do what I wanted" on further clarification of RUE>LE weakness. Pt endorsed was leaning to one side, endorsed RLE feels weaker than normal which was present on initial NIHSS 5. Initial BPs elevated 190s systolic, CTH without hemorrhage or acute findings, BP was lowered with improvement in R sided acute on chronic weakness to allow for ataxia testing in limbs which was present, which was new findings.  Endorsed mild L posterior headache, no N/V or infectious symptoms. Patient was re-evaluated after improvement for weakness, concern for focal seizure with patient endorsing no prior seizure history or prior episodes w RUE. CTH w/o acute findings, CTA w worsening PCA stenosis but normal flow bL VAs. CDU monitoring for MR Brain to assess for any new infarcts, however course c/b reoccurrence in R UE "weakness" appearing contracture-like w RUE flexion and hypertonia, intact mentation.  she was seen by neurologist.  pt received 1500mg IV Keppra load given high suspicion for focal seizure activity. MRI Brain showed Left thalamic infarct. She did not receive TPA as she was out of  window. She was seen by cardiology; TTE, ASA/STATIN, and losartan started. EP was consulted, ILR was implanted (8/18), No events recorded on ILR. Of note, patient had ILR placed in the past ( 2017) no arrhythmias was recorded then. She was evaluated by PM&R and therapist and acute rehabilitation was recommended. She was admitted to [facility] on 8/19/21.    While in the inpatient rehab facility, patient's course significant for some increased tone of the RUE and right toes curling. She was initially started on tizanidine which had minimal improvement which was then replaced w/ baclofen which was titrated up throughout the stay. Patient also with dizziness and endorsed to issues with her balance. Vestibular evaluation was done, symptoms have been stable. Her blood pressure was also noted to be elevated. Labetalol was added to her BP regimen. She was also started on prozac and has been tolerating it well. She has been evaluated for right AFO while in rehab due to her right drop foot. She has tolerated her therapy sessions and has met her goals for rehab. She is cleared for discharge home on 9/17. She will follow up with neurology and cardiology after her discharge.     Functional status improved to:   OT: min assist with bathing  CG with toileting, transfers  Bed mobility: modified independent  Ambulation: 75' with WBQC - CGA/min assist  8 steps : min assist      Patient seen and examined on day of discharge.  Medications, medication side effects, and discharge instructions were reviewed with the patient, who expressed understanding of all information.  Patient was medically and functionally optimized and cleared for discharge. 60 yo R handed Female with  PMHx of DMT2, HTN, HLD, multiple CVAs w/ residual RUE/LE weakness w/ foot drop, LLE>UE numbness s/p loop recorder and plavix  now discontinued 2017. She presented to [facility] on 8/15/21 with acute on chronic R-sided weakness, LKN 8/15 07:30 symptoms noticed around 10AM after patient was trying to use her arm and was not able to "make it do what I wanted" on further clarification of RUE>LE weakness. Pt endorsed was leaning to one side, endorsed RLE feels weaker than normal which was present on initial NIHSS 5. Initial BPs elevated 190s systolic, CTH without hemorrhage or acute findings, BP was lowered with improvement in R sided acute on chronic weakness to allow for ataxia testing in limbs which was present, which was new findings.  Endorsed mild L posterior headache, no N/V or infectious symptoms. Patient was re-evaluated after improvement for weakness, concern for focal seizure with patient endorsing no prior seizure history or prior episodes w RUE. CTH w/o acute findings, CTA w worsening PCA stenosis but normal flow bL VAs. CDU monitoring for MR Brain to assess for any new infarcts, however course c/b reoccurrence in R UE "weakness" appearing contracture-like w RUE flexion and hypertonia, intact mentation.  she was seen by neurologist.  pt received 1500mg IV Keppra load given high suspicion for focal seizure activity. MRI Brain showed Left thalamic infarct. She did not receive TPA as she was out of  window. She was seen by cardiology; TTE, ASA/STATIN, and losartan started. EP was consulted, ILR was implanted (8/18), No events recorded on ILR. Of note, patient had ILR placed in the past ( 2017) no arrhythmias was recorded then. She was evaluated by PM&R and therapist and acute rehabilitation was recommended. She was admitted to [facility] on 8/19/21.    While in the inpatient rehab facility, patient's course significant for some increased tone of the RUE and right toes curling. She was initially started on tizanidine which had minimal improvement which was then replaced w/ baclofen which was titrated up throughout the stay. Patient also with dizziness/headache/nausea/vomiting and endorsed to issues with her balance. She had repeat head CT 9/16/21 which was stable. Vestibular evaluation was done, symptoms have been stable. Her blood pressure was also noted to be elevated. Labetalol was added to her BP regimen. She was also started on prozac and has been tolerating it well. She has been evaluated for right AFO while in rehab due to her right drop foot.  She has tolerated her therapy sessions and has met her goals for rehab. She is cleared for discharge home on 9/17. She will follow up with neurology and cardiology as well as vestibular therapy after her discharge.     Functional status improved to:   OT: min assist with bathing  CG with toileting, transfers  Bed mobility: modified independent  Ambulation: 75' with WBQC - CGA/min assist  8 steps : min assist      Patient seen and examined on day of discharge.  Medications, medication side effects, and discharge instructions were reviewed with the patient, who expressed understanding of all information.  Patient was medically and functionally optimized and cleared for discharge.

## 2021-09-05 NOTE — DISCHARGE NOTE PROVIDER - PROVIDER TOKENS
PROVIDER:[TOKEN:[[id number]]],PROVIDER:[TOKEN:[[id number]]],PROVIDER:[TOKEN:[[id number]],FOLLOWUP:[1 month]] PROVIDER:[TOKEN:[[id number]]],PROVIDER:[TOKEN:[[id number]]],PROVIDER:[TOKEN:[[id number]],FOLLOWUP:[1 month]],PROVIDER:[TOKEN:[[id number]],FOLLOWUP:[1 week]]

## 2021-09-05 NOTE — DISCHARGE NOTE PROVIDER - NSDCCPCAREPLAN_GEN_ALL_CORE_FT
PRINCIPAL DISCHARGE DIAGNOSIS  Diagnosis: Cerebrovascular accident (CVA)  Assessment and Plan of Treatment: Continue aspirin   Continue lipitor   Continue brilinta 90mg twice a day for 4 weeks (started on 4/18/21)  Follow up with neurology after your discharge   Follow up with cardiology after your discharge (you had an ILR done)      SECONDARY DISCHARGE DIAGNOSES  Diagnosis: Spasticity  Assessment and Plan of Treatment: Continue tizanidine 4mg twice a day    Diagnosis: HTN (hypertension)  Assessment and Plan of Treatment: Continue amlodipine, lisinopril and labetalol   Follow up with your PCP after discharge    Diagnosis: DM (diabetes mellitus)  Assessment and Plan of Treatment: Continue metformin   Follow up with your PCP after discharge     PRINCIPAL DISCHARGE DIAGNOSIS  Diagnosis: Cerebrovascular accident (CVA)  Assessment and Plan of Treatment: Continue aspirin   Continue lipitor   Continue brilinta 90mg twice a day for 4 weeks (started on 8/17/21)-- last day will be 9/14  Follow up with neurology after your discharge   Follow up with cardiology after your discharge (you had an ILR done)      SECONDARY DISCHARGE DIAGNOSES  Diagnosis: Spasticity  Assessment and Plan of Treatment: Continue tizanidine 4mg twice a day    Diagnosis: HTN (hypertension)  Assessment and Plan of Treatment: Continue amlodipine, lisinopril and labetalol   Follow up with your PCP after discharge    Diagnosis: DM (diabetes mellitus)  Assessment and Plan of Treatment: Continue metformin   Follow up with your PCP after discharge     PRINCIPAL DISCHARGE DIAGNOSIS  Diagnosis: Cerebrovascular accident (CVA)  Assessment and Plan of Treatment: You had a stroke which is caused by decreased blood flow to your brain due to clotting in the blood vessels in the brain.    Continue aspirin   Continue lipitor   Continue brilinta 90mg twice a day for 4 weeks (started on 8/17/21)-- last day will be 9/14  Follow up with neurology after your discharge   Follow up with cardiology after your discharge (you had an ILR done to monitor for cardiac events that can lead to stroke)      SECONDARY DISCHARGE DIAGNOSES  Diagnosis: Spasticity  Assessment and Plan of Treatment: You have increased tone in some of your muscles due to your stroke. For this, continue baclofen as prescribed. You should also continue to do stretching exercises as taught in therapy.    Diagnosis: HTN (hypertension)  Assessment and Plan of Treatment: You have high blood pressure. It's important to control your blood pressure to reduce your chances of getting another stroke.   Continue amlodipine, lisinopril and labetalol   Follow up with your PCP after discharge    Diagnosis: DM (diabetes mellitus)  Assessment and Plan of Treatment: You have diabetes. Your sugars were controlled during your stay.   Continue metformin   Follow up with your PCP after discharge     PRINCIPAL DISCHARGE DIAGNOSIS  Diagnosis: Cerebrovascular accident (CVA)  Assessment and Plan of Treatment: You had a stroke which is caused by decreased blood flow to your brain due to clotting in the blood vessels in the brain.    Continue aspirin   Continue lipitor   Continue brilinta 90mg twice a day for 4 weeks (started on 8/17/21)-- last day will be 9/14  Follow up with neurology after your discharge   Follow up with cardiology after your discharge (you had an ILR placed to monitor for cardiac events that can lead to stroke)      SECONDARY DISCHARGE DIAGNOSES  Diagnosis: Spasticity  Assessment and Plan of Treatment: You have increased tone in some of your muscles due to your stroke. For this, continue baclofen as prescribed. You should also continue to do stretching exercises as taught in therapy in addition to taking this medication.    Diagnosis: HTN (hypertension)  Assessment and Plan of Treatment: You have high blood pressure. It's important to control your blood pressure to reduce your chances of getting another stroke. Continue the meds below as prescribed.,  Continue amlodipine, lisinopril and labetalol   Follow up with your PCP after discharge regarding your blood pressure control.    Diagnosis: DM (diabetes mellitus)  Assessment and Plan of Treatment: You have diabetes. Your sugars were controlled during your stay.   Continue metformin   Follow up with your PCP after discharge     PRINCIPAL DISCHARGE DIAGNOSIS  Diagnosis: Cerebrovascular accident (CVA)  Assessment and Plan of Treatment: You had a stroke which is caused by decreased blood flow to your brain due to clotting in the blood vessels in the brain.    Continue aspirin   Continue lipitor   Continue brilinta 90mg twice a day for 4 weeks (started on 8/17/21)-- last day will be 9/14  Follow up with vascular neurology after your discharge   Follow up with cardiology after your discharge (you had an ILR placed to monitor for cardiac events that can lead to stroke)      SECONDARY DISCHARGE DIAGNOSES  Diagnosis: Spasticity  Assessment and Plan of Treatment: You have increased tone in some of your muscles due to your stroke. For this, continue baclofen as prescribed. You should also continue to do stretching exercises as taught in therapy in addition to taking this medication.    Diagnosis: HTN (hypertension)  Assessment and Plan of Treatment: You have high blood pressure. It's important to control your blood pressure to reduce your chances of getting another stroke. Continue the meds below as prescribed.,  Continue amlodipine, lisinopril and labetalol   Follow up with your PCP after discharge regarding your blood pressure control.    Diagnosis: DM (diabetes mellitus)  Assessment and Plan of Treatment: You have diabetes. Your sugars were controlled during your stay.   Continue metformin   Follow up with your PCP after discharge    Diagnosis: Dizziness  Assessment and Plan of Treatment: You had dizziness which is likely caused by vertigo. You should see vestibular therapist on discharge.     PRINCIPAL DISCHARGE DIAGNOSIS  Diagnosis: Cerebrovascular accident (CVA)  Assessment and Plan of Treatment: You had a stroke which is caused by decreased blood flow to your brain due to clotting in the blood vessels in the brain.    Continue aspirin   Continue lipitor   You completed your course of brilinta (another blood thinner in addition to aspirin)  Follow up with vascular neurology after your discharge   Follow up with cardiology after your discharge (you had an ILR placed to monitor for cardiac events that can lead to stroke)      SECONDARY DISCHARGE DIAGNOSES  Diagnosis: Spasticity  Assessment and Plan of Treatment: You have increased tone in some of your muscles due to your stroke. For this, continue baclofen as prescribed. You should also continue to do stretching exercises as taught in therapy in addition to taking this medication.    Diagnosis: HTN (hypertension)  Assessment and Plan of Treatment: You have high blood pressure. It's important to control your blood pressure to reduce your chances of getting another stroke. Continue the meds below as prescribed.,  Continue amlodipine, lisinopril and labetalol   Follow up with your PCP after discharge regarding your blood pressure control.    Diagnosis: DM (diabetes mellitus)  Assessment and Plan of Treatment: You have diabetes. Your sugars were controlled during your stay.   Continue metformin   Follow up with your PCP after discharge    Diagnosis: Dizziness  Assessment and Plan of Treatment: You had dizziness which is likely caused by vertigo. You should see vestibular therapist on discharge.

## 2021-09-06 LAB
ALBUMIN SERPL ELPH-MCNC: 3.5 G/DL — SIGNIFICANT CHANGE UP (ref 3.3–5)
ALP SERPL-CCNC: 64 U/L — SIGNIFICANT CHANGE UP (ref 40–120)
ALT FLD-CCNC: 36 U/L — SIGNIFICANT CHANGE UP (ref 10–45)
ANION GAP SERPL CALC-SCNC: 10 MMOL/L — SIGNIFICANT CHANGE UP (ref 5–17)
AST SERPL-CCNC: 18 U/L — SIGNIFICANT CHANGE UP (ref 10–40)
BILIRUB SERPL-MCNC: 0.5 MG/DL — SIGNIFICANT CHANGE UP (ref 0.2–1.2)
BUN SERPL-MCNC: 19 MG/DL — SIGNIFICANT CHANGE UP (ref 7–23)
CALCIUM SERPL-MCNC: 9.3 MG/DL — SIGNIFICANT CHANGE UP (ref 8.4–10.5)
CHLORIDE SERPL-SCNC: 107 MMOL/L — SIGNIFICANT CHANGE UP (ref 96–108)
CO2 SERPL-SCNC: 27 MMOL/L — SIGNIFICANT CHANGE UP (ref 22–31)
CREAT SERPL-MCNC: 0.95 MG/DL — SIGNIFICANT CHANGE UP (ref 0.5–1.3)
GLUCOSE SERPL-MCNC: 113 MG/DL — HIGH (ref 70–99)
HCT VFR BLD CALC: 30.1 % — LOW (ref 34.5–45)
HGB BLD-MCNC: 10 G/DL — LOW (ref 11.5–15.5)
MCHC RBC-ENTMCNC: 26.9 PG — LOW (ref 27–34)
MCHC RBC-ENTMCNC: 33.2 GM/DL — SIGNIFICANT CHANGE UP (ref 32–36)
MCV RBC AUTO: 80.9 FL — SIGNIFICANT CHANGE UP (ref 80–100)
NRBC # BLD: 0 /100 WBCS — SIGNIFICANT CHANGE UP (ref 0–0)
PLATELET # BLD AUTO: 195 K/UL — SIGNIFICANT CHANGE UP (ref 150–400)
POTASSIUM SERPL-MCNC: 4.1 MMOL/L — SIGNIFICANT CHANGE UP (ref 3.5–5.3)
POTASSIUM SERPL-SCNC: 4.1 MMOL/L — SIGNIFICANT CHANGE UP (ref 3.5–5.3)
PROT SERPL-MCNC: 7.3 G/DL — SIGNIFICANT CHANGE UP (ref 6–8.3)
RBC # BLD: 3.72 M/UL — LOW (ref 3.8–5.2)
RBC # FLD: 15 % — HIGH (ref 10.3–14.5)
SODIUM SERPL-SCNC: 144 MMOL/L — SIGNIFICANT CHANGE UP (ref 135–145)
WBC # BLD: 5.28 K/UL — SIGNIFICANT CHANGE UP (ref 3.8–10.5)
WBC # FLD AUTO: 5.28 K/UL — SIGNIFICANT CHANGE UP (ref 3.8–10.5)

## 2021-09-06 PROCEDURE — 99232 SBSQ HOSP IP/OBS MODERATE 35: CPT

## 2021-09-06 RX ORDER — DIPHENHYDRAMINE HCL/ZINC ACET 2 %-0.1 %
1 CREAM (GRAM) TOPICAL
Refills: 0 | Status: DISCONTINUED | OUTPATIENT
Start: 2021-09-06 | End: 2021-09-17

## 2021-09-06 RX ORDER — INSULIN DETEMIR 100/ML (3)
15 INSULIN PEN (ML) SUBCUTANEOUS
Qty: 0 | Refills: 0 | DISCHARGE

## 2021-09-06 RX ORDER — ACETAMINOPHEN 500 MG
3 TABLET ORAL
Qty: 0 | Refills: 0 | DISCHARGE
Start: 2021-09-06

## 2021-09-06 RX ORDER — INSULIN LISPRO 100/ML
0 VIAL (ML) SUBCUTANEOUS
Qty: 0 | Refills: 0 | DISCHARGE

## 2021-09-06 RX ADMIN — Medication 100 MILLIGRAM(S): at 11:59

## 2021-09-06 RX ADMIN — TIZANIDINE 4 MILLIGRAM(S): 4 TABLET ORAL at 22:37

## 2021-09-06 RX ADMIN — ENOXAPARIN SODIUM 40 MILLIGRAM(S): 100 INJECTION SUBCUTANEOUS at 11:59

## 2021-09-06 RX ADMIN — TICAGRELOR 90 MILLIGRAM(S): 90 TABLET ORAL at 05:27

## 2021-09-06 RX ADMIN — METFORMIN HYDROCHLORIDE 1000 MILLIGRAM(S): 850 TABLET ORAL at 17:06

## 2021-09-06 RX ADMIN — LISINOPRIL 40 MILLIGRAM(S): 2.5 TABLET ORAL at 05:27

## 2021-09-06 RX ADMIN — METFORMIN HYDROCHLORIDE 1000 MILLIGRAM(S): 850 TABLET ORAL at 07:54

## 2021-09-06 RX ADMIN — AMLODIPINE BESYLATE 10 MILLIGRAM(S): 2.5 TABLET ORAL at 05:27

## 2021-09-06 RX ADMIN — Medication 81 MILLIGRAM(S): at 11:59

## 2021-09-06 RX ADMIN — TIZANIDINE 4 MILLIGRAM(S): 4 TABLET ORAL at 07:55

## 2021-09-06 RX ADMIN — Medication 10 MILLIGRAM(S): at 11:59

## 2021-09-06 RX ADMIN — TICAGRELOR 90 MILLIGRAM(S): 90 TABLET ORAL at 17:06

## 2021-09-06 RX ADMIN — ATORVASTATIN CALCIUM 80 MILLIGRAM(S): 80 TABLET, FILM COATED ORAL at 22:37

## 2021-09-06 RX ADMIN — Medication 100 MILLIGRAM(S): at 22:37

## 2021-09-06 NOTE — PROGRESS NOTE ADULT - SUBJECTIVE AND OBJECTIVE BOX
Patient is a 61y old  Female who presents with a chief complaint of CVA (05 Sep 2021 22:50)    Pt has no new complaints this AM. Participating in therapies, +BM's, denies HA CP SOB and no acute medical issues otherwise reported    ALLERGIES:  sulfa drugs (Angioedema; Swelling)  sulfa drugs (Rash)  Sulfac 10% (Unknown)  walnut (Urticaria)    MEDICATIONS  (STANDING):  amLODIPine   Tablet 10 milliGRAM(s) Oral daily  aspirin  chewable 81 milliGRAM(s) Oral daily  atorvastatin 80 milliGRAM(s) Oral at bedtime  benzocaine 15 mG/menthol 3.6 mG (Sugar-Free) Lozenge 1 Lozenge Oral four times a day  enoxaparin Injectable 40 milliGRAM(s) SubCutaneous daily  FLUoxetine 10 milliGRAM(s) Oral daily  labetalol 100 milliGRAM(s) Oral <User Schedule>  lisinopril 40 milliGRAM(s) Oral daily  metFORMIN 1000 milliGRAM(s) Oral two times a day with meals  ticagrelor 90 milliGRAM(s) Oral every 12 hours  tiZANidine 4 milliGRAM(s) Oral <User Schedule>  tiZANidine 4 milliGRAM(s) Oral at bedtime    MEDICATIONS  (PRN):  acetaminophen   Tablet .. 975 milliGRAM(s) Oral every 6 hours PRN Mild Pain (1 - 3), Moderate Pain (4 - 6), Severe Pain (7 - 10)    Vital Signs Last 24 Hrs  T(F): 98.1 (06 Sep 2021 07:46), Max: 98.1 (06 Sep 2021 07:46)  HR: 85 (06 Sep 2021 07:46) (73 - 95)  BP: 150/93 (06 Sep 2021 07:46) (122/83 - 150/93)  RR: 15 (06 Sep 2021 07:46) (15 - 17)  SpO2: 98% (06 Sep 2021 07:46) (98% - 98%)  I&O's Summary    PHYSICAL EXAM:  General: NAD, A/O x 3  ENT: MMM  Neck: Supple, No JVD  Lungs: Clear to auscultation bilaterally  Cardio: RRR, S1/S2, No murmurs  Abdomen: Soft, Nontender, Nondistended; Bowel sounds present  Extremities: No calf tenderness, No pitting edema  Neuro: no new deficits      LABS:                        10.0   5.28  )-----------( 195      ( 06 Sep 2021 05:45 )             30.1       09-06    144  |  107  |  19  ----------------------------<  113  4.1   |  27  |  0.95    Ca    9.3      06 Sep 2021 05:45    TPro  7.3  /  Alb  3.5  /  TBili  0.5  /  DBili  x   /  AST  18  /  ALT  36  /  AlkPhos  64  09-06     eGFR if Non African American: 65 mL/min/1.73M2 (09-06-21 @ 05:45)  eGFR if African American: 75 mL/min/1.73M2 (09-06-21 @ 05:45)             08-16 Chol 134 mg/dL LDL -- HDL 49 mg/dL Trig 64 mg/dL                COVID-19 PCR: NotDetec (09-01-21 @ 06:55)  COVID-19 PCR: NotDetec (08-26-21 @ 07:15)  COVID-19 PCR: NotDetec (08-20-21 @ 06:30)  COVID-19 PCR: NotDetec (08-15-21 @ 12:31)

## 2021-09-06 NOTE — PROGRESS NOTE ADULT - SUBJECTIVE AND OBJECTIVE BOX
Cc: Gait dysfunction    HPI: Patient with no new medical issues today. +BM.   Pain controlled, no chest pain, no N/V, no Fevers/Chills. No other new ROS  Has been tolerating rehabilitation program.    acetaminophen   Tablet .. 975 milliGRAM(s) Oral every 6 hours PRN  amLODIPine   Tablet 10 milliGRAM(s) Oral daily  aspirin  chewable 81 milliGRAM(s) Oral daily  atorvastatin 80 milliGRAM(s) Oral at bedtime  benzocaine 15 mG/menthol 3.6 mG (Sugar-Free) Lozenge 1 Lozenge Oral four times a day  enoxaparin Injectable 40 milliGRAM(s) SubCutaneous daily  FLUoxetine 10 milliGRAM(s) Oral daily  labetalol 100 milliGRAM(s) Oral <User Schedule>  lisinopril 40 milliGRAM(s) Oral daily  metFORMIN 1000 milliGRAM(s) Oral two times a day with meals  ticagrelor 90 milliGRAM(s) Oral every 12 hours  tiZANidine 4 milliGRAM(s) Oral at bedtime  tiZANidine 4 milliGRAM(s) Oral <User Schedule>      T(C): 36.7 (09-06-21 @ 07:46), Max: 36.7 (09-06-21 @ 07:46)  HR: 80 (09-06-21 @ 10:00) (73 - 95)  BP: 119/84 (09-06-21 @ 10:00) (119/84 - 150/93)  RR: 15 (09-06-21 @ 10:00) (15 - 17)  SpO2: 98% (09-06-21 @ 10:00) (98% - 98%)    In NAD  HEENT- EOMI  Heart- RRR, S1S2  Lungs- CTA bl.  Abd- + BS, NT  Ext- No calf pain  Neuro- Exam unchanged      Imp: Patient with diagnosis of L thalamic infarct/seizures admitted for comprehensive acute rehabilitation.    Plan:  - Continue PT/OT/SLP  - DVT prophylaxis  - Skin- Turn q2h, check skin daily  - Continue current medications; patient medically stable.   -Active issues- HTN - mostly controlled, will continue to monitor and adjust meds as needed, DM2 - continue metformin  - Patient is stable to continue current rehabilitation program.

## 2021-09-06 NOTE — PROGRESS NOTE ADULT - ASSESSMENT
62 yo R handed woman with history of DMT2, HTN, HLD, multiple CVAs w/ residual RUE/LE weakness w/ foot drop, LLE>UE numbness s/p loop recorder and no longer on plavix admitted to [facility] after hospital course for left thalamic infarct, admitted for rehab    multiple cerebrovascular accidents (CVAs)   -continue ASA/STATIN, Brilinta and losartan   -ILR was implanted (8/18)  -Continue comprehensive rehab    Postnasal drip/sore throat  -pt declined fluticasone  -complete cepacol lozenges    HLD  -continue atorvastatin    Benign essential HTN  -Continue amlodipine, lisinopril; although BP high today has mostly been acceptable and at times low    Type 2 diabetes mellitus  - continue metformin 1gm bid  -continue ISS  -monitor FS  -will d/w pt this weekend addition of SGLT2 inhibitor as has outcome benefits w/ regard to cardiac/ renal comorbidities    ERENDIRA (acute kidney injury) on CKD II  -encourage oral intake  -avoid nephrotoxic agents  -monitor BMP    VTE prophylaxis  -continue Lovenox     Dispo: pt scheduled for discharge this week and should follow up with her primary care MD as well as neurology      I have personally interviewed and examined this patient, reviewed pertinent clinical information, and performed the evaluation and management services provided at today's visit for inpatient medical follow up    I am available to discuss any issues related to the medical care of this patient on the unit, or by phone at [phone]

## 2021-09-07 LAB — SARS-COV-2 RNA SPEC QL NAA+PROBE: SIGNIFICANT CHANGE UP

## 2021-09-07 PROCEDURE — 99232 SBSQ HOSP IP/OBS MODERATE 35: CPT

## 2021-09-07 RX ORDER — FAMOTIDINE 10 MG/ML
20 INJECTION INTRAVENOUS DAILY
Refills: 0 | Status: DISCONTINUED | OUTPATIENT
Start: 2021-09-07 | End: 2021-09-10

## 2021-09-07 RX ORDER — LORATADINE 10 MG/1
10 TABLET ORAL DAILY
Refills: 0 | Status: DISCONTINUED | OUTPATIENT
Start: 2021-09-07 | End: 2021-09-10

## 2021-09-07 RX ADMIN — Medication 100 MILLIGRAM(S): at 09:58

## 2021-09-07 RX ADMIN — Medication 1 APPLICATION(S): at 17:19

## 2021-09-07 RX ADMIN — Medication 1 APPLICATION(S): at 08:49

## 2021-09-07 RX ADMIN — LISINOPRIL 40 MILLIGRAM(S): 2.5 TABLET ORAL at 07:26

## 2021-09-07 RX ADMIN — METFORMIN HYDROCHLORIDE 1000 MILLIGRAM(S): 850 TABLET ORAL at 07:26

## 2021-09-07 RX ADMIN — TIZANIDINE 4 MILLIGRAM(S): 4 TABLET ORAL at 07:26

## 2021-09-07 RX ADMIN — METFORMIN HYDROCHLORIDE 1000 MILLIGRAM(S): 850 TABLET ORAL at 17:17

## 2021-09-07 RX ADMIN — TIZANIDINE 4 MILLIGRAM(S): 4 TABLET ORAL at 21:32

## 2021-09-07 RX ADMIN — TICAGRELOR 90 MILLIGRAM(S): 90 TABLET ORAL at 05:09

## 2021-09-07 RX ADMIN — Medication 81 MILLIGRAM(S): at 11:36

## 2021-09-07 RX ADMIN — ATORVASTATIN CALCIUM 80 MILLIGRAM(S): 80 TABLET, FILM COATED ORAL at 21:32

## 2021-09-07 RX ADMIN — ENOXAPARIN SODIUM 40 MILLIGRAM(S): 100 INJECTION SUBCUTANEOUS at 11:36

## 2021-09-07 RX ADMIN — TICAGRELOR 90 MILLIGRAM(S): 90 TABLET ORAL at 17:17

## 2021-09-07 RX ADMIN — Medication 10 MILLIGRAM(S): at 11:36

## 2021-09-07 RX ADMIN — LORATADINE 10 MILLIGRAM(S): 10 TABLET ORAL at 11:36

## 2021-09-07 RX ADMIN — Medication 100 MILLIGRAM(S): at 19:42

## 2021-09-07 RX ADMIN — FAMOTIDINE 20 MILLIGRAM(S): 10 INJECTION INTRAVENOUS at 11:36

## 2021-09-07 NOTE — PROGRESS NOTE ADULT - ASSESSMENT
62 yo R handed woman with history of DMT2, HTN, HLD, multiple CVAs w/ residual RUE/LE weakness w/ foot drop, LLE>UE numbness s/p loop recorder and no longer on plavix admitted to  rehab after hospital course for left thalamic infarct, admitted for rehab    Acute L thalamic infarct  history of multiple cerebrovascular accidents (CVAs)   -continue ASA/STATIN, Brilinta   -BP goal <140/90  -ILR was implanted (8/18)  -Continue comprehensive rehab    Postnasal drip/sore throat  -pt declined fluticasone  -complete cepacol lozenges    Raised rash possibly urticaria   - Claritin and famotidine   - benadryl cream prn    HLD  -continue atorvastatin    Benign essential HTN  -BP acceptable  -Continue amlodipine, lisinopril    Type 2 diabetes mellitus  - continue metformin 1gm bid  -continue ISS  -monitor FS  -will d/w pt this weekend addition of SGLT2 inhibitor as has outcome benefits w/ regard to cardiac/ renal comorbidities    ERENDIRA (acute kidney injury) on CKD II - resolved  -encourage oral intake  -avoid nephrotoxic agents  -monitor BMP    Normocytic anemia possible anemia of chronic disease  - monitor. H/H stable    VTE prophylaxis  -continue Lovenox     Dispo: pt scheduled for discharge this week and should follow up with her primary care MD as well as neurology      I have personally interviewed and examined this patient, reviewed pertinent clinical information, and performed the evaluation and management services provided at today's visit for inpatient medical follow up

## 2021-09-07 NOTE — PROGRESS NOTE ADULT - SUBJECTIVE AND OBJECTIVE BOX
Patient is a 61y old  Female who presents with a chief complaint of CVA (19 Aug 2021 14:38)    HPI:  60 yo R handed Female with  PMHx of DMT2, HTN, HLD, multiple CVAs w/ residual RUE/LE weakness w/ foot drop, LLE>UE numbness s/p loop recorder and plavix  now discontinued 2017. She presented to [facility] on 8/15/21 with acute on chronic R-sided weakness, LKN 8/15 07:30 symptoms noticed around 10AM after patient was trying to use her arm and was not able to "make it do what I wanted" on further clarification of RUE>LE weakness. Pt endorsed was leaning to one side, endorsed RLE feels weaker than normal which was present on initial NIHSS 5. Initial BPs elevated 190s systolic, CTH without hemorrhage or acute findings, BP was lowered with improvement in R sided acute on chronic weakness to allow for ataxia testing in limbs which was present, which was new findings.  Endorsed mild L posterior headache, no N/V or infectious symptoms. Patient was re-evaluated after improvement for weakness, concern for focal seizure with patient endorsing no prior seizure history or prior episodes w RUE. CTH w/o acute findings, CTA w worsening PCA stenosis but normal flow bL VAs. CDU monitoring for MR Brain to assess for any new infarcts, however course c/b reoccurrence in R UE "weakness" appearing contracture-like w RUE flexion and hypertonia, intact mentation.  she was seen by neurologist.  pt received 1500mg IV Keppra load given high suspicion for focal seizure activity. MRI Brain showed Left thalamic infarct. She did not receive TPA as she was out of  window. She was seen by cardiology; TTE, ASA/STATIN, and losartan started. EP was consulted, ILR was implanted (8/18), No events recorded on ILR. Of note, patient had ILR placed in the past ( 2017) no arrhythmias was recorded then. She was evaluated by PM&R and therapist and acute rehabilitation was recommended. She was admitted to [facility] on 8/19/21.       SUBJECTIVE: Patient seen and evaluated at bedside. Over weekend patient with itching and started on benadryl cream with some relief.  Continues to have curling of toes that affects gait  Denies any new numbness.   Amlodpine held this am     ROS:  Denies HA/CP/palpitations/abdominal pain or nausea  Denies dysuria   + bm       PHYSICAL EXAM  Vital Signs Last 24 Hrs  T(C): 36.6 (07 Sep 2021 07:28), Max: 36.7 (06 Sep 2021 22:35)  T(F): 97.9 (07 Sep 2021 07:28), Max: 98 (06 Sep 2021 22:35)  HR: 77 (07 Sep 2021 09:59) (77 - 93)  BP: 135/91 (07 Sep 2021 09:59) (110/72 - 151/81)  BP(mean): --  RR: 15 (07 Sep 2021 07:28) (15 - 16)  SpO2: 97% (07 Sep 2021 07:28) (97% - 99%)      Constitutional - NAD, Comfortable  Chest - breathing comfortably ,  Cardio - s1s2+,   Abdomen -  Soft, NT ND  Extremities - No peripheral edema, No calf tenderness   Neurologic Exam: No nystagmus noted                     Cognitive - AAO x 3     Speech - Fluent, Comprehensible, Mild dysarthria      Motor -                      LUE and LLE 5/5                     RIGHT UE - ShAB 2/5, EF 2/5, EE 2/5, WE 0/5,  0/5, trace finger movement                     RIGHT LE - HF 3/5, KE 4/5, DF 0/5, PF 1/5  + foot drop      Sensory - Intact to LT bilateral     Tone- some increased tone noted on right elbow flexors, right finger flexors, right toes   Psychiatric - Mood stable, Affect WNL  Skin: Loop recorder site- clean       Function:  OT: min assist with bathing  CG with toileting, transfers  Bed mobility: modified independent  Ambulation: 75' with WBQC - CGA/min assist  8 steps : min assist    MEDICATIONS  (STANDING):  amLODIPine   Tablet 10 milliGRAM(s) Oral daily  aspirin  chewable 81 milliGRAM(s) Oral daily  atorvastatin 80 milliGRAM(s) Oral at bedtime  benzocaine 15 mG/menthol 3.6 mG (Sugar-Free) Lozenge 1 Lozenge Oral four times a day  enoxaparin Injectable 40 milliGRAM(s) SubCutaneous daily  famotidine    Tablet 20 milliGRAM(s) Oral daily  FLUoxetine 10 milliGRAM(s) Oral daily  labetalol 100 milliGRAM(s) Oral <User Schedule>  lisinopril 40 milliGRAM(s) Oral daily  loratadine 10 milliGRAM(s) Oral daily  metFORMIN 1000 milliGRAM(s) Oral two times a day with meals  ticagrelor 90 milliGRAM(s) Oral every 12 hours  tiZANidine 4 milliGRAM(s) Oral at bedtime  tiZANidine 4 milliGRAM(s) Oral <User Schedule>    MEDICATIONS  (PRN):  acetaminophen   Tablet .. 975 milliGRAM(s) Oral every 6 hours PRN Mild Pain (1 - 3), Moderate Pain (4 - 6), Severe Pain (7 - 10)  diphenhydramine 2%/zinc acetate 0.1% Cream 1 Application(s) Topical two times a day PRN Rash and/or Itching                          10.0   5.28  )-----------( 195      ( 06 Sep 2021 05:45 )             30.1     09-06    144  |  107  |  19  ----------------------------<  113<H>  4.1   |  27  |  0.95    Ca    9.3      06 Sep 2021 05:45    TPro  7.3  /  Alb  3.5  /  TBili  0.5  /  DBili  x   /  AST  18  /  ALT  36  /  AlkPhos  64  09-06

## 2021-09-07 NOTE — PROGRESS NOTE ADULT - ASSESSMENT
ASSESSMENT/PLAN  62 yo R handed Female with  PMHx of DMT2, HTN, HLD, multiple CVAs w/ residual RUE/LE weakness w/ foot drop, LLE>UE numbness s/p loop recorder and plavix  now discontinued 2017. Admitted on 8/15 for acute on chronic R-sided weakness found to have Left thalamic infarct. Had concerns of seizure and given loading dose of keppra, however it was ruled out. Patient now with Gait Instability, ADL impairments and Functional impairments.    COMORBIDITES/ACTIVE MEDICAL ISSUES     History of Multiple CVA: new left thalamic infarcts, right spastic hemiparesis   s/p ILR placement; f/u with Dr. Minor  - ASA 81 mg daily, - Brillinta 90 mg twice  FOR 4 WEEKS -  Per neurology   - Hypercoagulable workup- essentially negative   Continue Comprehensive Rehab Program of PT/OT/ST - total of 3 hrs/day 5 days/week   Patient to be evaluated for right AFO as she has a foot drop from recurrent stroke and prior AFO, does not meet patient's needs for safe ambulation.   Tizanidine 4mg BID for spasticity       HLD:- Lipitor 80 mg daily    HTN: on amlodipine 10mg daily, lisinopril 40mg daily ( 8/25) , Labetolol 100mg bid   - Home meds: (amlodipine, labetalol, HCTz, lisinopril 40)  - Hold for systolic BP of <140    #DM II: blood sugars good   on - metformin 1000mg  BID, Blood sugars good   FS dc'd     #Tachycardia : improved     # Increased tone :-Tizanidine 4mg BID 9/2     Hypokalemia: resolved    Pain control:- Tylenol PRN    GI/Bowel Mgmt   - Continent  - Senna,  Miralax PRN,    Bladder management: toilet schedule prn     DVT prophylaxis:- Lovenox  - SCDs, TEDs     FEN :- Diet - Consistent Carbohydrate Low Sodium     Mood: started prozac 10mg daily     Dizziness:  Vestibular  eval done 8/31- Patient reports dizziness slightly improved.     Precautions / PROPHYLAXIS:   - Falls  - Lungs: Aspiration  - Pressure injury/Skin: Turn Q2hrs while in bed, OOB to Chair, PT/OT       Disp: team conference 8/24 and 8/31 and 9/7:  Progressing well in therapy   TDD changed to 10/17 to improve overall function  Team to contact orthotist for AFO  Family training today     Hospitalist dana noted   Labs 9/6- ok

## 2021-09-07 NOTE — PROGRESS NOTE ADULT - SUBJECTIVE AND OBJECTIVE BOX
Patient is a 61y old  Female who presents with a chief complaint of CVA (06 Sep 2021 13:27)      Subjective and overnight events:  Patient seen and examined at bedside. pt reports raised pruritic rash on bilateral lower arm started last night, pt thinks rash maybe related to pineapple. pt reports no fever, chills, sob, cp.    ALLERGIES:  Pineapple (Unknown)  sulfa drugs (Angioedema; Swelling)  sulfa drugs (Rash)  Sulfac 10% (Unknown)  walnut (Urticaria)    MEDICATIONS  (STANDING):  amLODIPine   Tablet 10 milliGRAM(s) Oral daily  aspirin  chewable 81 milliGRAM(s) Oral daily  atorvastatin 80 milliGRAM(s) Oral at bedtime  benzocaine 15 mG/menthol 3.6 mG (Sugar-Free) Lozenge 1 Lozenge Oral four times a day  enoxaparin Injectable 40 milliGRAM(s) SubCutaneous daily  famotidine    Tablet 20 milliGRAM(s) Oral daily  FLUoxetine 10 milliGRAM(s) Oral daily  labetalol 100 milliGRAM(s) Oral <User Schedule>  lisinopril 40 milliGRAM(s) Oral daily  loratadine 10 milliGRAM(s) Oral daily  metFORMIN 1000 milliGRAM(s) Oral two times a day with meals  ticagrelor 90 milliGRAM(s) Oral every 12 hours  tiZANidine 4 milliGRAM(s) Oral at bedtime  tiZANidine 4 milliGRAM(s) Oral <User Schedule>    MEDICATIONS  (PRN):  acetaminophen   Tablet .. 975 milliGRAM(s) Oral every 6 hours PRN Mild Pain (1 - 3), Moderate Pain (4 - 6), Severe Pain (7 - 10)  diphenhydramine 2%/zinc acetate 0.1% Cream 1 Application(s) Topical two times a day PRN Rash and/or Itching    Vital Signs Last 24 Hrs  T(F): 97.9 (07 Sep 2021 07:28), Max: 98 (06 Sep 2021 22:35)  HR: 77 (07 Sep 2021 09:59) (77 - 93)  BP: 135/91 (07 Sep 2021 09:59) (110/72 - 151/81)  RR: 15 (07 Sep 2021 07:28) (15 - 16)  SpO2: 97% (07 Sep 2021 07:28) (97% - 99%)  I&O's Summary    PHYSICAL EXAM:  General: NAD, A/O x 3  ENT: MMM  Neck: Supple, No JVD  Lungs: Clear to auscultation bilaterally  Cardio: RRR, S1/S2, No murmurs  Abdomen: Soft, Nontender, Nondistended; Bowel sounds present  Extremities: No calf tenderness, No pitting edema  skin: raised pruritic rash on bilateral lower arm    LABS:                        10.0   5.28  )-----------( 195      ( 06 Sep 2021 05:45 )             30.1     09-06    144  |  107  |  19  ----------------------------<  113  4.1   |  27  |  0.95    Ca    9.3      06 Sep 2021 05:45    TPro  7.3  /  Alb  3.5  /  TBili  0.5  /  DBili  x   /  AST  18  /  ALT  36  /  AlkPhos  64  09-06    eGFR if Non African American: 65 mL/min/1.73M2 (09-06-21 @ 05:45)  eGFR if African American: 75 mL/min/1.73M2 (09-06-21 @ 05:45)    08-16 Chol 134 mg/dL LDL -- HDL 49 mg/dL Trig 64 mg/dL      COVID-19 PCR: NotDetec (09-01-21 @ 06:55)  COVID-19 PCR: NotDetec (08-26-21 @ 07:15)  COVID-19 PCR: NotDetec (08-20-21 @ 06:30)  COVID-19 PCR: NotDetec (08-15-21 @ 12:31)      RADIOLOGY & ADDITIONAL TESTS:    Care Discussed with Consultants/Other Providers: rehab team regarding rash

## 2021-09-07 NOTE — CHART NOTE - NSCHARTNOTEFT_GEN_A_CORE
Nutrition Follow Up Note  Hospital Course   (Per Electronic Medical Record)    Source:  Patient [X]  Medical Record [X]      Diet:   Consistent Carbohydrate Low Sodium Diet w/ Thin Liquids  Tolerates Diet Consistency Well  No Chewing/Swallowing Difficulties  No Recent Nausea, Vomiting, Diarrhea or Constipation (as Per Patient)  Consumes % of Meals (as Per Documentation) - Intake Improving (Per Patient)   Obtained Food Preferences from Patient     Enteral/Parenteral Nutrition: Not Applicable    Current Weight: 216.4lb on 8/19  Obtain New Weight  Obtain Weights Weekly     Pertinent Medications: MEDICATIONS  (STANDING):  amLODIPine   Tablet 10 milliGRAM(s) Oral daily  aspirin  chewable 81 milliGRAM(s) Oral daily  atorvastatin 80 milliGRAM(s) Oral at bedtime  benzocaine 15 mG/menthol 3.6 mG (Sugar-Free) Lozenge 1 Lozenge Oral four times a day  enoxaparin Injectable 40 milliGRAM(s) SubCutaneous daily  FLUoxetine 10 milliGRAM(s) Oral daily  labetalol 100 milliGRAM(s) Oral <User Schedule>  lisinopril 40 milliGRAM(s) Oral daily  metFORMIN 1000 milliGRAM(s) Oral two times a day with meals  ticagrelor 90 milliGRAM(s) Oral every 12 hours  tiZANidine 4 milliGRAM(s) Oral at bedtime  tiZANidine 4 milliGRAM(s) Oral <User Schedule>    MEDICATIONS  (PRN):  acetaminophen   Tablet .. 975 milliGRAM(s) Oral every 6 hours PRN Mild Pain (1 - 3), Moderate Pain (4 - 6), Severe Pain (7 - 10)  diphenhydramine 2%/zinc acetate 0.1% Cream 1 Application(s) Topical two times a day PRN Rash and/or Itching    Pertinent Labs:  09-06 Na144 mmol/L Glu 113 mg/dL<H> K+ 4.1 mmol/L Cr  0.95 mg/dL BUN 19 mg/dL 09-06 Alb 3.5 g/dL 08-16 Chol 134 mg/dL LDL --    HDL 49 mg/dL<L> Trig 64 mg/dL    Skin: No Pressure Ulcers  Surgical Incision on Chest  (as Per Nursing Flow Sheet)     Edema: None Noted (as Per Documentation)     Last Bowel Movement: on 9/6    Estimated Needs:   [X] No Change Since Previous Assessment    Previous Nutrition Diagnosis:   Moderate Malnutrition     Nutrition Diagnosis is [X] Ongoing - Intake Improving (Per Patient) - Declined Nutrition Supplementation     New Nutrition Diagnosis: [X] Not Applicable    Interventions:   1. Recommend Continue Nutrition Plan of Care     Monitoring & Evaluation:   [X] Weights   [X] PO Intake   [X] Skin Integrity   [X] Follow Up (Per Protocol)  [X] Tolerance to Diet Prescription   [X] Other: Labs & POCT    Registered Dietitian/Nutritionist Remains Available.  Ren Rosales RDN    Pager #[phone]  Phone# [phone] Nutrition Follow Up Note  Hospital Course   (Per Electronic Medical Record)    Source:  Patient [X]  Medical Record [X]      Diet:   Consistent Carbohydrate Low Sodium Diet w/ Thin Liquids  Tolerates Diet Consistency Well  No Chewing/Swallowing Difficulties  No Recent Nausea, Vomiting, Diarrhea or Constipation (as Per Patient)  Consumes % of Meals (as Per Documentation) - Intake Improving (Per Patient)   Obtained Food Preferences from Patient   Added Pineapple to Allergy (Per Patient)     Enteral/Parenteral Nutrition: Not Applicable    Current Weight: 216.4lb on 8/19  Obtain New Weight  Obtain Weights Weekly     Pertinent Medications: MEDICATIONS  (STANDING):  amLODIPine   Tablet 10 milliGRAM(s) Oral daily  aspirin  chewable 81 milliGRAM(s) Oral daily  atorvastatin 80 milliGRAM(s) Oral at bedtime  benzocaine 15 mG/menthol 3.6 mG (Sugar-Free) Lozenge 1 Lozenge Oral four times a day  enoxaparin Injectable 40 milliGRAM(s) SubCutaneous daily  FLUoxetine 10 milliGRAM(s) Oral daily  labetalol 100 milliGRAM(s) Oral <User Schedule>  lisinopril 40 milliGRAM(s) Oral daily  metFORMIN 1000 milliGRAM(s) Oral two times a day with meals  ticagrelor 90 milliGRAM(s) Oral every 12 hours  tiZANidine 4 milliGRAM(s) Oral at bedtime  tiZANidine 4 milliGRAM(s) Oral <User Schedule>    MEDICATIONS  (PRN):  acetaminophen   Tablet .. 975 milliGRAM(s) Oral every 6 hours PRN Mild Pain (1 - 3), Moderate Pain (4 - 6), Severe Pain (7 - 10)  diphenhydramine 2%/zinc acetate 0.1% Cream 1 Application(s) Topical two times a day PRN Rash and/or Itching    Pertinent Labs:  09-06 Na144 mmol/L Glu 113 mg/dL<H> K+ 4.1 mmol/L Cr  0.95 mg/dL BUN 19 mg/dL 09-06 Alb 3.5 g/dL 08-16 Chol 134 mg/dL LDL --    HDL 49 mg/dL<L> Trig 64 mg/dL    Skin: No Pressure Ulcers  Surgical Incision on Chest  (as Per Nursing Flow Sheet)     Edema: None Noted (as Per Documentation)     Last Bowel Movement: on 9/6    Estimated Needs:   [X] No Change Since Previous Assessment    Previous Nutrition Diagnosis:   Moderate Malnutrition     Nutrition Diagnosis is [X] Ongoing - Intake Improving (Per Patient) - Declined Nutrition Supplementation     New Nutrition Diagnosis: [X] Not Applicable    Interventions:   1. Recommend Continue Nutrition Plan of Care     Monitoring & Evaluation:   [X] Weights   [X] PO Intake   [X] Skin Integrity   [X] Follow Up (Per Protocol)  [X] Tolerance to Diet Prescription   [X] Other: Labs & POCT    Registered Dietitian/Nutritionist Remains Available.  Ren Rosales RDN    Pager #[phone]  Phone# [phone]

## 2021-09-08 PROCEDURE — 99232 SBSQ HOSP IP/OBS MODERATE 35: CPT

## 2021-09-08 RX ORDER — LISINOPRIL 2.5 MG/1
40 TABLET ORAL
Refills: 0 | Status: DISCONTINUED | OUTPATIENT
Start: 2021-09-09 | End: 2021-09-11

## 2021-09-08 RX ORDER — DIPHENHYDRAMINE HCL 50 MG
25 CAPSULE ORAL DAILY
Refills: 0 | Status: DISCONTINUED | OUTPATIENT
Start: 2021-09-08 | End: 2021-09-17

## 2021-09-08 RX ADMIN — LORATADINE 10 MILLIGRAM(S): 10 TABLET ORAL at 12:04

## 2021-09-08 RX ADMIN — FAMOTIDINE 20 MILLIGRAM(S): 10 INJECTION INTRAVENOUS at 12:04

## 2021-09-08 RX ADMIN — Medication 100 MILLIGRAM(S): at 12:04

## 2021-09-08 RX ADMIN — Medication 10 MILLIGRAM(S): at 12:04

## 2021-09-08 RX ADMIN — Medication 1 APPLICATION(S): at 05:14

## 2021-09-08 RX ADMIN — TIZANIDINE 4 MILLIGRAM(S): 4 TABLET ORAL at 07:45

## 2021-09-08 RX ADMIN — Medication 25 MILLIGRAM(S): at 18:45

## 2021-09-08 RX ADMIN — METFORMIN HYDROCHLORIDE 1000 MILLIGRAM(S): 850 TABLET ORAL at 07:45

## 2021-09-08 RX ADMIN — AMLODIPINE BESYLATE 10 MILLIGRAM(S): 2.5 TABLET ORAL at 05:12

## 2021-09-08 RX ADMIN — LISINOPRIL 40 MILLIGRAM(S): 2.5 TABLET ORAL at 05:12

## 2021-09-08 RX ADMIN — METFORMIN HYDROCHLORIDE 1000 MILLIGRAM(S): 850 TABLET ORAL at 17:07

## 2021-09-08 RX ADMIN — Medication 100 MILLIGRAM(S): at 21:17

## 2021-09-08 RX ADMIN — ENOXAPARIN SODIUM 40 MILLIGRAM(S): 100 INJECTION SUBCUTANEOUS at 12:04

## 2021-09-08 RX ADMIN — Medication 81 MILLIGRAM(S): at 12:03

## 2021-09-08 RX ADMIN — TICAGRELOR 90 MILLIGRAM(S): 90 TABLET ORAL at 17:07

## 2021-09-08 RX ADMIN — TICAGRELOR 90 MILLIGRAM(S): 90 TABLET ORAL at 05:12

## 2021-09-08 RX ADMIN — ATORVASTATIN CALCIUM 80 MILLIGRAM(S): 80 TABLET, FILM COATED ORAL at 21:17

## 2021-09-08 NOTE — PROVIDER CONTACT NOTE (OTHER) - BACKGROUND
CVA R side weak   h/o multiple cva's benign essential HTN , type II DM
pt s/p cva
lisinopril 20mg started at 1200 today
dx: CVA with right sided weakness
Patient admitted s/p cva.
pt to receive labetolol 1000 schedule

## 2021-09-08 NOTE — PROVIDER CONTACT NOTE (OTHER) - ASSESSMENT
/99, DE 96 (SITTING )  /95 DE 95 ( LAYING  Neuro assessment done at bedside, pt aware of surroundings
Patient denies symptoms.
pt denies headache and other signs or symptoms.
pt denies any chest pain, no further dizziness in room.
pt denies any headache, nausea, chest pain.
bottom lip mildly swollen, no difficulty swallowing
pt denies any chest pain, nausea
upon inspection, rn noted a small area of raised skin possibly hives. no skin discoloration noted. no other rash noted. pt

## 2021-09-08 NOTE — PROVIDER CONTACT NOTE (OTHER) - SITUATION
/105, HR 97 RR 16 Sao2 98% RA
pt with elevated /112, P 81
pt reported left arm pruitis.
during dinner meal, pt c/o bottom lip swelling
pt c/o dizziness  /78 HR 95 sao2 96%, RR 16 temp 97.3
1000 bp 127/75 HR 73, RR 16, sao2 100%.  pt c/o dizziness during OT
Patient's second blood pressure reading (manual) 170/100. Heart rate 90.
Upon standing , patient became dizzy , staff sat patient back down, drank water, felt ok to get back up to sit in the chair to the restroom. Patient c/o right eye floaters, seeing stars across (r)eye.

## 2021-09-08 NOTE — PROVIDER CONTACT NOTE (OTHER) - REASON
Hypertension
VS
mild bottom lip swelling
Dizziness upon standing
dizziness
elevated /98 after stat BP med given
elevated BP
left arm pruitis

## 2021-09-08 NOTE — PROGRESS NOTE ADULT - ASSESSMENT
ASSESSMENT/PLAN  60 yo R handed Female with  PMHx of DMT2, HTN, HLD, multiple CVAs w/ residual RUE/LE weakness w/ foot drop, LLE>UE numbness s/p loop recorder and plavix  now discontinued 2017. Admitted on 8/15 for acute on chronic R-sided weakness found to have Left thalamic infarct. Had concerns of seizure and given loading dose of keppra, however it was ruled out. Patient now with Gait Instability, ADL impairments and Functional impairments.    COMORBIDITES/ACTIVE MEDICAL ISSUES     History of Multiple CVA: new left thalamic infarcts, right spastic hemiparesis   s/p ILR placement; f/u with Dr. Minor  - ASA 81 mg daily, - Brillinta 90 mg twice  FOR 4 WEEKS -  Per neurology   - Hypercoagulable workup- essentially negative   Continue Comprehensive Rehab Program of PT/OT/ST - total of 3 hrs/day 5 days/week  Will need right AFO as she has a foot drop from recurrent stroke   DC tizanidine as not beneficial at this time - ? trial of baclofen - SE discussed with patient       HLD:- Lipitor qhs     HTN: on amlodipine 10mg daily, lisinopril 40mg daily - timing changed to 1200pm  , Labetolol 100mg bid ( 1000 am and 1000 pm )   - Home meds: (amlodipine, labetalol, HCTz, lisinopril 40)  - Hold for systolic BP of <140    DM II:on - metformin 1000mg  BID    Tachycardia : improved     Hypokalemia: resolved    Pain control:- Tylenol PRN    GI/Bowel Mgmt   - Continent  - Senna,  Miralax PRN,    Bladder management: toilet schedule prn     DVT prophylaxis:- Lovenox  - SCDs, TEDs     FEN :- Diet - Consistent Carbohydrate Low Sodium     Mood: prozac 10mg daily     Dizziness:  Vestibular  eval done 8/31- Patient reports dizziness slightly improved.     Precautions / PROPHYLAXIS:   - Falls  - Lungs: Aspiration  - Pressure injury/Skin: Turn Q2hrs while in bed, OOB to Chair, PT/OT       Disp: team conference 8/24 and 8/31 and 9/7:  TDD changed to 10/17 to improve overall function    Case d/w hospitalist

## 2021-09-08 NOTE — PROVIDER CONTACT NOTE (OTHER) - ACTION/TREATMENT ORDERED:
continue to monitor
Dr Schwab and Dr Chen aware and at bedside
Dr Schwab aware,VS to be checked [id number] and labetolol to be given then, pending vital signs
Will administer scheduled labetalol 100mg early as per MD and monitor.
benadryl ordered.
medication given, rechecked. BP still elevated. resident ordered to continue monitoring.
topical cream ordered. will continue to monitor.
Bed rest  MD made aware , will discuss with team for bedside assessment.

## 2021-09-08 NOTE — PROGRESS NOTE ADULT - SUBJECTIVE AND OBJECTIVE BOX
Patient is a 61y old  Female who presents with a chief complaint of CVA (08 Sep 2021 12:14)      Subjective and overnight events:  Patient seen and examined at bedside. pt reports mild lip swelling and tingling sensation and pruritic rash on bilateral arms started 2 days ago, symptoms improving. no fever, chills, sob, cp, abd pain, palpitations, n/v. + toes curling and muscle spasm persistent and pt feels that tizanidine provided minimal relief.    ALLERGIES:  Pineapple (Unknown)  sulfa drugs (Angioedema; Swelling)  sulfa drugs (Rash)  Sulfac 10% (Unknown)  walnut (Urticaria)    MEDICATIONS  (STANDING):  amLODIPine   Tablet 10 milliGRAM(s) Oral daily  aspirin  chewable 81 milliGRAM(s) Oral daily  atorvastatin 80 milliGRAM(s) Oral at bedtime  benzocaine 15 mG/menthol 3.6 mG (Sugar-Free) Lozenge 1 Lozenge Oral four times a day  enoxaparin Injectable 40 milliGRAM(s) SubCutaneous daily  famotidine    Tablet 20 milliGRAM(s) Oral daily  FLUoxetine 10 milliGRAM(s) Oral daily  labetalol 100 milliGRAM(s) Oral <User Schedule>  loratadine 10 milliGRAM(s) Oral daily  metFORMIN 1000 milliGRAM(s) Oral two times a day with meals  ticagrelor 90 milliGRAM(s) Oral every 12 hours    MEDICATIONS  (PRN):  acetaminophen   Tablet .. 975 milliGRAM(s) Oral every 6 hours PRN Mild Pain (1 - 3), Moderate Pain (4 - 6), Severe Pain (7 - 10)  diphenhydramine 2%/zinc acetate 0.1% Cream 1 Application(s) Topical two times a day PRN Rash and/or Itching    Vital Signs Last 24 Hrs  T(F): 97.6 (08 Sep 2021 08:33), Max: 98.3 (08 Sep 2021 07:49)  HR: 83 (08 Sep 2021 12:02) (73 - 97)  BP: 163/95 (08 Sep 2021 12:02) (111/66 - 170/100)  RR: 16 (08 Sep 2021 12:02) (16 - 17)  SpO2: 100% (08 Sep 2021 12:02) (96% - 100%)  I&O's Summary    PHYSICAL EXAM:  General: NAD, A/O x 3  ENT: MMM, very slight lips swellings  Neck: Supple, No JVD  Lungs: Clear to auscultation bilaterally  Cardio: RRR, S1/S2, No murmurs  Abdomen: Soft, Nontender, Nondistended; Bowel sounds present  Extremities: No calf tenderness, No pitting edema  skin: raised pruritic rash improving    LABS:                        10.0   5.28  )-----------( 195      ( 06 Sep 2021 05:45 )             30.1     09-06    144  |  107  |  19  ----------------------------<  113  4.1   |  27  |  0.95    Ca    9.3      06 Sep 2021 05:45    TPro  7.3  /  Alb  3.5  /  TBili  0.5  /  DBili  x   /  AST  18  /  ALT  36  /  AlkPhos  64  09-06    eGFR if Non African American: 65 mL/min/1.73M2 (09-06-21 @ 05:45)  eGFR if African American: 75 mL/min/1.73M2 (09-06-21 @ 05:45)      08-16 Chol 134 mg/dL LDL -- HDL 49 mg/dL Trig 64 mg/dL        COVID-19 PCR: NotDetec (09-07-21 @ 04:56)  COVID-19 PCR: NotDetec (09-01-21 @ 06:55)  COVID-19 PCR: NotDetec (08-26-21 @ 07:15)  COVID-19 PCR: NotDetec (08-20-21 @ 06:30)  COVID-19 PCR: NotDetec (08-15-21 @ 12:31)      RADIOLOGY & ADDITIONAL TESTS:    Care Discussed with Consultants/Other Providers: rehab team regarding rash

## 2021-09-08 NOTE — PROVIDER CONTACT NOTE (OTHER) - DATE AND TIME:
08-Sep-2021 10:09
08-Sep-2021 17:59
07-Sep-2021 19:39
06-Sep-2021 18:13
20-Aug-2021 22:02
08-Sep-2021 08:34
21-Aug-2021 17:37
28-Aug-2021 06:15

## 2021-09-08 NOTE — PROGRESS NOTE ADULT - ASSESSMENT
60 yo R handed woman with history of DMT2, HTN, HLD, multiple CVAs w/ residual RUE/LE weakness w/ foot drop, LLE>UE numbness s/p loop recorder and no longer on plavix admitted to [facility] after hospital course for left thalamic infarct, admitted for rehab    Acute L thalamic infarct  history of multiple cerebrovascular accidents (CVAs)   -continue ASA/STATIN, Brilinta   -BP goal <140/90  -ILR was implanted (8/18)  -Continue comprehensive rehab    Benign essential HTN  -SBP in 160-170 in early morning and pt then becomes lightheaded when BP drops to 110s with medications   -labetalol time changed to 10am and 10pm,   -cont amlodipine 10mg, lisinopril 40    Raised rash possibly urticaria - improving  -Claritin and famotidine   -benadryl cream prn    HLD  -continue atorvastatin    Type 2 diabetes mellitus  -continue metformin 1gm bid  -continue ISS  -monitor FS    ERENDIRA (acute kidney injury) on CKD II - resolved  -encourage oral intake  -avoid nephrotoxic agents  -monitor BMP    Normocytic anemia possible anemia of chronic disease  - monitor. H/H stable    Moderate protein-calorie malnutrition  - diet as per dietitian    VTE prophylaxis  -continue Lovenox       Dispo: pt scheduled for discharge this week and should follow up with her primary care MD as well as neurology      I have personally interviewed and examined this patient, reviewed pertinent clinical information, and performed the evaluation and management services provided at today's visit for inpatient medical follow up

## 2021-09-08 NOTE — PROGRESS NOTE ADULT - SUBJECTIVE AND OBJECTIVE BOX
Patient is a 61y old  Female who presents with a chief complaint of CVA (19 Aug 2021 14:38)    HPI:  60 yo R handed Female with  PMHx of DMT2, HTN, HLD, multiple CVAs w/ residual RUE/LE weakness w/ foot drop, LLE>UE numbness s/p loop recorder and plavix  now discontinued 2017. She presented to [facility] on 8/15/21 with acute on chronic R-sided weakness, LKN 8/15 07:30 symptoms noticed around 10AM after patient was trying to use her arm and was not able to "make it do what I wanted" on further clarification of RUE>LE weakness. Pt endorsed was leaning to one side, endorsed RLE feels weaker than normal which was present on initial NIHSS 5. Initial BPs elevated 190s systolic, CTH without hemorrhage or acute findings, BP was lowered with improvement in R sided acute on chronic weakness to allow for ataxia testing in limbs which was present, which was new findings.  Endorsed mild L posterior headache, no N/V or infectious symptoms. Patient was re-evaluated after improvement for weakness, concern for focal seizure with patient endorsing no prior seizure history or prior episodes w RUE. CTH w/o acute findings, CTA w worsening PCA stenosis but normal flow bL VAs. CDU monitoring for MR Brain to assess for any new infarcts, however course c/b reoccurrence in R UE "weakness" appearing contracture-like w RUE flexion and hypertonia, intact mentation.  she was seen by neurologist.  pt received 1500mg IV Keppra load given high suspicion for focal seizure activity. MRI Brain showed Left thalamic infarct. She did not receive TPA as she was out of  window. She was seen by cardiology; TTE, ASA/STATIN, and losartan started. EP was consulted, ILR was implanted (8/18), No events recorded on ILR. Of note, patient had ILR placed in the past ( 2017) no arrhythmias was recorded then. She was evaluated by PM&R and therapist and acute rehabilitation was recommended. She was admitted to [facility] on 8/19/21.       SUBJECTIVE: Patient seen and evaluated at bedside.   No new issues overnight  Itching and rash improving  Reported dizziness aroung 900am with . Am BP in 150s.   Has some spasms in RLE overnight. Continues to have curling of toes and does not feel any significant improvement with tizanidine.       ROS:  Denies HA/CP/palpitations/abdominal pain or nausea  Denies dysuria   + bm   as above       PHYSICAL EXAM  Vital Signs Last 24 Hrs    Vital Signs Last 24 Hrs  T(C): 36.4 (08 Sep 2021 08:33), Max: 36.8 (08 Sep 2021 07:49)  T(F): 97.6 (08 Sep 2021 08:33), Max: 98.3 (08 Sep 2021 07:49)  HR: 83 (08 Sep 2021 12:02) (73 - 97)  BP: 163/95 (08 Sep 2021 12:02) (111/66 - 170/100)  BP(mean): --  RR: 16 (08 Sep 2021 12:02) (16 - 17)  SpO2: 100% (08 Sep 2021 12:02) (96% - 100%)      Constitutional - NAD, Comfortable  Chest - breathing comfortably ,  Cardio - s1s2+,   Abdomen -  Soft, NT ND  Extremities - No peripheral edema, No calf tenderness   Neurologic Exam:              Cognitive - AAO x 3     Speech - Fluent, Comprehensible, Mild dysarthria   Right facial droop      Motor -                      LUE and LLE 5/5                     RIGHT UE - ShAB 2/5, EF 2/5, EE 2/5, WE 0/5,  0/5, trace finger movement                     RIGHT LE - HF 3/5, KE 4/5, DF 0/5, PF 1/5  + foot drop      Tone- some increased tone noted on right elbow flexors, right finger flexors, right toes   Psychiatric - Mood stable, Affect WNL  Skin: Loop recorder site- clean       Function:  OT: min assist with bathing  CG with toileting, transfers  Bed mobility: modified independent  Ambulation: 75' with WBQC - CGA/min assist  8 steps : min assist    MEDICATIONS  (STANDING):  amLODIPine   Tablet 10 milliGRAM(s) Oral daily  aspirin  chewable 81 milliGRAM(s) Oral daily  atorvastatin 80 milliGRAM(s) Oral at bedtime  benzocaine 15 mG/menthol 3.6 mG (Sugar-Free) Lozenge 1 Lozenge Oral four times a day  enoxaparin Injectable 40 milliGRAM(s) SubCutaneous daily  famotidine    Tablet 20 milliGRAM(s) Oral daily  FLUoxetine 10 milliGRAM(s) Oral daily  labetalol 100 milliGRAM(s) Oral <User Schedule>  loratadine 10 milliGRAM(s) Oral daily  metFORMIN 1000 milliGRAM(s) Oral two times a day with meals  ticagrelor 90 milliGRAM(s) Oral every 12 hours    MEDICATIONS  (PRN):  acetaminophen   Tablet .. 975 milliGRAM(s) Oral every 6 hours PRN Mild Pain (1 - 3), Moderate Pain (4 - 6), Severe Pain (7 - 10)  diphenhydramine 2%/zinc acetate 0.1% Cream 1 Application(s) Topical two times a day PRN Rash and/or Itching                            10.0   5.28  )-----------( 195      ( 06 Sep 2021 05:45 )             30.1     09-06    144  |  107  |  19  ----------------------------<  113<H>  4.1   |  27  |  0.95    Ca    9.3      06 Sep 2021 05:45    TPro  7.3  /  Alb  3.5  /  TBili  0.5  /  DBili  x   /  AST  18  /  ALT  36  /  AlkPhos  64  09-06

## 2021-09-08 NOTE — PROVIDER CONTACT NOTE (OTHER) - RECOMMENDATIONS
pt denies known exposure to allergens. rn cleaned skin c soap and water.
medications to be changed as per MD
Bed rest  MD made aware , will discuss with team for bedside assessment
labetalol 100mg stat dose.

## 2021-09-09 LAB
ALBUMIN SERPL ELPH-MCNC: 3.7 G/DL — SIGNIFICANT CHANGE UP (ref 3.3–5)
ALP SERPL-CCNC: 65 U/L — SIGNIFICANT CHANGE UP (ref 40–120)
ALT FLD-CCNC: 33 U/L — SIGNIFICANT CHANGE UP (ref 10–45)
ANION GAP SERPL CALC-SCNC: 8 MMOL/L — SIGNIFICANT CHANGE UP (ref 5–17)
AST SERPL-CCNC: 23 U/L — SIGNIFICANT CHANGE UP (ref 10–40)
BILIRUB SERPL-MCNC: 0.4 MG/DL — SIGNIFICANT CHANGE UP (ref 0.2–1.2)
BUN SERPL-MCNC: 23 MG/DL — SIGNIFICANT CHANGE UP (ref 7–23)
CALCIUM SERPL-MCNC: 9.5 MG/DL — SIGNIFICANT CHANGE UP (ref 8.4–10.5)
CHLORIDE SERPL-SCNC: 106 MMOL/L — SIGNIFICANT CHANGE UP (ref 96–108)
CO2 SERPL-SCNC: 28 MMOL/L — SIGNIFICANT CHANGE UP (ref 22–31)
CREAT SERPL-MCNC: 1.24 MG/DL — SIGNIFICANT CHANGE UP (ref 0.5–1.3)
GLUCOSE SERPL-MCNC: 129 MG/DL — HIGH (ref 70–99)
HCT VFR BLD CALC: 33 % — LOW (ref 34.5–45)
HGB BLD-MCNC: 10.9 G/DL — LOW (ref 11.5–15.5)
MCHC RBC-ENTMCNC: 27.3 PG — SIGNIFICANT CHANGE UP (ref 27–34)
MCHC RBC-ENTMCNC: 33 GM/DL — SIGNIFICANT CHANGE UP (ref 32–36)
MCV RBC AUTO: 82.5 FL — SIGNIFICANT CHANGE UP (ref 80–100)
NRBC # BLD: 0 /100 WBCS — SIGNIFICANT CHANGE UP (ref 0–0)
PLATELET # BLD AUTO: 234 K/UL — SIGNIFICANT CHANGE UP (ref 150–400)
POTASSIUM SERPL-MCNC: 4.4 MMOL/L — SIGNIFICANT CHANGE UP (ref 3.5–5.3)
POTASSIUM SERPL-SCNC: 4.4 MMOL/L — SIGNIFICANT CHANGE UP (ref 3.5–5.3)
PROT SERPL-MCNC: 7.6 G/DL — SIGNIFICANT CHANGE UP (ref 6–8.3)
RBC # BLD: 4 M/UL — SIGNIFICANT CHANGE UP (ref 3.8–5.2)
RBC # FLD: 14.1 % — SIGNIFICANT CHANGE UP (ref 10.3–14.5)
SODIUM SERPL-SCNC: 142 MMOL/L — SIGNIFICANT CHANGE UP (ref 135–145)
WBC # BLD: 5.91 K/UL — SIGNIFICANT CHANGE UP (ref 3.8–10.5)
WBC # FLD AUTO: 5.91 K/UL — SIGNIFICANT CHANGE UP (ref 3.8–10.5)

## 2021-09-09 PROCEDURE — 99232 SBSQ HOSP IP/OBS MODERATE 35: CPT

## 2021-09-09 RX ORDER — BACLOFEN 100 %
5 POWDER (GRAM) MISCELLANEOUS AT BEDTIME
Refills: 0 | Status: DISCONTINUED | OUTPATIENT
Start: 2021-09-09 | End: 2021-09-10

## 2021-09-09 RX ADMIN — FAMOTIDINE 20 MILLIGRAM(S): 10 INJECTION INTRAVENOUS at 14:23

## 2021-09-09 RX ADMIN — ENOXAPARIN SODIUM 40 MILLIGRAM(S): 100 INJECTION SUBCUTANEOUS at 11:57

## 2021-09-09 RX ADMIN — Medication 100 MILLIGRAM(S): at 21:10

## 2021-09-09 RX ADMIN — AMLODIPINE BESYLATE 10 MILLIGRAM(S): 2.5 TABLET ORAL at 05:13

## 2021-09-09 RX ADMIN — TICAGRELOR 90 MILLIGRAM(S): 90 TABLET ORAL at 17:13

## 2021-09-09 RX ADMIN — LORATADINE 10 MILLIGRAM(S): 10 TABLET ORAL at 11:58

## 2021-09-09 RX ADMIN — TICAGRELOR 90 MILLIGRAM(S): 90 TABLET ORAL at 05:13

## 2021-09-09 RX ADMIN — METFORMIN HYDROCHLORIDE 1000 MILLIGRAM(S): 850 TABLET ORAL at 17:13

## 2021-09-09 RX ADMIN — Medication 100 MILLIGRAM(S): at 10:06

## 2021-09-09 RX ADMIN — ATORVASTATIN CALCIUM 80 MILLIGRAM(S): 80 TABLET, FILM COATED ORAL at 21:10

## 2021-09-09 RX ADMIN — METFORMIN HYDROCHLORIDE 1000 MILLIGRAM(S): 850 TABLET ORAL at 08:04

## 2021-09-09 RX ADMIN — Medication 5 MILLIGRAM(S): at 21:10

## 2021-09-09 RX ADMIN — LISINOPRIL 40 MILLIGRAM(S): 2.5 TABLET ORAL at 11:58

## 2021-09-09 RX ADMIN — Medication 81 MILLIGRAM(S): at 11:57

## 2021-09-09 RX ADMIN — Medication 10 MILLIGRAM(S): at 11:58

## 2021-09-09 NOTE — PROGRESS NOTE ADULT - SUBJECTIVE AND OBJECTIVE BOX
Patient is a 61y old  Female who presents with a chief complaint of CVA (09 Sep 2021 12:52)      Subjective and overnight events:  Patient seen and examined at bedside. pt reports lips swelling and tingling sensation with bilateral arm pruritus last night after eating dinner.. symptoms resolved with benadryl. this morning, all symptoms resolved. no throat/ tongue swelling. no fever, chills, sob, cp, abd pain, n/v/d, dysuria. + muscle spasm on foot    ALLERGIES:  Pineapple (Unknown)  sulfa drugs (Angioedema; Swelling)  sulfa drugs (Rash)  Sulfac 10% (Unknown)  walnut (Urticaria)    MEDICATIONS  (STANDING):  amLODIPine   Tablet 10 milliGRAM(s) Oral daily  aspirin  chewable 81 milliGRAM(s) Oral daily  atorvastatin 80 milliGRAM(s) Oral at bedtime  baclofen 5 milliGRAM(s) Oral at bedtime  benzocaine 15 mG/menthol 3.6 mG (Sugar-Free) Lozenge 1 Lozenge Oral four times a day  enoxaparin Injectable 40 milliGRAM(s) SubCutaneous daily  famotidine    Tablet 20 milliGRAM(s) Oral daily  FLUoxetine 10 milliGRAM(s) Oral daily  labetalol 100 milliGRAM(s) Oral <User Schedule>  lisinopril 40 milliGRAM(s) Oral <User Schedule>  loratadine 10 milliGRAM(s) Oral daily  metFORMIN 1000 milliGRAM(s) Oral two times a day with meals  ticagrelor 90 milliGRAM(s) Oral every 12 hours    MEDICATIONS  (PRN):  acetaminophen   Tablet .. 975 milliGRAM(s) Oral every 6 hours PRN Mild Pain (1 - 3), Moderate Pain (4 - 6), Severe Pain (7 - 10)  diphenhydrAMINE 25 milliGRAM(s) Oral daily PRN Rash and/or Itching  diphenhydramine 2%/zinc acetate 0.1% Cream 1 Application(s) Topical two times a day PRN Rash and/or Itching    Vital Signs Last 24 Hrs  T(F): 98 (09 Sep 2021 08:05), Max: 98 (09 Sep 2021 08:05)  HR: 94 (09 Sep 2021 10:05) (87 - 99)  BP: 160/94 (09 Sep 2021 10:05) (130/81 - 160/94)  RR: 15 (09 Sep 2021 08:05) (15 - 16)  SpO2: 99% (09 Sep 2021 08:05) (99% - 100%)  I&O's Summary    PHYSICAL EXAM:  General: NAD, A/O x 3  ENT: MMM  Neck: Supple, No JVD  Lungs: Clear to auscultation bilaterally  Cardio: RRR, S1/S2, No murmurs  Abdomen: Soft, Nontender, Nondistended; Bowel sounds present  Extremities: No calf tenderness, No pitting edema  Skin: raised erythema rash on bilateral arm resolving    LABS:                        10.9   5.91  )-----------( 234      ( 09 Sep 2021 05:45 )             33.0     09-09    142  |  106  |  23  ----------------------------<  129  4.4   |  28  |  1.24    Ca    9.5      09 Sep 2021 05:45    TPro  7.6  /  Alb  3.7  /  TBili  0.4  /  DBili  x   /  AST  23  /  ALT  33  /  AlkPhos  65  09-09    eGFR if Non African American: 47 mL/min/1.73M2 (09-09-21 @ 05:45)  eGFR if African American: 54 mL/min/1.73M2 (09-09-21 @ 05:45)          08-16 Chol 134 mg/dL LDL -- HDL 49 mg/dL Trig 64 mg/dL                      COVID-19 PCR: NotDetec (09-07-21 @ 04:56)  COVID-19 PCR: NotDetec (09-01-21 @ 06:55)  COVID-19 PCR: NotDetec (08-26-21 @ 07:15)  COVID-19 PCR: NotDetec (08-20-21 @ 06:30)  COVID-19 PCR: NotDetec (08-15-21 @ 12:31)      RADIOLOGY & ADDITIONAL TESTS:    Care Discussed with Consultants/Other Providers: rehab team during IDR round   Patient is a 61y old  Female who presents with a chief complaint of CVA (09 Sep 2021 12:52)      Subjective and overnight events:  Patient seen and examined at bedside. pt reports lips swelling and tingling sensation with bilateral arm pruritus last night after eating dinner.. symptoms resolved with benadryl. this morning, all symptoms resolved. no throat/ tongue swelling. no fever, chills, sob, cp, abd pain, n/v/d, dysuria. + muscle spasm on foot    ALLERGIES:  Pineapple (Unknown)  sulfa drugs (Angioedema; Swelling)  sulfa drugs (Rash)  Sulfac 10% (Unknown)  walnut (Urticaria)    MEDICATIONS  (STANDING):  amLODIPine   Tablet 10 milliGRAM(s) Oral daily  aspirin  chewable 81 milliGRAM(s) Oral daily  atorvastatin 80 milliGRAM(s) Oral at bedtime  baclofen 5 milliGRAM(s) Oral at bedtime  benzocaine 15 mG/menthol 3.6 mG (Sugar-Free) Lozenge 1 Lozenge Oral four times a day  enoxaparin Injectable 40 milliGRAM(s) SubCutaneous daily  famotidine    Tablet 20 milliGRAM(s) Oral daily  FLUoxetine 10 milliGRAM(s) Oral daily  labetalol 100 milliGRAM(s) Oral <User Schedule>  lisinopril 40 milliGRAM(s) Oral <User Schedule>  loratadine 10 milliGRAM(s) Oral daily  metFORMIN 1000 milliGRAM(s) Oral two times a day with meals  ticagrelor 90 milliGRAM(s) Oral every 12 hours    MEDICATIONS  (PRN):  acetaminophen   Tablet .. 975 milliGRAM(s) Oral every 6 hours PRN Mild Pain (1 - 3), Moderate Pain (4 - 6), Severe Pain (7 - 10)  diphenhydrAMINE 25 milliGRAM(s) Oral daily PRN Rash and/or Itching  diphenhydramine 2%/zinc acetate 0.1% Cream 1 Application(s) Topical two times a day PRN Rash and/or Itching    Vital Signs Last 24 Hrs  T(F): 98 (09 Sep 2021 08:05), Max: 98 (09 Sep 2021 08:05)  HR: 94 (09 Sep 2021 10:05) (87 - 99)  BP: 160/94 (09 Sep 2021 10:05) (130/81 - 160/94)  RR: 15 (09 Sep 2021 08:05) (15 - 16)  SpO2: 99% (09 Sep 2021 08:05) (99% - 100%)  I&O's Summary    PHYSICAL EXAM:  General: NAD, A/O x 3  ENT: MMM. no lip swelling, no tongue swelling  Neck: Supple, No JVD  Lungs: Clear to auscultation bilaterally  Cardio: RRR, S1/S2, No murmurs  Abdomen: Soft, Nontender, Nondistended; Bowel sounds present  Extremities: No calf tenderness, No pitting edema  Skin: raised erythema rash on bilateral arm resolving    LABS:                        10.9   5.91  )-----------( 234      ( 09 Sep 2021 05:45 )             33.0     09-09    142  |  106  |  23  ----------------------------<  129  4.4   |  28  |  1.24    Ca    9.5      09 Sep 2021 05:45    TPro  7.6  /  Alb  3.7  /  TBili  0.4  /  DBili  x   /  AST  23  /  ALT  33  /  AlkPhos  65  09-09    eGFR if Non African American: 47 mL/min/1.73M2 (09-09-21 @ 05:45)  eGFR if African American: 54 mL/min/1.73M2 (09-09-21 @ 05:45)          08-16 Chol 134 mg/dL LDL -- HDL 49 mg/dL Trig 64 mg/dL                      COVID-19 PCR: NotDetec (09-07-21 @ 04:56)  COVID-19 PCR: NotDetec (09-01-21 @ 06:55)  COVID-19 PCR: NotDetec (08-26-21 @ 07:15)  COVID-19 PCR: NotDetec (08-20-21 @ 06:30)  COVID-19 PCR: NotDetec (08-15-21 @ 12:31)      RADIOLOGY & ADDITIONAL TESTS:    Care Discussed with Consultants/Other Providers: rehab team during IDR round

## 2021-09-09 NOTE — PROGRESS NOTE ADULT - ASSESSMENT
ASSESSMENT/PLAN  60 yo R handed Female with  PMHx of DMT2, HTN, HLD, multiple CVAs w/ residual RUE/LE weakness w/ foot drop, LLE>UE numbness s/p loop recorder and plavix  now discontinued 2017. Admitted on 8/15 for acute on chronic R-sided weakness found to have Left thalamic infarct. Had concerns of seizure and given loading dose of keppra, however it was ruled out. Patient now with Gait Instability, ADL impairments and Functional impairments.    COMORBIDITES/ACTIVE MEDICAL ISSUES     History of Multiple CVA: new left thalamic infarcts, right spastic hemiparesis   s/p ILR placement; f/u with Dr. Minor  - ASA 81 mg daily, - Brillinta 90 mg twice  FOR 4 WEEKS -  Per neurology - start date 8/17   - Hypercoagulable workup- essentially negative   Continue Comprehensive Rehab Program of PT/OT/ST - total of 3 hrs/day 5 days/week( ST reduced to 30 mnutes)   Will need right AFO as she has a foot drop from recurrent stroke   DC tizanidine as not beneficial at this time   Baclofen 5 mg qhs     HLD:- Lipitor qhs     HTN: on amlodipine 10mg daily, lisinopril 40mg daily - timing changed to 1200pm  , Labetolol 100mg bid ( 1000 am and 1000 pm )   - Home meds: (amlodipine, labetalol, HCTz, lisinopril 40)  - Hold for systolic BP of <140    DM II:on - metformin 1000mg  BID    Tachycardia : improved     Hypokalemia: resolved    Pain control:- Tylenol PRN    GI/Bowel Mgmt   - Continent  - Senna,  Miralax PRN,    Bladder management: toilet schedule prn     DVT prophylaxis:- Lovenox  - SCDs, TEDs     FEN :- Diet - Consistent Carbohydrate Low Sodium     Mood: prozac 10mg daily     Dizziness:  Vestibular  eval done 8/31- Patient reports dizziness slightly better, but still occurs at times- Requested PT for vestibular program in addition to post stroke rehabilitation    LLE numbness- ? etiology. exam LLE normal strength, sensation       Precautions / PROPHYLAXIS:   - Falls  - Pressure injury/Skin:  OOB to Chair, position change while in bed q2h      Disp: team conference 8/24 and 8/31 and 9/7:  TDD 9/17 to improve overall function    Hospitalist  fu noted

## 2021-09-09 NOTE — PROGRESS NOTE ADULT - ASSESSMENT
62 yo R handed woman with history of DMT2, HTN, HLD, multiple CVAs w/ residual RUE/LE weakness w/ foot drop, LLE>UE numbness s/p loop recorder and no longer on plavix admitted to  rehab after hospital course for left thalamic infarct, admitted for rehab    Acute L thalamic infarct  history of multiple cerebrovascular accidents (CVAs)   -continue ASA/STATIN, Brilinta   -BP goal <140/90  -ILR was implanted (8/18)  -Continue comprehensive rehab    Benign essential HTN  -SBP persistently >150  -labetalol 100mg BID  -cont amlodipine 10mg, lisinopril 40  -pt does reports some dizziness on position change, check orthostatic VS.      Raised rash possibly urticaria and mild lip swelling possibly due to food allergy  - symptoms started after pt ingested food that touched pineapple which pt is allergic to  - benadryl prn, claritinand famotidine. cont benadryl cream prn  -     HLD  -continue atorvastatin    Type 2 diabetes mellitus  -continue metformin 1gm bid  -continue ISS  -monitor FS    ERENDIRA (acute kidney injury) on CKD II - resolved  -encourage oral intake  -avoid nephrotoxic agents  -monitor BMP    Normocytic anemia possible anemia of chronic disease  - monitor. H/H stable    Moderate protein-calorie malnutrition  - diet as per dietitian    VTE prophylaxis  -continue Lovenox       Dispo: pt scheduled for discharge this week and should follow up with her primary care MD as well as neurology      I have personally interviewed and examined this patient, reviewed pertinent clinical information, and performed the evaluation and management services provided at today's visit for inpatient medical follow up   60 yo R handed woman with history of DMT2, HTN, HLD, multiple CVAs w/ residual RUE/LE weakness w/ foot drop, LLE>UE numbness s/p loop recorder and no longer on plavix admitted to [facility] after hospital course for left thalamic infarct, admitted for rehab    Acute L thalamic infarct  history of multiple cerebrovascular accidents (CVAs)   -continue ASA/STATIN, Brilinta   -BP goal <140/90  -ILR was implanted (8/18)  -Continue comprehensive rehab    Benign essential HTN  -SBP persistently >150  -labetalol 100mg BID  -cont amlodipine 10mg, lisinopril 40  -pt does reports some dizziness on position change, check orthostatic VS.      Raised rash possibly urticaria and mild lip swelling possibly due to food allergy  - symptoms subsided now  - symptoms started after pt ingested food that touched pineapple which pt is allergic to  - benadryl prn, famotidine. cont benadryl cream prn  - Follow up with Allergist as outpt    HLD  -continue atorvastatin    Type 2 diabetes mellitus  -continue metformin 1gm bid  -continue ISS  -monitor FS    ERENDIRA (acute kidney injury) on CKD II - resolved  -encourage oral intake  -avoid nephrotoxic agents  -monitor BMP    Normocytic anemia possible anemia of chronic disease  - monitor. H/H stable    Moderate protein-calorie malnutrition  - diet as per dietitian    VTE prophylaxis  -continue Lovenox       Dispo: pt scheduled for discharge this week and should follow up with her primary care MD as well as neurology      I have personally interviewed and examined this patient, reviewed pertinent clinical information, and performed the evaluation and management services provided at today's visit for inpatient medical follow up

## 2021-09-09 NOTE — PROGRESS NOTE ADULT - SUBJECTIVE AND OBJECTIVE BOX
Patient is a 61y old  Female who presents with a chief complaint of CVA (19 Aug 2021 14:38)    HPI:  62 yo R handed Female with  PMHx of DMT2, HTN, HLD, multiple CVAs w/ residual RUE/LE weakness w/ foot drop, LLE>UE numbness s/p loop recorder and plavix  now discontinued 2017. She presented to [facility] on 8/15/21 with acute on chronic R-sided weakness, LKN 8/15 07:30 symptoms noticed around 10AM after patient was trying to use her arm and was not able to "make it do what I wanted" on further clarification of RUE>LE weakness. Pt endorsed was leaning to one side, endorsed RLE feels weaker than normal which was present on initial NIHSS 5. Initial BPs elevated 190s systolic, CTH without hemorrhage or acute findings, BP was lowered with improvement in R sided acute on chronic weakness to allow for ataxia testing in limbs which was present, which was new findings.  Endorsed mild L posterior headache, no N/V or infectious symptoms. Patient was re-evaluated after improvement for weakness, concern for focal seizure with patient endorsing no prior seizure history or prior episodes w RUE. CTH w/o acute findings, CTA w worsening PCA stenosis but normal flow bL VAs. CDU monitoring for MR Brain to assess for any new infarcts, however course c/b reoccurrence in R UE "weakness" appearing contracture-like w RUE flexion and hypertonia, intact mentation.  she was seen by neurologist.  pt received 1500mg IV Keppra load given high suspicion for focal seizure activity. MRI Brain showed Left thalamic infarct. She did not receive TPA as she was out of  window. She was seen by cardiology; TTE, ASA/STATIN, and losartan started. EP was consulted, ILR was implanted (8/18), No events recorded on ILR. Of note, patient had ILR placed in the past ( 2017) no arrhythmias was recorded then. She was evaluated by PM&R and therapist and acute rehabilitation was recommended. She was admitted to [facility] on 8/19/21.       SUBJECTIVE: Patient seen and evaluated at bedside.   Overnight given dose of benadryl for lip swelling -reports some lip swelling. Denies any tongue swelling  Tizanidine dc yesterday  This am also reports left LE transient numbness- new- Denies any back pain   Has some left sided posterior scalp region headache    ROS:  Denies HA/CP/palpitations/abdominal pain or nausea  Denies dysuria   Continent of bowel and bladder     PHYSICAL EXAM  Vital Signs Last 24 Hrs  T(C): 36.7 (09 Sep 2021 08:05), Max: 36.7 (09 Sep 2021 08:05)  T(F): 98 (09 Sep 2021 08:05), Max: 98 (09 Sep 2021 08:05)  HR: 99 (09 Sep 2021 08:05) (87 - 99)  BP: 152/95 (09 Sep 2021 08:05) (130/81 - 152/95)  BP(mean): --  RR: 15 (09 Sep 2021 08:05) (15 - 16)  SpO2: 99% (09 Sep 2021 08:05) (99% - 100%)      Constitutional - NAD, Comfortable  Chest - breathing comfortably ,  Cardio - s1s2+,   Abdomen -  Soft, NT ND  Extremities - No peripheral edema, No calf tenderness   Neurologic Exam:              Cognitive - AAO x 3     Speech - Fluent, Comprehensible, Mild dysarthria   Right facial droop      Motor -                      LUE and LLE 5/5                     RIGHT UE - ShAB 2/5, EF 2/5, EE 2/5, WE 0/5,  0/5, trace finger movement                     RIGHT LE - HF 3/5, KE 4/5, DF 0/5, PF 1/5  + foot drop      Tone- some increased tone noted on right elbow flexors, right finger flexors, right toes   Psychiatric - Mood stable, Affect WNL  Skin: Loop recorder site- clean       Function:  OT: min assist with bathing  CG with toileting, transfers  Bed mobility: modified independent  Ambulation: 75' with WBQC - CGA/min assist  8 steps : min assist      MEDICATIONS  (STANDING):  amLODIPine   Tablet 10 milliGRAM(s) Oral daily  aspirin  chewable 81 milliGRAM(s) Oral daily  atorvastatin 80 milliGRAM(s) Oral at bedtime  baclofen 5 milliGRAM(s) Oral at bedtime  benzocaine 15 mG/menthol 3.6 mG (Sugar-Free) Lozenge 1 Lozenge Oral four times a day  enoxaparin Injectable 40 milliGRAM(s) SubCutaneous daily  famotidine    Tablet 20 milliGRAM(s) Oral daily  FLUoxetine 10 milliGRAM(s) Oral daily  labetalol 100 milliGRAM(s) Oral <User Schedule>  lisinopril 40 milliGRAM(s) Oral <User Schedule>  loratadine 10 milliGRAM(s) Oral daily  metFORMIN 1000 milliGRAM(s) Oral two times a day with meals  ticagrelor 90 milliGRAM(s) Oral every 12 hours    MEDICATIONS  (PRN):  acetaminophen   Tablet .. 975 milliGRAM(s) Oral every 6 hours PRN Mild Pain (1 - 3), Moderate Pain (4 - 6), Severe Pain (7 - 10)  diphenhydrAMINE 25 milliGRAM(s) Oral daily PRN Rash and/or Itching  diphenhydramine 2%/zinc acetate 0.1% Cream 1 Application(s) Topical two times a day PRN Rash and/or Itching                            10.9   5.91  )-----------( 234      ( 09 Sep 2021 05:45 )             33.0       09-09    142  |  106  |  23  ----------------------------<  129<H>  4.4   |  28  |  1.24    Ca    9.5      09 Sep 2021 05:45    TPro  7.6  /  Alb  3.7  /  TBili  0.4  /  DBili  x   /  AST  23  /  ALT  33  /  AlkPhos  65  09-09

## 2021-09-10 PROCEDURE — 99232 SBSQ HOSP IP/OBS MODERATE 35: CPT | Mod: GC

## 2021-09-10 PROCEDURE — 99232 SBSQ HOSP IP/OBS MODERATE 35: CPT

## 2021-09-10 RX ORDER — LISINOPRIL 2.5 MG/1
1 TABLET ORAL
Qty: 30 | Refills: 0
Start: 2021-09-10 | End: 2021-10-09

## 2021-09-10 RX ORDER — METFORMIN HYDROCHLORIDE 850 MG/1
1 TABLET ORAL
Qty: 60 | Refills: 0
Start: 2021-09-10 | End: 2021-10-09

## 2021-09-10 RX ORDER — TICAGRELOR 90 MG/1
1 TABLET ORAL
Qty: 8 | Refills: 0
Start: 2021-09-10 | End: 2021-09-13

## 2021-09-10 RX ORDER — TIZANIDINE 4 MG/1
1 TABLET ORAL
Qty: 60 | Refills: 0
Start: 2021-09-10 | End: 2021-10-09

## 2021-09-10 RX ORDER — LABETALOL HCL 100 MG
100 TABLET ORAL
Refills: 0 | Status: DISCONTINUED | OUTPATIENT
Start: 2021-09-10 | End: 2021-09-17

## 2021-09-10 RX ORDER — AMLODIPINE BESYLATE 2.5 MG/1
1 TABLET ORAL
Qty: 30 | Refills: 0
Start: 2021-09-10 | End: 2021-10-09

## 2021-09-10 RX ORDER — BACLOFEN 100 %
10 POWDER (GRAM) MISCELLANEOUS AT BEDTIME
Refills: 0 | Status: DISCONTINUED | OUTPATIENT
Start: 2021-09-10 | End: 2021-09-13

## 2021-09-10 RX ORDER — DOXAZOSIN MESYLATE 4 MG
2 TABLET ORAL AT BEDTIME
Refills: 0 | Status: DISCONTINUED | OUTPATIENT
Start: 2021-09-10 | End: 2021-09-12

## 2021-09-10 RX ORDER — ASPIRIN/CALCIUM CARB/MAGNESIUM 324 MG
1 TABLET ORAL
Qty: 30 | Refills: 0
Start: 2021-09-10 | End: 2021-10-09

## 2021-09-10 RX ORDER — LABETALOL HCL 100 MG
1 TABLET ORAL
Qty: 60 | Refills: 0
Start: 2021-09-10 | End: 2021-10-09

## 2021-09-10 RX ORDER — ATORVASTATIN CALCIUM 80 MG/1
1 TABLET, FILM COATED ORAL
Qty: 30 | Refills: 0
Start: 2021-09-10 | End: 2021-10-09

## 2021-09-10 RX ORDER — FLUOXETINE HCL 10 MG
1 CAPSULE ORAL
Qty: 30 | Refills: 0
Start: 2021-09-10 | End: 2021-10-09

## 2021-09-10 RX ADMIN — Medication 10 MILLIGRAM(S): at 22:32

## 2021-09-10 RX ADMIN — ATORVASTATIN CALCIUM 80 MILLIGRAM(S): 80 TABLET, FILM COATED ORAL at 22:32

## 2021-09-10 RX ADMIN — TICAGRELOR 90 MILLIGRAM(S): 90 TABLET ORAL at 05:34

## 2021-09-10 RX ADMIN — METFORMIN HYDROCHLORIDE 1000 MILLIGRAM(S): 850 TABLET ORAL at 08:20

## 2021-09-10 RX ADMIN — Medication 100 MILLIGRAM(S): at 10:00

## 2021-09-10 RX ADMIN — Medication 100 MILLIGRAM(S): at 17:12

## 2021-09-10 RX ADMIN — Medication 81 MILLIGRAM(S): at 12:05

## 2021-09-10 RX ADMIN — ENOXAPARIN SODIUM 40 MILLIGRAM(S): 100 INJECTION SUBCUTANEOUS at 12:05

## 2021-09-10 RX ADMIN — Medication 2 MILLIGRAM(S): at 22:32

## 2021-09-10 RX ADMIN — METFORMIN HYDROCHLORIDE 1000 MILLIGRAM(S): 850 TABLET ORAL at 17:11

## 2021-09-10 RX ADMIN — LISINOPRIL 40 MILLIGRAM(S): 2.5 TABLET ORAL at 12:05

## 2021-09-10 RX ADMIN — Medication 10 MILLIGRAM(S): at 12:05

## 2021-09-10 RX ADMIN — TICAGRELOR 90 MILLIGRAM(S): 90 TABLET ORAL at 17:11

## 2021-09-10 RX ADMIN — FAMOTIDINE 20 MILLIGRAM(S): 10 INJECTION INTRAVENOUS at 12:05

## 2021-09-10 RX ADMIN — AMLODIPINE BESYLATE 10 MILLIGRAM(S): 2.5 TABLET ORAL at 05:34

## 2021-09-10 NOTE — PROGRESS NOTE ADULT - ASSESSMENT
ASSESSMENT/PLAN  62 yo R handed Female with  PMHx of DMT2, HTN, HLD, multiple CVAs w/ residual RUE/LE weakness w/ foot drop, LLE>UE numbness s/p loop recorder and plavix  now discontinued 2017. Admitted on 8/15 for acute on chronic R-sided weakness found to have Left thalamic infarct. Had concerns of seizure and given loading dose of keppra, however it was ruled out. Patient now with Gait Instability, ADL impairments and Functional impairments.    COMORBIDITES/ACTIVE MEDICAL ISSUES     History of Multiple CVA: new left thalamic infarcts, right spastic hemiparesis   s/p ILR placement; f/u with Dr. Minor  - ASA 81 mg daily, - Brillinta 90 mg twice  FOR 4 WEEKS -  Per neurology - start date 8/17   - Hypercoagulable workup- essentially negative   Continue Comprehensive Rehab Program of PT/OT/ST - total of 3 hrs/day 5 days/week( ST reduced to 30 mnutes)   Will need right AFO as she has a foot drop from recurrent stroke   DC tizanidine as not beneficial at this time   Baclofen 5 mg qhs     HLD:- Lipitor qhs     HTN: on amlodipine 10mg daily, lisinopril 40mg daily - timing changed to 1200pm  , Labetolol 100mg bid ( 1000 am and 1000 pm )   - Home meds: (amlodipine, labetalol, HCTz, lisinopril 40)  - Hold for systolic BP of <140    DM II:on - metformin 1000mg  BID    Tachycardia : improved     Hypokalemia: resolved    Pain control:- Tylenol PRN    GI/Bowel Mgmt   - Continent  - Senna,  Miralax PRN,    Bladder management: toilet schedule prn     DVT prophylaxis:- Lovenox  - SCDs, TEDs     FEN :- Diet - Consistent Carbohydrate Low Sodium     Mood: prozac 10mg daily     Dizziness:  Vestibular  eval done 8/31- Patient reports dizziness slightly better, but still occurs at times- Requested PT for vestibular program in addition to post stroke rehabilitation    LLE numbness- ? etiology. exam LLE normal strength, sensation       Precautions / PROPHYLAXIS:   - Falls  - Pressure injury/Skin:  OOB to Chair, position change while in bed q2h      Disp: team conference 8/24 and 8/31 and 9/7:  TDD 9/17 to improve overall function           ASSESSMENT/PLAN  62 yo R handed Female with  PMHx of DMT2, HTN, HLD, multiple CVAs w/ residual RUE/LE weakness w/ foot drop, LLE>UE numbness s/p loop recorder and plavix  now discontinued 2017. Admitted on 8/15 for acute on chronic R-sided weakness found to have Left thalamic infarct. Had concerns of seizure and given loading dose of keppra, however it was ruled out. Patient now with Gait Instability, ADL impairments and Functional impairments.    COMORBIDITES/ACTIVE MEDICAL ISSUES     History of Multiple CVA: new left thalamic infarcts, right spastic hemiparesis   s/p ILR placement; f/u with Dr. Minor  - ASA 81 mg daily, - Brillinta 90 mg twice  FOR 4 WEEKS -  Per neurology - start date 8/17   - Hypercoagulable workup- essentially negative   Continue Comprehensive Rehab Program of PT/OT/ST - total of 3 hrs/day 5 days/week( ST reduced to 30 mnutes)   Will need right AFO as she has a foot drop from recurrent stroke   DC tizanidine as not beneficial at this time   Baclofen 5 mg qhs increased to 10mg qhs    HLD:- Lipitor qhs     HTN: on amlodipine 10mg daily, lisinopril 40mg daily - timing changed to 1200pm  , Labetolol 100mg bid ( 1000 am and 1000 pm )   - Home meds: (amlodipine, labetalol, lisinopril 40), has been on hydralazine in past, will discuss w/ hospitalist about adding another agent.  - Hold for systolic BP of <140  - Recommend outpatient follow-up w/ nephrologist for further workup for resistant HTN and management.    DM II:on - metformin 1000mg  BID    Tachycardia : improved     Hypokalemia: resolved    Pain control:- Tylenol PRN    GI/Bowel Mgmt   - Continent  - Senna,  Miralax PRN,    Bladder management: toilet schedule prn     DVT prophylaxis:- Lovenox  - SCDs, TEDs     FEN :- Diet - Consistent Carbohydrate Low Sodium     Mood: prozac 10mg daily     Dizziness:  Vestibular  eval done 8/31- Patient reports dizziness slightly better, but still occurs at times- Requested PT for vestibular program in addition to post stroke rehabilitation    LLE numbness- ? etiology. exam LLE normal strength, sensation       Precautions / PROPHYLAXIS:   - Falls  - Pressure injury/Skin:  OOB to Chair, position change while in bed q2h      Disp: team conference 8/24 and 8/31 and 9/7:  TDD 9/17 to improve overall function

## 2021-09-10 NOTE — PROGRESS NOTE ADULT - ATTENDING COMMENTS
Patient states that she feels well.  No further itching or allergies- dc claritin    2. BP noted to be elevated today. Per discussion with hospitalist low dose cardura added . ( Patient with sulfa allergy, hence HCTZ not started )   Also recommended outpatient fu with nephrology for further evaluation if needed     3. Neuro exam stable   Right spastic Hemiparesis- Increase baclofen to 10mg qhs.    4. Continue rehab program  dc planning home next week

## 2021-09-10 NOTE — PROGRESS NOTE ADULT - ASSESSMENT
62 yo R handed woman with history of DMT2, HTN, HLD, multiple CVAs w/ residual RUE/LE weakness w/ foot drop, LLE>UE numbness s/p loop recorder and no longer on plavix admitted to [facility] after hospital course for left thalamic infarct, admitted for rehab    Acute L thalamic infarct  history of multiple cerebrovascular accidents (CVAs)   -continue ASA/STATIN, Brilinta   -BP goal <140/90  -ILR was implanted (8/18)  -Continue comprehensive rehab    Benign essential HTN  -SBP persistently >150  -labetalol 100mg BID  -cont amlodipine 10mg, lisinopril 40  -pt does reports some dizziness on position change, check orthostatic VS.      Raised rash possibly urticaria and mild lip swelling possibly due to food allergy  - symptoms subsided now  - symptoms started after pt ingested food that touched pineapple which pt is allergic to  - benadryl prn, famotidine. cont benadryl cream prn  - Follow up with Allergist as outpt    HLD  -continue atorvastatin    Type 2 diabetes mellitus  -continue metformin 1gm bid  -continue ISS  -monitor FS    ERENDIRA (acute kidney injury) on CKD II - resolved  -encourage oral intake  -avoid nephrotoxic agents  -monitor BMP    Normocytic anemia possible anemia of chronic disease  - monitor. H/H stable    Moderate protein-calorie malnutrition  - diet as per dietitian    VTE prophylaxis  -continue Lovenox       Dispo: pt scheduled for discharge this week and should follow up with her primary care MD as well as neurology      I have personally interviewed and examined this patient, reviewed pertinent clinical information, and performed the evaluation and management services provided at today's visit for inpatient medical follow up   60 yo R handed woman with history of DMT2, HTN, HLD, multiple CVAs w/ residual RUE/LE weakness w/ foot drop, LLE>UE numbness s/p loop recorder and no longer on plavix admitted to  rehab after hospital course for left thalamic infarct, admitted for rehab    Acute L thalamic infarct  history of multiple cerebrovascular accidents (CVAs)   -continue ASA/STATIN, Brilinta   -BP goal <140/90  -ILR was implanted (8/18)  -Continue comprehensive rehab    Benign essential HTN  -SBP persistently >150  -cont labetalol 100mg BID, amlodipine 10mg, lisinopril 40  -add cardura 2mg qHS  -pt does reports some dizziness on position change, check orthostatic VS.      Raised rash possibly urticaria and mild lip swelling possibly due to food allergy- resolved  - symptoms started after pt ingested food that touched pineapple which pt is allergic to  - benadryl prn  - benadryl cream prn  - Follow up with Allergist as outpt    HLD  -continue atorvastatin    Type 2 diabetes mellitus  -continue metformin 1gm bid  -continue ISS  -monitor FS    ERENDIRA (acute kidney injury) on CKD II - resolved  -encourage oral intake  -avoid nephrotoxic agents  -monitor BMP    Normocytic anemia possible anemia of chronic disease  - monitor. H/H stable    Moderate protein-calorie malnutrition  - diet as per dietitian    VTE prophylaxis  -continue Lovenox       Dispo: pt scheduled for discharge this week and should follow up with her primary care MD as well as neurology

## 2021-09-10 NOTE — PROGRESS NOTE ADULT - SUBJECTIVE AND OBJECTIVE BOX
Patient is a 61y old  Female who presents with a chief complaint of CVA (10 Sep 2021 10:55)      Subjective and overnight events:  Patient seen and examined at bedside. pt reports lip swelling and pruritic rash resolved. Pt cont to have lightheadedness/ dizziness in therapy but pt states that it is manageable. No fever, chills, sob, cp.     ALLERGIES:  Pineapple (Unknown)  sulfa drugs (Angioedema; Swelling)  sulfa drugs (Rash)  Sulfac 10% (Unknown)  walnut (Urticaria)    MEDICATIONS  (STANDING):  amLODIPine   Tablet 10 milliGRAM(s) Oral daily  aspirin  chewable 81 milliGRAM(s) Oral daily  atorvastatin 80 milliGRAM(s) Oral at bedtime  baclofen 10 milliGRAM(s) Oral at bedtime  benzocaine 15 mG/menthol 3.6 mG (Sugar-Free) Lozenge 1 Lozenge Oral four times a day  doxazosin 2 milliGRAM(s) Oral at bedtime  enoxaparin Injectable 40 milliGRAM(s) SubCutaneous daily  famotidine    Tablet 20 milliGRAM(s) Oral daily  FLUoxetine 10 milliGRAM(s) Oral daily  labetalol 100 milliGRAM(s) Oral <User Schedule>  lisinopril 40 milliGRAM(s) Oral <User Schedule>  metFORMIN 1000 milliGRAM(s) Oral two times a day with meals  ticagrelor 90 milliGRAM(s) Oral every 12 hours    MEDICATIONS  (PRN):  acetaminophen   Tablet .. 975 milliGRAM(s) Oral every 6 hours PRN Mild Pain (1 - 3), Moderate Pain (4 - 6), Severe Pain (7 - 10)  diphenhydrAMINE 25 milliGRAM(s) Oral daily PRN Rash and/or Itching  diphenhydramine 2%/zinc acetate 0.1% Cream 1 Application(s) Topical two times a day PRN Rash and/or Itching    Vital Signs Last 24 Hrs  T(F): 97.7 (10 Sep 2021 08:55), Max: 98.2 (09 Sep 2021 20:52)  HR: 87 (10 Sep 2021 09:58) (87 - 100)  BP: 149/91 (10 Sep 2021 09:58) (129/81 - 150/100)  RR: 16 (10 Sep 2021 08:55) (16 - 16)  SpO2: 95% (10 Sep 2021 08:55) (95% - 98%)  I&O's Summary    PHYSICAL EXAM:  General: NAD, A/O x 3  ENT: MMM  Neck: Supple, No JVD  Lungs: Clear to auscultation bilaterally  Cardio: RRR, S1/S2, No murmurs  Abdomen: Soft, Nontender, Nondistended; Bowel sounds present  Extremities: No calf tenderness, No pitting edema  Skin: rash resolved. lip swelling resolved    LABS:                        10.9   5.91  )-----------( 234      ( 09 Sep 2021 05:45 )             33.0     09-09    142  |  106  |  23  ----------------------------<  129  4.4   |  28  |  1.24    Ca    9.5      09 Sep 2021 05:45    TPro  7.6  /  Alb  3.7  /  TBili  0.4  /  DBili  x   /  AST  23  /  ALT  33  /  AlkPhos  65  09-09    eGFR if Non African American: 47 mL/min/1.73M2 (09-09-21 @ 05:45)  eGFR if African American: 54 mL/min/1.73M2 (09-09-21 @ 05:45)        08-16 Chol 134 mg/dL LDL -- HDL 49 mg/dL Trig 64 mg/dL        COVID-19 PCR: NotDetec (09-07-21 @ 04:56)  COVID-19 PCR: NotDetec (09-01-21 @ 06:55)  COVID-19 PCR: NotDetec (08-26-21 @ 07:15)  COVID-19 PCR: NotDetec (08-20-21 @ 06:30)  COVID-19 PCR: NotDetec (08-15-21 @ 12:31)      RADIOLOGY & ADDITIONAL TESTS:    Care Discussed with Consultants/Other Providers: rehab team    Patient is a 61y old  Female who presents with a chief complaint of CVA (10 Sep 2021 10:55)      Subjective and overnight events:  Patient seen and examined at bedside. pt reports lip swelling and pruritic rash resolved. Pt cont to have lightheadedness/ dizziness in therapy but pt states that it is manageable. No fever, chills, sob, cp.     ALLERGIES:  Pineapple (Unknown)  sulfa drugs (Angioedema; Swelling)  sulfa drugs (Rash)  Sulfac 10% (Unknown)  walnut (Urticaria)    MEDICATIONS  (STANDING):  amLODIPine   Tablet 10 milliGRAM(s) Oral daily  aspirin  chewable 81 milliGRAM(s) Oral daily  atorvastatin 80 milliGRAM(s) Oral at bedtime  baclofen 10 milliGRAM(s) Oral at bedtime  benzocaine 15 mG/menthol 3.6 mG (Sugar-Free) Lozenge 1 Lozenge Oral four times a day  doxazosin 2 milliGRAM(s) Oral at bedtime  enoxaparin Injectable 40 milliGRAM(s) SubCutaneous daily  famotidine    Tablet 20 milliGRAM(s) Oral daily  FLUoxetine 10 milliGRAM(s) Oral daily  labetalol 100 milliGRAM(s) Oral <User Schedule>  lisinopril 40 milliGRAM(s) Oral <User Schedule>  metFORMIN 1000 milliGRAM(s) Oral two times a day with meals  ticagrelor 90 milliGRAM(s) Oral every 12 hours    MEDICATIONS  (PRN):  acetaminophen   Tablet .. 975 milliGRAM(s) Oral every 6 hours PRN Mild Pain (1 - 3), Moderate Pain (4 - 6), Severe Pain (7 - 10)  diphenhydrAMINE 25 milliGRAM(s) Oral daily PRN Rash and/or Itching  diphenhydramine 2%/zinc acetate 0.1% Cream 1 Application(s) Topical two times a day PRN Rash and/or Itching    Vital Signs Last 24 Hrs  T(F): 97.7 (10 Sep 2021 08:55), Max: 98.2 (09 Sep 2021 20:52)  HR: 87 (10 Sep 2021 09:58) (87 - 100)  BP: 149/91 (10 Sep 2021 09:58) (129/81 - 150/100)  RR: 16 (10 Sep 2021 08:55) (16 - 16)  SpO2: 95% (10 Sep 2021 08:55) (95% - 98%)  I&O's Summary    PHYSICAL EXAM:  General: NAD, A/O x 3.   ENT: MMM  Neck: Supple, No JVD  Lungs: Clear to auscultation bilaterally  Cardio: RRR, S1/S2, No murmurs  Abdomen: Soft, Nontender, Nondistended; Bowel sounds present  Extremities: No calf tenderness, No pitting edema  Skin: rash resolved. lip swelling resolved    LABS:                        10.9   5.91  )-----------( 234      ( 09 Sep 2021 05:45 )             33.0     09-09    142  |  106  |  23  ----------------------------<  129  4.4   |  28  |  1.24    Ca    9.5      09 Sep 2021 05:45    TPro  7.6  /  Alb  3.7  /  TBili  0.4  /  DBili  x   /  AST  23  /  ALT  33  /  AlkPhos  65  09-09    eGFR if Non African American: 47 mL/min/1.73M2 (09-09-21 @ 05:45)  eGFR if African American: 54 mL/min/1.73M2 (09-09-21 @ 05:45)        08-16 Chol 134 mg/dL LDL -- HDL 49 mg/dL Trig 64 mg/dL        COVID-19 PCR: NotDetec (09-07-21 @ 04:56)  COVID-19 PCR: NotDetec (09-01-21 @ 06:55)  COVID-19 PCR: NotDetec (08-26-21 @ 07:15)  COVID-19 PCR: NotDetec (08-20-21 @ 06:30)  COVID-19 PCR: NotDetec (08-15-21 @ 12:31)      RADIOLOGY & ADDITIONAL TESTS:    Care Discussed with Consultants/Other Providers: rehab team during IDR round

## 2021-09-10 NOTE — PROGRESS NOTE ADULT - SUBJECTIVE AND OBJECTIVE BOX
Patient is a 61y old  Female who presents with a chief complaint of CVA (09 Sep 2021 14:27)      HPI:  62 yo R handed Female with  PMHx of DMT2, HTN, HLD, multiple CVAs w/ residual RUE/LE weakness w/ foot drop, LLE>UE numbness s/p loop recorder and plavix  now discontinued 2017. She presented to [facility] on 8/15/21 with acute on chronic R-sided weakness, LKN 8/15 07:30 symptoms noticed around 10AM after patient was trying to use her arm and was not able to "make it do what I wanted" on further clarification of RUE>LE weakness. Pt endorsed was leaning to one side, endorsed RLE feels weaker than normal which was present on initial NIHSS 5. Initial BPs elevated 190s systolic, post-CTH without hemorrhage or acute findings, BP was lowered with improvement in R sided acute on chronic weakness to allow for ataxia testing in limbs which was present, which was new findings.  Endorsed mild L posterior headache, no N/V or infectious symptoms. Patient was re-evaluated after improvement for weakness, concern for focal seizure with patient endorsing no prior seizure history or prior episodes w RUE. CTH w/o acute findings, CTA w worsening PCA stenosis but normal flow bL VAs. CDU monitoring for MR Brain to access for any new infarcts, however course c/b reoccurrence in R UE "weakness" appearing contracture-like w RUE flexion and hypertonia, intact mentation.  she was seen by neurologist.  pt received 1500mg IV Keppra load given high suspicion for focal seizure activity. MRI Brain showed Left thalamic infarct. She did not receive TPA as she was out of  window. She was seen by cardiology; TTE, ASA/STATIN, and losartan started. EP was consulted, ILR was implanted (8/18), No events recorded on ILR. Of note, patient had ILR placed in the past ( 2017) no arrhythmias was recorded then. She was evaluated by PM&R and therapist and acute rehabilitation was recommended. She was admitted to [facility] on 8/19/21.        (19 Aug 2021 14:38)        SUBJECTIVE: Patient seen and examined. No acute overnight events, slept well.   No other complaints.       REVIEW OF SYSTEMS      VITALS  61y  Vital Signs Last 24 Hrs  T(C): 36.5 (10 Sep 2021 08:55), Max: 36.8 (09 Sep 2021 20:52)  T(F): 97.7 (10 Sep 2021 08:55), Max: 98.2 (09 Sep 2021 20:52)  HR: 87 (10 Sep 2021 09:58) (87 - 100)  BP: 149/91 (10 Sep 2021 09:58) (129/81 - 150/100)  BP(mean): --  RR: 16 (10 Sep 2021 08:55) (16 - 16)  SpO2: 95% (10 Sep 2021 08:55) (95% - 98%)  Daily     Daily         PHYSICAL EXAM:           FUNCTIONAL STATUS:        RECENT LABS:                        10.9   5.91  )-----------( 234      ( 09 Sep 2021 05:45 )             33.0     09-09    142  |  106  |  23  ----------------------------<  129<H>  4.4   |  28  |  1.24    Ca    9.5      09 Sep 2021 05:45    TPro  7.6  /  Alb  3.7  /  TBili  0.4  /  DBili  x   /  AST  23  /  ALT  33  /  AlkPhos  65  09-09    LIVER FUNCTIONS - ( 09 Sep 2021 05:45 )  Alb: 3.7 g/dL / Pro: 7.6 g/dL / ALK PHOS: 65 U/L / ALT: 33 U/L / AST: 23 U/L / GGT: x                   CAPILLARY BLOOD GLUCOSE            MEDICATIONS:  MEDICATIONS  (STANDING):  amLODIPine   Tablet 10 milliGRAM(s) Oral daily  aspirin  chewable 81 milliGRAM(s) Oral daily  atorvastatin 80 milliGRAM(s) Oral at bedtime  baclofen 10 milliGRAM(s) Oral at bedtime  benzocaine 15 mG/menthol 3.6 mG (Sugar-Free) Lozenge 1 Lozenge Oral four times a day  doxazosin 2 milliGRAM(s) Oral at bedtime  enoxaparin Injectable 40 milliGRAM(s) SubCutaneous daily  famotidine    Tablet 20 milliGRAM(s) Oral daily  FLUoxetine 10 milliGRAM(s) Oral daily  labetalol 100 milliGRAM(s) Oral <User Schedule>  lisinopril 40 milliGRAM(s) Oral <User Schedule>  metFORMIN 1000 milliGRAM(s) Oral two times a day with meals  ticagrelor 90 milliGRAM(s) Oral every 12 hours    MEDICATIONS  (PRN):  acetaminophen   Tablet .. 975 milliGRAM(s) Oral every 6 hours PRN Mild Pain (1 - 3), Moderate Pain (4 - 6), Severe Pain (7 - 10)  diphenhydrAMINE 25 milliGRAM(s) Oral daily PRN Rash and/or Itching  diphenhydramine 2%/zinc acetate 0.1% Cream 1 Application(s) Topical two times a day PRN Rash and/or Itching     Patient is a 61y old  Female who presents with a chief complaint of CVA (09 Sep 2021 14:27)      HPI:  60 yo R handed Female with  PMHx of DMT2, HTN, HLD, multiple CVAs w/ residual RUE/LE weakness w/ foot drop, LLE>UE numbness s/p loop recorder and plavix  now discontinued 2017. She presented to [facility] on 8/15/21 with acute on chronic R-sided weakness, LKN 8/15 07:30 symptoms noticed around 10AM after patient was trying to use her arm and was not able to "make it do what I wanted" on further clarification of RUE>LE weakness. Pt endorsed was leaning to one side, endorsed RLE feels weaker than normal which was present on initial NIHSS 5. Initial BPs elevated 190s systolic, post-CTH without hemorrhage or acute findings, BP was lowered with improvement in R sided acute on chronic weakness to allow for ataxia testing in limbs which was present, which was new findings.  Endorsed mild L posterior headache, no N/V or infectious symptoms. Patient was re-evaluated after improvement for weakness, concern for focal seizure with patient endorsing no prior seizure history or prior episodes w RUE. CTH w/o acute findings, CTA w worsening PCA stenosis but normal flow bL VAs. CDU monitoring for MR Brain to access for any new infarcts, however course c/b reoccurrence in R UE "weakness" appearing contracture-like w RUE flexion and hypertonia, intact mentation.  she was seen by neurologist.  pt received 1500mg IV Keppra load given high suspicion for focal seizure activity. MRI Brain showed Left thalamic infarct. She did not receive TPA as she was out of  window. She was seen by cardiology; TTE, ASA/STATIN, and losartan started. EP was consulted, ILR was implanted (8/18), No events recorded on ILR. Of note, patient had ILR placed in the past ( 2017) no arrhythmias was recorded then. She was evaluated by PM&R and therapist and acute rehabilitation was recommended. She was admitted to [facility] on 8/19/21.        (19 Aug 2021 14:38)        SUBJECTIVE: Patient seen and examined. No acute overnight events, slept well. No further lip swelling or rashes.   No other complaints.       REVIEW OF SYSTEMS  Denies HA/CP/palpitations/abdominal pain or nausea  Denies dysuria   Continent of bowel and bladder     VITALS  61y  Vital Signs Last 24 Hrs  T(C): 36.5 (10 Sep 2021 08:55), Max: 36.8 (09 Sep 2021 20:52)  T(F): 97.7 (10 Sep 2021 08:55), Max: 98.2 (09 Sep 2021 20:52)  HR: 87 (10 Sep 2021 09:58) (87 - 100)  BP: 149/91 (10 Sep 2021 09:58) (129/81 - 150/100)  BP(mean): --  RR: 16 (10 Sep 2021 08:55) (16 - 16)  SpO2: 95% (10 Sep 2021 08:55) (95% - 98%)  Daily     Daily         PHYSICAL EXAM:   Constitutional - NAD, Comfortable  Chest - breathing comfortably ,  Cardio - s1s2+,   Abdomen -  Soft, NT ND  Extremities - No peripheral edema, No calf tenderness   Neurologic Exam:              Cognitive - AAO x 3     Speech - Fluent, Comprehensible, Mild dysarthria   Right facial droop      Motor -                      LUE and LLE 5/5                     RIGHT UE - ShAB 2/5, EF 2/5, EE 2/5, WE 0/5,  0/5, trace finger movement                     RIGHT LE - HF 3/5, KE 4/5, DF 0/5, PF 1/5  + foot drop      Tone- increased tone noted on R elbow flexors, R finger flexors, R wrist flexors, R toes   Psychiatric - Mood stable, Affect WNL  Skin: Loop recorder site- clean       FUNCTIONAL STATUS:  OT: min assist with bathing  CG with toileting, transfers  Bed mobility: modified independent  Ambulation: 75' with WBQC - CGA/min assist  8 steps : min assist        RECENT LABS:                        10.9   5.91  )-----------( 234      ( 09 Sep 2021 05:45 )             33.0     09-09    142  |  106  |  23  ----------------------------<  129<H>  4.4   |  28  |  1.24    Ca    9.5      09 Sep 2021 05:45    TPro  7.6  /  Alb  3.7  /  TBili  0.4  /  DBili  x   /  AST  23  /  ALT  33  /  AlkPhos  65  09-09    LIVER FUNCTIONS - ( 09 Sep 2021 05:45 )  Alb: 3.7 g/dL / Pro: 7.6 g/dL / ALK PHOS: 65 U/L / ALT: 33 U/L / AST: 23 U/L / GGT: x                   CAPILLARY BLOOD GLUCOSE            MEDICATIONS:  MEDICATIONS  (STANDING):  amLODIPine   Tablet 10 milliGRAM(s) Oral daily  aspirin  chewable 81 milliGRAM(s) Oral daily  atorvastatin 80 milliGRAM(s) Oral at bedtime  baclofen 10 milliGRAM(s) Oral at bedtime  benzocaine 15 mG/menthol 3.6 mG (Sugar-Free) Lozenge 1 Lozenge Oral four times a day  doxazosin 2 milliGRAM(s) Oral at bedtime  enoxaparin Injectable 40 milliGRAM(s) SubCutaneous daily  famotidine    Tablet 20 milliGRAM(s) Oral daily  FLUoxetine 10 milliGRAM(s) Oral daily  labetalol 100 milliGRAM(s) Oral <User Schedule>  lisinopril 40 milliGRAM(s) Oral <User Schedule>  metFORMIN 1000 milliGRAM(s) Oral two times a day with meals  ticagrelor 90 milliGRAM(s) Oral every 12 hours    MEDICATIONS  (PRN):  acetaminophen   Tablet .. 975 milliGRAM(s) Oral every 6 hours PRN Mild Pain (1 - 3), Moderate Pain (4 - 6), Severe Pain (7 - 10)  diphenhydrAMINE 25 milliGRAM(s) Oral daily PRN Rash and/or Itching  diphenhydramine 2%/zinc acetate 0.1% Cream 1 Application(s) Topical two times a day PRN Rash and/or Itching

## 2021-09-11 PROCEDURE — 99232 SBSQ HOSP IP/OBS MODERATE 35: CPT

## 2021-09-11 RX ORDER — LISINOPRIL 2.5 MG/1
40 TABLET ORAL DAILY
Refills: 0 | Status: DISCONTINUED | OUTPATIENT
Start: 2021-09-12 | End: 2021-09-12

## 2021-09-11 RX ADMIN — TICAGRELOR 90 MILLIGRAM(S): 90 TABLET ORAL at 17:14

## 2021-09-11 RX ADMIN — Medication 100 MILLIGRAM(S): at 06:16

## 2021-09-11 RX ADMIN — Medication 10 MILLIGRAM(S): at 12:14

## 2021-09-11 RX ADMIN — LISINOPRIL 40 MILLIGRAM(S): 2.5 TABLET ORAL at 12:14

## 2021-09-11 RX ADMIN — METFORMIN HYDROCHLORIDE 1000 MILLIGRAM(S): 850 TABLET ORAL at 07:55

## 2021-09-11 RX ADMIN — Medication 81 MILLIGRAM(S): at 12:13

## 2021-09-11 RX ADMIN — Medication 2 MILLIGRAM(S): at 21:07

## 2021-09-11 RX ADMIN — Medication 10 MILLIGRAM(S): at 21:07

## 2021-09-11 RX ADMIN — Medication 25 MILLIGRAM(S): at 21:07

## 2021-09-11 RX ADMIN — METFORMIN HYDROCHLORIDE 1000 MILLIGRAM(S): 850 TABLET ORAL at 17:14

## 2021-09-11 RX ADMIN — AMLODIPINE BESYLATE 10 MILLIGRAM(S): 2.5 TABLET ORAL at 06:16

## 2021-09-11 RX ADMIN — TICAGRELOR 90 MILLIGRAM(S): 90 TABLET ORAL at 06:16

## 2021-09-11 RX ADMIN — ATORVASTATIN CALCIUM 80 MILLIGRAM(S): 80 TABLET, FILM COATED ORAL at 21:07

## 2021-09-11 RX ADMIN — ENOXAPARIN SODIUM 40 MILLIGRAM(S): 100 INJECTION SUBCUTANEOUS at 12:13

## 2021-09-11 RX ADMIN — Medication 1 APPLICATION(S): at 21:08

## 2021-09-11 NOTE — PROGRESS NOTE ADULT - SUBJECTIVE AND OBJECTIVE BOX
Patient is a 61y old  Female who presents with a chief complaint of CVA (11 Sep 2021 12:26)      HPI:  60 yo R handed Female with  PMHx of DMT2, HTN, HLD, multiple CVAs w/ residual RUE/LE weakness w/ foot drop, LLE>UE numbness s/p loop recorder and plavix  now discontinued 2017. She presented to [facility] on 8/15/21 with acute on chronic R-sided weakness, LKN 8/15 07:30 symptoms noticed around 10AM after patient was trying to use her arm and was not able to "make it do what I wanted" on further clarification of RUE>LE weakness. Pt endorsed was leaning to one side, endorsed RLE feels weaker than normal which was present on initial NIHSS 5. Initial BPs elevated 190s systolic, post-CTH without hemorrhage or acute findings, BP was lowered with improvement in R sided acute on chronic weakness to allow for ataxia testing in limbs which was present, which was new findings.  Endorsed mild L posterior headache, no N/V or infectious symptoms. Patient was re-evaluated after improvement for weakness, concern for focal seizure with patient endorsing no prior seizure history or prior episodes w RUE. CTH w/o acute findings, CTA w worsening PCA stenosis but normal flow bL VAs. CDU monitoring for MR Brain to access for any new infarcts, however course c/b reoccurrence in R UE "weakness" appearing contracture-like w RUE flexion and hypertonia, intact mentation.  she was seen by neurologist.  pt received 1500mg IV Keppra load given high suspicion for focal seizure activity. MRI Brain showed Left thalamic infarct. She did not receive TPA as she was out of  window. She was seen by cardiology; TTE, ASA/STATIN, and losartan started. EP was consulted, ILR was implanted (8/18), No events recorded on ILR. Of note, patient had ILR placed in the past ( 2017) no arrhythmias was recorded then. She was evaluated by PM&R and therapist and acute rehabilitation was recommended. She was admitted to [facility] on 8/19/21.        (19 Aug 2021 14:38)    Interval history: /93 this am. patient denies complaint, now on cardura.   Improved bp later in the day.  R sided weakness    REVIEW OF SYSTEMS  Constitutional - No fever, No weight loss, No fatigue  HEENT - No eye pain, No visual disturbances, No difficulty hearing, No tinnitus, No vertigo, No neck pain  Respiratory - No cough, No wheezing, No shortness of breath  Cardiovascular - No chest pain, No palpitations  Gastrointestinal - No abdominal pain, No nausea, No vomiting, No diarrhea, No constipation  Genitourinary - No dysuria, No frequency, No hematuria, No incontinence  Neurological - No headaches, No memory loss, + loss of strength, No numbness, No tremors  Skin - No itching, No rashes, No lesions   Endocrine - No temperature intolerance  Musculoskeletal - No joint pain, No joint swelling, No muscle pain  Psychiatric - No depression, No anxiety    PAST MEDICAL & SURGICAL HISTORY  Hypertension    Diabetes Mellitus Type II    Generalized Headaches    IBS (Irritable Bowel Syndrome)    Personal History of Hypertension    Diabetes Mellitus    Hypokalemia    CVA (cerebral vascular accident)    TIA (transient ischemic attack)    Hemiplegia of right dominant side due to infarction of brain    History of total knee replacement, right       FAMILY HISTORY   Family history of acute myocardial infarction (Father)  Family history of prostate cancer (Father)         VITALS  T(C): 36.5 (09-10-21 @ 22:30), Max: 36.5 (09-10-21 @ 22:30)  HR: 95 (09-11-21 @ 12:18) (90 - 96)  BP: 134/90 (09-11-21 @ 12:18) (120/75 - 154/93)  RR: 16 (09-10-21 @ 22:30) (16 - 16)  SpO2: 100% (09-10-21 @ 22:30) (100% - 100%)  Wt(kg): --    ALLERGIES  Pineapple (Unknown)  sulfa drugs (Angioedema; Swelling)  sulfa drugs (Rash)  Sulfac 10% (Unknown)  walnut (Urticaria)      MEDICATIONS   acetaminophen   Tablet .. 975 milliGRAM(s) Oral every 6 hours PRN  amLODIPine   Tablet 10 milliGRAM(s) Oral daily  aspirin  chewable 81 milliGRAM(s) Oral daily  atorvastatin 80 milliGRAM(s) Oral at bedtime  baclofen 10 milliGRAM(s) Oral at bedtime  benzocaine 15 mG/menthol 3.6 mG (Sugar-Free) Lozenge 1 Lozenge Oral four times a day  diphenhydrAMINE 25 milliGRAM(s) Oral daily PRN  diphenhydramine 2%/zinc acetate 0.1% Cream 1 Application(s) Topical two times a day PRN  doxazosin 2 milliGRAM(s) Oral at bedtime  enoxaparin Injectable 40 milliGRAM(s) SubCutaneous daily  FLUoxetine 10 milliGRAM(s) Oral daily  labetalol 100 milliGRAM(s) Oral two times a day  metFORMIN 1000 milliGRAM(s) Oral two times a day with meals  ticagrelor 90 milliGRAM(s) Oral every 12 hours      ----------------------------------------------------------------------------------------  PHYSICAL EXAM  Constitutional - NAD, Comfortable  HEENT - NCAT, EOMI   Chest - no respiratory distress  Cardiovascular - RRR, S1S2  Abdomen - Soft, NTND  Extremities - No C/C/E, No calf tenderness   Neurologic Exam -                    Cognitive - Awake, Alert, AAO to self, place, date, year, situation     Communication - Fluent, No dysarthria     Motor - R sided weakness, including dorsiflexion weakness on R      Sensory - Intact to LT       Balance - WNL Static  Psychiatric - Mood stable, Affect WNL  ----------------------------------------------------------------------------------------  ASSESSMENT/PLAN  60 yo R handed woman with history of DMT2, HTN, HLD, multiple CVAs w/ residual RUE/LE weakness w/ foot drop, LLE>UE numbness s/p loop recorder and no longer on plavix admitted to  rehab after hospital course for left thalamic infarct, admitted for rehab    Acute L thalamic infarct  history of multiple cerebrovascular accidents (CVAs)   -continue ASA/STATIN, Brilinta   -BP goal <140/90  -ILR was implanted (8/18)  -Continue comprehensive rehab    Benign essential HTN with orthostasis   -cont labetalol 100mg BID, amlodipine 10mg, lisinopril 40  -added cardura 2mg qHS 9/11    Raised rash possibly urticaria and mild lip swelling possibly due to food allergy- resolved  - symptoms started after pt ingested food that touched pineapple which pt is allergic to  - benadryl prn  - benadryl cream prn  - Follow up with Allergist as outpt    HLD  -continue atorvastatin    Type 2 diabetes mellitus  -continue metformin 1gm bid  -continue ISS  -monitor FS    ERENDIRA (acute kidney injury) on CKD II - resolved  -encourage oral intake  -avoid nephrotoxic agents  -monitor BMP    Normocytic anemia possible anemia of chronic disease  - monitor. H/H stable    Moderate protein-calorie malnutrition  - diet as per dietitian    VTE prophylaxis  -continue Lovenox

## 2021-09-11 NOTE — PROGRESS NOTE ADULT - SUBJECTIVE AND OBJECTIVE BOX
Patient is a 61y old  Female who presents with a chief complaint of CVA (10 Sep 2021 11:37)      Subjective and overnight events:  Patient seen and examined at bedside. pt reports no new complaints. rash and lips swelling completely resolved. + dizziness on standing but pt learned to manage symptoms. no headache, sob, cp, palpitations.    ALLERGIES:  Pineapple (Unknown)  sulfa drugs (Angioedema; Swelling)  sulfa drugs (Rash)  Sulfac 10% (Unknown)  walnut (Urticaria)    MEDICATIONS  (STANDING):  amLODIPine   Tablet 10 milliGRAM(s) Oral daily  aspirin  chewable 81 milliGRAM(s) Oral daily  atorvastatin 80 milliGRAM(s) Oral at bedtime  baclofen 10 milliGRAM(s) Oral at bedtime  benzocaine 15 mG/menthol 3.6 mG (Sugar-Free) Lozenge 1 Lozenge Oral four times a day  doxazosin 2 milliGRAM(s) Oral at bedtime  enoxaparin Injectable 40 milliGRAM(s) SubCutaneous daily  FLUoxetine 10 milliGRAM(s) Oral daily  labetalol 100 milliGRAM(s) Oral two times a day  lisinopril 40 milliGRAM(s) Oral <User Schedule>  metFORMIN 1000 milliGRAM(s) Oral two times a day with meals  ticagrelor 90 milliGRAM(s) Oral every 12 hours    MEDICATIONS  (PRN):  acetaminophen   Tablet .. 975 milliGRAM(s) Oral every 6 hours PRN Mild Pain (1 - 3), Moderate Pain (4 - 6), Severe Pain (7 - 10)  diphenhydrAMINE 25 milliGRAM(s) Oral daily PRN Rash and/or Itching  diphenhydramine 2%/zinc acetate 0.1% Cream 1 Application(s) Topical two times a day PRN Rash and/or Itching    Vital Signs Last 24 Hrs  T(F): 97.7 (10 Sep 2021 22:30), Max: 97.7 (10 Sep 2021 22:30)  HR: 95 (11 Sep 2021 12:18) (90 - 96)  BP: 134/90 (11 Sep 2021 12:18) (120/75 - 154/93)  RR: 16 (10 Sep 2021 22:30) (16 - 16)  SpO2: 100% (10 Sep 2021 22:30) (100% - 100%)  I&O's Summary    PHYSICAL EXAM:  General: NAD, A/O x 3  ENT: MMM  Neck: Supple, No JVD  Lungs: Clear to auscultation bilaterally  Cardio: RRR, S1/S2, No murmurs  Abdomen: Soft, Nontender, Nondistended; Bowel sounds present  Extremities: No calf tenderness, No pitting edema    LABS:                        10.9   5.91  )-----------( 234      ( 09 Sep 2021 05:45 )             33.0     09-09    142  |  106  |  23  ----------------------------<  129  4.4   |  28  |  1.24    Ca    9.5      09 Sep 2021 05:45    TPro  7.6  /  Alb  3.7  /  TBili  0.4  /  DBili  x   /  AST  23  /  ALT  33  /  AlkPhos  65  09-09    eGFR if Non African American: 47 mL/min/1.73M2 (09-09-21 @ 05:45)  eGFR if African American: 54 mL/min/1.73M2 (09-09-21 @ 05:45)        08-16 Chol 134 mg/dL LDL -- HDL 49 mg/dL Trig 64 mg/dL          COVID-19 PCR: NotDetec (09-07-21 @ 04:56)  COVID-19 PCR: NotDetec (09-01-21 @ 06:55)  COVID-19 PCR: NotDetec (08-26-21 @ 07:15)  COVID-19 PCR: NotDetec (08-20-21 @ 06:30)  COVID-19 PCR: NotDetec (08-15-21 @ 12:31)      RADIOLOGY & ADDITIONAL TESTS:    Care Discussed with Consultants/Other Providers: rehab team

## 2021-09-11 NOTE — PROGRESS NOTE ADULT - ASSESSMENT
62 yo R handed woman with history of DMT2, HTN, HLD, multiple CVAs w/ residual RUE/LE weakness w/ foot drop, LLE>UE numbness s/p loop recorder and no longer on plavix admitted to  rehab after hospital course for left thalamic infarct, admitted for rehab    Acute L thalamic infarct  history of multiple cerebrovascular accidents (CVAs)   -continue ASA/STATIN, Brilinta   -BP goal <140/90  -ILR was implanted (8/18)  -Continue comprehensive rehab    Benign essential HTN with orthostasis   -cont labetalol 100mg BID, amlodipine 10mg, lisinopril 40  -added cardura 2mg qHS 9/11    Raised rash possibly urticaria and mild lip swelling possibly due to food allergy- resolved  - symptoms started after pt ingested food that touched pineapple which pt is allergic to  - benadryl prn  - benadryl cream prn  - Follow up with Allergist as outpt    HLD  -continue atorvastatin    Type 2 diabetes mellitus  -continue metformin 1gm bid  -continue ISS  -monitor FS    ERENDIRA (acute kidney injury) on CKD II - resolved  -encourage oral intake  -avoid nephrotoxic agents  -monitor BMP    Normocytic anemia possible anemia of chronic disease  - monitor. H/H stable    Moderate protein-calorie malnutrition  - diet as per dietitian    VTE prophylaxis  -continue Lovenox

## 2021-09-12 PROCEDURE — 99233 SBSQ HOSP IP/OBS HIGH 50: CPT

## 2021-09-12 PROCEDURE — 99232 SBSQ HOSP IP/OBS MODERATE 35: CPT

## 2021-09-12 RX ORDER — LISINOPRIL 2.5 MG/1
40 TABLET ORAL AT BEDTIME
Refills: 0 | Status: DISCONTINUED | OUTPATIENT
Start: 2021-09-12 | End: 2021-09-17

## 2021-09-12 RX ADMIN — TICAGRELOR 90 MILLIGRAM(S): 90 TABLET ORAL at 17:34

## 2021-09-12 RX ADMIN — Medication 81 MILLIGRAM(S): at 11:58

## 2021-09-12 RX ADMIN — Medication 100 MILLIGRAM(S): at 05:20

## 2021-09-12 RX ADMIN — ATORVASTATIN CALCIUM 80 MILLIGRAM(S): 80 TABLET, FILM COATED ORAL at 21:32

## 2021-09-12 RX ADMIN — Medication 100 MILLIGRAM(S): at 17:34

## 2021-09-12 RX ADMIN — ENOXAPARIN SODIUM 40 MILLIGRAM(S): 100 INJECTION SUBCUTANEOUS at 11:58

## 2021-09-12 RX ADMIN — METFORMIN HYDROCHLORIDE 1000 MILLIGRAM(S): 850 TABLET ORAL at 17:34

## 2021-09-12 RX ADMIN — LISINOPRIL 40 MILLIGRAM(S): 2.5 TABLET ORAL at 05:21

## 2021-09-12 RX ADMIN — METFORMIN HYDROCHLORIDE 1000 MILLIGRAM(S): 850 TABLET ORAL at 07:55

## 2021-09-12 RX ADMIN — TICAGRELOR 90 MILLIGRAM(S): 90 TABLET ORAL at 05:21

## 2021-09-12 RX ADMIN — Medication 10 MILLIGRAM(S): at 21:33

## 2021-09-12 RX ADMIN — Medication 10 MILLIGRAM(S): at 11:59

## 2021-09-12 RX ADMIN — LISINOPRIL 40 MILLIGRAM(S): 2.5 TABLET ORAL at 21:33

## 2021-09-12 NOTE — PROGRESS NOTE ADULT - SUBJECTIVE AND OBJECTIVE BOX
Patient is a 61y old  Female who presents with a chief complaint of CVA (12 Sep 2021 12:57)      Subjective and overnight events:  Patient seen and examined at bedside. reports transient recurrence of pruritic rash last night which resolved now, also lip tingling came back. symptoms happened after pt used the alcohol wipe last night... pt reports feeling very lightheadedness when SBP dipped to 108 yesterday around 5pm. no other complaints. no sob, cp, palpitation.     ALLERGIES:  Pineapple (Unknown)  sulfa drugs (Angioedema; Swelling)  sulfa drugs (Rash)  Sulfac 10% (Unknown)  walnut (Urticaria)    MEDICATIONS  (STANDING):  amLODIPine   Tablet 10 milliGRAM(s) Oral daily  aspirin  chewable 81 milliGRAM(s) Oral daily  atorvastatin 80 milliGRAM(s) Oral at bedtime  baclofen 10 milliGRAM(s) Oral at bedtime  benzocaine 15 mG/menthol 3.6 mG (Sugar-Free) Lozenge 1 Lozenge Oral four times a day  enoxaparin Injectable 40 milliGRAM(s) SubCutaneous daily  FLUoxetine 10 milliGRAM(s) Oral daily  labetalol 100 milliGRAM(s) Oral two times a day  lisinopril 40 milliGRAM(s) Oral at bedtime  metFORMIN 1000 milliGRAM(s) Oral two times a day with meals  ticagrelor 90 milliGRAM(s) Oral every 12 hours    MEDICATIONS  (PRN):  acetaminophen   Tablet .. 975 milliGRAM(s) Oral every 6 hours PRN Mild Pain (1 - 3), Moderate Pain (4 - 6), Severe Pain (7 - 10)  diphenhydrAMINE 25 milliGRAM(s) Oral daily PRN Rash and/or Itching  diphenhydramine 2%/zinc acetate 0.1% Cream 1 Application(s) Topical two times a day PRN Rash and/or Itching    Vital Signs Last 24 Hrs  T(F): 98.5 (12 Sep 2021 08:19), Max: 98.5 (12 Sep 2021 08:19)  HR: 94 (12 Sep 2021 08:19) (90 - 98)  BP: 121/69 (12 Sep 2021 08:19) (108/75 - 130/81)  RR: 16 (12 Sep 2021 08:19) (15 - 16)  SpO2: 96% (12 Sep 2021 08:19) (96% - 99%)  I&O's Summary    PHYSICAL EXAM:  General: NAD, A/O x 3  ENT: MMM, no obvious lip swelling  Neck: Supple, No JVD  Lungs: Clear to auscultation bilaterally  Cardio: RRR, S1/S2, No murmurs  Abdomen: Soft, Nontender, Nondistended; Bowel sounds present  Extremities: No calf tenderness, No pitting edema  Skin: no rash noted    LABS:                    08-16 Chol 134 mg/dL LDL -- HDL 49 mg/dL Trig 64 mg/dL                      COVID-19 PCR: NotDetec (09-07-21 @ 04:56)  COVID-19 PCR: NotDetec (09-01-21 @ 06:55)  COVID-19 PCR: NotDetec (08-26-21 @ 07:15)  COVID-19 PCR: NotDetec (08-20-21 @ 06:30)  COVID-19 PCR: NotDetec (08-15-21 @ 12:31)      RADIOLOGY & ADDITIONAL TESTS:    Care Discussed with Consultants/Other Providers: rehab team

## 2021-09-12 NOTE — PROGRESS NOTE ADULT - SUBJECTIVE AND OBJECTIVE BOX
Patient is a 61y old  Female who presents with a chief complaint of CVA (11 Sep 2021 14:48)      HPI:  60 yo R handed Female with  PMHx of DMT2, HTN, HLD, multiple CVAs w/ residual RUE/LE weakness w/ foot drop, LLE>UE numbness s/p loop recorder and plavix  now discontinued 2017. She presented to [facility] on 8/15/21 with acute on chronic R-sided weakness, LKN 8/15 07:30 symptoms noticed around 10AM after patient was trying to use her arm and was not able to "make it do what I wanted" on further clarification of RUE>LE weakness. Pt endorsed was leaning to one side, endorsed RLE feels weaker than normal which was present on initial NIHSS 5. Initial BPs elevated 190s systolic, post-CTH without hemorrhage or acute findings, BP was lowered with improvement in R sided acute on chronic weakness to allow for ataxia testing in limbs which was present, which was new findings.  Endorsed mild L posterior headache, no N/V or infectious symptoms. Patient was re-evaluated after improvement for weakness, concern for focal seizure with patient endorsing no prior seizure history or prior episodes w RUE. CTH w/o acute findings, CTA w worsening PCA stenosis but normal flow bL VAs. CDU monitoring for MR Brain to access for any new infarcts, however course c/b reoccurrence in R UE "weakness" appearing contracture-like w RUE flexion and hypertonia, intact mentation.  she was seen by neurologist.  pt received 1500mg IV Keppra load given high suspicion for focal seizure activity. MRI Brain showed Left thalamic infarct. She did not receive TPA as she was out of  window. She was seen by cardiology; TTE, ASA/STATIN, and losartan started. EP was consulted, ILR was implanted (8/18), No events recorded on ILR. Of note, patient had ILR placed in the past ( 2017) no arrhythmias was recorded then. She was evaluated by PM&R and therapist and acute rehabilitation was recommended. She was admitted to [facility] on 8/19/21.        (19 Aug 2021 14:38)    /69.  Denies light headedness but reports vertigo which she states she has had before.  Weakness unchanged.      REVIEW OF SYSTEMS  Constitutional - No fever, No weight loss, No fatigue  HEENT - No eye pain, No visual disturbances, No difficulty hearing, No tinnitus, +vertigo, No neck pain  Respiratory - No cough, No wheezing, No shortness of breath  Cardiovascular - No chest pain, No palpitations  Gastrointestinal - No abdominal pain, No nausea, No vomiting, No diarrhea, No constipation  Genitourinary - No dysuria, No frequency, No hematuria, No incontinence  Neurological - No headaches, No memory loss, + loss of strength, No numbness, No tremors  Skin - No itching, No rashes, No lesions   Endocrine - No temperature intolerance  Musculoskeletal - No joint pain, No joint swelling, No muscle pain  Psychiatric - No depression, No anxiety    PAST MEDICAL & SURGICAL HISTORY  Hypertension    Diabetes Mellitus Type II    Generalized Headaches    IBS (Irritable Bowel Syndrome)    Personal History of Hypertension    Diabetes Mellitus    Hypokalemia    CVA (cerebral vascular accident)    TIA (transient ischemic attack)    Hemiplegia of right dominant side due to infarction of brain    No Past Surgical History    History of total knee replacement, right         FAMILY HISTORY   Family history of acute myocardial infarction (Father)    Family history of prostate cancer (Father)      VITALS  T(C): 36.9 (09-12-21 @ 08:19), Max: 36.9 (09-12-21 @ 08:19)  HR: 94 (09-12-21 @ 08:19) (90 - 98)  BP: 121/69 (09-12-21 @ 08:19) (108/75 - 130/81)  RR: 16 (09-12-21 @ 08:19) (15 - 16)  SpO2: 96% (09-12-21 @ 08:19) (96% - 99%)  Wt(kg): --    ALLERGIES  Pineapple (Unknown)  sulfa drugs (Angioedema; Swelling)  sulfa drugs (Rash)  Sulfac 10% (Unknown)  walnut (Urticaria)      MEDICATIONS   acetaminophen   Tablet .. 975 milliGRAM(s) Oral every 6 hours PRN  amLODIPine   Tablet 10 milliGRAM(s) Oral daily  aspirin  chewable 81 milliGRAM(s) Oral daily  atorvastatin 80 milliGRAM(s) Oral at bedtime  baclofen 10 milliGRAM(s) Oral at bedtime  benzocaine 15 mG/menthol 3.6 mG (Sugar-Free) Lozenge 1 Lozenge Oral four times a day  diphenhydrAMINE 25 milliGRAM(s) Oral daily PRN  diphenhydramine 2%/zinc acetate 0.1% Cream 1 Application(s) Topical two times a day PRN  enoxaparin Injectable 40 milliGRAM(s) SubCutaneous daily  FLUoxetine 10 milliGRAM(s) Oral daily  labetalol 100 milliGRAM(s) Oral two times a day  lisinopril 40 milliGRAM(s) Oral at bedtime  metFORMIN 1000 milliGRAM(s) Oral two times a day with meals  ticagrelor 90 milliGRAM(s) Oral every 12 hours      ----------------------------------------------------------------------------------------  PHYSICAL EXAM  Constitutional - NAD, Comfortable  HEENT - NCAT, EOMI   Chest - no respiratory distress  Cardiovascular - RRR, S1S2  Abdomen - Soft, NTND  Extremities - No C/C/E, No calf tenderness   Neurologic Exam -                    Cognitive - Awake, Alert, AAO to self, place, date, year, situation     Communication - Fluent, No dysarthria     Motor - R sided weakness, including dorsiflexion weakness on R      Sensory - Intact to LT       Balance - WNL Static  Psychiatric - Mood stable, Affect WNL  ----------------------------------------------------------------------------------------  ASSESSMENT/PLAN  60 yo R handed woman with history of DMT2, HTN, HLD, multiple CVAs w/ residual RUE/LE weakness w/ foot drop, LLE>UE numbness s/p loop recorder and no longer on plavix admitted to  rehab after hospital course for left thalamic infarct, admitted for rehab    Acute L thalamic infarct  history of multiple cerebrovascular accidents (CVAs)   -continue ASA/STATIN, Brilinta   -BP goal <140/90  -ILR was implanted (8/18)  -Continue comprehensive rehab    Benign essential HTN with orthostasis   -cont labetalol 100mg BID, amlodipine 10mg, lisinopril 40  -added cardura 2mg qHS 9/11    Raised rash possibly urticaria and mild lip swelling possibly due to food allergy- resolved  - symptoms started after pt ingested food that touched pineapple which pt is allergic to  - benadryl prn  - benadryl cream prn  - Follow up with Allergist as outpt    HLD  -continue atorvastatin    Type 2 diabetes mellitus  -continue metformin 1gm bid  -continue ISS  -monitor FS    ERENDIRA (acute kidney injury) on CKD II - resolved  -encourage oral intake  -avoid nephrotoxic agents  -monitor BMP    Normocytic anemia possible anemia of chronic disease  - monitor. H/H stable    Moderate protein-calorie malnutrition  - diet as per dietitian    VTE prophylaxis  -continue Lovenox

## 2021-09-12 NOTE — PROGRESS NOTE ADULT - ASSESSMENT
62 yo R handed woman with history of DMT2, HTN, HLD, multiple CVAs w/ residual RUE/LE weakness w/ foot drop, LLE>UE numbness s/p loop recorder and no longer on plavix admitted to  rehab after hospital course for left thalamic infarct, admitted for rehab    Acute L thalamic infarct  history of multiple cerebrovascular accidents (CVAs)   -continue ASA/STATIN, Brilinta   -BP goal <140/90  -ILR was implanted (8/18)  -Continue comprehensive rehab    Benign essential HTN with orthostasis   ----worsened lightheadedness yesterday when SBP in 108  -cont labetalol 100mg BID, amlodipine 10mg  -lisinopril 40mg moved to qHS  -will discontinue cardura 2mg qHS     Raised rash possibly urticaria and mild lip swelling possibly due to food allergy- symptoms recurred last night but milder than previously  - symptoms started after pt ingested food that touched pineapple which pt is allergic to  - benadryl prn  - benadryl cream prn  - Follow up with Allergist as outpt    HLD  -continue atorvastatin    Type 2 diabetes mellitus  -continue metformin 1gm bid  -continue ISS  -monitor FS    ERENDIRA (acute kidney injury) on CKD II - resolved  -encourage oral intake  -avoid nephrotoxic agents  -monitor BMP    Normocytic anemia possible anemia of chronic disease  - monitor. H/H stable    Moderate protein-calorie malnutrition  - diet as per dietitian    VTE prophylaxis  -continue Lovenox

## 2021-09-13 LAB
ALBUMIN SERPL ELPH-MCNC: 3.5 G/DL — SIGNIFICANT CHANGE UP (ref 3.3–5)
ALP SERPL-CCNC: 64 U/L — SIGNIFICANT CHANGE UP (ref 40–120)
ALT FLD-CCNC: 27 U/L — SIGNIFICANT CHANGE UP (ref 10–45)
ANION GAP SERPL CALC-SCNC: 10 MMOL/L — SIGNIFICANT CHANGE UP (ref 5–17)
AST SERPL-CCNC: 17 U/L — SIGNIFICANT CHANGE UP (ref 10–40)
BILIRUB SERPL-MCNC: 0.5 MG/DL — SIGNIFICANT CHANGE UP (ref 0.2–1.2)
BUN SERPL-MCNC: 30 MG/DL — HIGH (ref 7–23)
CALCIUM SERPL-MCNC: 9.3 MG/DL — SIGNIFICANT CHANGE UP (ref 8.4–10.5)
CHLORIDE SERPL-SCNC: 105 MMOL/L — SIGNIFICANT CHANGE UP (ref 96–108)
CO2 SERPL-SCNC: 27 MMOL/L — SIGNIFICANT CHANGE UP (ref 22–31)
CREAT SERPL-MCNC: 1.11 MG/DL — SIGNIFICANT CHANGE UP (ref 0.5–1.3)
GLUCOSE SERPL-MCNC: 117 MG/DL — HIGH (ref 70–99)
HCT VFR BLD CALC: 31 % — LOW (ref 34.5–45)
HGB BLD-MCNC: 10.3 G/DL — LOW (ref 11.5–15.5)
MCHC RBC-ENTMCNC: 27.4 PG — SIGNIFICANT CHANGE UP (ref 27–34)
MCHC RBC-ENTMCNC: 33.2 GM/DL — SIGNIFICANT CHANGE UP (ref 32–36)
MCV RBC AUTO: 82.4 FL — SIGNIFICANT CHANGE UP (ref 80–100)
NRBC # BLD: 0 /100 WBCS — SIGNIFICANT CHANGE UP (ref 0–0)
PLATELET # BLD AUTO: 202 K/UL — SIGNIFICANT CHANGE UP (ref 150–400)
POTASSIUM SERPL-MCNC: 3.9 MMOL/L — SIGNIFICANT CHANGE UP (ref 3.5–5.3)
POTASSIUM SERPL-SCNC: 3.9 MMOL/L — SIGNIFICANT CHANGE UP (ref 3.5–5.3)
PROT SERPL-MCNC: 7.4 G/DL — SIGNIFICANT CHANGE UP (ref 6–8.3)
RBC # BLD: 3.76 M/UL — LOW (ref 3.8–5.2)
RBC # FLD: 14.8 % — HIGH (ref 10.3–14.5)
SARS-COV-2 RNA SPEC QL NAA+PROBE: SIGNIFICANT CHANGE UP
SODIUM SERPL-SCNC: 142 MMOL/L — SIGNIFICANT CHANGE UP (ref 135–145)
WBC # BLD: 5.7 K/UL — SIGNIFICANT CHANGE UP (ref 3.8–10.5)
WBC # FLD AUTO: 5.7 K/UL — SIGNIFICANT CHANGE UP (ref 3.8–10.5)

## 2021-09-13 PROCEDURE — 99232 SBSQ HOSP IP/OBS MODERATE 35: CPT

## 2021-09-13 PROCEDURE — 99232 SBSQ HOSP IP/OBS MODERATE 35: CPT | Mod: GC

## 2021-09-13 RX ORDER — BACLOFEN 100 %
1 POWDER (GRAM) MISCELLANEOUS
Qty: 60 | Refills: 0
Start: 2021-09-13 | End: 2021-10-12

## 2021-09-13 RX ORDER — LISINOPRIL 2.5 MG/1
1 TABLET ORAL
Qty: 30 | Refills: 0
Start: 2021-09-13 | End: 2021-10-12

## 2021-09-13 RX ORDER — BACLOFEN 100 %
10 POWDER (GRAM) MISCELLANEOUS
Refills: 0 | Status: DISCONTINUED | OUTPATIENT
Start: 2021-09-13 | End: 2021-09-14

## 2021-09-13 RX ADMIN — Medication 81 MILLIGRAM(S): at 11:25

## 2021-09-13 RX ADMIN — AMLODIPINE BESYLATE 10 MILLIGRAM(S): 2.5 TABLET ORAL at 06:03

## 2021-09-13 RX ADMIN — Medication 100 MILLIGRAM(S): at 06:03

## 2021-09-13 RX ADMIN — ENOXAPARIN SODIUM 40 MILLIGRAM(S): 100 INJECTION SUBCUTANEOUS at 11:26

## 2021-09-13 RX ADMIN — ATORVASTATIN CALCIUM 80 MILLIGRAM(S): 80 TABLET, FILM COATED ORAL at 21:51

## 2021-09-13 RX ADMIN — Medication 100 MILLIGRAM(S): at 16:45

## 2021-09-13 RX ADMIN — METFORMIN HYDROCHLORIDE 1000 MILLIGRAM(S): 850 TABLET ORAL at 07:36

## 2021-09-13 RX ADMIN — Medication 10 MILLIGRAM(S): at 16:45

## 2021-09-13 RX ADMIN — LISINOPRIL 40 MILLIGRAM(S): 2.5 TABLET ORAL at 21:51

## 2021-09-13 RX ADMIN — TICAGRELOR 90 MILLIGRAM(S): 90 TABLET ORAL at 16:45

## 2021-09-13 RX ADMIN — METFORMIN HYDROCHLORIDE 1000 MILLIGRAM(S): 850 TABLET ORAL at 16:45

## 2021-09-13 RX ADMIN — Medication 10 MILLIGRAM(S): at 11:25

## 2021-09-13 RX ADMIN — TICAGRELOR 90 MILLIGRAM(S): 90 TABLET ORAL at 06:04

## 2021-09-13 NOTE — PROGRESS NOTE ADULT - ASSESSMENT
ASSESSMENT/PLAN  62 yo R handed Female with  PMHx of DMT2, HTN, HLD, multiple CVAs w/ residual RUE/LE weakness w/ foot drop, LLE>UE numbness s/p loop recorder and plavix  now discontinued 2017. Admitted on 8/15 for acute on chronic R-sided weakness found to have Left thalamic infarct. Had concerns of seizure and given loading dose of keppra, however it was ruled out. Patient now with Gait Instability, ADL impairments and Functional impairments.    COMORBIDITES/ACTIVE MEDICAL ISSUES     History of Multiple CVA: new left thalamic infarcts, right spastic hemiparesis   s/p ILR placement; f/u with Dr. Minor  - ASA 81 mg daily, - Brillinta 90 mg twice  FOR 4 WEEKS -  Per neurology - start date 8/17   - Hypercoagulable workup- essentially negative   Continue Comprehensive Rehab Program of PT/OT/ST - total of 3 hrs/day 5 days/week( ST reduced to 30 mnutes)   Will need right AFO as she has a foot drop from recurrent stroke   DCed tizanidine for increased tone, Baclofen 10 mg qhs increased to 10mg BID    HLD:- Lipitor qhs     HTN: on amlodipine 10mg daily, lisinopril 40mg daily - timing changed to 1200pm  , Labetolol 100mg bid ( 1000 am and 1000 pm )   - Home meds: (amlodipine, labetalol, lisinopril 40), has been on hydralazine in past,   - Cardura was added 9/10, d/derik 9/11 due to lightheadedness.   - Recommend outpatient follow-up w/ nephrologist for further workup for resistant HTN and management.    DM II:on - metformin 1000mg  BID    Tachycardia : improved     Hypokalemia: resolved    Pain control:- Tylenol PRN    GI/Bowel Mgmt   - Continent  - Senna,  Miralax PRN,    Bladder management: toilet schedule prn     DVT prophylaxis:- Lovenox  - SCDs, TEDs     FEN :- Diet - Consistent Carbohydrate Low Sodium     Mood: prozac 10mg daily     Dizziness:  Vestibular  eval done 8/31- Patient reports dizziness slightly better, but still occurs at times- Requested PT for vestibular program in addition to post stroke rehabilitation    LLE numbness- ? etiology. exam LLE normal strength, sensation       Precautions / PROPHYLAXIS:   - Falls  - Pressure injury/Skin:  OOB to Chair, position change while in bed q2h      Disp: team conference 8/24 and 8/31 and 9/7:  TDD 9/17 to improve overall function

## 2021-09-13 NOTE — PROGRESS NOTE ADULT - SUBJECTIVE AND OBJECTIVE BOX
Patient is a 61y old  Female who presents with a chief complaint of CVA (12 Sep 2021 13:30)      HPI:  62 yo R handed Female with  PMHx of DMT2, HTN, HLD, multiple CVAs w/ residual RUE/LE weakness w/ foot drop, LLE>UE numbness s/p loop recorder and plavix  now discontinued 2017. She presented to [facility] on 8/15/21 with acute on chronic R-sided weakness, LKN 8/15 07:30 symptoms noticed around 10AM after patient was trying to use her arm and was not able to "make it do what I wanted" on further clarification of RUE>LE weakness. Pt endorsed was leaning to one side, endorsed RLE feels weaker than normal which was present on initial NIHSS 5. Initial BPs elevated 190s systolic, post-CTH without hemorrhage or acute findings, BP was lowered with improvement in R sided acute on chronic weakness to allow for ataxia testing in limbs which was present, which was new findings.  Endorsed mild L posterior headache, no N/V or infectious symptoms. Patient was re-evaluated after improvement for weakness, concern for focal seizure with patient endorsing no prior seizure history or prior episodes w RUE. CTH w/o acute findings, CTA w worsening PCA stenosis but normal flow bL VAs. CDU monitoring for MR Brain to access for any new infarcts, however course c/b reoccurrence in R UE "weakness" appearing contracture-like w RUE flexion and hypertonia, intact mentation.  she was seen by neurologist.  pt received 1500mg IV Keppra load given high suspicion for focal seizure activity. MRI Brain showed Left thalamic infarct. She did not receive TPA as she was out of  window. She was seen by cardiology; TTE, ASA/STATIN, and losartan started. EP was consulted, ILR was implanted (8/18), No events recorded on ILR. Of note, patient had ILR placed in the past ( 2017) no arrhythmias was recorded then. She was evaluated by PM&R and therapist and acute rehabilitation was recommended. She was admitted to [facility] on 8/19/21.        (19 Aug 2021 14:38)        SUBJECTIVE: Patient seen and examined. No acute overnight events, slept well. Had some lightheadedness w/ new BP medication couple days ago but no issues since its discontinuation. No lip swelling or rashes.   No other complaints.      REVIEW OF SYSTEMS  enies HA/CP/palpitations/abdominal pain or nausea  Denies dysuria   Continent of bowel and bladder       VITALS  61y  Vital Signs Last 24 Hrs  T(C): 36.6 (13 Sep 2021 08:20), Max: 36.6 (13 Sep 2021 08:20)  T(F): 97.8 (13 Sep 2021 08:20), Max: 97.8 (13 Sep 2021 08:20)  HR: 88 (13 Sep 2021 08:20) (78 - 95)  BP: 135/88 (13 Sep 2021 08:20) (121/86 - 172/95)  BP(mean): --  RR: 16 (13 Sep 2021 08:20) (16 - 18)  SpO2: 98% (13 Sep 2021 08:20) (97% - 98%)  Daily     Daily         PHYSICAL EXAM:   Constitutional - NAD, Comfortable  Chest - breathing comfortably ,  Cardio - s1s2+,   Abdomen -  Soft, NT ND  Extremities - No peripheral edema, No calf tenderness   Neurologic Exam:              Cognitive - AAO x 3     Speech - Fluent, Comprehensible, Mild dysarthria   Right facial droop      Motor -                      LUE and LLE 5/5                     RIGHT UE - ShAB 2/5, EF 2/5, EE 2/5, WE 2/5, trace finger movement                     RIGHT LE - HF 3/5, KE 4/5, DF 0/5, PF 1/5  + foot drop      Tone- increased tone noted on R elbow flexors, R finger flexors, R wrist flexors, R toes   Psychiatric - Mood stable, Affect WNL  Skin: Loop recorder site- clean       FUNCTIONAL STATUS:  OT: min assist with bathing  CG with toileting, transfers  Bed mobility: modified independent  Ambulation: 75' with WBQC - CGA/min assist  8 steps : min assist          RECENT LABS:                        10.3   5.70  )-----------( 202      ( 13 Sep 2021 05:30 )             31.0     09-13    142  |  105  |  30<H>  ----------------------------<  117<H>  3.9   |  27  |  1.11    Ca    9.3      13 Sep 2021 05:30    TPro  7.4  /  Alb  3.5  /  TBili  0.5  /  DBili  x   /  AST  17  /  ALT  27  /  AlkPhos  64  09-13    LIVER FUNCTIONS - ( 13 Sep 2021 05:30 )  Alb: 3.5 g/dL / Pro: 7.4 g/dL / ALK PHOS: 64 U/L / ALT: 27 U/L / AST: 17 U/L / GGT: x                   CAPILLARY BLOOD GLUCOSE            MEDICATIONS:  MEDICATIONS  (STANDING):  amLODIPine   Tablet 10 milliGRAM(s) Oral daily  aspirin  chewable 81 milliGRAM(s) Oral daily  atorvastatin 80 milliGRAM(s) Oral at bedtime  baclofen 10 milliGRAM(s) Oral two times a day  benzocaine 15 mG/menthol 3.6 mG (Sugar-Free) Lozenge 1 Lozenge Oral four times a day  enoxaparin Injectable 40 milliGRAM(s) SubCutaneous daily  FLUoxetine 10 milliGRAM(s) Oral daily  labetalol 100 milliGRAM(s) Oral two times a day  lisinopril 40 milliGRAM(s) Oral at bedtime  metFORMIN 1000 milliGRAM(s) Oral two times a day with meals  ticagrelor 90 milliGRAM(s) Oral every 12 hours    MEDICATIONS  (PRN):  acetaminophen   Tablet .. 975 milliGRAM(s) Oral every 6 hours PRN Mild Pain (1 - 3), Moderate Pain (4 - 6), Severe Pain (7 - 10)  diphenhydrAMINE 25 milliGRAM(s) Oral daily PRN Rash and/or Itching  diphenhydramine 2%/zinc acetate 0.1% Cream 1 Application(s) Topical two times a day PRN Rash and/or Itching

## 2021-09-13 NOTE — PROGRESS NOTE ADULT - ASSESSMENT
60 yo R handed woman with history of DMT2, HTN, HLD, multiple CVAs w/ residual RUE/LE weakness w/ foot drop, LLE>UE numbness s/p loop recorder and no longer on plavix admitted to  rehab after hospital course for left thalamic infarct, admitted for rehab    Acute L thalamic infarct  history of multiple cerebrovascular accidents (CVAs)   -continue ASA/STATIN, Brilinta   -BP goal <140/90  -ILR was implanted (8/18)  -Continue comprehensive rehab    R sided Lip tingling/paresthesia  - suspect due to underlying CVA  - monitor    Raised rash possibly urticaria and mild lip swelling possibly due to food allergy- symptoms improved  - pt reports subjective lip swelling, however, no obvious lip swelling observed. monitor.  - pruritic rash improved.  - benadryl prn  - benadryl cream prn  - Follow up with Allergist as outpt    Benign essential HTN with orthostasis   -one episode of /95 yesterday, BP acceptable rest of the time  -cont labetalol 100mg BID, amlodipine 10mg  -lisinopril 40mg moved to qHS  -BP acceptable   -avoid low BP as pt with symptomatic orthostasis    HLD  -continue atorvastatin    Type 2 diabetes mellitus  -continue metformin 1gm bid  -continue ISS  -monitor FS    ERENDIRA (acute kidney injury) on CKD II - resolved  -encourage oral intake  -avoid nephrotoxic agents  -monitor BMP    Normocytic anemia possible anemia of chronic disease  - monitor. H/H stable    Moderate protein-calorie malnutrition  - diet as per dietitian    VTE prophylaxis  -continue Lovenox

## 2021-09-13 NOTE — PROGRESS NOTE ADULT - ATTENDING COMMENTS
Chart reviewed. Patient seen at bedside  Feels well,   Over weekend BP low on 9/11 and hence cardura was dc . BP trend improved over past 24 hrs.   Neuro exam stable right spastic hemiparesis  Denies any excessive sedation with baclofen- will increase dose to BID    Labs stable    Continue rehab program

## 2021-09-13 NOTE — PROGRESS NOTE ADULT - SUBJECTIVE AND OBJECTIVE BOX
Patient is a 61y old  Female who presents with a chief complaint of CVA (13 Sep 2021 11:10)      Subjective and overnight events:  Patient seen and examined at bedside. reports persistent right sided mild lip tingling. reports pruritic rash much improved. no lip swelling. no fever, chills, sob, cp    ALLERGIES:  Pineapple (Unknown)  sulfa drugs (Angioedema; Swelling)  sulfa drugs (Rash)  Sulfac 10% (Unknown)  walnut (Urticaria)    MEDICATIONS  (STANDING):  amLODIPine   Tablet 10 milliGRAM(s) Oral daily  aspirin  chewable 81 milliGRAM(s) Oral daily  atorvastatin 80 milliGRAM(s) Oral at bedtime  baclofen 10 milliGRAM(s) Oral two times a day  benzocaine 15 mG/menthol 3.6 mG (Sugar-Free) Lozenge 1 Lozenge Oral four times a day  enoxaparin Injectable 40 milliGRAM(s) SubCutaneous daily  FLUoxetine 10 milliGRAM(s) Oral daily  labetalol 100 milliGRAM(s) Oral two times a day  lisinopril 40 milliGRAM(s) Oral at bedtime  metFORMIN 1000 milliGRAM(s) Oral two times a day with meals  ticagrelor 90 milliGRAM(s) Oral every 12 hours    MEDICATIONS  (PRN):  acetaminophen   Tablet .. 975 milliGRAM(s) Oral every 6 hours PRN Mild Pain (1 - 3), Moderate Pain (4 - 6), Severe Pain (7 - 10)  diphenhydrAMINE 25 milliGRAM(s) Oral daily PRN Rash and/or Itching  diphenhydramine 2%/zinc acetate 0.1% Cream 1 Application(s) Topical two times a day PRN Rash and/or Itching    Vital Signs Last 24 Hrs  T(F): 97.8 (13 Sep 2021 08:20), Max: 97.8 (13 Sep 2021 08:20)  HR: 88 (13 Sep 2021 08:20) (78 - 95)  BP: 135/88 (13 Sep 2021 08:20) (121/86 - 172/95)  RR: 16 (13 Sep 2021 08:20) (16 - 18)  SpO2: 98% (13 Sep 2021 08:20) (97% - 98%)  I&O's Summary    PHYSICAL EXAM:  General: NAD, A/O x 3  ENT: MMM  Neck: Supple, No JVD  Lungs: Clear to auscultation bilaterally  Cardio: RRR, S1/S2, No murmurs  Abdomen: Soft, Nontender, Nondistended; Bowel sounds present  Extremities: No calf tenderness, No pitting edema  neuro: R hemiparesis    LABS:                        10.3   5.70  )-----------( 202      ( 13 Sep 2021 05:30 )             31.0     09-13    142  |  105  |  30  ----------------------------<  117  3.9   |  27  |  1.11    Ca    9.3      13 Sep 2021 05:30    TPro  7.4  /  Alb  3.5  /  TBili  0.5  /  DBili  x   /  AST  17  /  ALT  27  /  AlkPhos  64  09-13    eGFR if Non African American: 54 mL/min/1.73M2 (09-13-21 @ 05:30)  eGFR if : 62 mL/min/1.73M2 (09-13-21 @ 05:30)            08-16 Chol 134 mg/dL LDL -- HDL 49 mg/dL Trig 64 mg/dL                      COVID-19 PCR: NotDetec (09-07-21 @ 04:56)  COVID-19 PCR: NotDetec (09-01-21 @ 06:55)  COVID-19 PCR: NotDetec (08-26-21 @ 07:15)  COVID-19 PCR: NotDetec (08-20-21 @ 06:30)  COVID-19 PCR: NotDetec (08-15-21 @ 12:31)      RADIOLOGY & ADDITIONAL TESTS:    Care Discussed with Consultants/Other Providers: rehab team

## 2021-09-14 PROCEDURE — 99232 SBSQ HOSP IP/OBS MODERATE 35: CPT

## 2021-09-14 PROCEDURE — 99232 SBSQ HOSP IP/OBS MODERATE 35: CPT | Mod: GC

## 2021-09-14 RX ORDER — BACLOFEN 100 %
10 POWDER (GRAM) MISCELLANEOUS DAILY
Refills: 0 | Status: DISCONTINUED | OUTPATIENT
Start: 2021-09-15 | End: 2021-09-16

## 2021-09-14 RX ORDER — BACLOFEN 100 %
20 POWDER (GRAM) MISCELLANEOUS AT BEDTIME
Refills: 0 | Status: DISCONTINUED | OUTPATIENT
Start: 2021-09-14 | End: 2021-09-16

## 2021-09-14 RX ADMIN — Medication 100 MILLIGRAM(S): at 06:09

## 2021-09-14 RX ADMIN — Medication 100 MILLIGRAM(S): at 17:59

## 2021-09-14 RX ADMIN — ENOXAPARIN SODIUM 40 MILLIGRAM(S): 100 INJECTION SUBCUTANEOUS at 12:25

## 2021-09-14 RX ADMIN — TICAGRELOR 90 MILLIGRAM(S): 90 TABLET ORAL at 06:10

## 2021-09-14 RX ADMIN — TICAGRELOR 90 MILLIGRAM(S): 90 TABLET ORAL at 17:59

## 2021-09-14 RX ADMIN — ATORVASTATIN CALCIUM 80 MILLIGRAM(S): 80 TABLET, FILM COATED ORAL at 22:17

## 2021-09-14 RX ADMIN — Medication 10 MILLIGRAM(S): at 12:25

## 2021-09-14 RX ADMIN — AMLODIPINE BESYLATE 10 MILLIGRAM(S): 2.5 TABLET ORAL at 06:09

## 2021-09-14 RX ADMIN — METFORMIN HYDROCHLORIDE 1000 MILLIGRAM(S): 850 TABLET ORAL at 17:59

## 2021-09-14 RX ADMIN — METFORMIN HYDROCHLORIDE 1000 MILLIGRAM(S): 850 TABLET ORAL at 11:04

## 2021-09-14 RX ADMIN — Medication 81 MILLIGRAM(S): at 12:25

## 2021-09-14 RX ADMIN — LISINOPRIL 40 MILLIGRAM(S): 2.5 TABLET ORAL at 22:17

## 2021-09-14 RX ADMIN — Medication 10 MILLIGRAM(S): at 06:09

## 2021-09-14 RX ADMIN — Medication 20 MILLIGRAM(S): at 22:17

## 2021-09-14 NOTE — PROGRESS NOTE ADULT - SUBJECTIVE AND OBJECTIVE BOX
Patient is a 61y old  Female who presents with a chief complaint of CVA (14 Sep 2021 10:43)      HPI:  60 yo R handed Female with  PMHx of DMT2, HTN, HLD, multiple CVAs w/ residual RUE/LE weakness w/ foot drop, LLE>UE numbness s/p loop recorder and plavix  now discontinued 2017. She presented to [facility] on 8/15/21 with acute on chronic R-sided weakness, LKN 8/15 07:30 symptoms noticed around 10AM after patient was trying to use her arm and was not able to "make it do what I wanted" on further clarification of RUE>LE weakness. Pt endorsed was leaning to one side, endorsed RLE feels weaker than normal which was present on initial NIHSS 5. Initial BPs elevated 190s systolic, post-CTH without hemorrhage or acute findings, BP was lowered with improvement in R sided acute on chronic weakness to allow for ataxia testing in limbs which was present, which was new findings.  Endorsed mild L posterior headache, no N/V or infectious symptoms. Patient was re-evaluated after improvement for weakness, concern for focal seizure with patient endorsing no prior seizure history or prior episodes w RUE. CTH w/o acute findings, CTA w worsening PCA stenosis but normal flow bL VAs. CDU monitoring for MR Brain to access for any new infarcts, however course c/b reoccurrence in R UE "weakness" appearing contracture-like w RUE flexion and hypertonia, intact mentation.  she was seen by neurologist.  pt received 1500mg IV Keppra load given high suspicion for focal seizure activity. MRI Brain showed Left thalamic infarct. She did not receive TPA as she was out of  window. She was seen by cardiology; TTE, ASA/STATIN, and losartan started. EP was consulted, ILR was implanted (8/18), No events recorded on ILR. Of note, patient had ILR placed in the past ( 2017) no arrhythmias was recorded then. She was evaluated by PM&R and therapist and acute rehabilitation was recommended. She was admitted to [facility] on 8/19/21.        (19 Aug 2021 14:38)        SUBJECTIVE: Patient seen and examined. No acute overnight events, slept well. Continues to have intermittent dizziness  No other complaints.       REVIEW OF SYSTEMS  Denies joint/extremity swelling  Denies rashes/itching  Denies n/v/d/c  continent of bowel and baldder    VITALS  61y  Vital Signs Last 24 Hrs  T(C): 36.7 (14 Sep 2021 09:00), Max: 36.7 (14 Sep 2021 09:00)  T(F): 98 (14 Sep 2021 09:00), Max: 98 (14 Sep 2021 09:00)  HR: 80 (14 Sep 2021 09:00) (80 - 88)  BP: 144/87 (14 Sep 2021 09:00) (120/80 - 144/87)  BP(mean): --  RR: 17 (14 Sep 2021 09:00) (17 - 18)  SpO2: 96% (14 Sep 2021 09:00) (96% - 96%)  Daily     Daily         PHYSICAL EXAM:   Constitutional - NAD, Comfortable  Chest - breathing comfortably ,  Cardio - s1s2+,   Abdomen -  Soft, NT ND  Extremities - No peripheral edema, No calf tenderness   Neurologic Exam:              Cognitive - AAO x 3     Speech - Fluent, Comprehensible, Mild dysarthria   Right facial droop      Motor -                      LUE and LLE 5/5                     RIGHT UE - ShAB 2/5, EF 2/5, EE 2/5, WE 2/5, trace finger movement                     RIGHT LE - HF 3/5, KE 4/5, DF 0/5, PF 1/5  + foot drop      Tone- increased tone noted on R elbow flexors, R finger flexors, R wrist flexors, R toes   Psychiatric - Mood stable, Affect WNL  Skin: Loop recorder site- clean       FUNCTIONAL STATUS:  OT: min assist with bathing  CG with toileting, transfers  Bed mobility: modified independent  Ambulation: 75' with WBQC - CGA/min assist  8 steps : min assist        RECENT LABS:                        10.3   5.70  )-----------( 202      ( 13 Sep 2021 05:30 )             31.0     09-13    142  |  105  |  30<H>  ----------------------------<  117<H>  3.9   |  27  |  1.11    Ca    9.3      13 Sep 2021 05:30    TPro  7.4  /  Alb  3.5  /  TBili  0.5  /  DBili  x   /  AST  17  /  ALT  27  /  AlkPhos  64  09-13    LIVER FUNCTIONS - ( 13 Sep 2021 05:30 )  Alb: 3.5 g/dL / Pro: 7.4 g/dL / ALK PHOS: 64 U/L / ALT: 27 U/L / AST: 17 U/L / GGT: x                   CAPILLARY BLOOD GLUCOSE            MEDICATIONS:  MEDICATIONS  (STANDING):  amLODIPine   Tablet 10 milliGRAM(s) Oral daily  aspirin  chewable 81 milliGRAM(s) Oral daily  atorvastatin 80 milliGRAM(s) Oral at bedtime  baclofen 10 milliGRAM(s) Oral two times a day  benzocaine 15 mG/menthol 3.6 mG (Sugar-Free) Lozenge 1 Lozenge Oral four times a day  enoxaparin Injectable 40 milliGRAM(s) SubCutaneous daily  FLUoxetine 10 milliGRAM(s) Oral daily  labetalol 100 milliGRAM(s) Oral two times a day  lisinopril 40 milliGRAM(s) Oral at bedtime  metFORMIN 1000 milliGRAM(s) Oral two times a day with meals  ticagrelor 90 milliGRAM(s) Oral every 12 hours    MEDICATIONS  (PRN):  acetaminophen   Tablet .. 975 milliGRAM(s) Oral every 6 hours PRN Mild Pain (1 - 3), Moderate Pain (4 - 6), Severe Pain (7 - 10)  diphenhydrAMINE 25 milliGRAM(s) Oral daily PRN Rash and/or Itching  diphenhydramine 2%/zinc acetate 0.1% Cream 1 Application(s) Topical two times a day PRN Rash and/or Itching

## 2021-09-14 NOTE — PROGRESS NOTE ADULT - SUBJECTIVE AND OBJECTIVE BOX
Patient is a 61y old  Female who presents with a chief complaint of CVA (13 Sep 2021 15:35)      Patient seen and examined at bedside. Denies any acute complaints at this time, feeling well. Admits to some tingling in L hand today but more present in the morning and resolves as the day progresses. Denies chest pain, sob, palpitations, abd pain. States she is feeling good, patient in good spirits today    ALLERGIES:  Pineapple (Unknown)  sulfa drugs (Angioedema; Swelling)  sulfa drugs (Rash)  Sulfac 10% (Unknown)  walnut (Urticaria)    MEDICATIONS  (STANDING):  amLODIPine   Tablet 10 milliGRAM(s) Oral daily  aspirin  chewable 81 milliGRAM(s) Oral daily  atorvastatin 80 milliGRAM(s) Oral at bedtime  baclofen 10 milliGRAM(s) Oral two times a day  benzocaine 15 mG/menthol 3.6 mG (Sugar-Free) Lozenge 1 Lozenge Oral four times a day  enoxaparin Injectable 40 milliGRAM(s) SubCutaneous daily  FLUoxetine 10 milliGRAM(s) Oral daily  labetalol 100 milliGRAM(s) Oral two times a day  lisinopril 40 milliGRAM(s) Oral at bedtime  metFORMIN 1000 milliGRAM(s) Oral two times a day with meals  ticagrelor 90 milliGRAM(s) Oral every 12 hours    MEDICATIONS  (PRN):  acetaminophen   Tablet .. 975 milliGRAM(s) Oral every 6 hours PRN Mild Pain (1 - 3), Moderate Pain (4 - 6), Severe Pain (7 - 10)  diphenhydrAMINE 25 milliGRAM(s) Oral daily PRN Rash and/or Itching  diphenhydramine 2%/zinc acetate 0.1% Cream 1 Application(s) Topical two times a day PRN Rash and/or Itching    Vital Signs Last 24 Hrs  T(F): 98 (14 Sep 2021 09:00), Max: 98 (14 Sep 2021 09:00)  HR: 80 (14 Sep 2021 09:00) (80 - 88)  BP: 144/87 (14 Sep 2021 09:00) (120/80 - 144/87)  RR: 17 (14 Sep 2021 09:00) (17 - 18)  SpO2: 96% (14 Sep 2021 09:00) (96% - 96%)  I&O's Summary      PHYSICAL EXAM:  General: NAD, A/O x 3  ENT: MMM  Neck: Supple, No JVD  Lungs: Clear to auscultation bilaterally  Cardio: RRR, S1/S2, No murmurs  Abdomen: Soft, Nontender, Nondistended; Bowel sounds present  Extremities: No calf tenderness, No pitting edema  Neuro: R hemiparesis 1-2/5 strength of RUE and 4/5 strength RLE with knee extension, +foot drop, 0/5 strength with plantarflexion and dorsiflexion of R foot, negative tinel sign of median nerve on L hand    LABS:                        10.3   5.70  )-----------( 202      ( 13 Sep 2021 05:30 )             31.0     09-13    142  |  105  |  30  ----------------------------<  117  3.9   |  27  |  1.11    Ca    9.3      13 Sep 2021 05:30    TPro  7.4  /  Alb  3.5  /  TBili  0.5  /  DBili  x   /  AST  17  /  ALT  27  /  AlkPhos  64  09-13    eGFR if Non African American: 54 mL/min/1.73M2 (09-13-21 @ 05:30)  eGFR if : 62 mL/min/1.73M2 (09-13-21 @ 05:30)            08-16 Chol 134 mg/dL LDL -- HDL 49 mg/dL Trig 64 mg/dL                      COVID-19 PCR: NotDetec (09-13-21 @ 05:00)  COVID-19 PCR: NotDetec (09-07-21 @ 04:56)  COVID-19 PCR: NotDetec (09-01-21 @ 06:55)  COVID-19 PCR: NotDetec (08-26-21 @ 07:15)  COVID-19 PCR: NotDetec (08-20-21 @ 06:30)      RADIOLOGY & ADDITIONAL TESTS:    Care Discussed with Consultants/Other Providers: discussed with Dr. Schwab

## 2021-09-14 NOTE — PROGRESS NOTE ADULT - ATTENDING COMMENTS
Patient seen at bedside  Feels well  Denies any new symptoms  BP and HR much improved today  Has dizziness, not vertigo- Recommended follow up as out patient     2. Neuro exam stable  Will increase baclofen to 20mg at night and dc home on 20mg bid if tolerated  Continue rehab program

## 2021-09-14 NOTE — PROGRESS NOTE ADULT - ASSESSMENT
ASSESSMENT/PLAN  60 yo R handed Female with  PMHx of DMT2, HTN, HLD, multiple CVAs w/ residual RUE/LE weakness w/ foot drop, LLE>UE numbness s/p loop recorder and plavix  now discontinued 2017. Admitted on 8/15 for acute on chronic R-sided weakness found to have Left thalamic infarct. Had concerns of seizure and given loading dose of keppra, however it was ruled out. Patient now with Gait Instability, ADL impairments and Functional impairments.    COMORBIDITES/ACTIVE MEDICAL ISSUES     History of Multiple CVA: new left thalamic infarcts, right spastic hemiparesis   s/p ILR placement; f/u with Dr. Minor  - ASA 81 mg daily, - Brillinta 90 mg twice  FOR 4 WEEKS -  Per neurology - start date 8/17   - Hypercoagulable workup- essentially negative   Continue Comprehensive Rehab Program of PT/OT/ST - total of 3 hrs/day 5 days/week( ST reduced to 30 mnutes)   Will need right AFO as she has a foot drop from recurrent stroke   DCed tizanidine for increased tone, Baclofen 10 mg BID increased to 10mg in AM and 20mg QHS.    HLD:- Lipitor qhs     HTN: on amlodipine 10mg daily, lisinopril 40mg daily - timing changed to 1200pm  , Labetolol 100mg bid ( 1000 am and 1000 pm )   - Home meds: (amlodipine, labetalol, lisinopril 40), has been on hydralazine in past,   - Cardura was added 9/10, d/derik 9/11 due to lightheadedness.   - Recommend outpatient follow-up w/ nephrologist for further workup for resistant HTN and management.    DM II:on - metformin 1000mg  BID    Tachycardia : improved     Hypokalemia: resolved    Pain control:- Tylenol PRN    GI/Bowel Mgmt   - Continent  - Senna,  Miralax PRN,    Bladder management: toilet schedule prn     DVT prophylaxis:- Lovenox  - SCDs, TEDs     FEN :- Diet - Consistent Carbohydrate Low Sodium     Mood: prozac 10mg daily     Dizziness:  Vestibular  eval done 8/31- Patient reports dizziness slightly better, but still occurs at times- Requested PT for vestibular program in addition to post stroke rehabilitation    LLE numbness- ? etiology. exam LLE normal strength, sensation       Precautions / PROPHYLAXIS:   - Falls  - Pressure injury/Skin:  OOB to Chair, position change while in bed q2h      Disp: team conference 8/24 and 8/31 and 9/7:  TDD 9/17 to improve overall function

## 2021-09-14 NOTE — PROGRESS NOTE ADULT - ASSESSMENT
62 yo R handed woman with history of DMT2, HTN, HLD, multiple CVAs w/ residual RUE/LE weakness w/ foot drop, LLE>UE numbness s/p loop recorder and no longer on plavix admitted to  rehab after hospital course for left thalamic infarct, admitted for rehab    Acute L thalamic infarct  history of multiple cerebrovascular accidents (CVAs)   -continue ASA/STATIN, Brilinta   -BP goal <140/90  -ILR was implanted (8/18)  -Continue comprehensive rehab  -will need brace for foot drop  -baclofen 10mg BID for increased muscle tone     R sided Lip tingling/paresthesia  - suspect due to underlying CVA  - monitor    Raised rash possibly urticaria and mild lip swelling possibly due to food allergy- symptoms improved  - pt reports subjective lip swelling, however, no obvious lip swelling observed. monitor.  - pruritic rash improved.  - benadryl prn  - benadryl cream prn  - Follow up with Allergist as outpt    Benign essential HTN with orthostasis   -cont labetalol 100mg BID, amlodipine 10mg  -lisinopril 40mg moved to qHS  -BP acceptable   -avoid low BP as pt with symptomatic orthostasis    HLD  -continue atorvastatin    Type 2 diabetes mellitus  -continue metformin 1000mg bid  -continue ISS  -monitor FS    ERENDIRA (acute kidney injury) on CKD II - resolved  -encourage oral intake  -avoid nephrotoxic agents  -monitor BMP    Normocytic anemia possible anemia of chronic disease  - monitor. H/H stable    Moderate protein-calorie malnutrition  - diet as per dietitian    VTE prophylaxis  -continue Lovenox

## 2021-09-14 NOTE — CHART NOTE - NSCHARTNOTEFT_GEN_A_CORE
Assessment:   Patient seen for: f/u    Source: [x] medical review, [x] patient    Factors impacting intake: [x] none    Intake: %    Diet Prescription: Diet, Consistent Carbohydrate w/Evening Snack:   Low Sodium  [place] Vegetarian (Accepts Fish)  No Egg (08-20-21 @ 09:50)      Current Weight: 216.4 Lbs (8/19)    % Weight Change: no new wts recorded. Requested new weights.     Pt reports good appetite. PO intake %. Pt complimented the meals. Pt feed self, reports no chewing/swallowing difficulties. Confirmed allergy to walnuts and pineapples. Denies N/V/C/D. Last BM: 09/13.     Pertinent Medications: MEDICATIONS  (STANDING):  amLODIPine   Tablet 10 milliGRAM(s) Oral daily  aspirin  chewable 81 milliGRAM(s) Oral daily  atorvastatin 80 milliGRAM(s) Oral at bedtime  baclofen 20 milliGRAM(s) Oral at bedtime  benzocaine 15 mG/menthol 3.6 mG (Sugar-Free) Lozenge 1 Lozenge Oral four times a day  enoxaparin Injectable 40 milliGRAM(s) SubCutaneous daily  FLUoxetine 10 milliGRAM(s) Oral daily  labetalol 100 milliGRAM(s) Oral two times a day  lisinopril 40 milliGRAM(s) Oral at bedtime  metFORMIN 1000 milliGRAM(s) Oral two times a day with meals  ticagrelor 90 milliGRAM(s) Oral every 12 hours    MEDICATIONS  (PRN):  acetaminophen   Tablet .. 975 milliGRAM(s) Oral every 6 hours PRN Mild Pain (1 - 3), Moderate Pain (4 - 6), Severe Pain (7 - 10)  diphenhydrAMINE 25 milliGRAM(s) Oral daily PRN Rash and/or Itching  diphenhydramine 2%/zinc acetate 0.1% Cream 1 Application(s) Topical two times a day PRN Rash and/or Itching    Pertinent Labs: 09-13 Na142 mmol/L Glu 117 mg/dL<H> K+ 3.9 mmol/L Cr  1.11 mg/dL BUN 30 mg/dL<H> 09-13 Alb 3.5 g/dL 08-16 Chol 134 mg/dL LDL --    HDL 49 mg/dL<L> Trig 64 mg/dL    CAPILLARY BLOOD GLUCOSE    Skin: WDL except left chest wall/loop recorder insertion.     Edema: None     Estimated Needs:   [x] no change since previous assessment  [ ] recalculated:     Previous Nutrition Diagnosis:   [x] Malnutrition Moderate     Nutrition Diagnosis is [x] ongoing  [ ] resolved [ ] not applicable     New Nutrition Diagnosis: [x] not applicable     Interventions:   Recommend  [x] Continue with current diet: Consistent Carbohydrates diet (evening snacks) Low Na, [place] vegetarian (no eggs, no meats, no poultry)  [x] Nutrition Support: Honor pt's food preferences as feasible with prescribed diet.   [x] Other: Obtain and record PO intake and weights daily     Monitoring and Evaluation:   Continue to monitor Nutritional intake, Tolerance to diet prescription, weights, labs, skin integrity.  other:  RD remains available upon request and will follow up per protocol.

## 2021-09-15 PROCEDURE — 99232 SBSQ HOSP IP/OBS MODERATE 35: CPT

## 2021-09-15 RX ORDER — ONDANSETRON 8 MG/1
4 TABLET, FILM COATED ORAL ONCE
Refills: 0 | Status: COMPLETED | OUTPATIENT
Start: 2021-09-15 | End: 2021-09-15

## 2021-09-15 RX ADMIN — ONDANSETRON 4 MILLIGRAM(S): 8 TABLET, FILM COATED ORAL at 22:17

## 2021-09-15 RX ADMIN — ENOXAPARIN SODIUM 40 MILLIGRAM(S): 100 INJECTION SUBCUTANEOUS at 13:02

## 2021-09-15 RX ADMIN — Medication 975 MILLIGRAM(S): at 14:09

## 2021-09-15 RX ADMIN — Medication 100 MILLIGRAM(S): at 06:06

## 2021-09-15 RX ADMIN — Medication 975 MILLIGRAM(S): at 10:33

## 2021-09-15 RX ADMIN — LISINOPRIL 40 MILLIGRAM(S): 2.5 TABLET ORAL at 21:30

## 2021-09-15 RX ADMIN — Medication 81 MILLIGRAM(S): at 13:02

## 2021-09-15 RX ADMIN — Medication 10 MILLIGRAM(S): at 13:02

## 2021-09-15 RX ADMIN — Medication 20 MILLIGRAM(S): at 21:31

## 2021-09-15 RX ADMIN — Medication 975 MILLIGRAM(S): at 22:10

## 2021-09-15 RX ADMIN — Medication 975 MILLIGRAM(S): at 21:31

## 2021-09-15 RX ADMIN — METFORMIN HYDROCHLORIDE 1000 MILLIGRAM(S): 850 TABLET ORAL at 18:29

## 2021-09-15 RX ADMIN — AMLODIPINE BESYLATE 10 MILLIGRAM(S): 2.5 TABLET ORAL at 06:06

## 2021-09-15 RX ADMIN — TICAGRELOR 90 MILLIGRAM(S): 90 TABLET ORAL at 06:06

## 2021-09-15 RX ADMIN — METFORMIN HYDROCHLORIDE 1000 MILLIGRAM(S): 850 TABLET ORAL at 08:27

## 2021-09-15 RX ADMIN — TICAGRELOR 90 MILLIGRAM(S): 90 TABLET ORAL at 18:29

## 2021-09-15 RX ADMIN — ATORVASTATIN CALCIUM 80 MILLIGRAM(S): 80 TABLET, FILM COATED ORAL at 21:31

## 2021-09-15 RX ADMIN — Medication 100 MILLIGRAM(S): at 18:29

## 2021-09-15 NOTE — PROGRESS NOTE ADULT - SUBJECTIVE AND OBJECTIVE BOX
Patient is a 61y old  Female who presents with a chief complaint of CVA (15 Sep 2021 10:37)      Patient seen and examined at bedside. No events overnight. Patient admits to some nausea and headache this morning, had episode of emesis NBNB and nausea and headache resolved. Patient otherwise denies chest pain, SOB, palpitations, abd pain. Feels good.    ALLERGIES:  Pineapple (Unknown)  sulfa drugs (Angioedema; Swelling)  sulfa drugs (Rash)  Sulfac 10% (Unknown)  walnut (Urticaria)    MEDICATIONS  (STANDING):  amLODIPine   Tablet 10 milliGRAM(s) Oral daily  aspirin  chewable 81 milliGRAM(s) Oral daily  atorvastatin 80 milliGRAM(s) Oral at bedtime  baclofen 10 milliGRAM(s) Oral daily  baclofen 20 milliGRAM(s) Oral at bedtime  benzocaine 15 mG/menthol 3.6 mG (Sugar-Free) Lozenge 1 Lozenge Oral four times a day  enoxaparin Injectable 40 milliGRAM(s) SubCutaneous daily  FLUoxetine 10 milliGRAM(s) Oral daily  labetalol 100 milliGRAM(s) Oral two times a day  lisinopril 40 milliGRAM(s) Oral at bedtime  metFORMIN 1000 milliGRAM(s) Oral two times a day with meals  ticagrelor 90 milliGRAM(s) Oral every 12 hours    MEDICATIONS  (PRN):  acetaminophen   Tablet .. 975 milliGRAM(s) Oral every 6 hours PRN Mild Pain (1 - 3), Moderate Pain (4 - 6), Severe Pain (7 - 10)  diphenhydrAMINE 25 milliGRAM(s) Oral daily PRN Rash and/or Itching  diphenhydramine 2%/zinc acetate 0.1% Cream 1 Application(s) Topical two times a day PRN Rash and/or Itching    Vital Signs Last 24 Hrs  T(F): 98.8 (15 Sep 2021 09:01), Max: 98.8 (15 Sep 2021 09:01)  HR: 95 (15 Sep 2021 09:01) (91 - 95)  BP: 152/90 (15 Sep 2021 09:01) (129/85 - 153/88)  RR: 17 (15 Sep 2021 09:01) (17 - 18)  SpO2: 99% (15 Sep 2021 09:01) (99% - 100%)  I&O's Summary      PHYSICAL EXAM:  General: NAD, A/O x 3  ENT: MMM  Neck: Supple, No JVD  Lungs: Clear to auscultation bilaterally  Cardio: RRR, S1/S2, No murmurs  Abdomen: Soft, Nontender, Nondistended; Bowel sounds present  Extremities: No calf tenderness, No pitting edema  Neuro: R hemiparesis 1-2/5 strength of RUE and 4/5 strength RLE with knee extension, +foot drop, 0/5 strength with plantarflexion and dorsiflexion of R foot, negative tinel sign of median nerve on L hand    LABS:                        10.3   5.70  )-----------( 202      ( 13 Sep 2021 05:30 )             31.0     09-13    142  |  105  |  30  ----------------------------<  117  3.9   |  27  |  1.11    Ca    9.3      13 Sep 2021 05:30    TPro  7.4  /  Alb  3.5  /  TBili  0.5  /  DBili  x   /  AST  17  /  ALT  27  /  AlkPhos  64  09-13    eGFR if Non African American: 54 mL/min/1.73M2 (09-13-21 @ 05:30)  eGFR if : 62 mL/min/1.73M2 (09-13-21 @ 05:30)            08-16 Chol 134 mg/dL LDL -- HDL 49 mg/dL Trig 64 mg/dL                      COVID-19 PCR: NotDetec (09-13-21 @ 05:00)  COVID-19 PCR: NotDetec (09-07-21 @ 04:56)  COVID-19 PCR: NotDetec (09-01-21 @ 06:55)  COVID-19 PCR: NotDetec (08-26-21 @ 07:15)  COVID-19 PCR: NotDetec (08-20-21 @ 06:30)      RADIOLOGY & ADDITIONAL TESTS:     Care Discussed with Consultants/Other Providers: discussed with Dr. Schwab

## 2021-09-15 NOTE — PROGRESS NOTE ADULT - SUBJECTIVE AND OBJECTIVE BOX
Patient is a 61y old  Female who presents with a chief complaint of CVA (14 Sep 2021 10:43)      HPI:  60 yo R handed Female with  PMHx of DMT2, HTN, HLD, multiple CVAs w/ residual RUE/LE weakness w/ foot drop, LLE>UE numbness s/p loop recorder and plavix  now discontinued 2017. She presented to [facility] on 8/15/21 with acute on chronic R-sided weakness, LKN 8/15 07:30 symptoms noticed around 10AM after patient was trying to use her arm and was not able to "make it do what I wanted" on further clarification of RUE>LE weakness. Pt endorsed was leaning to one side, endorsed RLE feels weaker than normal which was present on initial NIHSS 5. Initial BPs elevated 190s systolic, post-CTH without hemorrhage or acute findings, BP was lowered with improvement in R sided acute on chronic weakness to allow for ataxia testing in limbs which was present, which was new findings.  Endorsed mild L posterior headache, no N/V or infectious symptoms. Patient was re-evaluated after improvement for weakness, concern for focal seizure with patient endorsing no prior seizure history or prior episodes w RUE. CTH w/o acute findings, CTA w worsening PCA stenosis but normal flow bL VAs. CDU monitoring for MR Brain to access for any new infarcts, however course c/b reoccurrence in R UE "weakness" appearing contracture-like w RUE flexion and hypertonia, intact mentation.  she was seen by neurologist.  pt received 1500mg IV Keppra load given high suspicion for focal seizure activity. MRI Brain showed Left thalamic infarct. She did not receive TPA as she was out of  window. She was seen by cardiology; TTE, ASA/STATIN, and losartan started. EP was consulted, ILR was implanted (8/18), No events recorded on ILR. Of note, patient had ILR placed in the past ( 2017) no arrhythmias was recorded then. She was evaluated by PM&R and therapist and acute rehabilitation was recommended. She was admitted to [facility] on 8/19/21.        (19 Aug 2021 14:38)    SUBJECTIVE: Patient seen and examined. No acute overnight events,   Reports mild nausea this am   Denies abdominal discomfort  Dizziness mild    REVIEW OF SYSTEMS  Denies n/v/d/c  continent of bowel and bladder    VITALS  Vital Signs Last 24 Hrs  T(C): 37.1 (15 Sep 2021 09:01), Max: 37.1 (15 Sep 2021 09:01)  T(F): 98.8 (15 Sep 2021 09:01), Max: 98.8 (15 Sep 2021 09:01)  HR: 95 (15 Sep 2021 09:01) (91 - 95)  BP: 152/90 (15 Sep 2021 09:01) (129/85 - 153/88)  BP(mean): --  RR: 17 (15 Sep 2021 09:01) (17 - 18)  SpO2: 99% (15 Sep 2021 09:01) (99% - 100%)    PHYSICAL EXAM:   Constitutional - NAD, Comfortable  Chest - breathing comfortably ,  Cardio - s1s2+,   Abdomen -  Soft, NT ND  Extremities - No peripheral edema, No calf tenderness   Neurologic Exam:              Cognitive - AAO x 3     Speech - Fluent, Comprehensible, Mild dysarthria   Right facial droop      Motor -                      LUE and LLE 5/5                     RIGHT UE - ShAB 2/5, EF 2/5, EE 2/5, WE 2/5, trace finger movement                     RIGHT LE - HF 3/5, KE 4/5, DF 0/5, PF 1/5  + foot drop      Tone- increased tone noted on R elbow flexors, R finger flexors, R wrist flexors, R toes   Psychiatric - Mood stable, Affect WNL  Skin: Loop recorder site- clean       FUNCTIONAL STATUS:  OT: min assist with bathing  CG with toileting, transfers  Bed mobility: modified independent  Ambulation: 75' with WBQC - CGA/min assist  8 steps : min assist    MEDICATIONS  (STANDING):  amLODIPine   Tablet 10 milliGRAM(s) Oral daily  aspirin  chewable 81 milliGRAM(s) Oral daily  atorvastatin 80 milliGRAM(s) Oral at bedtime  baclofen 10 milliGRAM(s) Oral daily  baclofen 20 milliGRAM(s) Oral at bedtime  benzocaine 15 mG/menthol 3.6 mG (Sugar-Free) Lozenge 1 Lozenge Oral four times a day  enoxaparin Injectable 40 milliGRAM(s) SubCutaneous daily  FLUoxetine 10 milliGRAM(s) Oral daily  labetalol 100 milliGRAM(s) Oral two times a day  lisinopril 40 milliGRAM(s) Oral at bedtime  metFORMIN 1000 milliGRAM(s) Oral two times a day with meals  ticagrelor 90 milliGRAM(s) Oral every 12 hours    MEDICATIONS  (PRN):  acetaminophen   Tablet .. 975 milliGRAM(s) Oral every 6 hours PRN Mild Pain (1 - 3), Moderate Pain (4 - 6), Severe Pain (7 - 10)  diphenhydrAMINE 25 milliGRAM(s) Oral daily PRN Rash and/or Itching  diphenhydramine 2%/zinc acetate 0.1% Cream 1 Application(s) Topical two times a day PRN Rash and/or Itching        RECENT LABS:                        10.3   5.70  )-----------( 202      ( 13 Sep 2021 05:30 )             31.0     09-13    142  |  105  |  30<H>  ----------------------------<  117<H>  3.9   |  27  |  1.11    Ca    9.3      13 Sep 2021 05:30    TPro  7.4  /  Alb  3.5  /  TBili  0.5  /  DBili  x   /  AST  17  /  ALT  27  /  AlkPhos  64  09-13    LIVER FUNCTIONS - ( 13 Sep 2021 05:30 )  Alb: 3.5 g/dL / Pro: 7.4 g/dL / ALK PHOS: 64 U/L / ALT: 27 U/L / AST: 17 U/L / GGT: x

## 2021-09-15 NOTE — PROGRESS NOTE ADULT - ASSESSMENT
62 yo R handed woman with history of DMT2, HTN, HLD, multiple CVAs w/ residual RUE/LE weakness w/ foot drop, LLE>UE numbness s/p loop recorder and no longer on plavix admitted to  rehab after hospital course for left thalamic infarct, admitted for rehab    Acute L thalamic infarct  history of multiple cerebrovascular accidents (CVAs)   -continue ASA/STATIN, Brilinta   -BP goal <140/90  -ILR was implanted (8/18)  -Continue comprehensive rehab  -will need brace for foot drop  -baclofen 10mg BID for increased muscle tone- changed to 10mg qAM and 20mg qhs    R sided Lip tingling/paresthesia  - suspect due to underlying CVA  - monitor    Raised rash possibly urticaria and mild lip swelling possibly due to food allergy- symptoms improved  - pt reports subjective lip swelling, however, no obvious lip swelling observed. monitor.  - pruritic rash improved.  - benadryl prn  - benadryl cream prn  - Follow up with Allergist as outpt    Benign essential HTN with orthostasis   -cont labetalol 100mg BID, amlodipine 10mg  -lisinopril 40mg moved to qHS  -BP acceptable   -avoid low BP as pt with symptomatic orthostasis    HLD  -continue atorvastatin    Type 2 diabetes mellitus  -continue metformin 1000mg bid  -continue ISS  -monitor FS    ERENDIRA (acute kidney injury) on CKD II - resolved  -encourage oral intake  -avoid nephrotoxic agents  -monitor BMP    Normocytic anemia possible anemia of chronic disease  - monitor. H/H stable    Moderate protein-calorie malnutrition  - diet as per dietitian    VTE prophylaxis  -continue Lovenox

## 2021-09-15 NOTE — PROGRESS NOTE ADULT - ASSESSMENT
ASSESSMENT/PLAN  62 yo R handed Female with  PMHx of DMT2, HTN, HLD, multiple CVAs w/ residual RUE/LE weakness w/ foot drop, LLE>UE numbness s/p loop recorder and plavix  now discontinued 2017. Admitted on 8/15 for acute on chronic R-sided weakness found to have Left thalamic infarct. Had concerns of seizure and given loading dose of keppra, however it was ruled out. Patient now with Gait Instability, ADL impairments and Functional impairments.    COMORBIDITES/ACTIVE MEDICAL ISSUES     History of Multiple CVA: new left thalamic infarcts, right spastic hemiparesis   s/p ILR placement; f/u with Dr. Minor  - ASA 81 mg daily, - Brillinta 90 mg twice  FOR 4 WEEKS -  Per neurology - start date 8/17   - Hypercoagulable workup- essentially negative   Continue Comprehensive Rehab Program of PT/OT/ST - total of 3 hrs/day 5 days/week( ST reduced to 30 mnutes)   Casted for Right AFO during rehab stay   Baclofen 10 mg BID increased to 10mg in AM and 20mg QHS.    HLD:- Lipitor qhs     HTN: on amlodipine 10mg daily, lisinopril 40mg daily - timing changed to 1200pm  , Labetolol 100mg bid ( 1000 am and 1000 pm ) . BP much improved   - - Recommend outpatient follow-up w/ nephrologist for further workup for resistant HTN and management.    DM II: metformin 1000mg  BID    Tachycardia : improved     Hypokalemia: resolved    Pain control:- Tylenol PRN    GI/Bowel Mgmt   - Continent  - Senna,  Miralax PRN,    Bladder management: toilet schedule prn     DVT prophylaxis:- Lovenox  - SCDs, TEDs     FEN :- Diet - Consistent Carbohydrate Low Sodium     Mood: prozac 10mg daily     Dizziness:  fu with vestibular therapy as outpatient     Precautions / PROPHYLAXIS:   - Falls  - Pressure injury/Skin:  OOB to Chair, position change while in bed q2h      Disp:  TDD 9/17 with home care

## 2021-09-16 LAB
ALBUMIN SERPL ELPH-MCNC: 3.9 G/DL — SIGNIFICANT CHANGE UP (ref 3.3–5)
ALP SERPL-CCNC: 69 U/L — SIGNIFICANT CHANGE UP (ref 40–120)
ALT FLD-CCNC: 29 U/L — SIGNIFICANT CHANGE UP (ref 10–45)
AMYLASE P1 CFR SERPL: 85 U/L — SIGNIFICANT CHANGE UP (ref 25–125)
ANION GAP SERPL CALC-SCNC: 9 MMOL/L — SIGNIFICANT CHANGE UP (ref 5–17)
AST SERPL-CCNC: 18 U/L — SIGNIFICANT CHANGE UP (ref 10–40)
BASOPHILS # BLD AUTO: 0.03 K/UL — SIGNIFICANT CHANGE UP (ref 0–0.2)
BASOPHILS NFR BLD AUTO: 0.6 % — SIGNIFICANT CHANGE UP (ref 0–2)
BILIRUB SERPL-MCNC: 0.6 MG/DL — SIGNIFICANT CHANGE UP (ref 0.2–1.2)
BUN SERPL-MCNC: 19 MG/DL — SIGNIFICANT CHANGE UP (ref 7–23)
CALCIUM SERPL-MCNC: 9.5 MG/DL — SIGNIFICANT CHANGE UP (ref 8.4–10.5)
CHLORIDE SERPL-SCNC: 104 MMOL/L — SIGNIFICANT CHANGE UP (ref 96–108)
CO2 SERPL-SCNC: 29 MMOL/L — SIGNIFICANT CHANGE UP (ref 22–31)
CREAT SERPL-MCNC: 1.13 MG/DL — SIGNIFICANT CHANGE UP (ref 0.5–1.3)
EOSINOPHIL # BLD AUTO: 0.13 K/UL — SIGNIFICANT CHANGE UP (ref 0–0.5)
EOSINOPHIL NFR BLD AUTO: 2.5 % — SIGNIFICANT CHANGE UP (ref 0–6)
GLUCOSE SERPL-MCNC: 146 MG/DL — HIGH (ref 70–99)
HCT VFR BLD CALC: 33.5 % — LOW (ref 34.5–45)
HGB BLD-MCNC: 11 G/DL — LOW (ref 11.5–15.5)
IMM GRANULOCYTES NFR BLD AUTO: 0.4 % — SIGNIFICANT CHANGE UP (ref 0–1.5)
LIDOCAIN IGE QN: 191 U/L — SIGNIFICANT CHANGE UP (ref 73–393)
LYMPHOCYTES # BLD AUTO: 1.46 K/UL — SIGNIFICANT CHANGE UP (ref 1–3.3)
LYMPHOCYTES # BLD AUTO: 27.9 % — SIGNIFICANT CHANGE UP (ref 13–44)
MCHC RBC-ENTMCNC: 27.2 PG — SIGNIFICANT CHANGE UP (ref 27–34)
MCHC RBC-ENTMCNC: 32.8 GM/DL — SIGNIFICANT CHANGE UP (ref 32–36)
MCV RBC AUTO: 82.9 FL — SIGNIFICANT CHANGE UP (ref 80–100)
MONOCYTES # BLD AUTO: 0.34 K/UL — SIGNIFICANT CHANGE UP (ref 0–0.9)
MONOCYTES NFR BLD AUTO: 6.5 % — SIGNIFICANT CHANGE UP (ref 2–14)
NEUTROPHILS # BLD AUTO: 3.25 K/UL — SIGNIFICANT CHANGE UP (ref 1.8–7.4)
NEUTROPHILS NFR BLD AUTO: 62.1 % — SIGNIFICANT CHANGE UP (ref 43–77)
NRBC # BLD: 0 /100 WBCS — SIGNIFICANT CHANGE UP (ref 0–0)
PLATELET # BLD AUTO: 229 K/UL — SIGNIFICANT CHANGE UP (ref 150–400)
POTASSIUM SERPL-MCNC: 3.7 MMOL/L — SIGNIFICANT CHANGE UP (ref 3.5–5.3)
POTASSIUM SERPL-SCNC: 3.7 MMOL/L — SIGNIFICANT CHANGE UP (ref 3.5–5.3)
PROT SERPL-MCNC: 8.1 G/DL — SIGNIFICANT CHANGE UP (ref 6–8.3)
RBC # BLD: 4.04 M/UL — SIGNIFICANT CHANGE UP (ref 3.8–5.2)
RBC # FLD: 15.2 % — HIGH (ref 10.3–14.5)
SODIUM SERPL-SCNC: 142 MMOL/L — SIGNIFICANT CHANGE UP (ref 135–145)
WBC # BLD: 5.23 K/UL — SIGNIFICANT CHANGE UP (ref 3.8–10.5)
WBC # FLD AUTO: 5.23 K/UL — SIGNIFICANT CHANGE UP (ref 3.8–10.5)

## 2021-09-16 PROCEDURE — 99233 SBSQ HOSP IP/OBS HIGH 50: CPT | Mod: GC

## 2021-09-16 PROCEDURE — 70450 CT HEAD/BRAIN W/O DYE: CPT | Mod: 26

## 2021-09-16 RX ORDER — BACLOFEN 100 %
10 POWDER (GRAM) MISCELLANEOUS
Refills: 0 | Status: DISCONTINUED | OUTPATIENT
Start: 2021-09-16 | End: 2021-09-17

## 2021-09-16 RX ORDER — ONDANSETRON 8 MG/1
4 TABLET, FILM COATED ORAL ONCE
Refills: 0 | Status: COMPLETED | OUTPATIENT
Start: 2021-09-16 | End: 2021-09-16

## 2021-09-16 RX ORDER — ONDANSETRON 8 MG/1
4 TABLET, FILM COATED ORAL THREE TIMES A DAY
Refills: 0 | Status: DISCONTINUED | OUTPATIENT
Start: 2021-09-16 | End: 2021-09-16

## 2021-09-16 RX ORDER — ONDANSETRON 8 MG/1
4 TABLET, FILM COATED ORAL THREE TIMES A DAY
Refills: 0 | Status: DISCONTINUED | OUTPATIENT
Start: 2021-09-16 | End: 2021-09-17

## 2021-09-16 RX ADMIN — TICAGRELOR 90 MILLIGRAM(S): 90 TABLET ORAL at 06:36

## 2021-09-16 RX ADMIN — AMLODIPINE BESYLATE 10 MILLIGRAM(S): 2.5 TABLET ORAL at 06:36

## 2021-09-16 RX ADMIN — TICAGRELOR 90 MILLIGRAM(S): 90 TABLET ORAL at 17:38

## 2021-09-16 RX ADMIN — ENOXAPARIN SODIUM 40 MILLIGRAM(S): 100 INJECTION SUBCUTANEOUS at 12:39

## 2021-09-16 RX ADMIN — Medication 100 MILLIGRAM(S): at 17:38

## 2021-09-16 RX ADMIN — METFORMIN HYDROCHLORIDE 1000 MILLIGRAM(S): 850 TABLET ORAL at 10:00

## 2021-09-16 RX ADMIN — Medication 100 MILLIGRAM(S): at 06:36

## 2021-09-16 RX ADMIN — Medication 975 MILLIGRAM(S): at 21:59

## 2021-09-16 RX ADMIN — LISINOPRIL 40 MILLIGRAM(S): 2.5 TABLET ORAL at 21:59

## 2021-09-16 RX ADMIN — ONDANSETRON 4 MILLIGRAM(S): 8 TABLET, FILM COATED ORAL at 20:37

## 2021-09-16 RX ADMIN — Medication 10 MILLIGRAM(S): at 12:39

## 2021-09-16 RX ADMIN — ONDANSETRON 4 MILLIGRAM(S): 8 TABLET, FILM COATED ORAL at 06:38

## 2021-09-16 RX ADMIN — METFORMIN HYDROCHLORIDE 1000 MILLIGRAM(S): 850 TABLET ORAL at 17:38

## 2021-09-16 RX ADMIN — ONDANSETRON 4 MILLIGRAM(S): 8 TABLET, FILM COATED ORAL at 10:04

## 2021-09-16 RX ADMIN — Medication 10 MILLIGRAM(S): at 19:33

## 2021-09-16 RX ADMIN — ATORVASTATIN CALCIUM 80 MILLIGRAM(S): 80 TABLET, FILM COATED ORAL at 21:59

## 2021-09-16 RX ADMIN — Medication 81 MILLIGRAM(S): at 12:39

## 2021-09-16 NOTE — CHART NOTE - NSCHARTNOTEFT_GEN_A_CORE
Patient w/ nausea/vomiting (NBNB) and hypertensive  Neurological exam stable however w/ persistent dizziness nausea will order CT head non contrast   Zofran PRN for nausea/vomiting ordered. IF emesis after PO zofran will order IV.   May consider 1 dose meclizine however no nystagmus on exam  Full progress note to follow.

## 2021-09-16 NOTE — CHART NOTE - NSCHARTNOTESELECT_GEN_ALL_CORE
Nutrition Services
Nutrition Services
Plan of Care/Event Note
Emesis/Event Note
Nutrition Services
Nutrition Services

## 2021-09-16 NOTE — PROGRESS NOTE ADULT - ATTENDING COMMENTS
Chart reviewed. Patient seen at bedside  Continues to have nausea this am and emesis overnight. Given zofran with some relief  Reports also some frontal HA- Stat head CT - no acute bleed.   Denies abdominal discomfort, not constipated. Abdomen soft and NT  ? drug induced- most recent change - increase dose of baclofen - reduce dose to 10mg bid    2. CBC, CMP wnl amylase, lipase wnl    3. Continue rehab program.   TDD in am pending medical stability Chart reviewed. Patient seen at bedside  Continues to have nausea this am and emesis overnight. Given zofran with some relief  Reports also some frontal HA- Stat head CT - no acute bleed.   Denies abdominal discomfort, not constipated. Abdomen soft and NT  ? drug induced- most recent change - increase dose of baclofen - reduce dose to 10mg bid    2. CBC, CMP wnl amylase, lipase wnl    3. Continue rehab program.   TDD in am pending medical stability      Addendum:  Patient seen again later in the day. Stays feeling better. Was able to tolerate small amount of lunch. Headache better. Dizziness improved

## 2021-09-16 NOTE — PROGRESS NOTE ADULT - ASSESSMENT
ASSESSMENT/PLAN  62 yo R handed Female with  PMHx of DMT2, HTN, HLD, multiple CVAs w/ residual RUE/LE weakness w/ foot drop, LLE>UE numbness s/p loop recorder and plavix  now discontinued 2017. Admitted on 8/15 for acute on chronic R-sided weakness found to have Left thalamic infarct. Had concerns of seizure and given loading dose of keppra, however it was ruled out. Patient now with Gait Instability, ADL impairments and Functional impairments.    COMORBIDITES/ACTIVE MEDICAL ISSUES     History of Multiple CVA: new left thalamic infarcts, right spastic hemiparesis   s/p ILR placement; f/u with Dr. Minor  - ASA 81 mg daily, - Brillinta 90 mg twice  FOR 4 WEEKS -  Per neurology - start date 8/17   - Hypercoagulable workup- essentially negative   Continue Comprehensive Rehab Program of PT/OT/ST - total of 3 hrs/day 5 days/week( ST reduced to 30 mnutes)   Casted for Right AFO during rehab stay   Baclofen 10 mg BID (decreased baclofen from 20mg QHS to 10mg QHS suspect nausea/vomiting from it vs vertigo)     HLD:- Lipitor qhs     HTN: on amlodipine 10mg daily, lisinopril 40mg daily - timing changed to 1200pm  , Labetolol 100mg bid ( 1000 am and 1000 pm ) . BP much improved   - - Recommend outpatient follow-up w/ nephrologist for further workup for resistant HTN and management.    DM II: metformin 1000mg  BID    Tachycardia : improved     Hypokalemia: resolved    Pain control:- Tylenol PRN    GI/Bowel Mgmt   - Continent  - Senna,  Miralax PRN,    Bladder management: toilet schedule prn     DVT prophylaxis:- Lovenox  - SCDs, TEDs     FEN :- Diet - Consistent Carbohydrate Low Sodium     Mood: prozac 10mg daily     Dizziness:  fu with vestibular therapy as outpatient   -nausea/vomiting dizziness/headache 9/16/21   -CT head ordered  -f/u STAT CBC/CMP  -zofran PRN ordered  -can consider 1 doze of meclizine if does not remit   -no new weakness elicited on exam  -suspect 2/2 vertigo vs other cause - denies abdominal pain, consider adding amylase and lipase    Precautions / PROPHYLAXIS:   - Falls  - Pressure injury/Skin:  OOB to Chair, position change while in bed q2h      Disp:  TDD  to be determined

## 2021-09-16 NOTE — PROGRESS NOTE ADULT - SUBJECTIVE AND OBJECTIVE BOX
Patient is a 61y old  Female who presents with a chief complaint of CVA (15 Sep 2021 10:55)      HPI:  62 yo R handed Female with  PMHx of DMT2, HTN, HLD, multiple CVAs w/ residual RUE/LE weakness w/ foot drop, LLE>UE numbness s/p loop recorder and plavix  now discontinued 2017. She presented to [facility] on 8/15/21 with acute on chronic R-sided weakness, LKN 8/15 07:30 symptoms noticed around 10AM after patient was trying to use her arm and was not able to "make it do what I wanted" on further clarification of RUE>LE weakness. Pt endorsed was leaning to one side, endorsed RLE feels weaker than normal which was present on initial NIHSS 5. Initial BPs elevated 190s systolic, post-CTH without hemorrhage or acute findings, BP was lowered with improvement in R sided acute on chronic weakness to allow for ataxia testing in limbs which was present, which was new findings.  Endorsed mild L posterior headache, no N/V or infectious symptoms. Patient was re-evaluated after improvement for weakness, concern for focal seizure with patient endorsing no prior seizure history or prior episodes w RUE. CTH w/o acute findings, CTA w worsening PCA stenosis but normal flow bL VAs. CDU monitoring for MR Brain to access for any new infarcts, however course c/b reoccurrence in R UE "weakness" appearing contracture-like w RUE flexion and hypertonia, intact mentation.  she was seen by neurologist.  pt received 1500mg IV Keppra load given high suspicion for focal seizure activity. MRI Brain showed Left thalamic infarct. She did not receive TPA as she was out of  window. She was seen by cardiology; TTE, ASA/STATIN, and losartan started. EP was consulted, ILR was implanted (8/18), No events recorded on ILR. Of note, patient had ILR placed in the past ( 2017) no arrhythmias was recorded then. She was evaluated by PM&R and therapist and acute rehabilitation was recommended. She was admitted to [facility] on 8/19/21.  (19 Aug 2021 14:38)        SUBJECTIVE: Patient seen and examined. Nausea/vomiting/headache/dizziness overnight and this AM. Denies new weakness. Emesis nonbloody/nonbilious. Denies abdominal pain/diarrhea.   No other complaints      REVIEW OF SYSTEMS  +n/v  +headaches   +dizziness  no abd pain  no diarrhea/constipation    VITALS  61y  Vital Signs Last 24 Hrs  T(C): 36.8 (16 Sep 2021 07:52), Max: 36.8 (16 Sep 2021 07:52)  T(F): 98.3 (16 Sep 2021 07:52), Max: 98.3 (16 Sep 2021 07:52)  HR: 74 (16 Sep 2021 10:11) (74 - 84)  BP: 159/100 (16 Sep 2021 10:11) (148/83 - 164/103)  BP(mean): --  RR: 16 (16 Sep 2021 10:11) (16 - 16)  SpO2: 96% (16 Sep 2021 10:11) (96% - 99%)  Daily     Daily         PHYSICAL EXAM:   Constitutional - NAD, Comfortable  Chest - breathing comfortably ,  Cardio - s1s2+,   Abdomen -  Soft, NT ND  Extremities - No peripheral edema, No calf tenderness   Neurologic Exam:              Cognitive - AAO x 3     Speech - Fluent, Comprehensible, Mild dysarthria   Right facial droop      Motor -                      LUE and LLE 5/5                     RIGHT UE - ShAB 2/5, EF 2/5, EE 2/5, WE 2/5, trace finger movement                     RIGHT LE - HF 3/5, KE 4/5, DF 0/5, PF 1/5  + foot drop      Tone- increased tone noted on R elbow flexors, R finger flexors, R wrist flexors, R toes   Psychiatric - Mood stable, Affect WNL  Skin: Loop recorder site- clean         FUNCTIONAL STATUS:  OT: min assist with bathing  CG with toileting, transfers  Bed mobility: modified independent  Ambulation: 75' with WBQC - CGA/min assist  8 steps : min assist        RECENT LABS:                        11.0   5.23  )-----------( 229      ( 16 Sep 2021 08:34 )             33.5     09-16    142  |  104  |  19  ----------------------------<  146<H>  3.7   |  29  |  1.13    Ca    9.5      16 Sep 2021 08:34    TPro  8.1  /  Alb  3.9  /  TBili  0.6  /  DBili  x   /  AST  18  /  ALT  29  /  AlkPhos  69  09-16    LIVER FUNCTIONS - ( 16 Sep 2021 08:34 )  Alb: 3.9 g/dL / Pro: 8.1 g/dL / ALK PHOS: 69 U/L / ALT: 29 U/L / AST: 18 U/L / GGT: x                   CAPILLARY BLOOD GLUCOSE            MEDICATIONS:  MEDICATIONS  (STANDING):  amLODIPine   Tablet 10 milliGRAM(s) Oral daily  aspirin  chewable 81 milliGRAM(s) Oral daily  atorvastatin 80 milliGRAM(s) Oral at bedtime  baclofen 10 milliGRAM(s) Oral two times a day  benzocaine 15 mG/menthol 3.6 mG (Sugar-Free) Lozenge 1 Lozenge Oral four times a day  enoxaparin Injectable 40 milliGRAM(s) SubCutaneous daily  FLUoxetine 10 milliGRAM(s) Oral daily  labetalol 100 milliGRAM(s) Oral two times a day  lisinopril 40 milliGRAM(s) Oral at bedtime  metFORMIN 1000 milliGRAM(s) Oral two times a day with meals  ticagrelor 90 milliGRAM(s) Oral every 12 hours    MEDICATIONS  (PRN):  acetaminophen   Tablet .. 975 milliGRAM(s) Oral every 6 hours PRN Mild Pain (1 - 3), Moderate Pain (4 - 6), Severe Pain (7 - 10)  diphenhydrAMINE 25 milliGRAM(s) Oral daily PRN Rash and/or Itching  diphenhydramine 2%/zinc acetate 0.1% Cream 1 Application(s) Topical two times a day PRN Rash and/or Itching  ondansetron    Tablet 4 milliGRAM(s) Oral three times a day PRN Nausea and/or Vomiting

## 2021-09-17 ENCOUNTER — TRANSCRIPTION ENCOUNTER (OUTPATIENT)
Age: 61
End: 2021-09-17

## 2021-09-17 VITALS
DIASTOLIC BLOOD PRESSURE: 87 MMHG | SYSTOLIC BLOOD PRESSURE: 143 MMHG | RESPIRATION RATE: 17 BRPM | TEMPERATURE: 98 F | HEART RATE: 82 BPM

## 2021-09-17 PROCEDURE — 97535 SELF CARE MNGMENT TRAINING: CPT

## 2021-09-17 PROCEDURE — 81241 F5 GENE: CPT

## 2021-09-17 PROCEDURE — U0003: CPT

## 2021-09-17 PROCEDURE — 36415 COLL VENOUS BLD VENIPUNCTURE: CPT

## 2021-09-17 PROCEDURE — 80053 COMPREHEN METABOLIC PANEL: CPT

## 2021-09-17 PROCEDURE — 85303 CLOT INHIBIT PROT C ACTIVITY: CPT

## 2021-09-17 PROCEDURE — 97760 ORTHOTIC MGMT&TRAING 1ST ENC: CPT

## 2021-09-17 PROCEDURE — 85300 ANTITHROMBIN III ACTIVITY: CPT

## 2021-09-17 PROCEDURE — 70450 CT HEAD/BRAIN W/O DYE: CPT

## 2021-09-17 PROCEDURE — U0005: CPT

## 2021-09-17 PROCEDURE — 86146 BETA-2 GLYCOPROTEIN ANTIBODY: CPT

## 2021-09-17 PROCEDURE — 99232 SBSQ HOSP IP/OBS MODERATE 35: CPT

## 2021-09-17 PROCEDURE — 83090 ASSAY OF HOMOCYSTEINE: CPT

## 2021-09-17 PROCEDURE — 97530 THERAPEUTIC ACTIVITIES: CPT

## 2021-09-17 PROCEDURE — 85025 COMPLETE CBC W/AUTO DIFF WBC: CPT

## 2021-09-17 PROCEDURE — 97110 THERAPEUTIC EXERCISES: CPT

## 2021-09-17 PROCEDURE — 97112 NEUROMUSCULAR REEDUCATION: CPT

## 2021-09-17 PROCEDURE — 97167 OT EVAL HIGH COMPLEX 60 MIN: CPT

## 2021-09-17 PROCEDURE — 97163 PT EVAL HIGH COMPLEX 45 MIN: CPT

## 2021-09-17 PROCEDURE — 90686 IIV4 VACC NO PRSV 0.5 ML IM: CPT

## 2021-09-17 PROCEDURE — 85306 CLOT INHIBIT PROT S FREE: CPT

## 2021-09-17 PROCEDURE — 85027 COMPLETE CBC AUTOMATED: CPT

## 2021-09-17 PROCEDURE — 86147 CARDIOLIPIN ANTIBODY EA IG: CPT

## 2021-09-17 PROCEDURE — 80048 BASIC METABOLIC PNL TOTAL CA: CPT

## 2021-09-17 PROCEDURE — 92523 SPEECH SOUND LANG COMPREHEN: CPT

## 2021-09-17 PROCEDURE — 83690 ASSAY OF LIPASE: CPT

## 2021-09-17 PROCEDURE — 97116 GAIT TRAINING THERAPY: CPT

## 2021-09-17 PROCEDURE — 82150 ASSAY OF AMYLASE: CPT

## 2021-09-17 PROCEDURE — 99238 HOSP IP/OBS DSCHRG MGMT 30/<: CPT

## 2021-09-17 PROCEDURE — 92610 EVALUATE SWALLOWING FUNCTION: CPT

## 2021-09-17 PROCEDURE — 92507 TX SP LANG VOICE COMM INDIV: CPT

## 2021-09-17 PROCEDURE — 82962 GLUCOSE BLOOD TEST: CPT

## 2021-09-17 RX ORDER — AMLODIPINE BESYLATE 2.5 MG/1
1 TABLET ORAL
Qty: 30 | Refills: 0
Start: 2021-09-17 | End: 2021-10-16

## 2021-09-17 RX ORDER — INFLUENZA VIRUS VACCINE 15; 15; 15; 15 UG/.5ML; UG/.5ML; UG/.5ML; UG/.5ML
0.5 SUSPENSION INTRAMUSCULAR ONCE
Refills: 0 | Status: COMPLETED | OUTPATIENT
Start: 2021-09-17 | End: 2021-09-17

## 2021-09-17 RX ORDER — ATORVASTATIN CALCIUM 80 MG/1
1 TABLET, FILM COATED ORAL
Qty: 30 | Refills: 0
Start: 2021-09-17 | End: 2021-10-16

## 2021-09-17 RX ORDER — METFORMIN HYDROCHLORIDE 850 MG/1
1 TABLET ORAL
Qty: 60 | Refills: 0
Start: 2021-09-17 | End: 2021-10-16

## 2021-09-17 RX ORDER — BACLOFEN 100 %
1 POWDER (GRAM) MISCELLANEOUS
Qty: 60 | Refills: 0
Start: 2021-09-17 | End: 2021-10-16

## 2021-09-17 RX ORDER — LABETALOL HCL 100 MG
1 TABLET ORAL
Qty: 60 | Refills: 0
Start: 2021-09-17 | End: 2021-10-16

## 2021-09-17 RX ORDER — FLUOXETINE HCL 10 MG
1 CAPSULE ORAL
Qty: 30 | Refills: 0
Start: 2021-09-17 | End: 2021-10-16

## 2021-09-17 RX ORDER — LISINOPRIL 2.5 MG/1
1 TABLET ORAL
Qty: 30 | Refills: 0
Start: 2021-09-17 | End: 2021-10-16

## 2021-09-17 RX ORDER — ASPIRIN/CALCIUM CARB/MAGNESIUM 324 MG
1 TABLET ORAL
Qty: 30 | Refills: 0
Start: 2021-09-17 | End: 2021-10-16

## 2021-09-17 RX ADMIN — METFORMIN HYDROCHLORIDE 1000 MILLIGRAM(S): 850 TABLET ORAL at 07:55

## 2021-09-17 RX ADMIN — AMLODIPINE BESYLATE 10 MILLIGRAM(S): 2.5 TABLET ORAL at 06:06

## 2021-09-17 RX ADMIN — Medication 100 MILLIGRAM(S): at 06:06

## 2021-09-17 RX ADMIN — Medication 10 MILLIGRAM(S): at 06:06

## 2021-09-17 RX ADMIN — INFLUENZA VIRUS VACCINE 0.5 MILLILITER(S): 15; 15; 15; 15 SUSPENSION INTRAMUSCULAR at 13:00

## 2021-09-17 RX ADMIN — Medication 10 MILLIGRAM(S): at 12:29

## 2021-09-17 RX ADMIN — Medication 81 MILLIGRAM(S): at 12:29

## 2021-09-17 RX ADMIN — TICAGRELOR 90 MILLIGRAM(S): 90 TABLET ORAL at 06:06

## 2021-09-17 NOTE — PROGRESS NOTE ADULT - ASSESSMENT
60 yo R handed woman with history of DMT2, HTN, HLD, multiple CVAs w/ residual RUE/LE weakness w/ foot drop, LLE>UE numbness s/p loop recorder and no longer on plavix admitted to  rehab after hospital course for left thalamic infarct, admitted for rehab    Acute L thalamic infarct  history of multiple cerebrovascular accidents (CVAs)   -continue ASA/STATIN, Brilinta   -BP goal <140/90  -ILR was implanted (8/18)  -Continue comprehensive rehab  -will need brace for foot drop  -baclofen 10mg BID for increased muscle tone- changed to 10mg qAM and 20mg qhs    R sided Lip tingling/paresthesia  - suspect due to underlying CVA  - monitor    Raised rash possibly urticaria and mild lip swelling possibly due to food allergy- symptoms improved  - pt reports subjective lip swelling, however, no obvious lip swelling observed. monitor.  - pruritic rash improved.  - benadryl prn  - benadryl cream prn  - Follow up with Allergist as outpt    Benign essential HTN with orthostasis   -cont labetalol 100mg BID, amlodipine 10mg  -lisinopril 40mg moved to qHS  -BP acceptable   -avoid low BP as pt with symptomatic orthostasis    HLD  -continue atorvastatin    Type 2 diabetes mellitus  -continue metformin 1000mg bid  -continue ISS  -monitor FS    ERENDIRA (acute kidney injury) on CKD II - resolved  -encourage oral intake  -avoid nephrotoxic agents  -monitor BMP    Normocytic anemia possible anemia of chronic disease  - monitor. H/H stable    Moderate protein-calorie malnutrition  - diet as per dietitian    VTE prophylaxis  -continue Lovenox

## 2021-09-17 NOTE — DISCHARGE NOTE NURSING/CASE MANAGEMENT/SOCIAL WORK - NSDCVIVACCINE_GEN_ALL_CORE_FT
influenza, injectable, quadrivalent, preservative free; 06-Oct-2020 15:27; Abad Trujillo (NIA); [employer]; 5D4H4 (Exp. Date: 30-Jun-2021); IntraMuscular; Deltoid Left.; 0.5 milliLiter(s); VIS (VIS Published: 15-Aug-2019, VIS Presented: 06-Oct-2020);    influenza, injectable, quadrivalent, preservative free; 06-Oct-2020 15:27; Abad Trujillo (RN); [employer]; 5D4H4 (Exp. Date: 30-Jun-2021); IntraMuscular; Deltoid Left.; 0.5 milliLiter(s); VIS (VIS Published: 15-Aug-2019, VIS Presented: 06-Oct-2020);   influenza, injectable, quadrivalent, preservative free; 17-Sep-2021 13:00; Agustina Murray (NIA); [employer]; [id number] (Exp. Date: 30-Jun-2022); IntraMuscular; Deltoid Right.; 0.5 milliLiter(s); VIS (VIS Published: 15-Aug-2019, VIS Presented: 17-Sep-2021);

## 2021-09-17 NOTE — PROGRESS NOTE ADULT - ATTENDING SUPERVISION STATEMENT
Resident
ACP
Resident

## 2021-09-17 NOTE — PROGRESS NOTE ADULT - ASSESSMENT
ASSESSMENT/PLAN  62 yo R handed Female with  PMHx of DMT2, HTN, HLD, multiple CVAs w/ residual RUE/LE weakness w/ foot drop, LLE>UE numbness s/p loop recorder and plavix  now discontinued 2017. Admitted on 8/15 for acute on chronic R-sided weakness found to have Left thalamic infarct. Had concerns of seizure and given loading dose of keppra, however it was ruled out. Patient now with Gait Instability, ADL impairments and Functional impairments.    COMORBIDITES/ACTIVE MEDICAL ISSUES     History of Multiple CVA: new left thalamic infarcts, right spastic hemiparesis   s/p ILR placement; f/u with Dr. Minor  - ASA 81 mg daily, - Brillinta 90 mg twice  FOR 4 WEEKS -  Per neurology - start date 8/17, d/derik 9/17.  - Hypercoagulable workup- essentially negative   Continue Comprehensive Rehab Program of PT/OT/ST - total of 3 hrs/day 5 days/week( ST reduced to 30 mnutes)   Casted for Right AFO during rehab stay   Baclofen 10 mg BID (decreased baclofen from 20mg QHS to 10mg QHS suspect nausea/vomiting from it vs vertigo)     HLD:- Lipitor qhs     HTN: on amlodipine 10mg daily, lisinopril 40mg daily - timing changed to 1200pm  , Labetolol 100mg bid ( 1000 am and 1000 pm ) . BP much improved   - - Recommend outpatient follow-up w/ nephrologist for further workup for resistant HTN and management.    DM II: metformin 1000mg  BID    Tachycardia : improved     Hypokalemia: resolved    Pain control:- Tylenol PRN    GI/Bowel Mgmt   - Continent  - Senna,  Miralax PRN,    Bladder management: toilet schedule prn     DVT prophylaxis:- Lovenox  - SCDs, TEDs     FEN :- Diet - Consistent Carbohydrate Low Sodium     Mood: prozac 10mg daily     Dizziness:  fu with vestibular therapy as outpatient   -nausea/vomiting dizziness/headache 9/16/21   -CT head stable  -f/u CBC/CMP/Amylase/Lipase stable and WNL  -zofran PRN ordered  -can consider 1 doze of meclizine if does not remit   -no new weakness elicited on exam  -suspect 2/2 vertigo vs other cause - denies abdominal pain, consider adding amylase and lipase    Precautions / PROPHYLAXIS:   - Falls  - Pressure injury/Skin:  OOB to Chair, patient can position change while in bed q2h      Disp:  TDD 9/18

## 2021-09-17 NOTE — PROGRESS NOTE ADULT - ATTENDING COMMENTS
Patient seen at bedside  Nausea improved. Mild emesis last evening  Denies any abdominal pain   Tolerating PO     Labs and CT head stable    2. Family training completed.  All meds reviewed. Per neurology, no need for Brilinta  all fu discussed  Patient also given information for vestibular eval as outpatient    3. Stable for discharge home   d/w

## 2021-09-17 NOTE — PROGRESS NOTE ADULT - PROBLEM SELECTOR PROBLEM 6
Normocytic anemia

## 2021-09-17 NOTE — PROGRESS NOTE ADULT - PROBLEM SELECTOR PROBLEM 1
History of multiple cerebrovascular accidents (CVAs)

## 2021-09-17 NOTE — PROGRESS NOTE ADULT - SUBJECTIVE AND OBJECTIVE BOX
Patient is a 61y old  Female who presents with a chief complaint of CVA (16 Sep 2021 10:51)      Patient seen and examined at bedside.  Denies chest pain, dyspnea, abd pain.  HA improved. still has some nausea.    ALLERGIES:  Pineapple (Unknown)  sulfa drugs (Angioedema; Swelling)  sulfa drugs (Rash)  Sulfac 10% (Unknown)  walnut (Urticaria)    MEDICATIONS  (STANDING):  amLODIPine   Tablet 10 milliGRAM(s) Oral daily  aspirin  chewable 81 milliGRAM(s) Oral daily  atorvastatin 80 milliGRAM(s) Oral at bedtime  baclofen 10 milliGRAM(s) Oral two times a day  benzocaine 15 mG/menthol 3.6 mG (Sugar-Free) Lozenge 1 Lozenge Oral four times a day  enoxaparin Injectable 40 milliGRAM(s) SubCutaneous daily  FLUoxetine 10 milliGRAM(s) Oral daily  labetalol 100 milliGRAM(s) Oral two times a day  lisinopril 40 milliGRAM(s) Oral at bedtime  metFORMIN 1000 milliGRAM(s) Oral two times a day with meals  ticagrelor 90 milliGRAM(s) Oral every 12 hours    MEDICATIONS  (PRN):  acetaminophen   Tablet .. 975 milliGRAM(s) Oral every 6 hours PRN Mild Pain (1 - 3), Moderate Pain (4 - 6), Severe Pain (7 - 10)  diphenhydrAMINE 25 milliGRAM(s) Oral daily PRN Rash and/or Itching  diphenhydramine 2%/zinc acetate 0.1% Cream 1 Application(s) Topical two times a day PRN Rash and/or Itching  ondansetron   Disintegrating Tablet 4 milliGRAM(s) Oral three times a day PRN Nausea and/or Vomiting    Vital Signs Last 24 Hrs  T(F): 98.2 (17 Sep 2021 08:26), Max: 98.4 (16 Sep 2021 20:34)  HR: 82 (17 Sep 2021 08:26) (72 - 90)  BP: 143/87 (17 Sep 2021 08:26) (143/87 - 165/95)  RR: 17 (17 Sep 2021 08:26) (17 - 17)  SpO2: 99% (16 Sep 2021 20:34) (99% - 99%)  I&O's Summary    16 Sep 2021 07:01  -  17 Sep 2021 07:00  --------------------------------------------------------  IN: 0 mL / OUT: 4 mL / NET: -4 mL        PHYSICAL EXAM:  General: NAD, A/O x 3  ENT: MMM  Neck: Supple, No JVD  Lungs: Clear to auscultation bilaterally  Cardio: RRR, S1/S2, No murmurs  Abdomen: Soft, Nontender, Nondistended; Bowel sounds present  Extremities: No calf tenderness, No pitting edema    LABS:                        11.0   5.23  )-----------( 229      ( 16 Sep 2021 08:34 )             33.5       09-16    142  |  104  |  19  ----------------------------<  146  3.7   |  29  |  1.13    Ca    9.5      16 Sep 2021 08:34    TPro  8.1  /  Alb  3.9  /  TBili  0.6  /  DBili  x   /  AST  18  /  ALT  29  /  AlkPhos  69  09-16     eGFR if Non African American: 53 mL/min/1.73M2 (09-16-21 @ 08:34)  eGFR if : 61 mL/min/1.73M2 (09-16-21 @ 08:34)             08-16 Chol 134 mg/dL LDL -- HDL 49 mg/dL Trig 64 mg/dL                      COVID-19 PCR: NotDetec (09-13-21 @ 05:00)  COVID-19 PCR: NotDetec (09-07-21 @ 04:56)  COVID-19 PCR: NotDetec (09-01-21 @ 06:55)  COVID-19 PCR: NotDetec (08-26-21 @ 07:15)  COVID-19 PCR: NotDetec (08-20-21 @ 06:30)      RADIOLOGY & ADDITIONAL TESTS:    Care Discussed with Consultants/Other Providers:

## 2021-09-17 NOTE — PROGRESS NOTE ADULT - PROBLEM SELECTOR PROBLEM 5
ERENDIRA (acute kidney injury)

## 2021-09-17 NOTE — PROGRESS NOTE ADULT - REASON FOR ADMISSION
CVA

## 2021-09-17 NOTE — DISCHARGE NOTE NURSING/CASE MANAGEMENT/SOCIAL WORK - NSSCTYPOFSERV_GEN_ALL_CORE
You will have a home visiting RN, PT, OT, ST visiting at home. Your home visiting nurse will call on Saturday to schedule the first visit. You will have a home health aide evaluation once you are home

## 2021-09-17 NOTE — PROGRESS NOTE ADULT - PROBLEM SELECTOR PROBLEM 4
Type 2 diabetes mellitus

## 2021-09-17 NOTE — PROGRESS NOTE ADULT - PROVIDER SPECIALTY LIST ADULT
Hospitalist
Physiatry
Physiatry
Rehab Medicine
Hospitalist
Rehab Medicine
Hospitalist
Rehab Medicine
Hospitalist
Rehab Medicine
Rehab Medicine
Hospitalist

## 2021-09-17 NOTE — PROGRESS NOTE ADULT - PROBLEM SELECTOR PROBLEM 3
Benign essential HTN

## 2021-09-17 NOTE — PROGRESS NOTE ADULT - NUTRITIONAL ASSESSMENT
This patient has been assessed with a concern for Malnutrition and has been determined to have a diagnosis/diagnoses of Moderate protein-calorie malnutrition.    This patient is being managed with:   Diet Consistent Carbohydrate w/Evening Snack-  Low Sodium  [employer] Vegetarian (Accepts Fish)  No Egg  Entered: Aug 20 2021  9:49AM    
This patient has been assessed with a concern for Malnutrition and has been determined to have a diagnosis/diagnoses of Moderate protein-calorie malnutrition.    This patient is being managed with:   Diet Consistent Carbohydrate w/Evening Snack-  Low Sodium  [employer] Vegetarian (Accepts Fish)  No Egg  Entered: Aug 20 2021  9:49AM      This patient has been assessed with a concern for Malnutrition and has been determined to have a diagnosis/diagnoses of Moderate protein-calorie malnutrition.    This patient is being managed with:   Diet Consistent Carbohydrate w/Evening Snack-  Low Sodium  [employer] Vegetarian (Accepts Fish)  No Egg  Entered: Aug 20 2021  9:49AM    
This patient has been assessed with a concern for Malnutrition and has been determined to have a diagnosis/diagnoses of Moderate protein-calorie malnutrition.    This patient is being managed with:   Diet Consistent Carbohydrate w/Evening Snack-  Low Sodium  [employer] Vegetarian (Accepts Fish)  No Egg  Entered: Aug 20 2021  9:49AM    

## 2021-09-17 NOTE — PROGRESS NOTE ADULT - PROBLEM SELECTOR PROBLEM 2
HLD (hyperlipidemia)

## 2021-09-17 NOTE — PROGRESS NOTE ADULT - PROBLEM SELECTOR PROBLEM 7
DVT prophylaxis
Hypokalemia
DVT prophylaxis

## 2021-09-17 NOTE — DISCHARGE NOTE NURSING/CASE MANAGEMENT/SOCIAL WORK - NSDCDMETYPESERV_GEN_ALL_CORE_FT
Your cane and commode were delivered to bedside. A transfer tub bench and reacher were ordered, but are not covered by your insurance. Call [employer] to make payment for both if you would like or purchase privately. They will be delivered to your home

## 2022-01-01 NOTE — OCCUPATIONAL THERAPY INITIAL EVALUATION ADULT - LEVEL OF INDEPENDENCE: SIT/SUPINE, REHAB EVAL
independent I will STOP taking the medications listed below when I get home from the hospital:  None

## 2022-03-04 NOTE — DIETITIAN INITIAL EVALUATION ADULT. - PHYSICAL APPEARANCE
overweight/other (specify) Ht: 68”  Wt: 173#  IBW: 140# (+/- 10%), %IBW: 152%, BMI 33.5  Skin intact. No edema noted. LBM 10/6 No abnormalities

## 2022-05-13 ENCOUNTER — INPATIENT (INPATIENT)
Facility: HOSPITAL | Age: 62
LOS: 4 days | Discharge: HOME | DRG: 287 | End: 2022-05-18
Attending: HOSPITALIST | Admitting: HOSPITALIST
Payer: COMMERCIAL

## 2022-05-13 VITALS
DIASTOLIC BLOOD PRESSURE: 100 MMHG | OXYGEN SATURATION: 99 % | RESPIRATION RATE: 20 BRPM | TEMPERATURE: 98 F | HEART RATE: 94 BPM | HEIGHT: 68 IN | SYSTOLIC BLOOD PRESSURE: 166 MMHG | WEIGHT: 220.02 LBS

## 2022-05-13 DIAGNOSIS — R07.9 CHEST PAIN, UNSPECIFIED: ICD-10-CM

## 2022-05-13 LAB
ALBUMIN SERPL ELPH-MCNC: 4.2 G/DL — SIGNIFICANT CHANGE UP (ref 3.3–5)
ALP SERPL-CCNC: 83 U/L — SIGNIFICANT CHANGE UP (ref 40–120)
ALT FLD-CCNC: 15 U/L — SIGNIFICANT CHANGE UP (ref 10–45)
ANION GAP SERPL CALC-SCNC: 14 MMOL/L — SIGNIFICANT CHANGE UP (ref 5–17)
AST SERPL-CCNC: 16 U/L — SIGNIFICANT CHANGE UP (ref 10–40)
BASOPHILS # BLD AUTO: 0.03 K/UL — SIGNIFICANT CHANGE UP (ref 0–0.2)
BASOPHILS NFR BLD AUTO: 0.5 % — SIGNIFICANT CHANGE UP (ref 0–2)
BILIRUB SERPL-MCNC: 0.3 MG/DL — SIGNIFICANT CHANGE UP (ref 0.2–1.2)
BUN SERPL-MCNC: 17 MG/DL — SIGNIFICANT CHANGE UP (ref 7–23)
CALCIUM SERPL-MCNC: 10 MG/DL — SIGNIFICANT CHANGE UP (ref 8.4–10.5)
CHLORIDE SERPL-SCNC: 103 MMOL/L — SIGNIFICANT CHANGE UP (ref 96–108)
CO2 SERPL-SCNC: 26 MMOL/L — SIGNIFICANT CHANGE UP (ref 22–31)
CREAT SERPL-MCNC: 1.12 MG/DL — SIGNIFICANT CHANGE UP (ref 0.5–1.3)
EGFR: 56 ML/MIN/1.73M2 — LOW
EOSINOPHIL # BLD AUTO: 0.16 K/UL — SIGNIFICANT CHANGE UP (ref 0–0.5)
EOSINOPHIL NFR BLD AUTO: 2.7 % — SIGNIFICANT CHANGE UP (ref 0–6)
GLUCOSE SERPL-MCNC: 192 MG/DL — HIGH (ref 70–99)
HCT VFR BLD CALC: 36.3 % — SIGNIFICANT CHANGE UP (ref 34.5–45)
HGB BLD-MCNC: 11.2 G/DL — LOW (ref 11.5–15.5)
IMM GRANULOCYTES NFR BLD AUTO: 0.3 % — SIGNIFICANT CHANGE UP (ref 0–1.5)
LYMPHOCYTES # BLD AUTO: 2.16 K/UL — SIGNIFICANT CHANGE UP (ref 1–3.3)
LYMPHOCYTES # BLD AUTO: 36 % — SIGNIFICANT CHANGE UP (ref 13–44)
MAGNESIUM SERPL-MCNC: 2 MG/DL — SIGNIFICANT CHANGE UP (ref 1.6–2.6)
MCHC RBC-ENTMCNC: 26.1 PG — LOW (ref 27–34)
MCHC RBC-ENTMCNC: 30.9 GM/DL — LOW (ref 32–36)
MCV RBC AUTO: 84.6 FL — SIGNIFICANT CHANGE UP (ref 80–100)
MONOCYTES # BLD AUTO: 0.51 K/UL — SIGNIFICANT CHANGE UP (ref 0–0.9)
MONOCYTES NFR BLD AUTO: 8.5 % — SIGNIFICANT CHANGE UP (ref 2–14)
NEUTROPHILS # BLD AUTO: 3.12 K/UL — SIGNIFICANT CHANGE UP (ref 1.8–7.4)
NEUTROPHILS NFR BLD AUTO: 52 % — SIGNIFICANT CHANGE UP (ref 43–77)
NRBC # BLD: 0 /100 WBCS — SIGNIFICANT CHANGE UP (ref 0–0)
NT-PROBNP SERPL-SCNC: 129 PG/ML — SIGNIFICANT CHANGE UP (ref 0–300)
PHOSPHATE SERPL-MCNC: 4.3 MG/DL — SIGNIFICANT CHANGE UP (ref 2.5–4.5)
PLATELET # BLD AUTO: 221 K/UL — SIGNIFICANT CHANGE UP (ref 150–400)
POTASSIUM SERPL-MCNC: 3.2 MMOL/L — LOW (ref 3.5–5.3)
POTASSIUM SERPL-SCNC: 3.2 MMOL/L — LOW (ref 3.5–5.3)
PROT SERPL-MCNC: 7.3 G/DL — SIGNIFICANT CHANGE UP (ref 6–8.3)
RBC # BLD: 4.29 M/UL — SIGNIFICANT CHANGE UP (ref 3.8–5.2)
RBC # FLD: 14.3 % — SIGNIFICANT CHANGE UP (ref 10.3–14.5)
SODIUM SERPL-SCNC: 143 MMOL/L — SIGNIFICANT CHANGE UP (ref 135–145)
TROPONIN T, HIGH SENSITIVITY RESULT: 21 NG/L — SIGNIFICANT CHANGE UP (ref 0–51)
TROPONIN T, HIGH SENSITIVITY RESULT: 21 NG/L — SIGNIFICANT CHANGE UP (ref 0–51)
WBC # BLD: 6 K/UL — SIGNIFICANT CHANGE UP (ref 3.8–10.5)
WBC # FLD AUTO: 6 K/UL — SIGNIFICANT CHANGE UP (ref 3.8–10.5)

## 2022-05-13 PROCEDURE — 71045 X-RAY EXAM CHEST 1 VIEW: CPT | Mod: 26

## 2022-05-13 PROCEDURE — 99284 EMERGENCY DEPT VISIT MOD MDM: CPT | Mod: 25

## 2022-05-13 PROCEDURE — 99223 1ST HOSP IP/OBS HIGH 75: CPT

## 2022-05-13 PROCEDURE — 93010 ELECTROCARDIOGRAM REPORT: CPT

## 2022-05-13 RX ORDER — METFORMIN HYDROCHLORIDE 850 MG/1
500 TABLET ORAL ONCE
Refills: 0 | Status: COMPLETED | OUTPATIENT
Start: 2022-05-13 | End: 2022-05-13

## 2022-05-13 RX ORDER — ATORVASTATIN CALCIUM 80 MG/1
80 TABLET, FILM COATED ORAL ONCE
Refills: 0 | Status: COMPLETED | OUTPATIENT
Start: 2022-05-13 | End: 2022-05-13

## 2022-05-13 RX ORDER — INSULIN DETEMIR 100/ML (3)
15 INSULIN PEN (ML) SUBCUTANEOUS ONCE
Refills: 0 | Status: DISCONTINUED | OUTPATIENT
Start: 2022-05-13 | End: 2022-05-13

## 2022-05-13 RX ORDER — POTASSIUM CHLORIDE 20 MEQ
40 PACKET (EA) ORAL ONCE
Refills: 0 | Status: COMPLETED | OUTPATIENT
Start: 2022-05-13 | End: 2022-05-13

## 2022-05-13 RX ORDER — LABETALOL HCL 100 MG
100 TABLET ORAL ONCE
Refills: 0 | Status: COMPLETED | OUTPATIENT
Start: 2022-05-13 | End: 2022-05-13

## 2022-05-13 RX ORDER — ASPIRIN/CALCIUM CARB/MAGNESIUM 324 MG
243 TABLET ORAL ONCE
Refills: 0 | Status: COMPLETED | OUTPATIENT
Start: 2022-05-13 | End: 2022-05-13

## 2022-05-13 RX ORDER — POTASSIUM CHLORIDE 20 MEQ
20 PACKET (EA) ORAL
Refills: 0 | Status: DISCONTINUED | OUTPATIENT
Start: 2022-05-13 | End: 2022-05-13

## 2022-05-13 RX ORDER — HYDRALAZINE HCL 50 MG
25 TABLET ORAL ONCE
Refills: 0 | Status: COMPLETED | OUTPATIENT
Start: 2022-05-13 | End: 2022-05-13

## 2022-05-13 RX ORDER — INSULIN GLARGINE 100 [IU]/ML
15 INJECTION, SOLUTION SUBCUTANEOUS ONCE
Refills: 0 | Status: COMPLETED | OUTPATIENT
Start: 2022-05-13 | End: 2022-05-13

## 2022-05-13 RX ORDER — LISINOPRIL 2.5 MG/1
40 TABLET ORAL ONCE
Refills: 0 | Status: COMPLETED | OUTPATIENT
Start: 2022-05-13 | End: 2022-05-13

## 2022-05-13 RX ADMIN — ATORVASTATIN CALCIUM 80 MILLIGRAM(S): 80 TABLET, FILM COATED ORAL at 22:34

## 2022-05-13 RX ADMIN — Medication 243 MILLIGRAM(S): at 18:24

## 2022-05-13 RX ADMIN — Medication 25 MILLIGRAM(S): at 22:34

## 2022-05-13 RX ADMIN — Medication 40 MILLIEQUIVALENT(S): at 22:12

## 2022-05-13 RX ADMIN — Medication 30 MILLILITER(S): at 18:19

## 2022-05-13 RX ADMIN — Medication 100 MILLIGRAM(S): at 22:34

## 2022-05-13 RX ADMIN — INSULIN GLARGINE 15 UNIT(S): 100 INJECTION, SOLUTION SUBCUTANEOUS at 22:35

## 2022-05-13 RX ADMIN — METFORMIN HYDROCHLORIDE 500 MILLIGRAM(S): 850 TABLET ORAL at 22:34

## 2022-05-13 RX ADMIN — LISINOPRIL 40 MILLIGRAM(S): 2.5 TABLET ORAL at 22:34

## 2022-05-13 NOTE — H&P ADULT - NSHPREVIEWOFSYSTEMS_GEN_ALL_CORE
GEN: no night sweats or change in appetite  EYES: no changes in vision or diplopia   ENT: no epistaxis, sinus pain, gingival bleeding, odynophagia or dysphagia  CV: +CP, +palpitations  RESP: no cough, wheezing, or hemoptysis  GI: no hematemesis, hematochezia, or melena  : no dysuria, polyuria, or hematuria  MSK: no arthralgias or joint swelling   NEURO: no gross sensory changes, numbness, focal deficits  PSYCH: no depression or changes in concentration  HEME/ONC: no purpura, petechiae or night sweats  SKIN: no pruritus, hair loss or skin lesions

## 2022-05-13 NOTE — H&P ADULT - NSHPPHYSICALEXAM_GEN_ALL_CORE
Vital Signs Last 24 Hrs  T(C): 36.6 (13 May 2022 20:00), Max: 36.6 (13 May 2022 20:00)  T(F): 97.8 (13 May 2022 20:00), Max: 97.8 (13 May 2022 20:00)  HR: 88 (13 May 2022 22:28) (86 - 94)  BP: 147/88 (13 May 2022 22:28) (147/88 - 167/100)  BP(mean): 108 (13 May 2022 22:28) (108 - 108)  RR: 18 (13 May 2022 20:00) (18 - 24)  SpO2: 99% (13 May 2022 20:00) (99% - 99%)

## 2022-05-13 NOTE — H&P ADULT - PROBLEM SELECTOR PLAN 2
-s/p ILR  -hypokalemic on admission s/p repletions  -monitor K and Mg, replete prn  -cards eval in AM

## 2022-05-13 NOTE — ED PROVIDER NOTE - OBJECTIVE STATEMENT
Patient is a 62y F PMHx DM, HTN, CVA (ride sided weakness RUE/RLE), loop recorder, presenting with 3 hours epigastric chest pain. Patient states pain is a squeezing pain, does not radiate anywhere. Has had similar pain in the past, but no history of MI. Denies SOB, back pain, neck pain, arm pain, new numbness/neuro deficits from baseline, n/v.

## 2022-05-13 NOTE — ED PROVIDER NOTE - CLINICAL SUMMARY MEDICAL DECISION MAKING FREE TEXT BOX
Patient is a 62y F PMHx DM, HTN, CVA (ride sided weakness RUE/RLE), loop recorder, presenting with 3 hours epigastric chest pain. Will get labs, CXR, EKG, troponin. Will reassess. Patient is a 62y F PMHx DM, HTN, CVA (ride sided weakness RUE/RLE), loop recorder, presenting with 3 hours epigastric chest pain concern for gastritis or GERD ,vs angina . Will get labs, CXR, EKG, troponin. Will reassess.ZR

## 2022-05-13 NOTE — H&P ADULT - NSHPLABSRESULTS_GEN_ALL_CORE
11.2   6.00  )-----------( 221      ( 13 May 2022 18:40 )             36.3       05-13    143  |  103  |  17  ----------------------------<  192<H>  3.2<L>   |  26  |  1.12    Ca    10.0      13 May 2022 18:40  Phos  4.3     05-13  Mg     2.0     05-13    TPro  7.3  /  Alb  4.2  /  TBili  0.3  /  DBili  x   /  AST  16  /  ALT  15  /  AlkPhos  83  05-13            LIVER FUNCTIONS - ( 13 May 2022 18:40 )  Alb: 4.2 g/dL / Pro: 7.3 g/dL / ALK PHOS: 83 U/L / ALT: 15 U/L / AST: 16 U/L / GGT: x                   I have personally reviewed imaging   I have personally reviewed EKG 11.2   6.00  )-----------( 221      ( 13 May 2022 18:40 )             36.3       05-13    143  |  103  |  17  ----------------------------<  192<H>  3.2<L>   |  26  |  1.12    Ca    10.0      13 May 2022 18:40  Phos  4.3     05-13  Mg     2.0     05-13    TPro  7.3  /  Alb  4.2  /  TBili  0.3  /  DBili  x   /  AST  16  /  ALT  15  /  AlkPhos  83  05-13            LIVER FUNCTIONS - ( 13 May 2022 18:40 )  Alb: 4.2 g/dL / Pro: 7.3 g/dL / ALK PHOS: 83 U/L / ALT: 15 U/L / AST: 16 U/L / GGT: x                   I have personally reviewed imaging, CXR with clear lungs  I have personally reviewed EKG, NSR w/o e/o acute ST changes

## 2022-05-13 NOTE — ED ADULT NURSE REASSESSMENT NOTE - NS ED NURSE REASSESS COMMENT FT1
Received report from NIA Gil. Pt is A&Ox3, breathing spontaneously, unlabored and speaking in full sentences. Pt denies any pain/discomfort. Pt reports "having a history of dialysis done on LA," so does not want access or BP done on that arm. Pt safety and comfort provided. Awaiting further instructions. Received report from NIA Gil. Pt is A&Ox3, breathing spontaneously, unlabored and speaking in full sentences. Pt denies any increased pain or discomfort. Reports "pressure-like feelings" remains the same from time of arrival with no radiation. Pt denies sob, tingling, numbness, jaw pain, back pain, headache, dizziness, blurred vision. Pt appears comfortable resting on stretcher with family at bedside. Pt safety and comfort provided. Received report from NIA Gil. Pt is A&Ox3, breathing spontaneously, unlabored and speaking in full sentences. Pt denies any increased pain or discomfort. Reports "pressure-like feelings" remains the same from time of arrival with no radiation. Pt denies sob, tingling, numbness, jaw pain, back pain, headache, dizziness, blurred vision. Pt appears comfortable resting on stretcher with family at bedside. On CM, NSR. Pt safety and comfort provided.

## 2022-05-13 NOTE — H&P ADULT - PROBLEM SELECTOR PLAN 1
-suspect related to palpitations vs ischemic heart disease given risk factors. No e/o acute ischemia w/ neg trops x2 and EKG w/o acute ST changes  -she is s/p ILR for several months now and has not followed up with cards  -tele monitoring  -will obtain TTE  -cards eval in AM; ischemic w/u per cards  -f/u A1C and lipid panel

## 2022-05-13 NOTE — H&P ADULT - PROBLEM SELECTOR PLAN 3
-currently on Levemir 15u qhs , metformin 500mg BID and Jardiance 10mg at home  -FS AC TID HS  -c/w Lantus 12u qhs. Hold oral hypoglycemics  -AISS  -f/u A1C

## 2022-05-13 NOTE — ED PROVIDER NOTE - PHYSICAL EXAMINATION
GENERAL: no acute distress, non-toxic appearing  HEENT: PERRLA, EOMI, normal conjunctiva  CARDIAC: regular rate and rhythm, normal S1 and S2, no appreciable murmurs  PULM: clear to ascultation bilaterally, no crackles, rales, rhonchi, or wheezing  GI: abdomen nondistended, soft, nontender  NEURO: alert and oriented x 3, normal speech, no gross neurologic deficit  MSK: no visible deformities, trace peripheral edema bilaterally in the ankles, no calf tenderness/redness/swelling  SKIN: no visible rashes, dry, well-perfused  PSYCH: appropriate mood and affect

## 2022-05-13 NOTE — ED PROVIDER NOTE - DISPOSITION TYPE
Yes- you can use it up to three doses per week. Is it the Imitrex that isn't working well for you?       ADMIT

## 2022-05-13 NOTE — ED ADULT NURSE NOTE - OBJECTIVE STATEMENT
63 y/o female presents to ED complaining of midsternal non radiating chest pressure x2 days. Pt a&ox4, PMHx CVA x3 (deficit RUE weakness), endorses chest pressure x2 days, diaphoresis and sob starting today. Pt denies [place], fever, chills, n/v/d. No acute distress noted at this time. 20g IV placed to LAC. Labs obtained as ordered.

## 2022-05-13 NOTE — H&P ADULT - ASSESSMENT
62F PMH T2DM, HTN, Multiple CVAs(2016,'20,'21) c/b r.foot drop and residual rt-arm weakness s/p ILR in Aug '21 p/w chest pain and palpitations.

## 2022-05-13 NOTE — H&P ADULT - HISTORY OF PRESENT ILLNESS
62F PMH T2DM, HTN, CVA w/ rt-sided weakness p/w chest pain x.  62F PMH T2DM, HTN, Multiple CVAs(2016,'20,'21) c/b r.foot drop and residual rt-arm weakness s/p ILR in Aug '21 p/w non-exertional chest pain and palpitations. She reports it started with intermittent palpitations 1-day ago that occurred while at rest which became more frequent and today she experienced a squeezing substernal chest pain with the palpitations that prompted her to come in for evaluation. There was no radiation and peak intensity was 7/10. She took a baby ASA without relief. CP remained constant until presentation. She also reports diaphoresis at the time. She has had this type of pain before several years ago that had been attributed to low potassium. Her low K at the time had been d/t diarrhea as a SE from Metformin she had been taking at the time. She currently denies having any diarrhea. Denied fevers/chills, cough/hemoptysis,  PND, orthopnea, dizziness, abdominal pain, recent hospitalization, h/o VTE, association w/ food. Pt is yet to f/u with cards for evaluation of ILR.    ED course: Afebrile. Hypertensive to sys 160s, O2 and RR OK. Given BP meds, insulin and metformin.

## 2022-05-13 NOTE — ED PROVIDER NOTE - CCCP TRG CHIEF CMPLNT
palpitation/chest pain pt comes to ed for skin eruptions on her chest and on both lower legs   saline lock in right ac 20 g angio  normal saline bolus infusing well vss see above   at side  will continue to monitor  labs sent per orders incl blood cultures x 2  meds given per orders  solumedrol coming from pharmacy  report to relief rn

## 2022-05-14 DIAGNOSIS — Z29.9 ENCOUNTER FOR PROPHYLACTIC MEASURES, UNSPECIFIED: ICD-10-CM

## 2022-05-14 DIAGNOSIS — R00.2 PALPITATIONS: ICD-10-CM

## 2022-05-14 DIAGNOSIS — I63.9 CEREBRAL INFARCTION, UNSPECIFIED: ICD-10-CM

## 2022-05-14 DIAGNOSIS — R07.89 OTHER CHEST PAIN: ICD-10-CM

## 2022-05-14 DIAGNOSIS — E11.9 TYPE 2 DIABETES MELLITUS WITHOUT COMPLICATIONS: ICD-10-CM

## 2022-05-14 DIAGNOSIS — I10 ESSENTIAL (PRIMARY) HYPERTENSION: ICD-10-CM

## 2022-05-14 LAB
ANION GAP SERPL CALC-SCNC: 12 MMOL/L — SIGNIFICANT CHANGE UP (ref 5–17)
BUN SERPL-MCNC: 14 MG/DL — SIGNIFICANT CHANGE UP (ref 7–23)
CALCIUM SERPL-MCNC: 9.9 MG/DL — SIGNIFICANT CHANGE UP (ref 8.4–10.5)
CHLORIDE SERPL-SCNC: 107 MMOL/L — SIGNIFICANT CHANGE UP (ref 96–108)
CK MB CFR SERPL CALC: 1.2 NG/ML — SIGNIFICANT CHANGE UP (ref 0–3.8)
CK SERPL-CCNC: 57 U/L — SIGNIFICANT CHANGE UP (ref 25–170)
CO2 SERPL-SCNC: 25 MMOL/L — SIGNIFICANT CHANGE UP (ref 22–31)
CREAT SERPL-MCNC: 0.99 MG/DL — SIGNIFICANT CHANGE UP (ref 0.5–1.3)
EGFR: 64 ML/MIN/1.73M2 — SIGNIFICANT CHANGE UP
GLUCOSE BLDC GLUCOMTR-MCNC: 119 MG/DL — HIGH (ref 70–99)
GLUCOSE BLDC GLUCOMTR-MCNC: 147 MG/DL — HIGH (ref 70–99)
GLUCOSE BLDC GLUCOMTR-MCNC: 168 MG/DL — HIGH (ref 70–99)
GLUCOSE BLDC GLUCOMTR-MCNC: 192 MG/DL — HIGH (ref 70–99)
GLUCOSE SERPL-MCNC: 149 MG/DL — HIGH (ref 70–99)
HCT VFR BLD CALC: 33.8 % — LOW (ref 34.5–45)
HGB BLD-MCNC: 10.8 G/DL — LOW (ref 11.5–15.5)
MAGNESIUM SERPL-MCNC: 2 MG/DL — SIGNIFICANT CHANGE UP (ref 1.6–2.6)
MCHC RBC-ENTMCNC: 26.5 PG — LOW (ref 27–34)
MCHC RBC-ENTMCNC: 32 GM/DL — SIGNIFICANT CHANGE UP (ref 32–36)
MCV RBC AUTO: 82.8 FL — SIGNIFICANT CHANGE UP (ref 80–100)
NRBC # BLD: 0 /100 WBCS — SIGNIFICANT CHANGE UP (ref 0–0)
PLATELET # BLD AUTO: 210 K/UL — SIGNIFICANT CHANGE UP (ref 150–400)
POTASSIUM SERPL-MCNC: 3.4 MMOL/L — LOW (ref 3.5–5.3)
POTASSIUM SERPL-SCNC: 3.4 MMOL/L — LOW (ref 3.5–5.3)
RBC # BLD: 4.08 M/UL — SIGNIFICANT CHANGE UP (ref 3.8–5.2)
RBC # FLD: 14.1 % — SIGNIFICANT CHANGE UP (ref 10.3–14.5)
SARS-COV-2 RNA SPEC QL NAA+PROBE: SIGNIFICANT CHANGE UP
SODIUM SERPL-SCNC: 144 MMOL/L — SIGNIFICANT CHANGE UP (ref 135–145)
TROPONIN T, HIGH SENSITIVITY RESULT: 22 NG/L — SIGNIFICANT CHANGE UP (ref 0–51)
WBC # BLD: 5.3 K/UL — SIGNIFICANT CHANGE UP (ref 3.8–10.5)
WBC # FLD AUTO: 5.3 K/UL — SIGNIFICANT CHANGE UP (ref 3.8–10.5)

## 2022-05-14 PROCEDURE — 93010 ELECTROCARDIOGRAM REPORT: CPT | Mod: 77

## 2022-05-14 PROCEDURE — 93010 ELECTROCARDIOGRAM REPORT: CPT

## 2022-05-14 PROCEDURE — 99232 SBSQ HOSP IP/OBS MODERATE 35: CPT

## 2022-05-14 RX ORDER — INSULIN DETEMIR 100/ML (3)
15 INSULIN PEN (ML) SUBCUTANEOUS
Qty: 0 | Refills: 0 | DISCHARGE

## 2022-05-14 RX ORDER — SODIUM CHLORIDE 9 MG/ML
1000 INJECTION, SOLUTION INTRAVENOUS
Refills: 0 | Status: DISCONTINUED | OUTPATIENT
Start: 2022-05-14 | End: 2022-05-18

## 2022-05-14 RX ORDER — DEXTROSE 50 % IN WATER 50 %
25 SYRINGE (ML) INTRAVENOUS ONCE
Refills: 0 | Status: DISCONTINUED | OUTPATIENT
Start: 2022-05-14 | End: 2022-05-18

## 2022-05-14 RX ORDER — ASPIRIN/CALCIUM CARB/MAGNESIUM 324 MG
81 TABLET ORAL DAILY
Refills: 0 | Status: DISCONTINUED | OUTPATIENT
Start: 2022-05-14 | End: 2022-05-18

## 2022-05-14 RX ORDER — ACETAMINOPHEN 500 MG
650 TABLET ORAL EVERY 6 HOURS
Refills: 0 | Status: DISCONTINUED | OUTPATIENT
Start: 2022-05-14 | End: 2022-05-18

## 2022-05-14 RX ORDER — INSULIN LISPRO 100/ML
VIAL (ML) SUBCUTANEOUS
Refills: 0 | Status: DISCONTINUED | OUTPATIENT
Start: 2022-05-14 | End: 2022-05-18

## 2022-05-14 RX ORDER — FAMOTIDINE 10 MG/ML
20 INJECTION INTRAVENOUS DAILY
Refills: 0 | Status: DISCONTINUED | OUTPATIENT
Start: 2022-05-14 | End: 2022-05-18

## 2022-05-14 RX ORDER — HYDRALAZINE HCL 50 MG
25 TABLET ORAL THREE TIMES A DAY
Refills: 0 | Status: DISCONTINUED | OUTPATIENT
Start: 2022-05-14 | End: 2022-05-16

## 2022-05-14 RX ORDER — AMLODIPINE BESYLATE 2.5 MG/1
5 TABLET ORAL DAILY
Refills: 0 | Status: DISCONTINUED | OUTPATIENT
Start: 2022-05-14 | End: 2022-05-15

## 2022-05-14 RX ORDER — ONDANSETRON 8 MG/1
4 TABLET, FILM COATED ORAL EVERY 8 HOURS
Refills: 0 | Status: DISCONTINUED | OUTPATIENT
Start: 2022-05-14 | End: 2022-05-18

## 2022-05-14 RX ORDER — INSULIN GLARGINE 100 [IU]/ML
12 INJECTION, SOLUTION SUBCUTANEOUS AT BEDTIME
Refills: 0 | Status: DISCONTINUED | OUTPATIENT
Start: 2022-05-14 | End: 2022-05-18

## 2022-05-14 RX ORDER — LANOLIN ALCOHOL/MO/W.PET/CERES
3 CREAM (GRAM) TOPICAL AT BEDTIME
Refills: 0 | Status: DISCONTINUED | OUTPATIENT
Start: 2022-05-14 | End: 2022-05-18

## 2022-05-14 RX ORDER — EMPAGLIFLOZIN 10 MG/1
1 TABLET, FILM COATED ORAL
Qty: 0 | Refills: 0 | DISCHARGE

## 2022-05-14 RX ORDER — ATORVASTATIN CALCIUM 80 MG/1
80 TABLET, FILM COATED ORAL AT BEDTIME
Refills: 0 | Status: DISCONTINUED | OUTPATIENT
Start: 2022-05-14 | End: 2022-05-18

## 2022-05-14 RX ORDER — LABETALOL HCL 100 MG
100 TABLET ORAL
Refills: 0 | Status: DISCONTINUED | OUTPATIENT
Start: 2022-05-14 | End: 2022-05-15

## 2022-05-14 RX ORDER — LISINOPRIL 2.5 MG/1
40 TABLET ORAL DAILY
Refills: 0 | Status: DISCONTINUED | OUTPATIENT
Start: 2022-05-14 | End: 2022-05-18

## 2022-05-14 RX ORDER — DEXTROSE 50 % IN WATER 50 %
15 SYRINGE (ML) INTRAVENOUS ONCE
Refills: 0 | Status: DISCONTINUED | OUTPATIENT
Start: 2022-05-14 | End: 2022-05-18

## 2022-05-14 RX ORDER — POTASSIUM CHLORIDE 20 MEQ
40 PACKET (EA) ORAL EVERY 4 HOURS
Refills: 0 | Status: COMPLETED | OUTPATIENT
Start: 2022-05-14 | End: 2022-05-14

## 2022-05-14 RX ORDER — HEPARIN SODIUM 5000 [USP'U]/ML
5000 INJECTION INTRAVENOUS; SUBCUTANEOUS EVERY 8 HOURS
Refills: 0 | Status: DISCONTINUED | OUTPATIENT
Start: 2022-05-14 | End: 2022-05-18

## 2022-05-14 RX ORDER — GLUCAGON INJECTION, SOLUTION 0.5 MG/.1ML
1 INJECTION, SOLUTION SUBCUTANEOUS ONCE
Refills: 0 | Status: DISCONTINUED | OUTPATIENT
Start: 2022-05-14 | End: 2022-05-18

## 2022-05-14 RX ADMIN — Medication 81 MILLIGRAM(S): at 12:26

## 2022-05-14 RX ADMIN — Medication 25 MILLIGRAM(S): at 15:10

## 2022-05-14 RX ADMIN — HEPARIN SODIUM 5000 UNIT(S): 5000 INJECTION INTRAVENOUS; SUBCUTANEOUS at 21:35

## 2022-05-14 RX ADMIN — Medication 40 MILLIEQUIVALENT(S): at 18:34

## 2022-05-14 RX ADMIN — Medication 650 MILLIGRAM(S): at 10:40

## 2022-05-14 RX ADMIN — FAMOTIDINE 20 MILLIGRAM(S): 10 INJECTION INTRAVENOUS at 18:34

## 2022-05-14 RX ADMIN — Medication 650 MILLIGRAM(S): at 09:54

## 2022-05-14 RX ADMIN — Medication 100 MILLIGRAM(S): at 05:19

## 2022-05-14 RX ADMIN — INSULIN GLARGINE 12 UNIT(S): 100 INJECTION, SOLUTION SUBCUTANEOUS at 22:29

## 2022-05-14 RX ADMIN — Medication 40 MILLIEQUIVALENT(S): at 21:35

## 2022-05-14 RX ADMIN — Medication 1: at 17:06

## 2022-05-14 RX ADMIN — ATORVASTATIN CALCIUM 80 MILLIGRAM(S): 80 TABLET, FILM COATED ORAL at 21:35

## 2022-05-14 RX ADMIN — Medication 25 MILLIGRAM(S): at 05:18

## 2022-05-14 RX ADMIN — LISINOPRIL 40 MILLIGRAM(S): 2.5 TABLET ORAL at 06:48

## 2022-05-14 RX ADMIN — HEPARIN SODIUM 5000 UNIT(S): 5000 INJECTION INTRAVENOUS; SUBCUTANEOUS at 15:10

## 2022-05-14 RX ADMIN — HEPARIN SODIUM 5000 UNIT(S): 5000 INJECTION INTRAVENOUS; SUBCUTANEOUS at 05:19

## 2022-05-14 RX ADMIN — AMLODIPINE BESYLATE 5 MILLIGRAM(S): 2.5 TABLET ORAL at 21:34

## 2022-05-14 RX ADMIN — Medication 25 MILLIGRAM(S): at 21:34

## 2022-05-14 RX ADMIN — Medication 100 MILLIGRAM(S): at 18:33

## 2022-05-14 NOTE — OCCUPATIONAL THERAPY INITIAL EVALUATION ADULT - LIVES WITH, PROFILE
Pt lives with  and children in pvt home +4-5 [place], flight inside to bedroom/bathroom. Tub shower +shower chair, has 4 prong cane for outside. PTA pt was independent with all ADLs Pt lives with  and children in pvt home +4-5 [place], flight inside to bedroom/bathroom. Tub shower +shower chair, has quad cane for outside. PTA pt was independent with all ADLs

## 2022-05-14 NOTE — OCCUPATIONAL THERAPY INITIAL EVALUATION ADULT - GENERAL OBSERVATIONS, REHAB EVAL
Pt received supine in bed +IVL Pt received supine in bed +IVL; BP initially 186/100. rechecked 181/119, RN Chente aware. Pt presents sitting OOB in chair in NAD.

## 2022-05-14 NOTE — PHYSICAL THERAPY INITIAL EVALUATION ADULT - PERTINENT HX OF CURRENT PROBLEM, REHAB EVAL
62F PMH T2DM, HTN, Multiple CVAs(2016,'20,'21) c/b r.foot drop and residual rt-arm weakness s/p ILR in Aug '21 p/w non-exertional chest pain and palpitations. Atypical chest pain. suspect related to palpitations vs ischemic heart disease given risk factors. No e/o acute ischemia w/ neg trops x2 and EKG w/o acute ST changes

## 2022-05-14 NOTE — PHYSICAL THERAPY INITIAL EVALUATION ADULT - LEVEL OF INDEPENDENCE: GAIT, REHAB EVAL
n/a sec. increased BP. initially 186/100. rechecked 181/119. further activity deferred and Chente KRAMER made aware.

## 2022-05-14 NOTE — OCCUPATIONAL THERAPY INITIAL EVALUATION ADULT - VISUAL ASSESSMENT: CONVERGENCE
Christal Roblero is a 39 y.o. female here for a non-provider visit for Depo injection.    Reason for injection: Encounter for management and injection of injectable progestin contraceptive  Order in MAR?: Yes  Patient supplied?:No  Minimum interval has been met for this injection (per MAR order): Yes    Order and dose verified by: alc  Patient tolerated injection and no adverse effects were observed or reported: No    # of Administrations remaining in MAR: 0   normal

## 2022-05-14 NOTE — OCCUPATIONAL THERAPY INITIAL EVALUATION ADULT - PERTINENT HX OF CURRENT PROBLEM, REHAB EVAL
62F PMH T2DM, HTN, Multiple CVAs(2016,'20,'21) c/b r.foot drop and residual rt-arm weakness s/p ILR in Aug '21 p/w non-exertional chest pain and palpitations. She reports it started with intermittent palpitations 1-day ago that occurred while at rest which became more frequent and today she experienced a squeezing substernal chest pain with the palpitations that prompted her to come in for evaluation.

## 2022-05-14 NOTE — PATIENT PROFILE ADULT - FALL HARM RISK - HARM RISK INTERVENTIONS

## 2022-05-14 NOTE — OCCUPATIONAL THERAPY INITIAL EVALUATION ADULT - VISUAL ASSESSMENT: DEPTH PERCEPTION
WFL [FreeTextEntry1] : new patient, physical [de-identified] : MANDEEP ROBERT is a 26 year old female who presents for an initial comprehensive exam. Recently moved to [place] from [place].\par Scored 14 on PHQ-9, negative for suicidal/homicidal ideations, has h/o depression/anxiety, worsened after giving birth to son 1 yr ago. Was on Zoloft 50 mg but stopped med ~ 1 week ago as it makes her feel sleepy, willing to cut down dose to restart.\par -BMI 31, +obesity. Baseline weight ~ 135 pre-pregnancy (2 yrs ago). \par -Chronic lower back pain/sacrum pain for 2-3 yrs, worse since giving birth, localized to area in mid lower back but can radiating into right leg/buttock when standing/exerting herself.\par -Currently breastfeeding\par -Former smoker, 1 ppd x 8 yrs, quit 3 yrs ago. Concerned she may have chronic lung disease from the smoking, inquires on whether she should have lung screening tests

## 2022-05-14 NOTE — OCCUPATIONAL THERAPY INITIAL EVALUATION ADULT - DIAGNOSIS, OT EVAL
Pt was hypertensive t/o session deferring functional transfers, however demonstrated independence with some ADLs. Pt p/w decreased strength, balance, AROM, endurance impacting functional independence and ADLs.

## 2022-05-14 NOTE — PHYSICAL THERAPY INITIAL EVALUATION ADULT - ADDITIONAL COMMENTS
Patient lives in pvt house with family  4 steps to enter, 14 steps inside.  Patient ambulated with quad cane independent. Pt owns no DMs other than quad cane. Patient lives in pvt house with family  4 steps to enter, 14 steps inside.  Patient ambulated with quad cane independent. Pt owns no DMs other than quad cane. Uses R AFO. Needs assist with donning/doffing. Patient lives in pvt house with family  4 steps to enter, 14 steps inside.  Patient ambulated with quad cane independent. Pt owns no DME's other than quad cane. Uses R AFO. Needs assist with donning/doffing.

## 2022-05-14 NOTE — OCCUPATIONAL THERAPY INITIAL EVALUATION ADULT - RANGE OF MOTION EXAMINATION, UPPER EXTREMITY
bilateral UE Active ROM was WFL  (within functional limits) bilateral UE Passive ROM was WFL  (within functional limits)

## 2022-05-14 NOTE — OCCUPATIONAL THERAPY INITIAL EVALUATION ADULT - PRECAUTIONS/LIMITATIONS, REHAB EVAL
CONT: There was no radiation and peak intensity was 7/10. She took a baby ASA without relief. CP remained constant until presentation. She also reports diaphoresis at the time. She has had this type of pain before several years ago that had been attributed to low potassium. Her low K at the time had been d/t diarrhea as a SE from Metformin she had been taking at the time. She currently denies having any diarrhea. Denied fevers/chills, cough/hemoptysis,  PND, orthopnea, dizziness, abdominal pain, recent hospitalization, h/o VTE, association w/ food. Pt is yet to f/u with cards for evaluation of ILR. CONT: There was no radiation and peak intensity was 7/10. She took a baby ASA without relief. CP remained constant until presentation. She also reports diaphoresis at the time. She has had this type of pain before several years ago that had been attributed to low potassium. Her low K at the time had been d/t diarrhea as a SE from Metformin she had been taking at the time. She currently denies having any diarrhea. Denied fevers/chills, cough/hemoptysis,  PND, orthopnea, dizziness, abdominal pain, recent hospitalization, h/o VTE, association w/ food. Pt is yet to f/u with cards for evaluation of ILR./cardiac precautions

## 2022-05-14 NOTE — PROVIDER CONTACT NOTE (OTHER) - ASSESSMENT
Pt A&Ox4, VSS except hypertensive. Manual BP done on L arm, 164/92. C/o new headache localized to R side and continuing midsternal cp. No neuro changes noted.

## 2022-05-14 NOTE — PHYSICAL THERAPY INITIAL EVALUATION ADULT - RANGE OF MOTION EXAMINATION, REHAB EVAL
R ankle elbow shoulder ROM limited./Left UE ROM was WFL (within functional limits)/Left LE ROM was WFL (within functional limits)/deficits as listed below

## 2022-05-14 NOTE — PHYSICAL THERAPY INITIAL EVALUATION ADULT - PLANNED THERAPY INTERVENTIONS, PT EVAL
GOAL: Pt will negotiate 5 steps  up and down using HR/ quad cane  support, independent  within 2-3 weeks./balance training/gait training/strengthening/transfer training

## 2022-05-15 LAB
A1C WITH ESTIMATED AVERAGE GLUCOSE RESULT: 7.2 % — HIGH (ref 4–5.6)
CHOLEST SERPL-MCNC: 138 MG/DL — SIGNIFICANT CHANGE UP
ESTIMATED AVERAGE GLUCOSE: 160 MG/DL — HIGH (ref 68–114)
GLUCOSE BLDC GLUCOMTR-MCNC: 128 MG/DL — HIGH (ref 70–99)
GLUCOSE BLDC GLUCOMTR-MCNC: 138 MG/DL — HIGH (ref 70–99)
GLUCOSE BLDC GLUCOMTR-MCNC: 139 MG/DL — HIGH (ref 70–99)
GLUCOSE BLDC GLUCOMTR-MCNC: 141 MG/DL — HIGH (ref 70–99)
HDLC SERPL-MCNC: 54 MG/DL — SIGNIFICANT CHANGE UP
LIPID PNL WITH DIRECT LDL SERPL: 71 MG/DL — SIGNIFICANT CHANGE UP
NON HDL CHOLESTEROL: 84 MG/DL — SIGNIFICANT CHANGE UP
TRIGL SERPL-MCNC: 66 MG/DL — SIGNIFICANT CHANGE UP
TSH SERPL-MCNC: 0.33 UIU/ML — SIGNIFICANT CHANGE UP (ref 0.27–4.2)

## 2022-05-15 PROCEDURE — 93306 TTE W/DOPPLER COMPLETE: CPT | Mod: 26

## 2022-05-15 PROCEDURE — 99233 SBSQ HOSP IP/OBS HIGH 50: CPT

## 2022-05-15 RX ORDER — AMLODIPINE BESYLATE 2.5 MG/1
10 TABLET ORAL DAILY
Refills: 0 | Status: DISCONTINUED | OUTPATIENT
Start: 2022-05-16 | End: 2022-05-18

## 2022-05-15 RX ORDER — LABETALOL HCL 100 MG
100 TABLET ORAL
Refills: 0 | Status: DISCONTINUED | OUTPATIENT
Start: 2022-05-15 | End: 2022-05-15

## 2022-05-15 RX ORDER — LABETALOL HCL 100 MG
200 TABLET ORAL
Refills: 0 | Status: DISCONTINUED | OUTPATIENT
Start: 2022-05-15 | End: 2022-05-15

## 2022-05-15 RX ORDER — AMLODIPINE BESYLATE 2.5 MG/1
5 TABLET ORAL ONCE
Refills: 0 | Status: COMPLETED | OUTPATIENT
Start: 2022-05-15 | End: 2022-05-15

## 2022-05-15 RX ORDER — LABETALOL HCL 100 MG
100 TABLET ORAL THREE TIMES A DAY
Refills: 0 | Status: DISCONTINUED | OUTPATIENT
Start: 2022-05-15 | End: 2022-05-16

## 2022-05-15 RX ADMIN — ATORVASTATIN CALCIUM 80 MILLIGRAM(S): 80 TABLET, FILM COATED ORAL at 21:32

## 2022-05-15 RX ADMIN — Medication 81 MILLIGRAM(S): at 11:47

## 2022-05-15 RX ADMIN — Medication 25 MILLIGRAM(S): at 13:12

## 2022-05-15 RX ADMIN — INSULIN GLARGINE 12 UNIT(S): 100 INJECTION, SOLUTION SUBCUTANEOUS at 21:33

## 2022-05-15 RX ADMIN — Medication 25 MILLIGRAM(S): at 21:32

## 2022-05-15 RX ADMIN — FAMOTIDINE 20 MILLIGRAM(S): 10 INJECTION INTRAVENOUS at 11:47

## 2022-05-15 RX ADMIN — HEPARIN SODIUM 5000 UNIT(S): 5000 INJECTION INTRAVENOUS; SUBCUTANEOUS at 05:14

## 2022-05-15 RX ADMIN — HEPARIN SODIUM 5000 UNIT(S): 5000 INJECTION INTRAVENOUS; SUBCUTANEOUS at 13:14

## 2022-05-15 RX ADMIN — LISINOPRIL 40 MILLIGRAM(S): 2.5 TABLET ORAL at 05:12

## 2022-05-15 RX ADMIN — Medication 100 MILLIGRAM(S): at 05:12

## 2022-05-15 RX ADMIN — Medication 100 MILLIGRAM(S): at 21:32

## 2022-05-15 RX ADMIN — HEPARIN SODIUM 5000 UNIT(S): 5000 INJECTION INTRAVENOUS; SUBCUTANEOUS at 21:32

## 2022-05-15 RX ADMIN — Medication 25 MILLIGRAM(S): at 05:11

## 2022-05-15 RX ADMIN — AMLODIPINE BESYLATE 5 MILLIGRAM(S): 2.5 TABLET ORAL at 14:41

## 2022-05-16 LAB
ANION GAP SERPL CALC-SCNC: 13 MMOL/L — SIGNIFICANT CHANGE UP (ref 5–17)
BUN SERPL-MCNC: 15 MG/DL — SIGNIFICANT CHANGE UP (ref 7–23)
CALCIUM SERPL-MCNC: 10.1 MG/DL — SIGNIFICANT CHANGE UP (ref 8.4–10.5)
CHLORIDE SERPL-SCNC: 107 MMOL/L — SIGNIFICANT CHANGE UP (ref 96–108)
CO2 SERPL-SCNC: 22 MMOL/L — SIGNIFICANT CHANGE UP (ref 22–31)
CREAT SERPL-MCNC: 0.94 MG/DL — SIGNIFICANT CHANGE UP (ref 0.5–1.3)
EGFR: 69 ML/MIN/1.73M2 — SIGNIFICANT CHANGE UP
GLUCOSE BLDC GLUCOMTR-MCNC: 102 MG/DL — HIGH (ref 70–99)
GLUCOSE BLDC GLUCOMTR-MCNC: 153 MG/DL — HIGH (ref 70–99)
GLUCOSE BLDC GLUCOMTR-MCNC: 232 MG/DL — HIGH (ref 70–99)
GLUCOSE SERPL-MCNC: 113 MG/DL — HIGH (ref 70–99)
HCT VFR BLD CALC: 36.6 % — SIGNIFICANT CHANGE UP (ref 34.5–45)
HGB BLD-MCNC: 11.6 G/DL — SIGNIFICANT CHANGE UP (ref 11.5–15.5)
MCHC RBC-ENTMCNC: 26.5 PG — LOW (ref 27–34)
MCHC RBC-ENTMCNC: 31.7 GM/DL — LOW (ref 32–36)
MCV RBC AUTO: 83.8 FL — SIGNIFICANT CHANGE UP (ref 80–100)
NRBC # BLD: 0 /100 WBCS — SIGNIFICANT CHANGE UP (ref 0–0)
PLATELET # BLD AUTO: 216 K/UL — SIGNIFICANT CHANGE UP (ref 150–400)
POTASSIUM SERPL-MCNC: 3.8 MMOL/L — SIGNIFICANT CHANGE UP (ref 3.5–5.3)
POTASSIUM SERPL-SCNC: 3.8 MMOL/L — SIGNIFICANT CHANGE UP (ref 3.5–5.3)
RBC # BLD: 4.37 M/UL — SIGNIFICANT CHANGE UP (ref 3.8–5.2)
RBC # FLD: 14.1 % — SIGNIFICANT CHANGE UP (ref 10.3–14.5)
SODIUM SERPL-SCNC: 142 MMOL/L — SIGNIFICANT CHANGE UP (ref 135–145)
WBC # BLD: 5.71 K/UL — SIGNIFICANT CHANGE UP (ref 3.8–10.5)
WBC # FLD AUTO: 5.71 K/UL — SIGNIFICANT CHANGE UP (ref 3.8–10.5)

## 2022-05-16 PROCEDURE — 93016 CV STRESS TEST SUPVJ ONLY: CPT

## 2022-05-16 PROCEDURE — 99233 SBSQ HOSP IP/OBS HIGH 50: CPT

## 2022-05-16 PROCEDURE — 78452 HT MUSCLE IMAGE SPECT MULT: CPT | Mod: 26

## 2022-05-16 PROCEDURE — 93018 CV STRESS TEST I&R ONLY: CPT

## 2022-05-16 RX ORDER — LABETALOL HCL 100 MG
200 TABLET ORAL EVERY 8 HOURS
Refills: 0 | Status: DISCONTINUED | OUTPATIENT
Start: 2022-05-16 | End: 2022-05-17

## 2022-05-16 RX ADMIN — Medication 200 MILLIGRAM(S): at 21:59

## 2022-05-16 RX ADMIN — Medication 200 MILLIGRAM(S): at 14:35

## 2022-05-16 RX ADMIN — ATORVASTATIN CALCIUM 80 MILLIGRAM(S): 80 TABLET, FILM COATED ORAL at 21:59

## 2022-05-16 RX ADMIN — FAMOTIDINE 20 MILLIGRAM(S): 10 INJECTION INTRAVENOUS at 13:38

## 2022-05-16 RX ADMIN — INSULIN GLARGINE 12 UNIT(S): 100 INJECTION, SOLUTION SUBCUTANEOUS at 21:59

## 2022-05-16 RX ADMIN — Medication 81 MILLIGRAM(S): at 13:38

## 2022-05-16 RX ADMIN — HEPARIN SODIUM 5000 UNIT(S): 5000 INJECTION INTRAVENOUS; SUBCUTANEOUS at 05:30

## 2022-05-16 RX ADMIN — HEPARIN SODIUM 5000 UNIT(S): 5000 INJECTION INTRAVENOUS; SUBCUTANEOUS at 21:59

## 2022-05-16 RX ADMIN — AMLODIPINE BESYLATE 10 MILLIGRAM(S): 2.5 TABLET ORAL at 05:30

## 2022-05-16 RX ADMIN — Medication 2: at 17:15

## 2022-05-16 RX ADMIN — Medication 25 MILLIGRAM(S): at 05:30

## 2022-05-16 RX ADMIN — HEPARIN SODIUM 5000 UNIT(S): 5000 INJECTION INTRAVENOUS; SUBCUTANEOUS at 14:34

## 2022-05-16 RX ADMIN — Medication 100 MILLIGRAM(S): at 05:30

## 2022-05-16 RX ADMIN — LISINOPRIL 40 MILLIGRAM(S): 2.5 TABLET ORAL at 05:30

## 2022-05-17 LAB
ALDOST SERPL-MCNC: 9.8 NG/DL — SIGNIFICANT CHANGE UP
ANION GAP SERPL CALC-SCNC: 14 MMOL/L — SIGNIFICANT CHANGE UP (ref 5–17)
BUN SERPL-MCNC: 16 MG/DL — SIGNIFICANT CHANGE UP (ref 7–23)
CALCIUM SERPL-MCNC: 10 MG/DL — SIGNIFICANT CHANGE UP (ref 8.4–10.5)
CHLORIDE SERPL-SCNC: 105 MMOL/L — SIGNIFICANT CHANGE UP (ref 96–108)
CO2 SERPL-SCNC: 22 MMOL/L — SIGNIFICANT CHANGE UP (ref 22–31)
CREAT SERPL-MCNC: 1 MG/DL — SIGNIFICANT CHANGE UP (ref 0.5–1.3)
EGFR: 64 ML/MIN/1.73M2 — SIGNIFICANT CHANGE UP
GLUCOSE BLDC GLUCOMTR-MCNC: 108 MG/DL — HIGH (ref 70–99)
GLUCOSE BLDC GLUCOMTR-MCNC: 113 MG/DL — HIGH (ref 70–99)
GLUCOSE BLDC GLUCOMTR-MCNC: 143 MG/DL — HIGH (ref 70–99)
GLUCOSE BLDC GLUCOMTR-MCNC: 150 MG/DL — HIGH (ref 70–99)
GLUCOSE SERPL-MCNC: 112 MG/DL — HIGH (ref 70–99)
HCT VFR BLD CALC: 36.4 % — SIGNIFICANT CHANGE UP (ref 34.5–45)
HGB BLD-MCNC: 11.6 G/DL — SIGNIFICANT CHANGE UP (ref 11.5–15.5)
MAGNESIUM SERPL-MCNC: 1.8 MG/DL — SIGNIFICANT CHANGE UP (ref 1.6–2.6)
MCHC RBC-ENTMCNC: 26.5 PG — LOW (ref 27–34)
MCHC RBC-ENTMCNC: 31.9 GM/DL — LOW (ref 32–36)
MCV RBC AUTO: 83.1 FL — SIGNIFICANT CHANGE UP (ref 80–100)
NRBC # BLD: 0 /100 WBCS — SIGNIFICANT CHANGE UP (ref 0–0)
PHOSPHATE SERPL-MCNC: 3.7 MG/DL — SIGNIFICANT CHANGE UP (ref 2.5–4.5)
PLATELET # BLD AUTO: 201 K/UL — SIGNIFICANT CHANGE UP (ref 150–400)
POTASSIUM SERPL-MCNC: 3.8 MMOL/L — SIGNIFICANT CHANGE UP (ref 3.5–5.3)
POTASSIUM SERPL-SCNC: 3.8 MMOL/L — SIGNIFICANT CHANGE UP (ref 3.5–5.3)
RBC # BLD: 4.38 M/UL — SIGNIFICANT CHANGE UP (ref 3.8–5.2)
RBC # FLD: 14 % — SIGNIFICANT CHANGE UP (ref 10.3–14.5)
RENIN DIRECT, PLASMA: <2.1 PG/ML — SIGNIFICANT CHANGE UP
SARS-COV-2 RNA SPEC QL NAA+PROBE: SIGNIFICANT CHANGE UP
SODIUM SERPL-SCNC: 141 MMOL/L — SIGNIFICANT CHANGE UP (ref 135–145)
WBC # BLD: 5.11 K/UL — SIGNIFICANT CHANGE UP (ref 3.8–10.5)
WBC # FLD AUTO: 5.11 K/UL — SIGNIFICANT CHANGE UP (ref 3.8–10.5)

## 2022-05-17 PROCEDURE — 99233 SBSQ HOSP IP/OBS HIGH 50: CPT

## 2022-05-17 PROCEDURE — 99152 MOD SED SAME PHYS/QHP 5/>YRS: CPT

## 2022-05-17 PROCEDURE — 93454 CORONARY ARTERY ANGIO S&I: CPT | Mod: 26

## 2022-05-17 RX ORDER — SODIUM CHLORIDE 9 MG/ML
250 INJECTION INTRAMUSCULAR; INTRAVENOUS; SUBCUTANEOUS ONCE
Refills: 0 | Status: COMPLETED | OUTPATIENT
Start: 2022-05-17 | End: 2022-05-17

## 2022-05-17 RX ORDER — LABETALOL HCL 100 MG
10 TABLET ORAL ONCE
Refills: 0 | Status: COMPLETED | OUTPATIENT
Start: 2022-05-17 | End: 2022-05-17

## 2022-05-17 RX ORDER — LABETALOL HCL 100 MG
300 TABLET ORAL THREE TIMES A DAY
Refills: 0 | Status: DISCONTINUED | OUTPATIENT
Start: 2022-05-17 | End: 2022-05-18

## 2022-05-17 RX ORDER — SODIUM CHLORIDE 9 MG/ML
1000 INJECTION INTRAMUSCULAR; INTRAVENOUS; SUBCUTANEOUS
Refills: 0 | Status: DISCONTINUED | OUTPATIENT
Start: 2022-05-17 | End: 2022-05-18

## 2022-05-17 RX ORDER — SODIUM CHLORIDE 9 MG/ML
1000 INJECTION INTRAMUSCULAR; INTRAVENOUS; SUBCUTANEOUS
Refills: 0 | Status: DISCONTINUED | OUTPATIENT
Start: 2022-05-17 | End: 2022-05-17

## 2022-05-17 RX ADMIN — FAMOTIDINE 20 MILLIGRAM(S): 10 INJECTION INTRAVENOUS at 11:27

## 2022-05-17 RX ADMIN — Medication 300 MILLIGRAM(S): at 21:20

## 2022-05-17 RX ADMIN — SODIUM CHLORIDE 500 MILLILITER(S): 9 INJECTION INTRAMUSCULAR; INTRAVENOUS; SUBCUTANEOUS at 14:48

## 2022-05-17 RX ADMIN — AMLODIPINE BESYLATE 10 MILLIGRAM(S): 2.5 TABLET ORAL at 05:13

## 2022-05-17 RX ADMIN — HEPARIN SODIUM 5000 UNIT(S): 5000 INJECTION INTRAVENOUS; SUBCUTANEOUS at 21:20

## 2022-05-17 RX ADMIN — Medication 10 MILLIGRAM(S): at 17:39

## 2022-05-17 RX ADMIN — Medication 81 MILLIGRAM(S): at 11:27

## 2022-05-17 RX ADMIN — ATORVASTATIN CALCIUM 80 MILLIGRAM(S): 80 TABLET, FILM COATED ORAL at 21:19

## 2022-05-17 RX ADMIN — LISINOPRIL 40 MILLIGRAM(S): 2.5 TABLET ORAL at 05:12

## 2022-05-17 RX ADMIN — Medication 200 MILLIGRAM(S): at 05:13

## 2022-05-17 RX ADMIN — HEPARIN SODIUM 5000 UNIT(S): 5000 INJECTION INTRAVENOUS; SUBCUTANEOUS at 05:12

## 2022-05-17 RX ADMIN — SODIUM CHLORIDE 500 MILLILITER(S): 9 INJECTION INTRAMUSCULAR; INTRAVENOUS; SUBCUTANEOUS at 13:25

## 2022-05-17 RX ADMIN — SODIUM CHLORIDE 75 MILLILITER(S): 9 INJECTION INTRAMUSCULAR; INTRAVENOUS; SUBCUTANEOUS at 14:00

## 2022-05-17 RX ADMIN — Medication 200 MILLIGRAM(S): at 17:39

## 2022-05-17 RX ADMIN — INSULIN GLARGINE 12 UNIT(S): 100 INJECTION, SOLUTION SUBCUTANEOUS at 21:32

## 2022-05-17 NOTE — PROGRESS NOTE ADULT - SUBJECTIVE AND OBJECTIVE BOX
[facility] Division of Hospital Medicine  Eddie Pichardo MD, TYLER  I'm reachable on [employer] Teams   Off hours:  [phone] ([facility] pager)    Patient is a 62y old  Female who presents with a chief complaint of Chest pain (16 May 2022 21:11)      SUBJECTIVE / OVERNIGHT EVENTS:  No events overnight. No complaints. Awaiting planned angiogram this morning.     MEDICATIONS  (STANDING):  amLODIPine   Tablet 10 milliGRAM(s) Oral daily  aspirin  chewable 81 milliGRAM(s) Oral daily  atorvastatin 80 milliGRAM(s) Oral at bedtime  dextrose 5%. 1000 milliLiter(s) (100 mL/Hr) IV Continuous <Continuous>  dextrose 50% Injectable 25 Gram(s) IV Push once  famotidine    Tablet 20 milliGRAM(s) Oral daily  glucagon  Injectable 1 milliGRAM(s) IntraMuscular once  heparin   Injectable 5000 Unit(s) SubCutaneous every 8 hours  insulin glargine Injectable (LANTUS) 12 Unit(s) SubCutaneous at bedtime  insulin lispro (ADMELOG) corrective regimen sliding scale   SubCutaneous three times a day before meals  labetalol 300 milliGRAM(s) Oral three times a day  lisinopril 40 milliGRAM(s) Oral daily  sodium chloride 0.9%. 1000 milliLiter(s) (75 mL/Hr) IV Continuous <Continuous>    MEDICATIONS  (PRN):  acetaminophen     Tablet .. 650 milliGRAM(s) Oral every 6 hours PRN Temp greater or equal to 38C (100.4F), Mild Pain (1 - 3)  aluminum hydroxide/magnesium hydroxide/simethicone Suspension 30 milliLiter(s) Oral every 4 hours PRN Dyspepsia  dextrose Oral Gel 15 Gram(s) Oral once PRN Blood Glucose LESS THAN 70 milliGRAM(s)/deciliter  melatonin 3 milliGRAM(s) Oral at bedtime PRN Insomnia  ondansetron Injectable 4 milliGRAM(s) IV Push every 8 hours PRN Nausea and/or Vomiting    CAPILLARY BLOOD GLUCOSE      POCT Blood Glucose.: 108 mg/dL (17 May 2022 18:21)  POCT Blood Glucose.: 143 mg/dL (17 May 2022 11:45)  POCT Blood Glucose.: 113 mg/dL (17 May 2022 07:10)  POCT Blood Glucose.: 153 mg/dL (16 May 2022 21:35)    I&O's Summary    17 May 2022 07:01  -  17 May 2022 19:26  --------------------------------------------------------  IN: 600 mL / OUT: 0 mL / NET: 600 mL        PHYSICAL EXAM:  Vital Signs Last 24 Hrs  T(C): 36.5 (17 May 2022 18:06), Max: 37.1 (17 May 2022 11:09)  T(F): 97.7 (17 May 2022 18:06), Max: 98.7 (17 May 2022 11:09)  HR: 80 (17 May 2022 18:51) (72 - 84)  BP: 136/91 (17 May 2022 18:51) (136/91 - 180/99)  BP(mean): --  RR: 18 (17 May 2022 18:51) (15 - 18)  SpO2: 99% (17 May 2022 18:51) (94% - 100%)    CONSTITUTIONAL: NAD, well-developed, well-groomed  EYES: EOMI, conjunctiva and sclera clear  NECK: Supple  RESPIRATORY: Normal respiratory effort; lungs are clear to auscultation bilaterally  CARDIOVASCULAR: Regular rate and rhythm, normal S1 and S2, no murmur/rub/gallop; No lower extremity edema  ABDOMEN: Nontender to palpation, normoactive bowel sounds, no rebound/guarding  MUSCULOSKELETAL:  no joint swelling or tenderness to palpation  PSYCH: A+O to person, place, and time; affect appropriate  NEUROLOGY: no focal deficits       LABS:                        11.6   5.11  )-----------( 201      ( 17 May 2022 06:48 )             36.4     05-17    141  |  105  |  16  ----------------------------<  112<H>  3.8   |  22  |  1.00    Ca    10.0      17 May 2022 06:48  Phos  3.7     05-17  Mg     1.8     05-17        RADIOLOGY & ADDITIONAL TESTS:    Lab Results Reviewed    COORDINATION OF CARE:  Care Discussed with Consultants/Other Providers:  Cardiology re: angiogram
SUBJECTIVE / OVERNIGHT EVENTS:  - Had an episode of elevated BP with substernal CP this AM at rest had EKG and CE sent  - Patient seen at bedside. No tele events. Patient reports having substernal squeezing CP that is intermittent and non-radiating while she was lying in bed. It is non-exertional. She notices she has a "minty" flavor in her mouth although denies pain getting worse with food or lying down at night. She also has some mild SOB. No abdominal pain, nausea or vomiting    ADDITIONAL REVIEW OF SYSTEMS:    MEDICATIONS  (STANDING):  aspirin  chewable 81 milliGRAM(s) Oral daily  atorvastatin 80 milliGRAM(s) Oral at bedtime  dextrose 5%. 1000 milliLiter(s) (100 mL/Hr) IV Continuous <Continuous>  dextrose 50% Injectable 25 Gram(s) IV Push once  famotidine    Tablet 20 milliGRAM(s) Oral daily  glucagon  Injectable 1 milliGRAM(s) IntraMuscular once  heparin   Injectable 5000 Unit(s) SubCutaneous every 8 hours  hydrALAZINE 25 milliGRAM(s) Oral three times a day  insulin glargine Injectable (LANTUS) 12 Unit(s) SubCutaneous at bedtime  insulin lispro (ADMELOG) corrective regimen sliding scale   SubCutaneous three times a day before meals  labetalol 100 milliGRAM(s) Oral two times a day  lisinopril 40 milliGRAM(s) Oral daily  potassium chloride    Tablet ER 40 milliEquivalent(s) Oral every 4 hours    MEDICATIONS  (PRN):  acetaminophen     Tablet .. 650 milliGRAM(s) Oral every 6 hours PRN Temp greater or equal to 38C (100.4F), Mild Pain (1 - 3)  aluminum hydroxide/magnesium hydroxide/simethicone Suspension 30 milliLiter(s) Oral every 4 hours PRN Dyspepsia  dextrose Oral Gel 15 Gram(s) Oral once PRN Blood Glucose LESS THAN 70 milliGRAM(s)/deciliter  melatonin 3 milliGRAM(s) Oral at bedtime PRN Insomnia  ondansetron Injectable 4 milliGRAM(s) IV Push every 8 hours PRN Nausea and/or Vomiting      I&O's Summary    14 May 2022 07:01  -  14 May 2022 15:11  --------------------------------------------------------  IN: 360 mL / OUT: 0 mL / NET: 360 mL        PHYSICAL EXAM:  Vital Signs Last 24 Hrs  T(C): 36.7 (14 May 2022 12:33), Max: 36.7 (14 May 2022 01:57)  T(F): 98.1 (14 May 2022 12:33), Max: 98.1 (14 May 2022 01:57)  HR: 76 (14 May 2022 12:33) (76 - 99)  BP: 168/99 (14 May 2022 12:33) (136/92 - 186/100)  BP(mean): 108 (13 May 2022 22:28) (108 - 108)  RR: 18 (14 May 2022 12:33) (17 - 24)  SpO2: 98% (14 May 2022 12:33) (98% - 99%)    CONSTITUTIONAL: NAD, well-developed, well-groomed  EYES: PERRLA; conjunctiva and sclera clear  NECK: Supple  RESPIRATORY: Normal respiratory effort; lungs are clear to auscultation bilaterally  CARDIOVASCULAR: Regular rate and rhythm, normal S1 and S2, no murmur/rub/gallop; No lower extremity edema; Peripheral pulses are 2+ bilaterally  ABDOMEN: Nontender to palpation, normoactive bowel sounds, no rebound/guarding;   MUSCULOSKELETAL:  no joint swelling or tenderness to palpation  PSYCH: A+O to person, place, and time; affect appropriate  NEUROLOGY: no focal deficits   SKIN: No rashes    LABS:                        10.8   5.30  )-----------( 210      ( 14 May 2022 11:21 )             33.8     05-14    144  |  107  |  14  ----------------------------<  149<H>  3.4<L>   |  25  |  0.99    Ca    9.9      14 May 2022 11:25  Phos  4.3     05-13  Mg     2.0     05-14    TPro  7.3  /  Alb  4.2  /  TBili  0.3  /  DBili  x   /  AST  16  /  ALT  15  /  AlkPhos  83  05-13      CARDIAC MARKERS ( 14 May 2022 11:25 )  x     / x     / 57 U/L / x     / 1.2 ng/mL            RADIOLOGY & ADDITIONAL TESTS:  Results Reviewed:   Imaging Personally Reviewed:  Electrocardiogram Personally Reviewed:    COORDINATION OF CARE:  Care Discussed with Consultants/Other Providers [Y/N]:  Prior or Outpatient Records Reviewed [Y/N]:  
[facility] Division of Hospital Medicine  Eddie Pichardo MD, TYLER  I'm reachable on [employer] Teams   Off hours:  [phone] ([facility] pager)    Patient is a 62y old  Female who presents with a chief complaint of Chest pain (15 May 2022 17:36)      SUBJECTIVE / OVERNIGHT EVENTS:  No events overnight. No new complaints.     MEDICATIONS  (STANDING):  amLODIPine   Tablet 10 milliGRAM(s) Oral daily  aspirin  chewable 81 milliGRAM(s) Oral daily  atorvastatin 80 milliGRAM(s) Oral at bedtime  dextrose 5%. 1000 milliLiter(s) (100 mL/Hr) IV Continuous <Continuous>  dextrose 50% Injectable 25 Gram(s) IV Push once  famotidine    Tablet 20 milliGRAM(s) Oral daily  glucagon  Injectable 1 milliGRAM(s) IntraMuscular once  heparin   Injectable 5000 Unit(s) SubCutaneous every 8 hours  insulin glargine Injectable (LANTUS) 12 Unit(s) SubCutaneous at bedtime  insulin lispro (ADMELOG) corrective regimen sliding scale   SubCutaneous three times a day before meals  labetalol 200 milliGRAM(s) Oral every 8 hours  lisinopril 40 milliGRAM(s) Oral daily    MEDICATIONS  (PRN):  acetaminophen     Tablet .. 650 milliGRAM(s) Oral every 6 hours PRN Temp greater or equal to 38C (100.4F), Mild Pain (1 - 3)  aluminum hydroxide/magnesium hydroxide/simethicone Suspension 30 milliLiter(s) Oral every 4 hours PRN Dyspepsia  dextrose Oral Gel 15 Gram(s) Oral once PRN Blood Glucose LESS THAN 70 milliGRAM(s)/deciliter  melatonin 3 milliGRAM(s) Oral at bedtime PRN Insomnia  ondansetron Injectable 4 milliGRAM(s) IV Push every 8 hours PRN Nausea and/or Vomiting    CAPILLARY BLOOD GLUCOSE      POCT Blood Glucose.: 232 mg/dL (16 May 2022 16:38)  POCT Blood Glucose.: 102 mg/dL (16 May 2022 07:34)  POCT Blood Glucose.: 139 mg/dL (15 May 2022 21:18)    I&O's Summary    15 May 2022 07:01  -  16 May 2022 07:00  --------------------------------------------------------  IN: 480 mL / OUT: 0 mL / NET: 480 mL        PHYSICAL EXAM:  Vital Signs Last 24 Hrs  T(C): 36.4 (16 May 2022 21:01), Max: 36.6 (15 May 2022 21:20)  T(F): 97.5 (16 May 2022 21:01), Max: 97.9 (15 May 2022 21:20)  HR: 72 (16 May 2022 21:01) (72 - 88)  BP: 143/93 (16 May 2022 21:01) (143/93 - 173/108)  BP(mean): --  RR: 18 (16 May 2022 21:01) (18 - 18)  SpO2: 100% (16 May 2022 21:01) (99% - 100%)    CONSTITUTIONAL: NAD, well-developed, well-groomed  EYES: EOMI, conjunctiva and sclera clear  NECK: Supple  RESPIRATORY: Normal respiratory effort; lungs are clear to auscultation bilaterally  CARDIOVASCULAR: Regular rate and rhythm, normal S1 and S2, no murmur/rub/gallop; No lower extremity edema  ABDOMEN: Nontender to palpation, normoactive bowel sounds, no rebound/guarding  MUSCULOSKELETAL:  no joint swelling or tenderness to palpation  PSYCH: A+O to person, place, and time; affect appropriate  NEUROLOGY: no focal deficits     LABS:                        11.6   5.71  )-----------( 216      ( 16 May 2022 07:05 )             36.6     05-16    142  |  107  |  15  ----------------------------<  113<H>  3.8   |  22  |  0.94    Ca    10.1      16 May 2022 07:03        RADIOLOGY & ADDITIONAL TESTS:    Lab Results Reviewed: hypokalemia resolved    Imaging Personally Reviewed:   Stress test:  There are large, mild to moderate defects anterolateral,  anteroseptal, basal and distal anterior, and apical walls  that are reversible suggestive of ischemia.  * There are large, mild to moderate defects in the  inferior, inferolateral, and basal to mid inferoseptal  walls that are mostly reversible suggestive of infarct  with significant sandeep-infarct ischemia.    
    SUBJECTIVE / OVERNIGHT EVENTS:  - No acute events overnight or on tele   - Patient seen at bedside.  - Reports squeezing chest pain has improved, still has a minty taste in mouth  - Denies sob, abdominal pain, nausea or vomiting    ADDITIONAL REVIEW OF SYSTEMS:    MEDICATIONS  (STANDING):  aspirin  chewable 81 milliGRAM(s) Oral daily  atorvastatin 80 milliGRAM(s) Oral at bedtime  dextrose 5%. 1000 milliLiter(s) (100 mL/Hr) IV Continuous <Continuous>  dextrose 50% Injectable 25 Gram(s) IV Push once  famotidine    Tablet 20 milliGRAM(s) Oral daily  glucagon  Injectable 1 milliGRAM(s) IntraMuscular once  heparin   Injectable 5000 Unit(s) SubCutaneous every 8 hours  hydrALAZINE 25 milliGRAM(s) Oral three times a day  insulin glargine Injectable (LANTUS) 12 Unit(s) SubCutaneous at bedtime  insulin lispro (ADMELOG) corrective regimen sliding scale   SubCutaneous three times a day before meals  labetalol 100 milliGRAM(s) Oral three times a day  lisinopril 40 milliGRAM(s) Oral daily    MEDICATIONS  (PRN):  acetaminophen     Tablet .. 650 milliGRAM(s) Oral every 6 hours PRN Temp greater or equal to 38C (100.4F), Mild Pain (1 - 3)  aluminum hydroxide/magnesium hydroxide/simethicone Suspension 30 milliLiter(s) Oral every 4 hours PRN Dyspepsia  dextrose Oral Gel 15 Gram(s) Oral once PRN Blood Glucose LESS THAN 70 milliGRAM(s)/deciliter  melatonin 3 milliGRAM(s) Oral at bedtime PRN Insomnia  ondansetron Injectable 4 milliGRAM(s) IV Push every 8 hours PRN Nausea and/or Vomiting      I&O's Summary    14 May 2022 07:01  -  15 May 2022 07:00  --------------------------------------------------------  IN: 840 mL / OUT: 0 mL / NET: 840 mL    15 May 2022 07:01  -  15 May 2022 17:36  --------------------------------------------------------  IN: 240 mL / OUT: 0 mL / NET: 240 mL        PHYSICAL EXAM:  Vital Signs Last 24 Hrs  T(C): 36.5 (15 May 2022 11:31), Max: 36.8 (14 May 2022 21:01)  T(F): 97.7 (15 May 2022 11:31), Max: 98.2 (14 May 2022 21:01)  HR: 78 (15 May 2022 11:45) (76 - 82)  BP: 162/96 (15 May 2022 13:00) (142/88 - 190/97)  BP(mean): --  RR: 18 (15 May 2022 11:31) (18 - 18)  SpO2: 99% (15 May 2022 11:45) (97% - 100%)    CONSTITUTIONAL: NAD, well-developed, well-groomed  EYES: PERRLA; conjunctiva and sclera clear  NECK: Supple  RESPIRATORY: Normal respiratory effort; lungs are clear to auscultation bilaterally  CARDIOVASCULAR: Regular rate and rhythm, normal S1 and S2, no murmur/rub/gallop; No lower extremity edema; Peripheral pulses are 2+ bilaterally  ABDOMEN: Nontender to palpation, normoactive bowel sounds, no rebound/guarding;   MUSCULOSKELETAL:  no joint swelling or tenderness to palpation  PSYCH: A+O to person, place, and time; affect appropriate  NEUROLOGY: no focal deficits   SKIN: No rashes    LABS:                        10.8   5.30  )-----------( 210      ( 14 May 2022 11:21 )             33.8     05-14    144  |  107  |  14  ----------------------------<  149<H>  3.4<L>   |  25  |  0.99    Ca    9.9      14 May 2022 11:25  Phos  4.3     05-13  Mg     2.0     05-14    TPro  7.3  /  Alb  4.2  /  TBili  0.3  /  DBili  x   /  AST  16  /  ALT  15  /  AlkPhos  83  05-13      CARDIAC MARKERS ( 14 May 2022 11:25 )  x     / x     / 57 U/L / x     / 1.2 ng/mL            RADIOLOGY & ADDITIONAL TESTS:  Results Reviewed:   Imaging Personally Reviewed:  Electrocardiogram Personally Reviewed:    COORDINATION OF CARE:  Care Discussed with Consultants/Other Providers [Y/N]:  Prior or Outpatient Records Reviewed [Y/N]:

## 2022-05-17 NOTE — PROGRESS NOTE ADULT - PROBLEM SELECTOR PLAN 2
Uncontrolled HTN SBP 160s to 170s could be a reason for CP as well  - Increase Labetalol 300mg TID  - Continue Amlodipine 10mg daily  - Continue Lisinopril 40mg daily  - stopped Hydralazine 25mg TID   - Aldosterone level WNL. Renin level WNL.   - Would likely benefit from nephrology f/up as outpatient to optimize HTN management.
Uncontrolled HTN SBP 160s to 170s could be a reason for CP as well  - Continue Labetalol 100mg TID  - Continue Hydralazine 25mg TID (can consider uptitrating next)   - Started back on home med Amlodipine 10mg tomorrow (given additional 5mg today)  - Continue Lisinopril 40mg daily
-s/p ILR  -hypokalemic on admission s/p repletions  -monitor K and Mg, replete prn  - rest of plan as above
Uncontrolled HTN SBP 160s to 170s could be a reason for CP as well  - Increase Labetalol 200mg TID  - stopped Hydralazine 25mg TID   - Continue Amlodipine 10mg daily  - Continue Lisinopril 40mg daily  - Given hypertension and hypokalemia, rule out Conn syndrome. Check aldosterone level in AM.   - Check renin level as well. Would likely benefit from nephrology f/up as outpatient to optimize HTN management.

## 2022-05-17 NOTE — PROGRESS NOTE ADULT - PROBLEM SELECTOR PLAN 5
-c/w ASA and statin  -ILD had been placed to evaluate for embolic source of multiple strokes
-c/w Labetalol 100mg BID, Hydralazine 25mg TID and Lisinopril 40mg qd
-c/w ASA and statin  -ILD had been placed to evaluate for embolic source of multiple strokes
-c/w ASA and statin  -ILD had been placed to evaluate for embolic source of multiple strokes

## 2022-05-17 NOTE — PROGRESS NOTE ADULT - PROBLEM SELECTOR PLAN 1
-suspect related to palpitations vs ischemic heart disease given risk factors. No e/o acute ischemia w/ neg trops x3 and EKG x 2 w/o acute ST changes  -she is s/p ILR for several months now and has not followed up with cards  -tele monitoring  -will obtain TTE  - will check A1C, lipid profile and TSH  - Will trial famotidine and Maalox for possible acid reflux if symptoms do not improve and/or TTE is unremarkable may need cardiology eval for further ischemic w/u such as stress test
Positive stress test - will require angiogram for further evaluation  No e/o acute ischemia w/ neg trops x3 and EKG x 2 w/o acute ST changes  s/p ILR for several months now and has not followed up with cards  tele monitoring  TTE- mild diastolic dysfunction   A1C 7.2 lipid profile wnl TSH wnl
-suspect related to palpitations vs ischemic heart disease vs. poorly controlled HTN  given risk factors. No e/o acute ischemia w/ neg trops x3 and EKG x 2 w/o acute ST changes  -she is s/p ILR for several months now and has not followed up with cards  -tele monitoring  - TTE pending  - A1C 7.2 lipid profile wnl TSH wnl   - Nuc stress test ordered given risk factors
No e/o acute ischemia w/ neg trops x3 and EKG x 2 w/o acute ST changes  Positive stress test. S/p diagnostic angiogram today.  s/p ILR for several months now and has not followed up with cards  tele monitoring  TTE- mild diastolic dysfunction   A1C 7.2 lipid profile wnl TSH wnl    If HTN improves a little by tomorrow, will dc home.

## 2022-05-17 NOTE — PROGRESS NOTE ADULT - PROBLEM SELECTOR PROBLEM 5
Cerebrovascular accident (CVA)
HTN (hypertension)

## 2022-05-17 NOTE — PROGRESS NOTE ADULT - PROBLEM SELECTOR PLAN 4
-c/w ASA and statin  -ILD had been placed to evaluate for embolic source of multiple strokes
-currently on Levemir 15u qhs, metformin 500mg BID and Jardiance 10mg at home  -FS AC TID HS  -c/w Lantus 12u qhs. Hold oral hypoglycemics  -AISS  -A1C 7.2
-currently on Levemir 15u qhs, metformin 500mg BID and Jardiance 10mg at home  -FS AC TID HS  -c/w Lantus 12u qhs. Hold oral hypoglycemics  -AISS  -A1C 7.2
-currently on Levemir 15u qhs , metformin 500mg BID and Jardiance 10mg at home  -FS AC TID HS  -c/w Lantus 12u qhs. Hold oral hypoglycemics  -AISS  -A1C 7.2

## 2022-05-17 NOTE — PROGRESS NOTE ADULT - PROBLEM SELECTOR PROBLEM 4
T2DM (type 2 diabetes mellitus)
T2DM (type 2 diabetes mellitus)
Cerebrovascular accident (CVA)
T2DM (type 2 diabetes mellitus)

## 2022-05-17 NOTE — PROGRESS NOTE ADULT - PROBLEM SELECTOR PLAN 3
-s/p ILR  -hypokalemic on admission s/p repletions  -monitor K and Mg, replete prn  - rest of plan as above
-currently on Levemir 15u qhs , metformin 500mg BID and Jardiance 10mg at home  -FS AC TID HS  -c/w Lantus 12u qhs. Hold oral hypoglycemics  -AISS  -f/u A1C
-s/p ILR  -hypokalemic on admission s/p repletions  -monitor K and Mg, replete prn  - rest of plan as above
-s/p ILR  -hypokalemic on admission s/p repletions  -monitor K and Mg, replete prn  - rest of plan as above

## 2022-05-18 ENCOUNTER — TRANSCRIPTION ENCOUNTER (OUTPATIENT)
Age: 62
End: 2022-05-18

## 2022-05-18 VITALS
OXYGEN SATURATION: 99 % | HEART RATE: 74 BPM | RESPIRATION RATE: 18 BRPM | SYSTOLIC BLOOD PRESSURE: 143 MMHG | TEMPERATURE: 98 F | DIASTOLIC BLOOD PRESSURE: 94 MMHG

## 2022-05-18 LAB
ANION GAP SERPL CALC-SCNC: 14 MMOL/L — SIGNIFICANT CHANGE UP (ref 5–17)
BUN SERPL-MCNC: 12 MG/DL — SIGNIFICANT CHANGE UP (ref 7–23)
CALCIUM SERPL-MCNC: 9.6 MG/DL — SIGNIFICANT CHANGE UP (ref 8.4–10.5)
CHLORIDE SERPL-SCNC: 107 MMOL/L — SIGNIFICANT CHANGE UP (ref 96–108)
CO2 SERPL-SCNC: 22 MMOL/L — SIGNIFICANT CHANGE UP (ref 22–31)
CREAT SERPL-MCNC: 0.91 MG/DL — SIGNIFICANT CHANGE UP (ref 0.5–1.3)
EGFR: 71 ML/MIN/1.73M2 — SIGNIFICANT CHANGE UP
GLUCOSE BLDC GLUCOMTR-MCNC: 119 MG/DL — HIGH (ref 70–99)
GLUCOSE BLDC GLUCOMTR-MCNC: 150 MG/DL — HIGH (ref 70–99)
GLUCOSE SERPL-MCNC: 112 MG/DL — HIGH (ref 70–99)
MAGNESIUM SERPL-MCNC: 1.9 MG/DL — SIGNIFICANT CHANGE UP (ref 1.6–2.6)
POTASSIUM SERPL-MCNC: 3.6 MMOL/L — SIGNIFICANT CHANGE UP (ref 3.5–5.3)
POTASSIUM SERPL-SCNC: 3.6 MMOL/L — SIGNIFICANT CHANGE UP (ref 3.5–5.3)
SODIUM SERPL-SCNC: 143 MMOL/L — SIGNIFICANT CHANGE UP (ref 135–145)

## 2022-05-18 PROCEDURE — 82553 CREATINE MB FRACTION: CPT

## 2022-05-18 PROCEDURE — 82550 ASSAY OF CK (CPK): CPT

## 2022-05-18 PROCEDURE — 84443 ASSAY THYROID STIM HORMONE: CPT

## 2022-05-18 PROCEDURE — C1887: CPT

## 2022-05-18 PROCEDURE — A9500: CPT

## 2022-05-18 PROCEDURE — 85025 COMPLETE CBC W/AUTO DIFF WBC: CPT

## 2022-05-18 PROCEDURE — 93017 CV STRESS TEST TRACING ONLY: CPT

## 2022-05-18 PROCEDURE — U0003: CPT

## 2022-05-18 PROCEDURE — 36415 COLL VENOUS BLD VENIPUNCTURE: CPT

## 2022-05-18 PROCEDURE — 83036 HEMOGLOBIN GLYCOSYLATED A1C: CPT

## 2022-05-18 PROCEDURE — 97165 OT EVAL LOW COMPLEX 30 MIN: CPT

## 2022-05-18 PROCEDURE — 83735 ASSAY OF MAGNESIUM: CPT

## 2022-05-18 PROCEDURE — 85027 COMPLETE CBC AUTOMATED: CPT

## 2022-05-18 PROCEDURE — 71045 X-RAY EXAM CHEST 1 VIEW: CPT

## 2022-05-18 PROCEDURE — 97530 THERAPEUTIC ACTIVITIES: CPT

## 2022-05-18 PROCEDURE — 78452 HT MUSCLE IMAGE SPECT MULT: CPT

## 2022-05-18 PROCEDURE — 97162 PT EVAL MOD COMPLEX 30 MIN: CPT

## 2022-05-18 PROCEDURE — 82088 ASSAY OF ALDOSTERONE: CPT

## 2022-05-18 PROCEDURE — 84100 ASSAY OF PHOSPHORUS: CPT

## 2022-05-18 PROCEDURE — 97535 SELF CARE MNGMENT TRAINING: CPT

## 2022-05-18 PROCEDURE — 84484 ASSAY OF TROPONIN QUANT: CPT

## 2022-05-18 PROCEDURE — 82962 GLUCOSE BLOOD TEST: CPT

## 2022-05-18 PROCEDURE — 97116 GAIT TRAINING THERAPY: CPT

## 2022-05-18 PROCEDURE — 93005 ELECTROCARDIOGRAM TRACING: CPT

## 2022-05-18 PROCEDURE — 80061 LIPID PANEL: CPT

## 2022-05-18 PROCEDURE — 93454 CORONARY ARTERY ANGIO S&I: CPT

## 2022-05-18 PROCEDURE — 83880 ASSAY OF NATRIURETIC PEPTIDE: CPT

## 2022-05-18 PROCEDURE — U0005: CPT

## 2022-05-18 PROCEDURE — 99285 EMERGENCY DEPT VISIT HI MDM: CPT

## 2022-05-18 PROCEDURE — C1769: CPT

## 2022-05-18 PROCEDURE — 80053 COMPREHEN METABOLIC PANEL: CPT

## 2022-05-18 PROCEDURE — 80048 BASIC METABOLIC PNL TOTAL CA: CPT

## 2022-05-18 PROCEDURE — 99239 HOSP IP/OBS DSCHRG MGMT >30: CPT

## 2022-05-18 PROCEDURE — C1894: CPT

## 2022-05-18 PROCEDURE — 97110 THERAPEUTIC EXERCISES: CPT

## 2022-05-18 PROCEDURE — 93306 TTE W/DOPPLER COMPLETE: CPT

## 2022-05-18 PROCEDURE — 84244 ASSAY OF RENIN: CPT

## 2022-05-18 PROCEDURE — 99152 MOD SED SAME PHYS/QHP 5/>YRS: CPT

## 2022-05-18 RX ORDER — FAMOTIDINE 10 MG/ML
1 INJECTION INTRAVENOUS
Qty: 30 | Refills: 0
Start: 2022-05-18 | End: 2022-06-16

## 2022-05-18 RX ORDER — METFORMIN HYDROCHLORIDE 850 MG/1
1 TABLET ORAL
Qty: 0 | Refills: 0 | DISCHARGE

## 2022-05-18 RX ORDER — AMLODIPINE BESYLATE 2.5 MG/1
1 TABLET ORAL
Qty: 0 | Refills: 0 | DISCHARGE
Start: 2022-05-18

## 2022-05-18 RX ORDER — HYDRALAZINE HCL 50 MG
1 TABLET ORAL
Qty: 0 | Refills: 0 | DISCHARGE

## 2022-05-18 RX ORDER — LABETALOL HCL 100 MG
1 TABLET ORAL
Qty: 90 | Refills: 0
Start: 2022-05-18 | End: 2022-06-16

## 2022-05-18 RX ORDER — LABETALOL HCL 100 MG
1 TABLET ORAL
Qty: 0 | Refills: 0 | DISCHARGE
Start: 2022-05-18

## 2022-05-18 RX ORDER — FAMOTIDINE 10 MG/ML
1 INJECTION INTRAVENOUS
Qty: 0 | Refills: 0 | DISCHARGE
Start: 2022-05-18

## 2022-05-18 RX ORDER — AMLODIPINE BESYLATE 2.5 MG/1
1 TABLET ORAL
Qty: 30 | Refills: 0
Start: 2022-05-18 | End: 2022-06-16

## 2022-05-18 RX ADMIN — Medication 81 MILLIGRAM(S): at 12:28

## 2022-05-18 RX ADMIN — Medication 300 MILLIGRAM(S): at 14:46

## 2022-05-18 RX ADMIN — HEPARIN SODIUM 5000 UNIT(S): 5000 INJECTION INTRAVENOUS; SUBCUTANEOUS at 05:17

## 2022-05-18 RX ADMIN — FAMOTIDINE 20 MILLIGRAM(S): 10 INJECTION INTRAVENOUS at 12:28

## 2022-05-18 RX ADMIN — AMLODIPINE BESYLATE 10 MILLIGRAM(S): 2.5 TABLET ORAL at 05:16

## 2022-05-18 RX ADMIN — Medication 300 MILLIGRAM(S): at 05:17

## 2022-05-18 RX ADMIN — HEPARIN SODIUM 5000 UNIT(S): 5000 INJECTION INTRAVENOUS; SUBCUTANEOUS at 14:47

## 2022-05-18 RX ADMIN — LISINOPRIL 40 MILLIGRAM(S): 2.5 TABLET ORAL at 05:16

## 2022-05-18 NOTE — DISCHARGE NOTE PROVIDER - HOSPITAL COURSE
62F PMH T2DM, HTN, Multiple CVAs(2016,'20,'21) c/b right foot drop and residual rt-arm weakness s/p ILR in Aug '21 presented with chest pain and palpitations.     Problem/Plan - 1:  ·  Problem: Atypical chest pain.   ·  Plan: Negative trops x3 and EKG x 2 w/o acute ST changes  Positive stress test. S/p diagnostic angiogram on  5/17, pDx60% , luminal disease  TTE- mild diastolic dysfunction   A1C 7.2 lipid profile wnl TSH wnl     Problem/Plan - 2:  ·  Problem: HTN (hypertension).   ·  Plan: Uncontrolled HTN SBP 160s to 170s could be a reason for CP as well  - Increased Labetalol 300mg TID  - Continue Amlodipine 10mg daily  - Continue Lisinopril 40mg daily  - stopped Hydralazine 25mg TID   - Aldosterone level WNL. Renin level WNL.   - Would likely benefit from nephrology f/up as outpatient to optimize HTN management.     Problem/Plan - 3:  ·  Problem: Palpitations.   ·  Plan: -s/p ILR  -hypokalemic on admission s/p repletions     Problem/Plan - 4:  ·  Problem: T2DM (type 2 diabetes mellitus).   ·  Plan: -currently on Levemir 15u qhs, metformin 500mg BID and Jardiance 10mg at home  -A1C 7.2.     Problem/Plan - 5:  ·  Problem: Cerebrovascular accident (CVA).   ·  Plan: -continue ASA and statin  -ILR had been placed to evaluate for embolic source of multiple strokes and has not followed up with cards    Blood pressure is better controlled and medically cleared by Dr. Pichardo to discharge pt home 62F PMH T2DM, HTN, Multiple CVAs(2016,'20,'21) c/b right foot drop and residual rt-arm weakness s/p ILR in Aug '21 presented with chest pain and palpitations.     Problem/Plan - 1:  ·  Problem: Atypical chest pain.   ·  Plan: Negative trops x3 and EKG x 2 w/o acute ST changes  Positive stress test. S/p diagnostic angiogram on  5/17, pDx60% , luminal disease  TTE- mild diastolic dysfunction   A1C 7.2 lipid profile wnl TSH wnl     Problem/Plan - 2:  ·  Problem: HTN (hypertension).   ·  Plan: Uncontrolled HTN SBP 160s to 170s could be a reason for CP as well  - Increased Labetalol 300mg TID  - Continue Amlodipine 10mg daily  - Continue Lisinopril 40mg daily  - stopped Hydralazine 25mg TID   - Aldosterone level WNL. Renin level WNL.   - Would likely benefit from nephrology f/up as outpatient to optimize HTN management.     Problem/Plan - 3:  ·  Problem: Palpitations.   ·  Plan: -s/p ILR  -hypokalemic on admission s/p repletions     Problem/Plan - 4:  ·  Problem: T2DM (type 2 diabetes mellitus).   ·  Plan: -currently on Levemir 15u qhs, metformin 500mg BID and Jardiance 10mg at home  -A1C 7.2.     Problem/Plan - 5:  ·  Problem: Cerebrovascular accident (CVA).   ·  Plan: -continue ASA and statin  -ILR had been placed to evaluate for embolic source of multiple strokes and has not followed up with cards    Blood pressure is better controlled and medically cleared by Dr. Pichardo to discharge pt home      Discharge Diagnoses:  # Atypical chest pain - s/p diagnostic angiogram  # essential hypertension - plans to see nephrology outpatient to help with BP optimization  # palpitations - plans to see cardiology for ILR review  # DMT2  # h/o CVA    The patient is medically optimized for discharge to home. All questions answered.

## 2022-05-18 NOTE — DISCHARGE NOTE PROVIDER - NSDCCPTREATMENT_GEN_ALL_CORE_FT
PRINCIPAL PROCEDURE  Procedure: Left heart cardiac cath  Findings and Treatment: You had a cardiac catheterization on 5/17/22   No heavy lifting, strenuous activity, bending, straining or unnecessary stair climbing for 2 weeks. No sex for 1 week. No driving for 2 days. You may shower 24 hours following procedure but avoid baths and swimming for 1 week. Check groin site for bleeding and/or swelling daily following procedure. Call your doctor/cardiologist immediately for bleeding or swelling or if you have increased/persistent pain, fever/chills, or drainage at the site. Follow up with your cardiologist in 1- 2 weeks. You may call [facility] at [phone] or [phone] after office hours and weekends with any questions or concerns following our procedure.

## 2022-05-18 NOTE — DISCHARGE NOTE PROVIDER - NSDCCPGOAL_GEN_ALL_CORE_FT
________________________________________________________________    ~ It was our pleasure to care for you today. Thank you for choosing [facility]. We hope you will be feeling better soon.~    Thank you,  The Nursing Staff~    Hours:   Monday - Friday 8:00 am - 8:00 pm  Saturday & Sunday 8:00 am - 4:00 pm  ~~~~~~~~~~~~~~~~~~~~~~~~~~~~~~~~~~~~~~~~~~~~~~~~~~~~~~~~~~~~~    You may be receiving a patient satisfaction survey in the mail following your visit. Please take the time to complete this, as your feedback is very important to us! We strive to make your experience exceptional and your comments help us with that goal. We look forward to hearing from you!     If your provider has ordered additional testing as part of your ongoing plan of care, please allow 5-7 business days (from the day of your visit) for the testing to be resulted and reviewed by your provider. You will be contacted via telephone if your results are abnormal or changes need to be made with your current plan. If you have any questions regarding your testing, please contact the nurse at   [phone].         Patient Education     Conjunctivitis, Nonspecific    The membrane that covers the white part of your eye (the conjunctiva) is inflamed. Inflammation happens when your body responds to an injury, allergic reaction, infection, or illness. Symptoms of inflammation in the eye may include redness, irritation, itching, swelling, or burning. These symptoms should go away within the next 24 hours. Conjunctivitis may be related to a particle that was in your eye. If so, it may wash out with your tears or irrigation treatment. Being exposed to liquid chemicals or fumes may also cause this reaction.   Home care  · Apply a cold pack over the eye for 20 minutes at a time. This will reduce pain. To make a cold pack, put ice cubes in a plastic bag that seals at the top. Wrap the bag in a clean, thin towel or cloth.  · Artificial  To get better and follow your care plan as instructed. tears may be prescribed to reduce irritation or redness.  These should be used 3 to 4 times a day.  · You may use acetaminophen or ibuprofen to control pain, unless another medicine was prescribed. (Note: If you have chronic liver or kidney disease, or if you have ever had a stomach ulcer or gastrointestinal bleeding, talk with your healthcare provider before using these medicines.)  Follow-up care  Follow up with your healthcare provider, or as advised.  When to seek medical advice  Call your healthcare provider right away if any of these occur:  · Increased eyelid swelling  · Increased eye pain  · Increased redness or drainage from the eye  · Increased blurry vision or increased sensitivity to light  · Failure of normal vision to return within 24 to 48 hours  Date Last Reviewed: 7/1/2017  © 8017-5673 [employer]. [address] [place] [address], [place] [address]. All rights reserved. This information is not intended as a substitute for professional medical care. Always follow your healthcare professional's instructions.           Use the drops as prescribed for the next 7 days.  Stop using makeup around the eyes until it is completely resolved.  Clean your brush tips and makeup devices to avoid reinfections.    Follow up with your doctor if not resolving in the next 7-10 days.

## 2022-05-18 NOTE — DISCHARGE NOTE PROVIDER - NSDCMRMEDTOKEN_GEN_ALL_CORE_FT
amLODIPine 10 mg oral tablet: 1 tab(s) orally once a day  aspirin 81 mg oral tablet, chewable: 1 tab(s) orally once a day  atorvastatin 80 mg oral tablet: 1 tab(s) orally once a day (at bedtime)  famotidine 20 mg oral tablet: 1 tab(s) orally once a day  Jardiance 10 mg oral tablet: 1 tab(s) orally once a day (in the morning)  labetalol 300 mg oral tablet: 1 tab(s) orally 3 times a day  Levemir 100 units/mL subcutaneous solution: 15 unit(s) subcutaneous once a day (at bedtime)  lisinopril 40 mg oral tablet: 1 tab(s) orally once a day (at bedtime)   metFORMIN 500 mg oral tablet: 1 tab(s) orally 2 times a day RESUME ON 5/20/22

## 2022-05-18 NOTE — DISCHARGE NOTE NURSING/CASE MANAGEMENT/SOCIAL WORK - PATIENT PORTAL LINK FT
You can access the FollowMyHealth Patient Portal offered by [facility] by registering at the following website: [email or web address]. By joining [employer]’s FollowMyHealth portal, you will also be able to view your health information using other applications (apps) compatible with our system.

## 2022-05-18 NOTE — DISCHARGE NOTE PROVIDER - CARE PROVIDER_API CALL
Bunny Vences  [facility]  [address] [place] [place] [address]  Phone: [phone]  Fax: [phone]  Scheduled Appointment: 05/20/2022

## 2022-05-18 NOTE — DISCHARGE NOTE NURSING/CASE MANAGEMENT/SOCIAL WORK - NSDCVIVACCINE_GEN_ALL_CORE_FT
influenza, injectable, quadrivalent, preservative free; 06-Oct-2020 15:27; Abad Trujillo (RN); [employer]; [id number] (Exp. Date: 30-Jun-2021); IntraMuscular; Deltoid Left.; 0.5 milliLiter(s); VIS (VIS Published: 15-Aug-2019, VIS Presented: 06-Oct-2020);   influenza, injectable, quadrivalent, preservative free; 17-Sep-2021 13:00; Agustina Murray (NIA); [employer]; [id number] (Exp. Date: 30-Jun-2022); IntraMuscular; Deltoid Right.; 0.5 milliLiter(s); VIS (VIS Published: 15-Aug-2019, VIS Presented: 17-Sep-2021);

## 2022-05-18 NOTE — DISCHARGE NOTE PROVIDER - NSDCCPCAREPLAN_GEN_ALL_CORE_FT
PRINCIPAL DISCHARGE DIAGNOSIS  Diagnosis: Atypical chest pain  Assessment and Plan of Treatment: Your stress test was abnormal.  You got an cardiac catheterization on 5/17 which showed plaques in the aheart arteries - (pDx60%), but non obstructing the blood flow  Echo - mild diastolic dysfunction   A1C 7.2 lipid profile wnl TSH wnl  HOME CARE INSTRUCTIONS  For the next few days, avoid physical activities that bring on chest pain. Continue physical activities as directed.  Do not smoke.  Avoid drinking alcohol.   SEEK IMMEDIATE MEDICAL CARE IF:  You have increased chest pain or pain that spreads to your arm, neck, jaw, back, or abdomen.   You develop shortness of breath, an increasing cough, or you are coughing up blood.  You have severe back or abdominal pain, feel nauseous, or vomit.  You develop severe weakness, fainting, or chillsand fevers  THIS IS AN EMERGENCY. Do not wait to see if the pain will go away. Get medical help at once. Call your local emergency services (_____________[phone]________). Do not drive yourself to the hospital.        SECONDARY DISCHARGE DIAGNOSES  Diagnosis: Uncontrolled hypertension  Assessment and Plan of Treatment: Uncontrolled elevated blood pressure - BP 160s to 170s could be a reason for CP as well  Increased Labetalol 300mg TID  Continue Amlodipine 10mg daily  Continue Lisinopril 40mg daily  Stopped Hydralazine 25mg TID   Blood work - Aldosterone level and Renin level normal  Follow up with [facility] doctors - Would likely benefit for B/P management.  Take medications for your blood pressure as recommended.  Eat a heart healthy diet that is low in saturated fats and salt, and includes whole grains, fruits, vegetables and lean protein   Exercise regularly (consult with your physician or cardiologist first); maintain a heart healthy weight.   If you smoke - quit (A resource to help you stop smoking is the [facility] – phone number [phone].). Continue to follow with your primary physician or cardiologist.   Seek medical help for dizziness, Lightheadedness, Blurry vision, Headache, Chest pain, Shortness of breath  Follow up with your medical doctor to establish long term blood pressure treatment goals.      Diagnosis: Palpitations  Assessment and Plan of Treatment: Hypokalemic on admission s/p repletions  You have a internal loop recorder  Follow up with cardiologist and neurologist to review readings of the device       Diagnosis: T2DM (type 2 diabetes mellitus)  Assessment and Plan of Treatment: HgA1C this admission - 7.2  Make sure you get your HgA1c checked every three months.  If you take insulin, check your blood glucose before meals and at bedtime.  It's important not to skip any meals.  Keep a log of your blood glucose results and always take it with you to your doctor appointments.  Keep a list of your current medications including injectables and over the counter medications and bring this medication list with you to all your doctor appointments.  If you have not seen your ophthalmologist this year call for appointment.  Check your feet daily for redness, sores, or openings. Do not self treat. If no improvement in two days call your primary care physician for an appointment.  Low blood sugar (hypoglycemia) is a blood sugar below 70mg/dl. Check your blood sugar if you feel signs/symptoms of hypoglycemia. If your blood sugar is below 70 take 15 grams of carbohydrates (ex 4 oz of apple juice, 3-4 glucose tablets, or 4-6 oz of regular soda) wait 15 minutes and repeat blood sugar to make sure it comes up above 70.  If your blood sugar is above 70 and you are due for a meal, have a meal.  If you are not due for a meal have a snack.  This snack helps keeps your blood sugar at a safe range.      Diagnosis: Cerebrovascular accident (CVA)  Assessment and Plan of Treatment: continue ASA and statin  Follow up with cardiologist and neurologist to review readings of the device

## 2022-05-24 LAB — RENIN PLAS-CCNC: 0.56 NG/ML/HR — SIGNIFICANT CHANGE UP (ref 0.17–5.38)

## 2022-06-01 NOTE — CHART NOTE - NSCHARTNOTEFT_GEN_A_CORE
Left (1) message for patient in regards to follow up care with callback information.
Medicine Attending - Discharge Day Note:  ================================================    # Atypical chest pain - s/p diagnostic angiogram  # essential hypertension - plans to see nephrology outpatient to help with BP optimization  # palpitations - plans to see cardiology for ILR review  # DMT2  # h/o CVA    The patient is medically optimized for discharge to home. All questions answered.     Discharge time spent 35 minutes.     Eddie Pichardo MD, TYLER  Available on [employer] Teams
Seen for elevated /92, chest pain and head ache   Seen and examined at bedside.    A & O X3 , NAD, reports pressure like constant chest pain 7/10 ( same pain she came to hospital with), left side head ache which started  about 30 min ago.   Denies vision changes, dizziness, palpitations.   Right sided weakness 4/5 in RUE and RLE noted ( residual weakness from stroke as per pt )   She took her BP medications ( Hydralazine, Labetalol and Lisinopril )   - Will check CE, EKG  - Will give Tylenol 650 mg for Head ache and repeat BPs in one hour       Kinsey Prado NP-C   #[id number]
Left 2 messages for patient in regards to follow up care with callback information.

## 2022-07-06 NOTE — ED ADULT NURSE NOTE - SPO2 (%)
"Pre-op Monthly Nutrition Education        Ht Readings from Last 1 Encounters:   05/05/22 5' 2"" (1.575 m)       Wt Readings from Last 1 Encounters:   07/06/22 94.3 kg      Body mass index is 38.04 kg/mÂ². First Session Weight: 94.3 kg/207# (5/5/22)   Weight Loss to date: unchanged   Work: Pt working at [employer] from [place]   Current Diet: B: zero sugar yogurt or egg w/ tortilla                            L: chix and salad ( at home before work)                            D: did try to bring a Premier Protein to work, otherwise just coffee                            Water: >80 oz   Bariatric pre-op nutrition education/Session:  #3/6                                                    Pertinent Medical Hx noted: no new information reported             NUTRITION  DIAGNOSIS (PES)  Food and nutrition knowledge related deficit related to Lack of prior exposure to information, preparing for [address] [place] [address], as evidenced by Need for Education      ASSESSMENT  7/6/2022 Phone Visit with Serbian Interpretor:   Pt presents for 3rd of 6 required consecutive monthly RD nutrition education visits prior to [address] [place] [address]. Pts weight remains unchanged. She did try a Premier Protein for her dinner while at work but not consistently. Emphasized not to skip meals and incorporate a protein source every meal. Pt is practicing  liquids and solids. She is exercising 3x/week, walking for 45 minutes. Educated on 20-20-20 rule and order of food today. Reminded she needs to lose weight for insurance approval. Pt receptive. TREATMENT PLAN:  Topics discussed today  1. Practice 20-20-20  2. Practice eating foods in order of importance: protein first.  3. Do not skip meal while at work. Bring a premier protein  4. Continue exercise, walking 3x/week   Yes     RD monitoring intake trends, weight, labs, adherence to diet. Will collaborate with Bariatric Team as indicated.       Follow up in 1 month on 8/1/22: Weight check ( she has scale in home so may have " to have her go weigh herself). Reinforce having a shake as meal replacement while at work. Discuss foods to avoid for 6-9 months (tortillas) and ways to avoid dumping syndrome.          Face to Face Time:  30 minutes 99

## 2022-09-14 NOTE — PHYSICAL THERAPY INITIAL EVALUATION ADULT - STANDING BALANCE: STATIC
with rolling walker/fair plus Suturegard Body: The suture ends were repeatedly re-tightened and re-clamped to achieve the desired tissue expansion.

## 2022-11-30 NOTE — DISCHARGE NOTE ADULT - BECAUSE OF A PHYSICAL, MENTAL OR EMOTIONAL CONDITION, DO YOU HAVE DIFFICULTY DOING  ERRANDS ALONE LIKE VISITING A DOCTOR'S OFFICE OR SHOPPING (15 YEARS AND OLDER)
[facility]    History of Present Illness:   Elaina Maria is a 36 y.o. female here for   Chief Complaint   Patient presents with    Follow-up         HPI:  Patient here for follow-up depression. Unfortunately she was not able to  the Lexapro 5 mg as the pharmacist could not confirm with the  that that dose is alpha gal friendly. The 10 mg is however. She continues to see a counselor weekly. She denies any suicidal ideation. She also has a history of hyperlipidemia. She is due for lab work today. She is followed by GYN; She does have a history of abnormal Pap and was diagnosed with endometriosis. She is on metformin for PCOS. 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 metFORMIN (GLUCOPHAGE) 500 mg tablet Take 500 mg by mouth two (2) times daily (with meals). [DISCONTINUED] escitalopram oxalate (LEXAPRO) 5 mg tablet Take 1 Tablet by mouth daily.  (Patient not taking: Reported on 2022) 90 Tablet 0     No current facility-administered medications on file prior to visit. Patient Active Problem List   Diagnosis Code    Sleep apnea G47.30    Leukocytosis D72.829    Generalized anxiety disorder F41. 1    Benign hypertension I10    Depression F32. A    PCOS (polycystic ovarian syndrome) E28.2    Mixed hyperlipidemia E78.2       Social History     Socioeconomic History    Marital status: SINGLE   Tobacco Use    Smoking status: Every Day     Packs/day: 0.50     Years: 20.00     Pack years: 10.00     Types: Cigarettes    Smokeless tobacco: Never     Social Determinants of Health     Financial Resource Strain: Low Risk     Difficulty of Paying Living Expenses: Not very hard   Food Insecurity: No Food Insecurity    Worried About Running Out of Food in the Last Year: Never true    Ran Out of Food in the Last Year: Never true        Review of Systems   Review of Systems   Constitutional:  Negative for chills and fever. Respiratory:  Negative for cough and shortness of breath. Cardiovascular:  Negative for chest pain. Objective:   Visit Vitals  /80 (BP 1 Location: Right arm, BP Patient Position: Sitting, BP Cuff Size: Large adult)   Pulse 80   Temp 97.5 °F (36.4 °C) (Oral)   Resp 14   Ht 5' 4\" (1.626 m)   Wt 226 lb (102.5 kg)   LMP 11/15/2022 (Exact Date)   SpO2 97%   BMI 38.79 kg/m²        Physical Exam  PHYSICAL EXAM:  Gen: Pt sitting in chair, in NAD  Head: Normocephalic, atraumatic  Eyes: Sclera anicteric, EOM grossly intact,  Ears: TM's pearly with good light reflex b/l  Neck: Supple, no LAD, no thyromegaly or carotid bruits  CVS: Normal S1, S2, no m/r/g  Resp: CTAB, no wheezes or rales  Neuro: Alert, oriented, appropriate  Psych: good eye contact, slightly flat affect      Assessment and orders:       ICD-10-CM ICD-9-CM    1. Depression, unspecified depression type  F32. A 311 escitalopram oxalate (LEXAPRO) 10 mg tablet      DISCONTINUED: escitalopram oxalate (LEXAPRO) 10 mg tablet      2. Benign hypertension  I10 401.1       3. Encounter for long-term (current) use of other medications  Z79.899 V58.69 CBC WITH AUTOMATED DIFF      METABOLIC PANEL, COMPREHENSIVE      4. Need for hepatitis C screening test  Z11.59 V73.89 HEPATITIS C AB      5. Screening mammogram for breast cancer  Z12.31 V76.12 RAMOS MAMMO BI SCREENING INCL CAD      6. Mixed hyperlipidemia  E78.2 272.2 LIPID PANEL        Diagnoses and all orders for this visit:    1. Depression, unspecified depression type  -     escitalopram oxalate (LEXAPRO) 10 mg tablet; Take 0.5 Tablets by mouth daily. Please check with  regarding alpha gal    2. Benign hypertension  Assessment & Plan:   well controlled, continue current medications      3. Encounter for long-term (current) use of other medications  -     CBC WITH AUTOMATED DIFF; Future  -     METABOLIC PANEL, COMPREHENSIVE; Future    4. Need for hepatitis C screening test  -     HEPATITIS C AB; Future    5. Screening mammogram for breast cancer  -     RAMOS MAMMO BI SCREENING INCL CAD; Future    6. Mixed hyperlipidemia  -     LIPID PANEL; Future    Follow-up and Dispositions    Return in about 6 weeks (around 1/11/2023) for mental health. lab results and schedule of future lab studies reviewed with patient  reviewed medications and side effects in detail   I advised patient to get COVID booster at local pharmacy  I have discussed the diagnosis with the patient and the intended plan as seen in the above orders. Social history, medical history, and labs were reviewed. The patient has received an after-visit summary and questions were answered concerning future plans. I have discussed medication side effects and warnings with the patient as well. Patient/guardian verbalized understanding and accepts plan & risks. Please note that this dictation was completed with G5, the [employer] voice recognition software.   Quite often unanticipated grammatical, syntax, homophones, and other interpretive errors are inadvertently transcribed by the computer software. Please disregard these errors. Please excuse any errors that have escaped final proofreading. Thank you.      MD DIANDRA Solis & PRATIK KAMINSKI [facility]  11/30/22 No

## 2023-10-03 NOTE — PROGRESS NOTE ADULT - REASON FOR ADMISSION
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
CVA
CVA
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
Colchicine Counseling:  Patient counseled regarding adverse effects including but not limited to stomach upset (nausea, vomiting, stomach pain, or diarrhea).  Patient instructed to limit alcohol consumption while taking this medication.  Colchicine may reduce blood counts especially with prolonged use.  The patient understands that monitoring of kidney function and blood counts may be required, especially at baseline. The patient verbalized understanding of the proper use and possible adverse effects of colchicine.  All of the patient's questions and concerns were addressed.

## 2023-10-04 NOTE — PROGRESS NOTE ADULT - SUBJECTIVE AND OBJECTIVE BOX
Patient is a 61y old  Female who presents with a chief complaint of CVA (19 Aug 2021 14:38)    HPI:  62 yo R handed Female with  PMHx of DMT2, HTN, HLD, multiple CVAs w/ residual RUE/LE weakness w/ foot drop, LLE>UE numbness s/p loop recorder and plavix  now discontinued 2017. She presented to [facility] on 8/15/21 with acute on chronic R-sided weakness, LKN 8/15 07:30 symptoms noticed around 10AM after patient was trying to use her arm and was not able to "make it do what I wanted" on further clarification of RUE>LE weakness. Pt endorsed was leaning to one side, endorsed RLE feels weaker than normal which was present on initial NIHSS 5. Initial BPs elevated 190s systolic, CTH without hemorrhage or acute findings, BP was lowered with improvement in R sided acute on chronic weakness to allow for ataxia testing in limbs which was present, which was new findings.  Endorsed mild L posterior headache, no N/V or infectious symptoms. Patient was re-evaluated after improvement for weakness, concern for focal seizure with patient endorsing no prior seizure history or prior episodes w RUE. CTH w/o acute findings, CTA w worsening PCA stenosis but normal flow bL VAs. CDU monitoring for MR Brain to assess for any new infarcts, however course c/b reoccurrence in R UE "weakness" appearing contracture-like w RUE flexion and hypertonia, intact mentation.  she was seen by neurologist.  pt received 1500mg IV Keppra load given high suspicion for focal seizure activity. MRI Brain showed Left thalamic infarct. She did not receive TPA as she was out of  window. She was seen by cardiology; TTE, ASA/STATIN, and losartan started. EP was consulted, ILR was implanted (8/18), No events recorded on ILR. Of note, patient had ILR placed in the past ( 2017) no arrhythmias was recorded then. She was evaluated by PM&R and therapist and acute rehabilitation was recommended. She was admitted to [facility] on 8/19/21.       SUBJECTIVE: Patient seen and evaluated this morning. States that her right toes are curling especially when tryong to ambulate which makes it uncomfortable with her. Patient also has some tone on her RUE. No acute medical issues noted overnight.     ROS:  Denies HA/CP/palpitations/abdominal pain or nausea  Denies dysuria       PHYSICAL EXAM    Vital Signs Last 24 Hrs  T(C): 36.9 (27 Aug 2021 08:54), Max: 36.9 (27 Aug 2021 08:54)  T(F): 98.4 (27 Aug 2021 08:54), Max: 98.4 (27 Aug 2021 08:54)  HR: 92 (27 Aug 2021 08:54) (80 - 95)  BP: 146/95 (27 Aug 2021 08:54) (145/83 - 150/89)  BP(mean): --  RR: 16 (27 Aug 2021 08:54) (15 - 16)  SpO2: 99% (27 Aug 2021 08:54) (98% - 99%)    Constitutional - NAD, Comfortable  Chest - good chest expansion, good respiratory effort,  Cardio - s1s2+   Abdomen -  Soft, NTND  Extremities - No peripheral edema, No calf tenderness   Neurologic Exam:                    Cognitive - AAO x 3     Speech - Fluent, Comprehensible, Mild dysarthria      Motor -                       LUE and LLE 5/5                     RIGHT UE - ShAB 2/5, EF 2/5, EE 2/5, WE 0/5,  0/5, trace finger movement                     RIGHT LE - HF 3/5, KE 4/5, DF 0/5, PF 1/5  + foot drop      Sensory - Intact to LT bilateral     Tone- some increased tone noted on right elbow flexors and right finger flexors   Psychiatric - Mood stable, Affect WNL  Skin: Loop recorder site- clean       CAPILLARY BLOOD GLUCOSE  POCT Blood Glucose.: 146 mg/dL (27 Aug 2021 08:17)  POCT Blood Glucose.: 127 mg/dL (26 Aug 2021 22:49)      MEDICATIONS  (STANDING):  amLODIPine   Tablet 10 milliGRAM(s) Oral daily  aspirin  chewable 81 milliGRAM(s) Oral daily  atorvastatin 80 milliGRAM(s) Oral at bedtime  enoxaparin Injectable 40 milliGRAM(s) SubCutaneous daily  FLUoxetine 10 milliGRAM(s) Oral daily  insulin lispro (ADMELOG) corrective regimen sliding scale   SubCutaneous before breakfast  insulin lispro (ADMELOG) corrective regimen sliding scale   SubCutaneous at bedtime  lisinopril 40 milliGRAM(s) Oral daily  metFORMIN 1000 milliGRAM(s) Oral two times a day with meals  ticagrelor 90 milliGRAM(s) Oral every 12 hours  tiZANidine 2 milliGRAM(s) Oral at bedtime    MEDICATIONS  (PRN):  acetaminophen   Tablet .. 975 milliGRAM(s) Oral every 6 hours PRN Mild Pain (1 - 3), Moderate Pain (4 - 6), Severe Pain (7 - 10)                        Patient is a 61y old  Female who presents with a chief complaint of CVA (19 Aug 2021 14:38)    HPI:  60 yo R handed Female with  PMHx of DMT2, HTN, HLD, multiple CVAs w/ residual RUE/LE weakness w/ foot drop, LLE>UE numbness s/p loop recorder and plavix  now discontinued 2017. She presented to [facility] on 8/15/21 with acute on chronic R-sided weakness, LKN 8/15 07:30 symptoms noticed around 10AM after patient was trying to use her arm and was not able to "make it do what I wanted" on further clarification of RUE>LE weakness. Pt endorsed was leaning to one side, endorsed RLE feels weaker than normal which was present on initial NIHSS 5. Initial BPs elevated 190s systolic, CTH without hemorrhage or acute findings, BP was lowered with improvement in R sided acute on chronic weakness to allow for ataxia testing in limbs which was present, which was new findings.  Endorsed mild L posterior headache, no N/V or infectious symptoms. Patient was re-evaluated after improvement for weakness, concern for focal seizure with patient endorsing no prior seizure history or prior episodes w RUE. CTH w/o acute findings, CTA w worsening PCA stenosis but normal flow bL VAs. CDU monitoring for MR Brain to assess for any new infarcts, however course c/b reoccurrence in R UE "weakness" appearing contracture-like w RUE flexion and hypertonia, intact mentation.  she was seen by neurologist.  pt received 1500mg IV Keppra load given high suspicion for focal seizure activity. MRI Brain showed Left thalamic infarct. She did not receive TPA as she was out of  window. She was seen by cardiology; TTE, ASA/STATIN, and losartan started. EP was consulted, ILR was implanted (8/18), No events recorded on ILR. Of note, patient had ILR placed in the past ( 2017) no arrhythmias was recorded then. She was evaluated by PM&R and therapist and acute rehabilitation was recommended. She was admitted to [facility] on 8/19/21.       SUBJECTIVE: Patient seen and evaluated this morning. States that her right toes are curling especially when trying to ambulate which makes it uncomfortable with her. Patient also has some tone on her RUE. No acute medical issues noted overnight.     ROS:  Denies HA/CP/palpitations/abdominal pain or nausea  Denies dysuria       PHYSICAL EXAM    Vital Signs Last 24 Hrs  T(C): 36.9 (27 Aug 2021 08:54), Max: 36.9 (27 Aug 2021 08:54)  T(F): 98.4 (27 Aug 2021 08:54), Max: 98.4 (27 Aug 2021 08:54)  HR: 92 (27 Aug 2021 08:54) (80 - 95)  BP: 146/95 (27 Aug 2021 08:54) (145/83 - 150/89)  BP(mean): --  RR: 16 (27 Aug 2021 08:54) (15 - 16)  SpO2: 99% (27 Aug 2021 08:54) (98% - 99%)    Constitutional - NAD, Comfortable  Chest - good chest expansion, good respiratory effort,  Cardio - s1s2+   Abdomen -  Soft, NTND  Extremities - No peripheral edema, No calf tenderness   Neurologic Exam:                    Cognitive - AAO x 3     Speech - Fluent, Comprehensible, Mild dysarthria      Motor -                       LUE and LLE 5/5                     RIGHT UE - ShAB 2/5, EF 2/5, EE 2/5, WE 0/5,  0/5, trace finger movement                     RIGHT LE - HF 3/5, KE 4/5, DF 0/5, PF 1/5  + foot drop      Sensory - Intact to LT bilateral     Tone- some increased tone noted on right elbow flexors and right finger flexors   Psychiatric - Mood stable, Affect WNL  Skin: Loop recorder site- clean       CAPILLARY BLOOD GLUCOSE  POCT Blood Glucose.: 146 mg/dL (27 Aug 2021 08:17)  POCT Blood Glucose.: 127 mg/dL (26 Aug 2021 22:49)      MEDICATIONS  (STANDING):  amLODIPine   Tablet 10 milliGRAM(s) Oral daily  aspirin  chewable 81 milliGRAM(s) Oral daily  atorvastatin 80 milliGRAM(s) Oral at bedtime  enoxaparin Injectable 40 milliGRAM(s) SubCutaneous daily  FLUoxetine 10 milliGRAM(s) Oral daily  insulin lispro (ADMELOG) corrective regimen sliding scale   SubCutaneous before breakfast  insulin lispro (ADMELOG) corrective regimen sliding scale   SubCutaneous at bedtime  lisinopril 40 milliGRAM(s) Oral daily  metFORMIN 1000 milliGRAM(s) Oral two times a day with meals  ticagrelor 90 milliGRAM(s) Oral every 12 hours  tiZANidine 2 milliGRAM(s) Oral at bedtime    MEDICATIONS  (PRN):  acetaminophen   Tablet .. 975 milliGRAM(s) Oral every 6 hours PRN Mild Pain (1 - 3), Moderate Pain (4 - 6), Severe Pain (7 - 10)                        FREE:[LAST:[Shanthi],FIRST:[Nabor],PHONE:[phone]],FAX:[(   )    -],ADDRESS:[Primary Care Provider  [address] [place] [address]],FOLLOWUP:[2 weeks],ESTABLISHEDPATIENT:[T]] FREE:[LAST:[Shanthi],FIRST:[Jeronimo],PHONE:[phone]],FAX:[(   )    -],ADDRESS:[Primary Care Provider  [address] [place] [address]],FOLLOWUP:[2 weeks],ESTABLISHEDPATIENT:[T]]

## 2024-01-01 NOTE — DISCHARGE NOTE NURSING/CASE MANAGEMENT/SOCIAL WORK - PATIENT PORTAL LINK FT
Painless jaundice You can access the FollowMyHealth Patient Portal offered by [facility] by registering at the following website: [email or web address]. By joining [facility]’s FollowMyHealth portal, you will also be able to view your health information using other applications (apps) compatible with our system.

## 2024-04-08 NOTE — ED PROVIDER NOTE - CHILD ABUSE FACILITY
Take short course prednisone    If symptoms not resolving then call to schedule heart ultrasound      
[facility]

## 2024-07-08 NOTE — PROGRESS NOTE ADULT - ENDOCRINE
negative

## 2024-11-18 NOTE — ED PROVIDER NOTE - CROS ED ENDOCRINE ALL NEG
Detail Level: Detailed Size Of Lesion In Cm (Optional): 0.7 X Size Of Lesion In Cm (Optional): 0 negative...

## 2025-01-21 NOTE — PROGRESS NOTE ADULT - NEUROLOGICAL
Received refill request for lisinopril (ZESTRIL) 10 MG tablet. Prescription was sent to preferred pharmacy.    negative Alert & oriented; no sensory, motor or coordination deficits, normal reflexes

## 2025-07-08 NOTE — H&P ADULT - NSHPPOADEEPVENOUSTHROMB_GEN_A_CORE
[FreeTextEntry1] : ultrasound guided foam sclerotherapy of the tributary veins [FreeTextEntry3] : Procedural safety checklist and time out completed: Confirmed patient identification (Patient Name, , and/or medical record number including when possible affirmation by patient or parent/family/other). Confirmed procedure with the patient. Consent present, accurate and signed. Confirmed special equipment and supplies are present. Sterility confirmed. Position verified. Site/ side is marked and visible and confirmed. Procedure confirmed by consent. Accurate consent including side and site. Review of medical records, including venous ultrasound, noting correct procedure including site and side. MD/PA verifies presence and review of imaging studies and or written report of imaging studies. Agreement on the procedure to be performed Time out completed. All of the above has been confirmed by the team. All patient-specific concerns have been addressed.   Indication:  left lower extremity branch veins with leg ulcer, pain, leg swelling, itching, burning and leg cramping. Venous insufficiency.   Procedure: left lower extremity ultrasound guided foam sclerotherapy of the tributary veins of the GSV.   Procedure Note:  Ms. LUIS MUNOZ is a 44 year old F with left lower extremity below the knee tributary veins.   The patient has come for sclerotherapy of the left lower extremity below the knee tributary veins. I have discussed the risks of the procedure with the patient. Detailed discussion was held with the patient. Risks of itching, superficial thrombophlebitis, hyperpigmentation (darkening of the skin), allergic reactions, skin irritation due to extravasation of the sclerosant, air-embolism, and in rare cases deep vein thrombosis were all discussed with the patient. The patient agrees to the procedure. The patient was escorted into the procedure room, placed on the procedure table and a time out was called.   1.0 ml of 0.5% STS was mixed with 4 ml air. The vein was cannulated with a 27G butterfly needle. The foam solution injected and appropriate visualization of the foam going into the vein was achieved using direct ultrasound guidance. This was repeated at 2 different sites until the entire 4 ml of the foam solution was injected. Every injected area was immediately compressed with gauze. Repeat ultrasound of the treated vein was performed confirming successful treatment. After assuring hemostasis, a sterile 4x4 was placed on the access site and an ACE compression wrap was applied. Estimated Blood Loss: minimal Patient was given post-procedure instructions and follow up appointment with ultrasound was scheduled. Medication: 0.5% STS     exp 2025   no